# Patient Record
Sex: FEMALE | Race: OTHER | NOT HISPANIC OR LATINO | Employment: OTHER | URBAN - METROPOLITAN AREA
[De-identification: names, ages, dates, MRNs, and addresses within clinical notes are randomized per-mention and may not be internally consistent; named-entity substitution may affect disease eponyms.]

---

## 2022-05-12 ENCOUNTER — APPOINTMENT (OUTPATIENT)
Dept: RADIOLOGY | Facility: MEDICAL CENTER | Age: 82
DRG: 025 | End: 2022-05-12
Attending: EMERGENCY MEDICINE
Payer: MEDICAID

## 2022-05-12 ENCOUNTER — HOSPITAL ENCOUNTER (INPATIENT)
Facility: MEDICAL CENTER | Age: 82
LOS: 19 days | DRG: 025 | End: 2022-05-31
Attending: EMERGENCY MEDICINE | Admitting: STUDENT IN AN ORGANIZED HEALTH CARE EDUCATION/TRAINING PROGRAM
Payer: MEDICAID

## 2022-05-12 DIAGNOSIS — D49.6 BRAIN TUMOR (HCC): ICD-10-CM

## 2022-05-12 DIAGNOSIS — I10 PRIMARY HYPERTENSION: ICD-10-CM

## 2022-05-12 DIAGNOSIS — I10 HYPERTENSION, UNSPECIFIED TYPE: ICD-10-CM

## 2022-05-12 DIAGNOSIS — D32.9 MENINGIOMA (HCC): ICD-10-CM

## 2022-05-12 DIAGNOSIS — K04.7 DENTAL INFECTION: ICD-10-CM

## 2022-05-12 PROBLEM — I16.0 HYPERTENSIVE URGENCY: Status: ACTIVE | Noted: 2022-05-12

## 2022-05-12 PROBLEM — K02.9 DENTAL CARIES: Status: ACTIVE | Noted: 2022-05-12

## 2022-05-12 LAB
ALBUMIN SERPL BCP-MCNC: 3.9 G/DL (ref 3.2–4.9)
ALBUMIN/GLOB SERPL: 1.2 G/DL
ALP SERPL-CCNC: 103 U/L (ref 30–99)
ALT SERPL-CCNC: 20 U/L (ref 2–50)
ANION GAP SERPL CALC-SCNC: 10 MMOL/L (ref 7–16)
APPEARANCE UR: CLEAR
AST SERPL-CCNC: 20 U/L (ref 12–45)
BACTERIA #/AREA URNS HPF: NEGATIVE /HPF
BASOPHILS # BLD AUTO: 0.4 % (ref 0–1.8)
BASOPHILS # BLD: 0.04 K/UL (ref 0–0.12)
BILIRUB SERPL-MCNC: <0.2 MG/DL (ref 0.1–1.5)
BILIRUB UR QL STRIP.AUTO: NEGATIVE
BUN SERPL-MCNC: 17 MG/DL (ref 8–22)
CALCIUM SERPL-MCNC: 9.4 MG/DL (ref 8.5–10.5)
CHLORIDE SERPL-SCNC: 105 MMOL/L (ref 96–112)
CO2 SERPL-SCNC: 25 MMOL/L (ref 20–33)
COLOR UR: YELLOW
CREAT SERPL-MCNC: 0.71 MG/DL (ref 0.5–1.4)
EOSINOPHIL # BLD AUTO: 0.15 K/UL (ref 0–0.51)
EOSINOPHIL NFR BLD: 1.7 % (ref 0–6.9)
EPI CELLS #/AREA URNS HPF: NEGATIVE /HPF
ERYTHROCYTE [DISTWIDTH] IN BLOOD BY AUTOMATED COUNT: 43.6 FL (ref 35.9–50)
GFR SERPLBLD CREATININE-BSD FMLA CKD-EPI: 86 ML/MIN/1.73 M 2
GLOBULIN SER CALC-MCNC: 3.2 G/DL (ref 1.9–3.5)
GLUCOSE SERPL-MCNC: 146 MG/DL (ref 65–99)
GLUCOSE UR STRIP.AUTO-MCNC: NEGATIVE MG/DL
HCT VFR BLD AUTO: 42 % (ref 37–47)
HGB BLD-MCNC: 13.6 G/DL (ref 12–16)
HYALINE CASTS #/AREA URNS LPF: NORMAL /LPF
IMM GRANULOCYTES # BLD AUTO: 0.04 K/UL (ref 0–0.11)
IMM GRANULOCYTES NFR BLD AUTO: 0.4 % (ref 0–0.9)
KETONES UR STRIP.AUTO-MCNC: NEGATIVE MG/DL
LEUKOCYTE ESTERASE UR QL STRIP.AUTO: ABNORMAL
LYMPHOCYTES # BLD AUTO: 1.47 K/UL (ref 1–4.8)
LYMPHOCYTES NFR BLD: 16.2 % (ref 22–41)
MCH RBC QN AUTO: 28.3 PG (ref 27–33)
MCHC RBC AUTO-ENTMCNC: 32.4 G/DL (ref 33.6–35)
MCV RBC AUTO: 87.5 FL (ref 81.4–97.8)
MICRO URNS: ABNORMAL
MONOCYTES # BLD AUTO: 0.54 K/UL (ref 0–0.85)
MONOCYTES NFR BLD AUTO: 5.9 % (ref 0–13.4)
NEUTROPHILS # BLD AUTO: 6.85 K/UL (ref 2–7.15)
NEUTROPHILS NFR BLD: 75.4 % (ref 44–72)
NITRITE UR QL STRIP.AUTO: NEGATIVE
NRBC # BLD AUTO: 0 K/UL
NRBC BLD-RTO: 0 /100 WBC
PH UR STRIP.AUTO: 6 [PH] (ref 5–8)
PLATELET # BLD AUTO: 355 K/UL (ref 164–446)
PMV BLD AUTO: 10.4 FL (ref 9–12.9)
POTASSIUM SERPL-SCNC: 4.4 MMOL/L (ref 3.6–5.5)
PROT SERPL-MCNC: 7.1 G/DL (ref 6–8.2)
PROT UR QL STRIP: NEGATIVE MG/DL
RBC # BLD AUTO: 4.8 M/UL (ref 4.2–5.4)
RBC # URNS HPF: NORMAL /HPF
RBC UR QL AUTO: NEGATIVE
SODIUM SERPL-SCNC: 140 MMOL/L (ref 135–145)
SP GR UR STRIP.AUTO: 1.01
UROBILINOGEN UR STRIP.AUTO-MCNC: 0.2 MG/DL
WBC # BLD AUTO: 9.1 K/UL (ref 4.8–10.8)
WBC #/AREA URNS HPF: NORMAL /HPF

## 2022-05-12 PROCEDURE — 700102 HCHG RX REV CODE 250 W/ 637 OVERRIDE(OP): Performed by: EMERGENCY MEDICINE

## 2022-05-12 PROCEDURE — 70487 CT MAXILLOFACIAL W/DYE: CPT

## 2022-05-12 PROCEDURE — 70553 MRI BRAIN STEM W/O & W/DYE: CPT

## 2022-05-12 PROCEDURE — 70450 CT HEAD/BRAIN W/O DYE: CPT

## 2022-05-12 PROCEDURE — 700105 HCHG RX REV CODE 258: Performed by: EMERGENCY MEDICINE

## 2022-05-12 PROCEDURE — 700111 HCHG RX REV CODE 636 W/ 250 OVERRIDE (IP): Performed by: EMERGENCY MEDICINE

## 2022-05-12 PROCEDURE — 96376 TX/PRO/DX INJ SAME DRUG ADON: CPT

## 2022-05-12 PROCEDURE — 700117 HCHG RX CONTRAST REV CODE 255: Performed by: EMERGENCY MEDICINE

## 2022-05-12 PROCEDURE — 770001 HCHG ROOM/CARE - MED/SURG/GYN PRIV*

## 2022-05-12 PROCEDURE — A9270 NON-COVERED ITEM OR SERVICE: HCPCS | Performed by: EMERGENCY MEDICINE

## 2022-05-12 PROCEDURE — 85025 COMPLETE CBC W/AUTO DIFF WBC: CPT

## 2022-05-12 PROCEDURE — 96365 THER/PROPH/DIAG IV INF INIT: CPT

## 2022-05-12 PROCEDURE — A9576 INJ PROHANCE MULTIPACK: HCPCS | Performed by: EMERGENCY MEDICINE

## 2022-05-12 PROCEDURE — 81001 URINALYSIS AUTO W/SCOPE: CPT

## 2022-05-12 PROCEDURE — 700111 HCHG RX REV CODE 636 W/ 250 OVERRIDE (IP): Performed by: STUDENT IN AN ORGANIZED HEALTH CARE EDUCATION/TRAINING PROGRAM

## 2022-05-12 PROCEDURE — 80053 COMPREHEN METABOLIC PANEL: CPT

## 2022-05-12 PROCEDURE — 99285 EMERGENCY DEPT VISIT HI MDM: CPT

## 2022-05-12 PROCEDURE — 36415 COLL VENOUS BLD VENIPUNCTURE: CPT

## 2022-05-12 PROCEDURE — 96372 THER/PROPH/DIAG INJ SC/IM: CPT

## 2022-05-12 PROCEDURE — 96375 TX/PRO/DX INJ NEW DRUG ADDON: CPT

## 2022-05-12 PROCEDURE — 99223 1ST HOSP IP/OBS HIGH 75: CPT | Performed by: STUDENT IN AN ORGANIZED HEALTH CARE EDUCATION/TRAINING PROGRAM

## 2022-05-12 RX ORDER — HYDRALAZINE HYDROCHLORIDE 20 MG/ML
10 INJECTION INTRAMUSCULAR; INTRAVENOUS ONCE
Status: COMPLETED | OUTPATIENT
Start: 2022-05-12 | End: 2022-05-12

## 2022-05-12 RX ORDER — LABETALOL HYDROCHLORIDE 5 MG/ML
10 INJECTION, SOLUTION INTRAVENOUS EVERY 4 HOURS PRN
Status: DISCONTINUED | OUTPATIENT
Start: 2022-05-12 | End: 2022-05-19

## 2022-05-12 RX ORDER — LEVETIRACETAM 500 MG/5ML
500 INJECTION, SOLUTION, CONCENTRATE INTRAVENOUS EVERY 12 HOURS
Status: DISCONTINUED | OUTPATIENT
Start: 2022-05-12 | End: 2022-05-16

## 2022-05-12 RX ORDER — MORPHINE SULFATE 4 MG/ML
4 INJECTION INTRAVENOUS ONCE
Status: COMPLETED | OUTPATIENT
Start: 2022-05-12 | End: 2022-05-12

## 2022-05-12 RX ORDER — ONDANSETRON 2 MG/ML
4 INJECTION INTRAMUSCULAR; INTRAVENOUS ONCE
Status: COMPLETED | OUTPATIENT
Start: 2022-05-12 | End: 2022-05-12

## 2022-05-12 RX ORDER — LEVETIRACETAM 500 MG/1
500 TABLET ORAL ONCE
Status: COMPLETED | OUTPATIENT
Start: 2022-05-12 | End: 2022-05-12

## 2022-05-12 RX ORDER — HEPARIN SODIUM 5000 [USP'U]/ML
5000 INJECTION, SOLUTION INTRAVENOUS; SUBCUTANEOUS EVERY 8 HOURS
Status: DISPENSED | OUTPATIENT
Start: 2022-05-12 | End: 2022-05-15

## 2022-05-12 RX ORDER — LISINOPRIL 20 MG/1
20 TABLET ORAL
Status: DISCONTINUED | OUTPATIENT
Start: 2022-05-12 | End: 2022-05-16

## 2022-05-12 RX ORDER — DEXAMETHASONE 4 MG/1
4 TABLET ORAL ONCE
Status: COMPLETED | OUTPATIENT
Start: 2022-05-12 | End: 2022-05-12

## 2022-05-12 RX ORDER — ACETAMINOPHEN 325 MG/1
650 TABLET ORAL EVERY 4 HOURS PRN
Status: DISCONTINUED | OUTPATIENT
Start: 2022-05-12 | End: 2022-05-19

## 2022-05-12 RX ORDER — DEXAMETHASONE SODIUM PHOSPHATE 4 MG/ML
4 INJECTION, SOLUTION INTRA-ARTICULAR; INTRALESIONAL; INTRAMUSCULAR; INTRAVENOUS; SOFT TISSUE EVERY 6 HOURS
Status: DISCONTINUED | OUTPATIENT
Start: 2022-05-13 | End: 2022-05-16

## 2022-05-12 RX ADMIN — HYDRALAZINE HYDROCHLORIDE 10 MG: 20 INJECTION, SOLUTION INTRAMUSCULAR; INTRAVENOUS at 21:38

## 2022-05-12 RX ADMIN — IOHEXOL 50 ML: 350 INJECTION, SOLUTION INTRAVENOUS at 19:40

## 2022-05-12 RX ADMIN — ONDANSETRON 4 MG: 2 INJECTION INTRAMUSCULAR; INTRAVENOUS at 18:50

## 2022-05-12 RX ADMIN — HYDRALAZINE HYDROCHLORIDE 10 MG: 20 INJECTION INTRAMUSCULAR; INTRAVENOUS at 20:06

## 2022-05-12 RX ADMIN — MORPHINE SULFATE 4 MG: 4 INJECTION INTRAVENOUS at 21:38

## 2022-05-12 RX ADMIN — SODIUM CHLORIDE 3 G: 900 INJECTION INTRAVENOUS at 18:57

## 2022-05-12 RX ADMIN — HEPARIN SODIUM 5000 UNITS: 5000 INJECTION, SOLUTION INTRAVENOUS; SUBCUTANEOUS at 23:37

## 2022-05-12 RX ADMIN — MORPHINE SULFATE 4 MG: 4 INJECTION INTRAVENOUS at 18:50

## 2022-05-12 RX ADMIN — LEVETIRACETAM 500 MG: 500 TABLET, FILM COATED ORAL at 20:54

## 2022-05-12 RX ADMIN — LEVETIRACETAM 500 MG: 100 INJECTION, SOLUTION INTRAVENOUS at 23:37

## 2022-05-12 RX ADMIN — GADOTERIDOL 20 ML: 279.3 INJECTION, SOLUTION INTRAVENOUS at 23:16

## 2022-05-12 RX ADMIN — DEXAMETHASONE 4 MG: 4 TABLET ORAL at 20:54

## 2022-05-12 ASSESSMENT — ENCOUNTER SYMPTOMS
MUSCULOSKELETAL NEGATIVE: 1
PSYCHIATRIC NEGATIVE: 1
GASTROINTESTINAL NEGATIVE: 1
EYES NEGATIVE: 1
RESPIRATORY NEGATIVE: 1
NEUROLOGICAL NEGATIVE: 1
CARDIOVASCULAR NEGATIVE: 1

## 2022-05-13 PROCEDURE — 700105 HCHG RX REV CODE 258: Performed by: HOSPITALIST

## 2022-05-13 PROCEDURE — 700111 HCHG RX REV CODE 636 W/ 250 OVERRIDE (IP): Performed by: HOSPITALIST

## 2022-05-13 PROCEDURE — 770001 HCHG ROOM/CARE - MED/SURG/GYN PRIV*

## 2022-05-13 PROCEDURE — 96376 TX/PRO/DX INJ SAME DRUG ADON: CPT

## 2022-05-13 PROCEDURE — 99233 SBSQ HOSP IP/OBS HIGH 50: CPT | Performed by: HOSPITALIST

## 2022-05-13 PROCEDURE — 96375 TX/PRO/DX INJ NEW DRUG ADDON: CPT

## 2022-05-13 PROCEDURE — A9270 NON-COVERED ITEM OR SERVICE: HCPCS | Performed by: HOSPITALIST

## 2022-05-13 PROCEDURE — 700102 HCHG RX REV CODE 250 W/ 637 OVERRIDE(OP): Performed by: STUDENT IN AN ORGANIZED HEALTH CARE EDUCATION/TRAINING PROGRAM

## 2022-05-13 PROCEDURE — 700102 HCHG RX REV CODE 250 W/ 637 OVERRIDE(OP): Performed by: HOSPITALIST

## 2022-05-13 PROCEDURE — 96372 THER/PROPH/DIAG INJ SC/IM: CPT

## 2022-05-13 PROCEDURE — 700111 HCHG RX REV CODE 636 W/ 250 OVERRIDE (IP): Performed by: STUDENT IN AN ORGANIZED HEALTH CARE EDUCATION/TRAINING PROGRAM

## 2022-05-13 PROCEDURE — A9270 NON-COVERED ITEM OR SERVICE: HCPCS | Performed by: STUDENT IN AN ORGANIZED HEALTH CARE EDUCATION/TRAINING PROGRAM

## 2022-05-13 RX ORDER — OMEPRAZOLE 20 MG/1
20 CAPSULE, DELAYED RELEASE ORAL DAILY
Status: DISCONTINUED | OUTPATIENT
Start: 2022-05-13 | End: 2022-05-15

## 2022-05-13 RX ADMIN — ACETAMINOPHEN 650 MG: 325 TABLET, FILM COATED ORAL at 11:45

## 2022-05-13 RX ADMIN — DEXAMETHASONE SODIUM PHOSPHATE 4 MG: 4 INJECTION, SOLUTION INTRA-ARTICULAR; INTRALESIONAL; INTRAMUSCULAR; INTRAVENOUS; SOFT TISSUE at 11:45

## 2022-05-13 RX ADMIN — SODIUM CHLORIDE 3 G: 900 INJECTION INTRAVENOUS at 17:05

## 2022-05-13 RX ADMIN — HEPARIN SODIUM 5000 UNITS: 5000 INJECTION, SOLUTION INTRAVENOUS; SUBCUTANEOUS at 22:11

## 2022-05-13 RX ADMIN — OMEPRAZOLE 20 MG: 20 CAPSULE, DELAYED RELEASE ORAL at 17:04

## 2022-05-13 RX ADMIN — ACETAMINOPHEN 650 MG: 325 TABLET, FILM COATED ORAL at 17:04

## 2022-05-13 RX ADMIN — LEVETIRACETAM 500 MG: 100 INJECTION, SOLUTION INTRAVENOUS at 12:07

## 2022-05-13 RX ADMIN — DEXAMETHASONE SODIUM PHOSPHATE 4 MG: 4 INJECTION, SOLUTION INTRA-ARTICULAR; INTRALESIONAL; INTRAMUSCULAR; INTRAVENOUS; SOFT TISSUE at 01:36

## 2022-05-13 RX ADMIN — HEPARIN SODIUM 5000 UNITS: 5000 INJECTION, SOLUTION INTRAVENOUS; SUBCUTANEOUS at 06:45

## 2022-05-13 RX ADMIN — DEXAMETHASONE SODIUM PHOSPHATE 4 MG: 4 INJECTION, SOLUTION INTRA-ARTICULAR; INTRALESIONAL; INTRAMUSCULAR; INTRAVENOUS; SOFT TISSUE at 17:04

## 2022-05-13 RX ADMIN — LEVETIRACETAM 500 MG: 100 INJECTION, SOLUTION INTRAVENOUS at 22:12

## 2022-05-13 RX ADMIN — DEXAMETHASONE SODIUM PHOSPHATE 4 MG: 4 INJECTION, SOLUTION INTRA-ARTICULAR; INTRALESIONAL; INTRAMUSCULAR; INTRAVENOUS; SOFT TISSUE at 06:45

## 2022-05-13 ASSESSMENT — ENCOUNTER SYMPTOMS
PALPITATIONS: 0
SPUTUM PRODUCTION: 0
HEMOPTYSIS: 0
NERVOUS/ANXIOUS: 1
ORTHOPNEA: 0
MYALGIAS: 0
DIZZINESS: 1
VOMITING: 0
HEARTBURN: 0
COUGH: 0
CHILLS: 0
NAUSEA: 0
BLURRED VISION: 0
FOCAL WEAKNESS: 1
FEVER: 0
ABDOMINAL PAIN: 0
HEADACHES: 1
CLAUDICATION: 0
SHORTNESS OF BREATH: 1
DOUBLE VISION: 0

## 2022-05-13 ASSESSMENT — COGNITIVE AND FUNCTIONAL STATUS - GENERAL
SUGGESTED CMS G CODE MODIFIER MOBILITY: CH
SUGGESTED CMS G CODE MODIFIER DAILY ACTIVITY: CH
MOBILITY SCORE: 24
DAILY ACTIVITIY SCORE: 24

## 2022-05-13 ASSESSMENT — PAIN DESCRIPTION - PAIN TYPE
TYPE: ACUTE PAIN

## 2022-05-13 NOTE — ED TRIAGE NOTES
Pt BIB [facility] from home with c/c of HA and Hypertension. Per EMS the patient is reporting no pain relief with tylenol. Pt does not currently take HTN medications. Pt speaks Nepali

## 2022-05-13 NOTE — ASSESSMENT & PLAN NOTE
Unasyn given in ED  No abscess seen on CT. unasyn stopped 5/16.  Leukocytosis increased but now improving likely due to steroids - no dental pain  following intermittently

## 2022-05-13 NOTE — H&P
Hospital Medicine History & Physical Note    Date of Service  5/12/2022    Primary Care Physician  No primary care provider on file.    Consultants  Neurosurgery    Code Status  Full Code    Chief Complaint  Chief Complaint   Patient presents with   • Headache   • Hypertension   • Hernia   • Dental Pain     Left sided        History of Presenting Illness  Enzo Núñez is a 81 y.o. female who presented 5/12/2022 with head pressure with left-sided dental and facial pain for the last 2 days.  Patient is Czech speaking and is from [place].  She began to have worsening pain, prompting her to come into the ED for evaluation.    She states that she feels that her legs have been weak bilaterally, however this is been going on for years.  She states that she has visual issues however chronic as well.  She denies dizziness upon ambulation and denies imbalances upon ambulation.  Denies drinking smoking illicit drug use.    In the ED, patient found to have elevated blood pressure.  CT maxillofacial negative for abscess.  CT head shows large right frontal meningioma measuring 5.2 x 4.4 x 4.2 cm with associated mass-effect on the right frontal lobe with adjacent white matter edema and mild compression of the frontal horn of the right lateral ventricle.  Neurosurgery consulted, recommended Keppra MRI and steroids.    Translation provided by Dr. Karl HARRIS discussed the plan of care with patient.    Review of Systems  Review of Systems   Constitutional: Positive for malaise/fatigue.   HENT: Negative.    Eyes: Negative.    Respiratory: Negative.    Cardiovascular: Negative.    Gastrointestinal: Negative.    Genitourinary: Negative.    Musculoskeletal: Negative.    Skin: Negative.    Neurological: Negative.    Endo/Heme/Allergies: Negative.    Psychiatric/Behavioral: Negative.        Past Medical History  No pertinent medical history    Surgical History  No pertinent surgical history    Family History   Family history reviewed  with patient. There is no family history that is pertinent to the chief complaint.     Social History   as per hpi     Allergies  No Known Allergies    Medications  None       Physical Exam  Temp:  [36.9 °C (98.5 °F)] 36.9 °C (98.5 °F)  Pulse:  [82-97] 97  Resp:  [16] 16  BP: (162-212)/() 168/76  SpO2:  [90 %-95 %] 92 %  Blood Pressure : (!) 168/76   Temperature: 36.9 °C (98.5 °F)   Pulse: 97   Respiration: 16   Pulse Oximetry: 92 %       Physical Exam  Constitutional:       Appearance: Normal appearance. She is normal weight.   HENT:      Head: Normocephalic.      Comments: Several Dental cavities on L upper and lower teeth      Nose: Nose normal.      Mouth/Throat:      Mouth: Mucous membranes are moist.   Eyes:      Pupils: Pupils are equal, round, and reactive to light.   Cardiovascular:      Rate and Rhythm: Normal rate and regular rhythm.      Pulses: Normal pulses.   Pulmonary:      Effort: Pulmonary effort is normal.      Breath sounds: Normal breath sounds.   Abdominal:      General: Abdomen is flat. Bowel sounds are normal.      Palpations: Abdomen is soft.   Musculoskeletal:         General: Normal range of motion.      Cervical back: Normal range of motion.   Skin:     General: Skin is warm.   Neurological:      General: No focal deficit present.      Mental Status: She is alert and oriented to person, place, and time. Mental status is at baseline.   Psychiatric:         Mood and Affect: Mood normal.         Behavior: Behavior normal.         Thought Content: Thought content normal.         Judgment: Judgment normal.         Laboratory:  Recent Labs     05/12/22  1700   WBC 9.1   RBC 4.80   HEMOGLOBIN 13.6   HEMATOCRIT 42.0   MCV 87.5   MCH 28.3   MCHC 32.4*   RDW 43.6   PLATELETCT 355   MPV 10.4     Recent Labs     05/12/22  1700   SODIUM 140   POTASSIUM 4.4   CHLORIDE 105   CO2 25   GLUCOSE 146*   BUN 17   CREATININE 0.71   CALCIUM 9.4     Recent Labs     05/12/22  1700   ALTSGPT 20   ASTSGOT 20    ALKPHOSPHAT 103*   TBILIRUBIN <0.2   GLUCOSE 146*         No results for input(s): NTPROBNP in the last 72 hours.      No results for input(s): TROPONINT in the last 72 hours.    Imaging:  CT-HEAD W/O   Final Result      1.  Negative for hemorrhage      2.  Large right frontal meningioma measuring 5.2 x 4.4 x 4.2 cm      3.  Associated mass effect on the right frontal lobe with adjacent white matter edema and mild compression of the frontal horn right lateral ventricle.         CT-MAXILLOFACIAL WITH PLUS RECONS   Final Result      1.  Negative for facial or orbital abscess      2.  CT face within normal limits      3.  Partially calcified right frontal extra-axial mass consistent with a meningioma described in detail on head CT report      MR-[place] BRAIN WITH & W/O    (Results Pending)       no X-Ray or EKG requiring interpretation    Assessment/Plan:  Justification for Admission Status  I anticipate this patient will require at least two midnights for appropriate medical management, necessitating inpatient admission because Inpatient    * Meningioma (HCC)  Assessment & Plan  CT head shows Large right frontal meningioma measuring 5.2 x 4.4 x 4.2 cm and associated mass effect on the right frontal lobe with adjacent white matter edema and mild compression of the frontal horn right lateral ventricle  Neurosurgery would like MRI w/ and w/o contrast   Decadron 4 mg q6h  Keppra 500 mg q12h      Hypertensive urgency  Assessment & Plan  Start patient on lisinopril   Labetalol IV prn     Dental caries  Assessment & Plan  Unasyn given in ED  No abscess seen on CT. Can hold abx for now, possibly infected however no leukocytosis. Patient does not brush her teeth regularly and I feel that she can follow up with her dentist outpt         VTE prophylaxis: heparin ppx

## 2022-05-13 NOTE — PROGRESS NOTES
1106 Report received from ARACELIS Israel RN.     1135 Patient arrived on the unit.     1200 Dr. Dhaliwal at bedside to speak with patient and daughter. Plan for IR embolization on 5/14 and tumor resection on 5/15 or 5/16.    1230 Dr. Smith at bedside to assess patient. Patient updated on plan of care, all needs met at this time.

## 2022-05-13 NOTE — ED PROVIDER NOTES
ED Provider Note    Scribed for Chloe Norwood M.D. by Dave Edward. 5/12/2022  5:43 PM    Primary care provider: No primary care provider reported.  Means of arrival: EMS  History obtained from: Patient  History limited by: None  CHIEF COMPLAINT  Chief Complaint   Patient presents with   • Headache   • Hypertension   • Hernia   • Dental Pain     Left sided        HPI  Enzo Núñez is a 81 y.o. female who presents to the Emergency Department for complaint of head pressure and left-sided dental pain with left-sided jaw swelling over the last 2 days.  Patient is visiting from [place].  Translation is by patient's daughters.  Daughter say patient does not usually brush her teeth.  2 days ago she had pain at her left lower tooth.  Today she developed swelling in her left lower jaw.  She had a subjective fever today.  They did not take her temperature.  She is had some nausea but no vomiting.  She has history of hypertension but is not any medications for that.  Her daughter says that they thought she was taken off of her medications in [place] a few years ago.  No chest pains.  Denies she feels short of breath because of the pain.  She has been taking Aleve, Tylenol, and a pain medication from [place] without any improvement or relief in her symptoms.  She also complains of some abdominal discomfort which she has had many times in the past.  She says it is due to a large incisional hernia that she has.  The main reason why she comes to ER today is for the dental pain/jaw pain and head pain.  She says she really is not that concerned about the abdominal pain or hernia.    REVIEW OF SYSTEMS  Pertinent positives include left-sided dental pain, left-sided jaw swelling, subjective fever, nausea, intermittent abdominal pain due to known hernia, head pressure.   Pertinent negatives include no chest pain, vomiting, diarrhea, numbness/tingling/weakness of extremities.   See HPI for further details. All other systems are  negative.    PAST MEDICAL HISTORY  No past medical history on file.    FAMILY HISTORY  No family history on file.    SOCIAL HISTORY      Social History     Substance and Sexual Activity   Drug Use Not on file       SURGICAL HISTORY  No past surgical history on file.    CURRENT MEDICATIONS  Home Medications    **Home medications have not yet been reviewed for this encounter**         ALLERGIES  No Known Allergies    PHYSICAL EXAM  VITAL SIGNS: BP (!) 212/99   Pulse 91   Temp 36.9 °C (98.5 °F) (Temporal)   Resp 16   Wt 90.7 kg (200 lb)   SpO2 92%      Constitutional: Well developed, well nourished; moderate acute distress due to dental pain; Non-toxic appearance.   HENT: Normocephalic, atraumatic; Bilateral external ears normal; very poor dentition throughout.  The left lower premolar is broken, rotted and decayed.  It is tender to percussion.  There is associated tenderness with palpation of the gingiva adjacent to that particular tooth.  There is also some mild tenderness to one of the left upper premolars which is also broken and decayed.  Patient has some tenderness to the left anterior mandible just the front of the associated/affected tooth with some mild swelling in that area.  No trismus.  No sublingual or submental swelling.  Eyes: PERRL, EOMI, Conjunctiva normal. No discharge.   Neck:  Supple, nontender midline; No stridor; No nuchal rigidity.   Lymphatic: No cervical lymphadenopathy noted.   Cardiovascular: Regular rate and rhythm without murmurs, rubs, or gallop.   Thorax & Lungs: No respiratory distress, breath sounds clear to auscultation bilaterally without wheezing, rales or rhonchi. Nontender chest wall. No crepitus or subcutaneous air  Abdomen: Soft, nontender, healed abdominal scar with what appears to be a leftward incisional hernia which is soft and reducible.  Bowel sounds normal. No obvious masses; No pulsatile masses; no rebound, guarding, or peritoneal signs.   Skin: Good color; warm and  dry without rash or petechia.  Back: Nontender, No CVA tenderness.   Extremities:  No edema; Nontender calves or saphenous, No cyanosis, No clubbing.   Musculoskeletal: Good range of motion in all major joints. No tenderness to palpation or major deformities noted.   Neurologic: Alert & oriented x 4, clear speech.         LABS/RADIOLOGY/PROCEDURES  Results for orders placed or performed during the hospital encounter of 05/12/22   CBC WITH DIFFERENTIAL   Result Value Ref Range    WBC 9.1 4.8 - 10.8 K/uL    RBC 4.80 4.20 - 5.40 M/uL    Hemoglobin 13.6 12.0 - 16.0 g/dL    Hematocrit 42.0 37.0 - 47.0 %    MCV 87.5 81.4 - 97.8 fL    MCH 28.3 27.0 - 33.0 pg    MCHC 32.4 (L) 33.6 - 35.0 g/dL    RDW 43.6 35.9 - 50.0 fL    Platelet Count 355 164 - 446 K/uL    MPV 10.4 9.0 - 12.9 fL    Neutrophils-Polys 75.40 (H) 44.00 - 72.00 %    Lymphocytes 16.20 (L) 22.00 - 41.00 %    Monocytes 5.90 0.00 - 13.40 %    Eosinophils 1.70 0.00 - 6.90 %    Basophils 0.40 0.00 - 1.80 %    Immature Granulocytes 0.40 0.00 - 0.90 %    Nucleated RBC 0.00 /100 WBC    Neutrophils (Absolute) 6.85 2.00 - 7.15 K/uL    Lymphs (Absolute) 1.47 1.00 - 4.80 K/uL    Monos (Absolute) 0.54 0.00 - 0.85 K/uL    Eos (Absolute) 0.15 0.00 - 0.51 K/uL    Baso (Absolute) 0.04 0.00 - 0.12 K/uL    Immature Granulocytes (abs) 0.04 0.00 - 0.11 K/uL    NRBC (Absolute) 0.00 K/uL   Comp Metabolic Panel   Result Value Ref Range    Sodium 140 135 - 145 mmol/L    Potassium 4.4 3.6 - 5.5 mmol/L    Chloride 105 96 - 112 mmol/L    Co2 25 20 - 33 mmol/L    Anion Gap 10.0 7.0 - 16.0    Glucose 146 (H) 65 - 99 mg/dL    Bun 17 8 - 22 mg/dL    Creatinine 0.71 0.50 - 1.40 mg/dL    Calcium 9.4 8.5 - 10.5 mg/dL    AST(SGOT) 20 12 - 45 U/L    ALT(SGPT) 20 2 - 50 U/L    Alkaline Phosphatase 103 (H) 30 - 99 U/L    Total Bilirubin <0.2 0.1 - 1.5 mg/dL    Albumin 3.9 3.2 - 4.9 g/dL    Total Protein 7.1 6.0 - 8.2 g/dL    Globulin 3.2 1.9 - 3.5 g/dL    A-G Ratio 1.2 g/dL   ESTIMATED GFR   Result  "Value Ref Range    GFR (CKD-EPI) 86 >60 mL/min/1.73 m 2   URINALYSIS    Specimen: Urine   Result Value Ref Range    Color Yellow     Character Clear     Specific Gravity 1.010 <1.035    Ph 6.0 5.0 - 8.0    Glucose Negative Negative mg/dL    Ketones Negative Negative mg/dL    Protein Negative Negative mg/dL    Bilirubin Negative Negative    Urobilinogen, Urine 0.2 Negative    Nitrite Negative Negative    Leukocyte Esterase Trace (A) Negative    Occult Blood Negative Negative    Micro Urine Req Microscopic    URINE MICROSCOPIC (W/UA)   Result Value Ref Range    WBC 0-2 /hpf    RBC 0-2 /hpf    Bacteria Negative None /hpf    Epithelial Cells Negative /hpf    Hyaline Cast 0-2 /lpf         CT-HEAD W/O   Final Result      1.  Negative for hemorrhage      2.  Large right frontal meningioma measuring 5.2 x 4.4 x 4.2 cm      3.  Associated mass effect on the right frontal lobe with adjacent white matter edema and mild compression of the frontal horn right lateral ventricle.         CT-MAXILLOFACIAL WITH PLUS RECONS   Final Result      1.  Negative for facial or orbital abscess      2.  CT face within normal limits      3.  Partially calcified right frontal extra-axial mass consistent with a meningioma described in detail on head CT report      MR-STEALTH BRAIN WITH & W/O    (Results Pending)       COURSE & MEDICAL DECISION MAKING  Pertinent Labs & Imaging studies reviewed. (See chart for details)        5:43 PM - Patient seen and examined at bedside. Discussed plan of care.      2000: Daughter reports that patient has been having trouble walking for a little while.  Sometimes she is \"wobbly\" they say.  She is planning on being in the [place] area for the next 2 months.    2015: I spoke with Dr. Dhaliwal, neurosurgeon.  He recommends dexamethasone 4 mg every 6 hours, Keppra 500 mg twice daily, and he would like an MRI scan of the brain Stealth protocol with and without contrast.  He would like the patient admitted to the hospital " "service.    2130: I spoke with Dr. Salazar, hospitalist on-call.  He will kindly evaluate the patient hospitalization.    Patient presents to the ER with complaint of left dental pain and left jaw swelling for the last 2 days.  Dental pain started yesterday.  Jaw swelling started this morning.  She has a exquisitely tender left lower premolar which is broken, decayed and rotten.  It is tender to percussion as well as palpation.  She has tenderness with palpation of the adjacent gingiva as well.  There is some swelling to the left anterior mandible.  There is no trismus.  No sublingual or submental swelling.  No concern for Ryan's at this time.  She had a subjective fever this morning, but no measurable fever at home and no measurable fever here in the ER today.  She complained of \"head pressure\" and had an elevated blood pressure upon arrival.  She reports a history of hypertension in [place] but has not been on medications for quite some time.  Initially I thought perhaps her elevated blood pressure was due to some pain as she was quite uncomfortable secondary to her tooth.  Gave her some morphine.  It helped her pain but her blood pressure still high.  At this point I gave her some hydralazine and that did decrease her blood pressure a bit.  Given the elevated blood pressure and the \"head pressure\", since I was going to be doing a CT scan of her maxillofacial region anyways to look for abscess, I did a CT scan of the brain to be sure there was no head bleed.  The patient has a large meningioma which is causing mass-effect.  I spoke with Dr. Dhaliwal, neurosurgeon on-call.  He recommended admission to get admitted to the hospital for MRI Stealth protocol with and without contrast as well as for every 6 hour Decadron and twice daily Keppra.  Patient was given IV Unasyn for her dental pain.  There is no drainable abscess at this time.  She will need continued antibiotics to control the dental infection.  At this time " patient's meningioma with mass-effect will be managed by neurosurgery in conjunction with the hospitalist.  I spoke with Dr. Salazar, hospitalist on-call, and he will come evaluate the patient hospitalization.      FINAL IMPRESSION  1. Meningioma (HCC) Acute   2. Dental infection Acute   3. Hypertension, unspecified type Acute         Dave HARRIS (Herlindaibe), am scribing for, and in the presence of, Chloe Norwood M.D..    Electronically signed by: Dave Edward (Herlindaibmarlon), 5/12/2022    Chloe HARRIS M.D. personally performed the services described in this documentation, as scribed by Dave Edward in my presence, and it is both accurate and complete.    This dictation has been created using voice recognition software. The accuracy of the dictation is limited by the abilities of the software. I expect there may be some errors of grammar and possibly content. I made every attempt to manually correct the errors within my dictation. However, errors related to voice recognition software may still exist and should be interpreted within the appropriate context.    The note accurately reflects work and decisions made by me.  Chloe Norwood M.D.  5/12/2022  8:11 PM

## 2022-05-13 NOTE — CARE PLAN
The patient is Watcher - Medium risk of patient condition declining or worsening    Shift Goals  Clinical Goals: Pain management, monitor neuro status, NPO at midnight for procedure tomorrow  Patient Goals: Pain management, rest    Progress made toward(s) clinical / shift goals:    Problem: Knowledge Deficit - Standard  Goal: Patient and family/care givers will demonstrate understanding of plan of care, disease process/condition, diagnostic tests and medications  Outcome: Progressing  Note: Discussed POC with patient and daughter including plan for surgery, NPO at midnight, medications, and orientation to room and call light. They indicated understanding.      Problem: Pain - Standard  Goal: Alleviation of pain or a reduction in pain to the patient’s comfort goal  Outcome: Progressing  Note: Discussed pain management regimen with patient and daughter. See administrations per MAR, heat also in use to manage pain.      Problem: Fall Risk  Goal: Patient will remain free from falls  Outcome: Progressing  Note: Fall precautions in place, including bed alarm. Patient educated to call for assistance.

## 2022-05-13 NOTE — ASSESSMENT & PLAN NOTE
CT head shows Large right frontal meningioma measuring 5.2 x 4.4 x 4.2 cm and associated mass effect on the right frontal lobe with adjacent white matter edema and mild compression of the frontal horn right lateral ventricl  MRI was reviewed, neurosurgery Dr. Goodson has evaluated, recommended IR guided embolization on 5/14  IR unable to perform embolization of feeder artery due to anatomy    Continue decadron taper,  omeprazole 20 mg p.o. daily  Keppra 500 mg twice daily    S/p CRANIOTOMY, USING FRAMELESS STEREOTAXY - FRONTAL FOR BRAIN TUMOR RESECTION   Weaning steroids  Continue supportive care    Neurosurgery cleared her for discharge however given weakness and no insurance so no post-acute will keep inpatient to work with PT/OT until safe for discharge - she is improving, anticipate home very soon

## 2022-05-13 NOTE — CONSULTS
ID:  Enzo Núñez; 81 y.o. female      Admission Date: 5/12/2022   Date of Consultation: 5/13/2022  Requesting Provider: Braydon Smith M.D.  PCP: No primary care provider on file.        Chief Complaint:: headache    Reason for consultation:: brain tumor      HPI:  Enzo Núñez is a 81 y.o. female who presented to [facility] with headache and increasing confusion over last few days. Pt is visiting from [place]. When she arrived in February, she was in her usual state of health. However, she has had worsening headache, ataxia and confusion over last week. Imaging demonstrates large Right frontal tumor for which I have been consulted.        Past Medical History:  No past medical history on file.    Past Surgical History:  No past surgical history on file.    Social History:  Social History     Occupational History   • Not on file   Tobacco Use   • Smoking status: Not on file   • Smokeless tobacco: Not on file   Substance and Sexual Activity   • Alcohol use: Not on file   • Drug use: Not on file   • Sexual activity: Not on file     Social History     Social History Narrative   • Not on file       Family History:  No family history on file.    Medications:  Prior to Admission medications    Not on File       Allergies:  No Known Allergies    Review of Systems:    negative for constitutional, HEENT, cardiac, pulmonary, GI, , musculoskeletal, psych, dermatologic, endo, hematologic, immunologic except: headache, abdominal pain, RLE pain      PHYSICAL  EXAMINATION:     Performed with help of daughter for translation  General:  Awake and alert, oriented X4  Normal affect, attention and concentration  Fluent, appropriate speech.  No acute distress, well appearing  Briskly follows commands    Cranial nerves:  PERRL, EOMI, VFF  Face symmetric, TML    Muscle testing:  RUE 5/5  LUE 5/5  RLE 5/5  LLE 5/5    No pronator drift  Sensation intact to light touch          LABS:  Recent Labs     05/12/22  1700    SODIUM 140   POTASSIUM 4.4   CHLORIDE 105   CO2 25   GLUCOSE 146*   BUN 17   CREATININE 0.71   CALCIUM 9.4     Recent Labs     05/12/22  1700   WBC 9.1   RBC 4.80   HEMOGLOBIN 13.6   HEMATOCRIT 42.0   MCV 87.5   MCH 28.3   MCHC 32.4*   RDW 43.6   PLATELETCT 355   MPV 10.4     No results found for: PROTHROMBTM, INR   Recent Labs     05/12/22  1700   ASTSGOT 20   ALTSGPT 20   TBILIRUBIN <0.2   ALKPHOSPHAT 103*   GLOBULIN 3.2       Radiology Studies:     There is a large 45 x 57 x 45 mm mass in the right inferior frontal extra-axial space with significant mass effect upon the right frontal lobe as well as the inferomedial left frontal lobe.    Impression and Plan:     Ms. Enzo Núñez is a 81 y.o. year old female presenting with new diagnosis of right frontal brain tumor     - dex 4q6  - keppra 500 BID  - IR embolization 5/14  - resection on 5/15 or 5/15    Thank you for the consultation. Please do not hesitate contacting me with questions.    Hakan Dhaliwal III, MD, PhD  Board eligible neurosurgeon  [facility]

## 2022-05-13 NOTE — HOSPITAL COURSE
"\"This is 81-year-old female with a past medical history significant for obesity, new diagnosis of hypertension presented to the ER with headache, mild confusion hypertension and balance issues.  She was found to have right frontal meningioma with associated mass-effect on right frontal lobe with adjacent white matter edema.  Neurosurgery was consulted.  She was started on Keppra and Decadron.  She was initially admitted to the ICU for close monitoring.  IR was unable to perform embolization of feeder vessel due to vessel anatomy. Now planned for craniotomy and tumor resection 5/19/22.\" Assessment from prior Hospitalist and critical care APRN.    Patient did well after surgery and was monitored overnight in RICU. As per neurosurgery, patient motor skills intact and downgraded to medical bed.  Patient kept on decadron and Keppra.  MRI brain ordered.  "

## 2022-05-13 NOTE — ED NOTES
Medication Reconciliation Complete per virtual  with pt.     Patient stated she took two tabets of an otc pain medication yesterday, but does not know the name of the medication. Pt stated she does not take any prescriptions.   NKDA per pt.   No ABX in last 30 days.     Home Pharmacy: [facility] [phone]

## 2022-05-13 NOTE — PROGRESS NOTES
4 Eyes Skin Assessment Completed by Tracy RN and Tuan RN.    Head WDL  Ears WDL  Nose WDL  Mouth Redness, dental caries  Neck WDL  Breast/Chest WDL  Shoulder Blades WDL  Spine WDL  (R) Arm/Elbow/Hand Bruising  (L) Arm/Elbow/Hand Bruising  Abdomen Bruising and Incision, previous hernia repair  Groin WDL  Scrotum/Coccyx/Buttocks WDL  (R) Leg Swelling  (L) Leg Swelling  (R) Heel/Foot/Toe WDL  (L) Heel/Foot/Toe WDL          Devices In Places Blood Pressure Cuff, Pulse Ox and Nasal Cannula      Interventions In Place Waffle Overlay, Pillows, Heels Loaded W/Pillows and Pressure Redistribution Mattress    Possible Skin Injury No    Pictures Uploaded Into Epic N/A  Wound Consult Placed N/A  RN Wound Prevention Protocol Ordered No

## 2022-05-13 NOTE — H&P
"Radiology Consult  Author: ALMA Ray Date & Time created: 5/13/2022  4:34 PM   Date of admission  5/12/2022  Note to reader: this note follows the APSO format rather than the historical SOAP format. Assessment and plan located at the top of the note for ease of use.    Chief Complaint  81 y.o. female admitted 5/12/2022 with   Chief Complaint   Patient presents with   • Headache   • Hypertension   • Hernia   • Dental Pain     Left sided      HPI  \" 81-year-old female with a past medical significant for obesity presented to the ER on 5/12/2022 with a complaint of headache that has been going on for the last 2 days.  Patient is Azeri speaking and came from [place].  She reports that her leg has been getting weak for some time but now it has progressively gotten worse and associated with visual disturbances and dizziness, and problems with balance. CT head showed large right frontal meningioma measuring 5.2 x 4.4 x 4.2 cm with associated mass-effect on the right frontal lobe with adjacent white matter edema and mild compression of the frontal horn of the right lateral ventricle. Maxillofacial did not show any abscess, but did noted to have infected tooth. Neurosurgery was consulted, neurosurgery evaluated the patient, recommended steroid and Keppra.  MRI was obtained, large 45 x 57 x 45 mm mass in the right inferior frontal extra-axial space with significant mass effect upon the right frontal lobe as well as the inferomedial left frontal lobe.\"         Assessment/Plan  Interval History   Principal Problem:    Meningioma (HCC)  Active Problems:    Dental caries    Hypertensive urgency      Plan IR  - Intracranial tumor embolization with GA tomorrow 5/14  - NPO at midnight  - Hold blood thinners/ASA  - Went over the risks, of aforementioned procedures were discussed with patient in detail. Patient was given opportunities to ask questions and discuss other options. Risks including but not limited to " perforation, infection, bleeding,  possible need for surgery(ies) and/or interventional radiology,  aspiration, hypoxia, stroke, medication (s) and/or anesthesia reaction(s),  discomfort/pain, unsuccessful and/or incomplete procedure, ineffective therapy, persistent symptoms, damage to adjacent organs/structure and/or vascular, and other adverse event(s) possibly life-threatening.  Interactive discussion was undertaken with Layman's terms.  I answered questions in full and to satisfaction.  Patient stated understanding and acceptance of these risks, and wished to proceed.         [id number]  IR: Interpretor services via ipad               Review of Systems  Physical Exam   Review of Systems   Unable to perform ROS: Language   Constitutional: Negative for chills, fever and malaise/fatigue.   Neurological: Positive for dizziness and headaches.   Psychiatric/Behavioral: The patient is nervous/anxious.       Vitals:    05/13/22 1624   BP: 133/65   Pulse: 90   Resp: 18   Temp: 36.6 °C (97.8 °F)   SpO2: 95%      Physical Exam  Constitutional:       Appearance: She is obese.   HENT:      Head: Normocephalic.      Nose: Nose normal.      Mouth/Throat:      Mouth: Mucous membranes are moist.   Eyes:      Pupils: Pupils are equal, round, and reactive to light.   Cardiovascular:      Rate and Rhythm: Normal rate.   Pulmonary:      Effort: Pulmonary effort is normal. No respiratory distress.   Abdominal:      General: Abdomen is flat.      Tenderness: There is no abdominal tenderness.   Musculoskeletal:         General: No tenderness or deformity.      Cervical back: Normal range of motion.      Right lower leg: Edema present.      Left lower leg: Edema present.   Skin:     General: Skin is warm and dry.      Capillary Refill: Capillary refill takes less than 2 seconds.      Coloration: Skin is not jaundiced or pale.   Neurological:      General: No focal deficit present.      Mental Status: She is alert.      Motor: No weakness.    Psychiatric:         Mood and Affect: Mood normal.         Behavior: Behavior normal.             Labs    Recent Labs     05/12/22  1700   WBC 9.1   RBC 4.80   HEMOGLOBIN 13.6   HEMATOCRIT 42.0   MCV 87.5   MCH 28.3   MCHC 32.4*   RDW 43.6   PLATELETCT 355   MPV 10.4     Recent Labs     05/12/22  1700   SODIUM 140   POTASSIUM 4.4   CHLORIDE 105   CO2 25   GLUCOSE 146*   BUN 17   CREATININE 0.71   CALCIUM 9.4     MR-STEALTH BRAIN WITH & W/O   Final Result         Large right inferior frontal region dural based extra-axial mass that most likely represents a meningioma. There is mild mass effect upon the bilateral inferior frontal lobes with midline shift to the left measuring approximately 10 to 11 mm. There is    also mass effect upon the lateral ventricles.      Minimal edema noted in the right superomedial frontal white matter adjacent to the mass.      Age-related volume loss and chronic microvascular ischemic changes.      CT-HEAD W/O   Final Result      1.  Negative for hemorrhage      2.  Large right frontal meningioma measuring 5.2 x 4.4 x 4.2 cm      3.  Associated mass effect on the right frontal lobe with adjacent white matter edema and mild compression of the frontal horn right lateral ventricle.         CT-MAXILLOFACIAL WITH PLUS RECONS   Final Result      1.  Negative for facial or orbital abscess      2.  CT face within normal limits      3.  Partially calcified right frontal extra-axial mass consistent with a meningioma described in detail on head CT report      IR-EMBOLIZE-NEURO-INTRACRANIAL    (Results Pending)     Recent Labs     05/12/22  1700   SODIUM 140   POTASSIUM 4.4   CHLORIDE 105   CO2 25   GLUCOSE 146*   BUN 17     No results found for: INR  No results found for: POCINR   No intake or output data in the 24 hours ending 05/13/22 1634   Labs not explicitly included in this progress note were reviewed by the author. Radiology/imaging not explicitly included in this progress note was reviewed  by the author.     No past medical history on file.     Home Medications    Not on File       I have performed a physical exam and reviewed and updated ROS and Plan today (5/13/2022).     20  minutes in directly providing and coordinating care and extensive data review.  No time overlap and excludes procedures.      As the Staff Neurointerventional radiologist, I have reviewed the notes, discussed the findings and plan of management with the Nurse Practitioner and agree with the contents of the note.

## 2022-05-13 NOTE — PROGRESS NOTES
[facility] Medicine Daily Progress Note    Date of Service  5/13/2022    Chief Complaint  Enzo Núñez is a 81 y.o. female admitted 5/12/2022 with .[facility]      Hospital Course  This is 81-year-old female with a past medical significant for obesity presented to the ER on 5/12/2022 with a complaint of headache that has been going on for the last 2 days.  Patient is Latvian speaking and came from [place].  She reports that her leg has been getting weak for some time but now it has progressively gotten worse and associated with visual disturbances and dizziness, and problems with balance    CT head showed large right frontal meningioma measuring 5.2 x 4.4 x 4.2 cm with associated mass-effect on the right frontal lobe with adjacent white matter edema and mild compression of the frontal horn of the right lateral ventricle.  Maxillofacial did not show any abscess, but did noted to have infected tooth.    Neurosurgery was consulted, neurosurgery evaluated the patient, recommended steroid and Keppra.  MRI was obtained, large 45 x 57 x 45 mm mass in the right inferior frontal extra-axial space with significant mass effect upon the right frontal lobe as well as the inferomedial left frontal lobe.    Dr. Goodson evaluated and recommended Decadron 4 mg every 6 hours Keppra 500 mg p.o. twice daily, IR embolization on 5/4  And  resection on 5/15.  Neurochecks every 4 hours.    Interval events  -- No acute events overnight, medicine has been stable throughout and oriented, answering questions appropriately.  Blood pressure noted to be better controlled.  Monitor.  Heart rate has been 75-92.  -- Neurochecks every 4 hour, patient at bedtime is doing well on 2 L of oxygen.  -- Continue Keppra 5 mg p.o. twice daily, Decadron 4 mg every 6 hours.  -- Undergoing IR embolization 5/14 and resection of the tumor on 5/15  Follow NS recs     Daughter  Has  Been noted to be at bedside, and translated during the conversation.    Plan of care has been  discussed the patient, nursing staff, , family member.    I have personally seen and examined the patient at bedside. I discussed the plan of care with patient, family, bedside RN, charge RN and .    Consultants/Specialty  neurosurgery    Code Status  Full Code    Disposition  Patient is not medically cleared for discharge.   Anticipate discharge to to home with close outpatient follow-up.  I have placed the appropriate orders for post-discharge needs.    Review of Systems  Review of Systems   Constitutional: Negative for fever and malaise/fatigue.   HENT: Negative for congestion.    Eyes: Negative for blurred vision and double vision.   Respiratory: Positive for shortness of breath. Negative for cough, hemoptysis and sputum production.    Cardiovascular: Negative for chest pain, palpitations, orthopnea and claudication.   Gastrointestinal: Negative for abdominal pain, heartburn, nausea and vomiting.   Genitourinary: Negative for dysuria and urgency.   Musculoskeletal: Negative for myalgias.   Neurological: Positive for focal weakness and headaches.   Psychiatric/Behavioral: The patient is nervous/anxious.    All other systems reviewed and are negative.       Physical Exam  Temp:  [36.8 °C (98.2 °F)-36.9 °C (98.5 °F)] 36.8 °C (98.2 °F)  Pulse:  [] 81  Resp:  [16-18] 16  BP: (106-212)/() 131/70  SpO2:  [90 %-96 %] 96 %    Physical Exam  Vitals and nursing note reviewed.   Constitutional:       Appearance: She is obese.   HENT:      Head: Normocephalic.   Eyes:      Extraocular Movements: Extraocular movements intact.   Cardiovascular:      Rate and Rhythm: Normal rate.      Pulses: Normal pulses.   Pulmonary:      Effort: No respiratory distress.      Breath sounds: Normal breath sounds.   Abdominal:      General: Bowel sounds are normal. There is no distension.   Musculoskeletal:      Right lower leg: Edema present.      Left lower leg: Edema present.   Skin:     General: Skin is  warm.   Neurological:      Mental Status: She is alert and oriented to person, place, and time.      Cranial Nerves: No cranial nerve deficit.   Psychiatric:         Mood and Affect: Mood normal.         Fluids  No intake or output data in the 24 hours ending 05/13/22 1600    Laboratory  Recent Labs     05/12/22  1700   WBC 9.1   RBC 4.80   HEMOGLOBIN 13.6   HEMATOCRIT 42.0   MCV 87.5   MCH 28.3   MCHC 32.4*   RDW 43.6   PLATELETCT 355   MPV 10.4     Recent Labs     05/12/22  1700   SODIUM 140   POTASSIUM 4.4   CHLORIDE 105   CO2 25   GLUCOSE 146*   BUN 17   CREATININE 0.71   CALCIUM 9.4                   Imaging  MR-STEALTH BRAIN WITH & W/O   Final Result         Large right inferior frontal region dural based extra-axial mass that most likely represents a meningioma. There is mild mass effect upon the bilateral inferior frontal lobes with midline shift to the left measuring approximately 10 to 11 mm. There is    also mass effect upon the lateral ventricles.      Minimal edema noted in the right superomedial frontal white matter adjacent to the mass.      Age-related volume loss and chronic microvascular ischemic changes.      CT-HEAD W/O   Final Result      1.  Negative for hemorrhage      2.  Large right frontal meningioma measuring 5.2 x 4.4 x 4.2 cm      3.  Associated mass effect on the right frontal lobe with adjacent white matter edema and mild compression of the frontal horn right lateral ventricle.         CT-MAXILLOFACIAL WITH PLUS RECONS   Final Result      1.  Negative for facial or orbital abscess      2.  CT face within normal limits      3.  Partially calcified right frontal extra-axial mass consistent with a meningioma described in detail on head CT report      IR-EMBOLIZE-NEURO-INTRACRANIAL    (Results Pending)        Assessment/Plan  * Meningioma (HCC)  Assessment & Plan  CT head shows Large right frontal meningioma measuring 5.2 x 4.4 x 4.2 cm and associated mass effect on the right frontal lobe  with adjacent white matter edema and mild compression of the frontal horn right lateral ventricle   -- MRI has been reviewed, neurosurgery Dr. Goodson has evaluated, recommended IR guided embolization on 5/14   -- Surgery on 5/15  Continue decadron every 6 hours,  omeprazole 20 mg p.o. daily  Keppra 500 mg twice daily    Hypertensive urgency  Assessment & Plan  Start patient on lisinopril   Labetalol IV prn     Dental caries  Assessment & Plan  Unasyn given in ED  No abscess seen on CT.  Will continue unasyn       VTE prophylaxis: SCDs/TEDs    I

## 2022-05-13 NOTE — ED NOTES
Medication Reconciliation unable to obtain at this time.     Trying to get in touch with daughter. Pt has no medication history or pharmacy on file with us and only speaks Pashto.

## 2022-05-13 NOTE — ASSESSMENT & PLAN NOTE
Start patient on lisinopril (holding for neurosurgery)  Amlodipine, lasix 5/18  Hydralazine and Labetalol IV prn   BP improved but remains elevated

## 2022-05-14 ENCOUNTER — APPOINTMENT (OUTPATIENT)
Dept: RADIOLOGY | Facility: MEDICAL CENTER | Age: 82
DRG: 025 | End: 2022-05-14
Attending: NEUROLOGICAL SURGERY
Payer: MEDICAID

## 2022-05-14 ENCOUNTER — ANESTHESIA EVENT (OUTPATIENT)
Dept: RADIOLOGY | Facility: MEDICAL CENTER | Age: 82
DRG: 025 | End: 2022-05-14
Payer: MEDICAID

## 2022-05-14 ENCOUNTER — ANESTHESIA (OUTPATIENT)
Dept: RADIOLOGY | Facility: MEDICAL CENTER | Age: 82
DRG: 025 | End: 2022-05-14
Payer: MEDICAID

## 2022-05-14 PROBLEM — R73.9 HYPERGLYCEMIA: Status: ACTIVE | Noted: 2022-05-14

## 2022-05-14 LAB
ALBUMIN SERPL BCP-MCNC: 3.4 G/DL (ref 3.2–4.9)
BUN SERPL-MCNC: 20 MG/DL (ref 8–22)
CALCIUM SERPL-MCNC: 8.7 MG/DL (ref 8.5–10.5)
CHLORIDE SERPL-SCNC: 106 MMOL/L (ref 96–112)
CO2 SERPL-SCNC: 21 MMOL/L (ref 20–33)
CREAT SERPL-MCNC: 0.56 MG/DL (ref 0.5–1.4)
ERYTHROCYTE [DISTWIDTH] IN BLOOD BY AUTOMATED COUNT: 44.4 FL (ref 35.9–50)
GFR SERPLBLD CREATININE-BSD FMLA CKD-EPI: 92 ML/MIN/1.73 M 2
GLUCOSE SERPL-MCNC: 176 MG/DL (ref 65–99)
HCT VFR BLD AUTO: 38.3 % (ref 37–47)
HGB BLD-MCNC: 12 G/DL (ref 12–16)
INR PPP: 1.05 (ref 0.87–1.13)
MCH RBC QN AUTO: 27.3 PG (ref 27–33)
MCHC RBC AUTO-ENTMCNC: 31.3 G/DL (ref 33.6–35)
MCV RBC AUTO: 87 FL (ref 81.4–97.8)
PHOSPHATE SERPL-MCNC: 2.7 MG/DL (ref 2.5–4.5)
PLATELET # BLD AUTO: 339 K/UL (ref 164–446)
PMV BLD AUTO: 10.7 FL (ref 9–12.9)
POTASSIUM SERPL-SCNC: 4.4 MMOL/L (ref 3.6–5.5)
PROTHROMBIN TIME: 13.4 SEC (ref 12–14.6)
RBC # BLD AUTO: 4.4 M/UL (ref 4.2–5.4)
SODIUM SERPL-SCNC: 139 MMOL/L (ref 135–145)
WBC # BLD AUTO: 7.2 K/UL (ref 4.8–10.8)

## 2022-05-14 PROCEDURE — 700111 HCHG RX REV CODE 636 W/ 250 OVERRIDE (IP): Performed by: ANESTHESIOLOGY

## 2022-05-14 PROCEDURE — C1894 INTRO/SHEATH, NON-LASER: HCPCS

## 2022-05-14 PROCEDURE — 700102 HCHG RX REV CODE 250 W/ 637 OVERRIDE(OP): Performed by: STUDENT IN AN ORGANIZED HEALTH CARE EDUCATION/TRAINING PROGRAM

## 2022-05-14 PROCEDURE — 85027 COMPLETE CBC AUTOMATED: CPT

## 2022-05-14 PROCEDURE — B3181ZZ FLUOROSCOPY OF BILATERAL INTERNAL CAROTID ARTERIES USING LOW OSMOLAR CONTRAST: ICD-10-PCS | Performed by: RADIOLOGY

## 2022-05-14 PROCEDURE — B31C1ZZ FLUOROSCOPY OF BILATERAL EXTERNAL CAROTID ARTERIES USING LOW OSMOLAR CONTRAST: ICD-10-PCS | Performed by: RADIOLOGY

## 2022-05-14 PROCEDURE — 85610 PROTHROMBIN TIME: CPT

## 2022-05-14 PROCEDURE — 00216 ANES ICR PX VASCULAR PX: CPT | Performed by: ANESTHESIOLOGY

## 2022-05-14 PROCEDURE — 700111 HCHG RX REV CODE 636 W/ 250 OVERRIDE (IP): Performed by: STUDENT IN AN ORGANIZED HEALTH CARE EDUCATION/TRAINING PROGRAM

## 2022-05-14 PROCEDURE — 99100 ANES PT EXTEME AGE<1 YR&>70: CPT | Performed by: ANESTHESIOLOGY

## 2022-05-14 PROCEDURE — A9270 NON-COVERED ITEM OR SERVICE: HCPCS | Performed by: ANESTHESIOLOGY

## 2022-05-14 PROCEDURE — 85347 COAGULATION TIME ACTIVATED: CPT

## 2022-05-14 PROCEDURE — 700105 HCHG RX REV CODE 258: Performed by: HOSPITALIST

## 2022-05-14 PROCEDURE — 99291 CRITICAL CARE FIRST HOUR: CPT | Performed by: INTERNAL MEDICINE

## 2022-05-14 PROCEDURE — 160002 HCHG RECOVERY MINUTES (STAT)

## 2022-05-14 PROCEDURE — 700105 HCHG RX REV CODE 258: Performed by: ANESTHESIOLOGY

## 2022-05-14 PROCEDURE — B3151ZZ FLUOROSCOPY OF BILATERAL COMMON CAROTID ARTERIES USING LOW OSMOLAR CONTRAST: ICD-10-PCS | Performed by: RADIOLOGY

## 2022-05-14 PROCEDURE — 770022 HCHG ROOM/CARE - ICU (200)

## 2022-05-14 PROCEDURE — 80069 RENAL FUNCTION PANEL: CPT

## 2022-05-14 PROCEDURE — 700117 HCHG RX CONTRAST REV CODE 255: Performed by: RADIOLOGY

## 2022-05-14 PROCEDURE — 700101 HCHG RX REV CODE 250: Performed by: ANESTHESIOLOGY

## 2022-05-14 PROCEDURE — 160036 HCHG PACU - EA ADDL 30 MINS PHASE I

## 2022-05-14 PROCEDURE — A9270 NON-COVERED ITEM OR SERVICE: HCPCS | Performed by: HOSPITALIST

## 2022-05-14 PROCEDURE — 700102 HCHG RX REV CODE 250 W/ 637 OVERRIDE(OP): Performed by: ANESTHESIOLOGY

## 2022-05-14 PROCEDURE — 700102 HCHG RX REV CODE 250 W/ 637 OVERRIDE(OP): Performed by: HOSPITALIST

## 2022-05-14 PROCEDURE — 160035 HCHG PACU - 1ST 60 MINS PHASE I

## 2022-05-14 PROCEDURE — 700111 HCHG RX REV CODE 636 W/ 250 OVERRIDE (IP): Performed by: HOSPITALIST

## 2022-05-14 PROCEDURE — 36227 PLACE CATH XTRNL CAROTID: CPT

## 2022-05-14 PROCEDURE — A9270 NON-COVERED ITEM OR SERVICE: HCPCS | Performed by: STUDENT IN AN ORGANIZED HEALTH CARE EDUCATION/TRAINING PROGRAM

## 2022-05-14 PROCEDURE — B31R1ZZ FLUOROSCOPY OF INTRACRANIAL ARTERIES USING LOW OSMOLAR CONTRAST: ICD-10-PCS | Performed by: RADIOLOGY

## 2022-05-14 RX ORDER — HYDROMORPHONE HYDROCHLORIDE 1 MG/ML
0.4 INJECTION, SOLUTION INTRAMUSCULAR; INTRAVENOUS; SUBCUTANEOUS
Status: DISCONTINUED | OUTPATIENT
Start: 2022-05-14 | End: 2022-05-14 | Stop reason: HOSPADM

## 2022-05-14 RX ORDER — ONDANSETRON 2 MG/ML
INJECTION INTRAMUSCULAR; INTRAVENOUS PRN
Status: DISCONTINUED | OUTPATIENT
Start: 2022-05-14 | End: 2022-05-14 | Stop reason: SURG

## 2022-05-14 RX ORDER — AMLODIPINE BESYLATE 5 MG/1
2.5 TABLET ORAL
Status: DISCONTINUED | OUTPATIENT
Start: 2022-05-14 | End: 2022-05-14

## 2022-05-14 RX ORDER — DIPHENHYDRAMINE HYDROCHLORIDE 50 MG/ML
12.5 INJECTION INTRAMUSCULAR; INTRAVENOUS
Status: DISCONTINUED | OUTPATIENT
Start: 2022-05-14 | End: 2022-05-14 | Stop reason: HOSPADM

## 2022-05-14 RX ORDER — ALBUTEROL SULFATE 2.5 MG/3ML
2.5 SOLUTION RESPIRATORY (INHALATION)
Status: DISCONTINUED | OUTPATIENT
Start: 2022-05-14 | End: 2022-05-14 | Stop reason: HOSPADM

## 2022-05-14 RX ORDER — LABETALOL HYDROCHLORIDE 5 MG/ML
5 INJECTION, SOLUTION INTRAVENOUS
Status: DISCONTINUED | OUTPATIENT
Start: 2022-05-14 | End: 2022-05-14 | Stop reason: HOSPADM

## 2022-05-14 RX ORDER — MEPERIDINE HYDROCHLORIDE 25 MG/ML
12.5 INJECTION INTRAMUSCULAR; INTRAVENOUS; SUBCUTANEOUS
Status: DISCONTINUED | OUTPATIENT
Start: 2022-05-14 | End: 2022-05-14 | Stop reason: HOSPADM

## 2022-05-14 RX ORDER — MIDAZOLAM HYDROCHLORIDE 1 MG/ML
1 INJECTION INTRAMUSCULAR; INTRAVENOUS
Status: DISCONTINUED | OUTPATIENT
Start: 2022-05-14 | End: 2022-05-14 | Stop reason: HOSPADM

## 2022-05-14 RX ORDER — SODIUM CHLORIDE, SODIUM LACTATE, POTASSIUM CHLORIDE, CALCIUM CHLORIDE 600; 310; 30; 20 MG/100ML; MG/100ML; MG/100ML; MG/100ML
INJECTION, SOLUTION INTRAVENOUS CONTINUOUS
Status: DISCONTINUED | OUTPATIENT
Start: 2022-05-14 | End: 2022-05-14 | Stop reason: HOSPADM

## 2022-05-14 RX ORDER — HYDROMORPHONE HYDROCHLORIDE 1 MG/ML
0.1 INJECTION, SOLUTION INTRAMUSCULAR; INTRAVENOUS; SUBCUTANEOUS
Status: DISCONTINUED | OUTPATIENT
Start: 2022-05-14 | End: 2022-05-14 | Stop reason: HOSPADM

## 2022-05-14 RX ORDER — HYDROMORPHONE HYDROCHLORIDE 1 MG/ML
0.2 INJECTION, SOLUTION INTRAMUSCULAR; INTRAVENOUS; SUBCUTANEOUS
Status: DISCONTINUED | OUTPATIENT
Start: 2022-05-14 | End: 2022-05-14 | Stop reason: HOSPADM

## 2022-05-14 RX ORDER — DEXAMETHASONE SODIUM PHOSPHATE 4 MG/ML
INJECTION, SOLUTION INTRA-ARTICULAR; INTRALESIONAL; INTRAMUSCULAR; INTRAVENOUS; SOFT TISSUE PRN
Status: DISCONTINUED | OUTPATIENT
Start: 2022-05-14 | End: 2022-05-14 | Stop reason: SURG

## 2022-05-14 RX ORDER — ONDANSETRON 2 MG/ML
4 INJECTION INTRAMUSCULAR; INTRAVENOUS
Status: DISCONTINUED | OUTPATIENT
Start: 2022-05-14 | End: 2022-05-14 | Stop reason: HOSPADM

## 2022-05-14 RX ORDER — LABETALOL HYDROCHLORIDE 5 MG/ML
INJECTION, SOLUTION INTRAVENOUS PRN
Status: DISCONTINUED | OUTPATIENT
Start: 2022-05-14 | End: 2022-05-14 | Stop reason: SURG

## 2022-05-14 RX ORDER — SODIUM CHLORIDE 9 MG/ML
INJECTION, SOLUTION INTRAVENOUS CONTINUOUS
Status: DISCONTINUED | OUTPATIENT
Start: 2022-05-14 | End: 2022-05-15

## 2022-05-14 RX ORDER — HALOPERIDOL 5 MG/ML
1 INJECTION INTRAMUSCULAR
Status: DISCONTINUED | OUTPATIENT
Start: 2022-05-14 | End: 2022-05-14 | Stop reason: HOSPADM

## 2022-05-14 RX ORDER — HYDRALAZINE HYDROCHLORIDE 20 MG/ML
5 INJECTION INTRAMUSCULAR; INTRAVENOUS
Status: DISCONTINUED | OUTPATIENT
Start: 2022-05-14 | End: 2022-05-14 | Stop reason: HOSPADM

## 2022-05-14 RX ORDER — HEPARIN SODIUM,PORCINE 1000/ML
VIAL (ML) INJECTION PRN
Status: DISCONTINUED | OUTPATIENT
Start: 2022-05-14 | End: 2022-05-14 | Stop reason: SURG

## 2022-05-14 RX ORDER — ACETAMINOPHEN 500 MG
1000 TABLET ORAL EVERY 4 HOURS PRN
Status: DISCONTINUED | OUTPATIENT
Start: 2022-05-14 | End: 2022-05-14 | Stop reason: HOSPADM

## 2022-05-14 RX ORDER — HEPARIN SODIUM 1000 [USP'U]/ML
INJECTION, SOLUTION INTRAVENOUS; SUBCUTANEOUS
Status: COMPLETED
Start: 2022-05-14 | End: 2022-05-14

## 2022-05-14 RX ORDER — NEOSTIGMINE METHYLSULFATE 1 MG/ML
INJECTION, SOLUTION INTRAVENOUS PRN
Status: DISCONTINUED | OUTPATIENT
Start: 2022-05-14 | End: 2022-05-14 | Stop reason: SURG

## 2022-05-14 RX ORDER — SODIUM CHLORIDE, SODIUM LACTATE, POTASSIUM CHLORIDE, CALCIUM CHLORIDE 600; 310; 30; 20 MG/100ML; MG/100ML; MG/100ML; MG/100ML
INJECTION, SOLUTION INTRAVENOUS
Status: DISCONTINUED | OUTPATIENT
Start: 2022-05-14 | End: 2022-05-14 | Stop reason: SURG

## 2022-05-14 RX ADMIN — LABETALOL HYDROCHLORIDE 10 MG: 5 INJECTION, SOLUTION INTRAVENOUS at 12:07

## 2022-05-14 RX ADMIN — HEPARIN SODIUM 6000 UNITS: 1000 INJECTION, SOLUTION INTRAVENOUS; SUBCUTANEOUS at 11:23

## 2022-05-14 RX ADMIN — IOHEXOL 50 ML: 300 INJECTION, SOLUTION INTRAVENOUS at 12:45

## 2022-05-14 RX ADMIN — HEPARIN SODIUM 5000 UNITS: 5000 INJECTION, SOLUTION INTRAVENOUS; SUBCUTANEOUS at 22:14

## 2022-05-14 RX ADMIN — DEXAMETHASONE SODIUM PHOSPHATE 4 MG: 4 INJECTION, SOLUTION INTRA-ARTICULAR; INTRALESIONAL; INTRAMUSCULAR; INTRAVENOUS; SOFT TISSUE at 15:03

## 2022-05-14 RX ADMIN — FENTANYL CITRATE 25 MCG: 50 INJECTION, SOLUTION INTRAMUSCULAR; INTRAVENOUS at 12:53

## 2022-05-14 RX ADMIN — ROCURONIUM BROMIDE 40 MG: 10 INJECTION, SOLUTION INTRAVENOUS at 10:35

## 2022-05-14 RX ADMIN — ONDANSETRON 4 MG: 2 INJECTION INTRAMUSCULAR; INTRAVENOUS at 11:26

## 2022-05-14 RX ADMIN — SODIUM CHLORIDE 3 G: 900 INJECTION INTRAVENOUS at 21:00

## 2022-05-14 RX ADMIN — DEXAMETHASONE SODIUM PHOSPHATE 4 MG: 4 INJECTION, SOLUTION INTRA-ARTICULAR; INTRALESIONAL; INTRAMUSCULAR; INTRAVENOUS; SOFT TISSUE at 21:00

## 2022-05-14 RX ADMIN — LISINOPRIL 20 MG: 20 TABLET ORAL at 09:42

## 2022-05-14 RX ADMIN — SODIUM CHLORIDE: 9 INJECTION, SOLUTION INTRAVENOUS at 09:45

## 2022-05-14 RX ADMIN — NEOSTIGMINE METHYLSULFATE 5 MG: 1 INJECTION INTRAVENOUS at 12:11

## 2022-05-14 RX ADMIN — SODIUM CHLORIDE 3 G: 900 INJECTION INTRAVENOUS at 01:11

## 2022-05-14 RX ADMIN — DEXAMETHASONE SODIUM PHOSPHATE 4 MG: 4 INJECTION, SOLUTION INTRA-ARTICULAR; INTRALESIONAL; INTRAMUSCULAR; INTRAVENOUS; SOFT TISSUE at 11:27

## 2022-05-14 RX ADMIN — LEVETIRACETAM 500 MG: 100 INJECTION, SOLUTION INTRAVENOUS at 22:14

## 2022-05-14 RX ADMIN — ROCURONIUM BROMIDE 10 MG: 10 INJECTION, SOLUTION INTRAVENOUS at 11:31

## 2022-05-14 RX ADMIN — FENTANYL CITRATE 250 MCG: 50 INJECTION, SOLUTION INTRAMUSCULAR; INTRAVENOUS at 10:35

## 2022-05-14 RX ADMIN — OMEPRAZOLE 20 MG: 20 CAPSULE, DELAYED RELEASE ORAL at 05:29

## 2022-05-14 RX ADMIN — SODIUM CHLORIDE 3 G: 900 INJECTION INTRAVENOUS at 05:29

## 2022-05-14 RX ADMIN — PROPOFOL 130 MG: 10 INJECTION, EMULSION INTRAVENOUS at 10:35

## 2022-05-14 RX ADMIN — ALBUTEROL SULFATE 2.5 MG: 2.5 SOLUTION RESPIRATORY (INHALATION) at 13:10

## 2022-05-14 RX ADMIN — DEXAMETHASONE SODIUM PHOSPHATE 4 MG: 4 INJECTION, SOLUTION INTRA-ARTICULAR; INTRALESIONAL; INTRAMUSCULAR; INTRAVENOUS; SOFT TISSUE at 01:12

## 2022-05-14 RX ADMIN — DEXAMETHASONE SODIUM PHOSPHATE 4 MG: 4 INJECTION, SOLUTION INTRA-ARTICULAR; INTRALESIONAL; INTRAMUSCULAR; INTRAVENOUS; SOFT TISSUE at 05:29

## 2022-05-14 RX ADMIN — SODIUM CHLORIDE, POTASSIUM CHLORIDE, SODIUM LACTATE AND CALCIUM CHLORIDE: 600; 310; 30; 20 INJECTION, SOLUTION INTRAVENOUS at 11:04

## 2022-05-14 RX ADMIN — LABETALOL HYDROCHLORIDE 10 MG: 5 INJECTION, SOLUTION INTRAVENOUS at 12:01

## 2022-05-14 RX ADMIN — SODIUM CHLORIDE 3 G: 900 INJECTION INTRAVENOUS at 15:02

## 2022-05-14 ASSESSMENT — ENCOUNTER SYMPTOMS
BRUISES/BLEEDS EASILY: 0
BACK PAIN: 0
NERVOUS/ANXIOUS: 0
SORE THROAT: 0
VOMITING: 0
ABDOMINAL PAIN: 0
DOUBLE VISION: 0
BLURRED VISION: 0
NAUSEA: 0
FOCAL WEAKNESS: 0
COUGH: 0
DIARRHEA: 0
SPEECH CHANGE: 0
DEPRESSION: 0
FEVER: 0
PHOTOPHOBIA: 0
NECK PAIN: 0
SHORTNESS OF BREATH: 0
SENSORY CHANGE: 0
HEADACHES: 1
CHILLS: 0

## 2022-05-14 ASSESSMENT — PAIN DESCRIPTION - PAIN TYPE
TYPE: SURGICAL PAIN

## 2022-05-14 ASSESSMENT — PAIN SCALES - GENERAL: PAIN_LEVEL: 0

## 2022-05-14 ASSESSMENT — FIBROSIS 4 INDEX
FIB4 SCORE: 1.04
FIB4 SCORE: 1.04

## 2022-05-14 NOTE — CONSULTS
Critical Care Consultation    Date of consult: 5/14/2022    Referring Physician  Ayo Schmidt M.D.    Reason for Consultation  Critical care management after cerebral angiogram    History of Presenting Illness  All history was obtained from the patient's daughter and the old chart as the patient is Vietnamese speaking only.    The patient is a 81 year old lady who presented to the ER on 5/12 with complaints of worsening left sided facial pain and head pressure over the past 2 days.  The patient also endorses that some mild balance issues and family has noted some confusion.  The patient has chronic visual changes that have not worsened recently.  No nausea or vomiting.  The patient underwent a CT head which revealed a large right frontal meningioma measuring 5.2 x 4.4 x 4.2 cm with associated mass effect on the right frontal lobe with adjacent white matter edema and mild compression of the frontal horn of the right lateral ventricle.  Neurosurgery was consulted.  She was started on Keppra and Decadron.  Interventional radiology was consulted.  The patient underwent diagnostic cerebral angiogram today in order to do embolization.  Unfortunately, the patient did not have adequate vessel anatomy or feeder vessels to undergo embolization.  She will be admitted to the neuro ICU for closely neurological monitoring and in preparation for craniotomy tomorrow with neurosurgery.    Code Status  Full Code    Review of Systems  Review of Systems   Constitutional: Negative for chills, fever and malaise/fatigue.   HENT: Negative for congestion and sore throat.    Eyes: Negative for blurred vision, double vision and photophobia.   Respiratory: Negative for cough and shortness of breath.    Cardiovascular: Negative for chest pain and leg swelling.   Gastrointestinal: Negative for abdominal pain, diarrhea, nausea and vomiting.   Genitourinary: Negative for frequency and urgency.   Musculoskeletal: Negative for back pain and neck  pain.   Skin: Negative for rash.   Neurological: Positive for headaches. Negative for sensory change, speech change and focal weakness.        Gait disturbances   Endo/Heme/Allergies: Does not bruise/bleed easily.   Psychiatric/Behavioral: Negative for depression. The patient is not nervous/anxious.         Confusion       Past Medical History  Obesity     Surgical History  No surgeries in the past    Family History  No family history of brain tumors    Social History   no history of cigarette smoking, alcohol consumption, or illegal drugs.  The patient is from [place] and is Turkish speaking    Medications  Home Medications     Reviewed by Isidra Pleitez, Pharmacy Intern (Pharmacy Intern) on 05/13/22 at 1001  Med List Status: Complete   Medication Last Dose Status        Patient Zaki Taking any Medications                     Current Facility-Administered Medications   Medication Dose Route Frequency Provider Last Rate Last Admin   • NS infusion   Intravenous Continuous Braydon Smith M.D. 83 mL/hr at 05/14/22 0945 New Bag at 05/14/22 0945   • lactated ringers infusion   Intravenous Continuous Neil Santiago M.D. 50 mL/hr at 05/14/22 1245 Restarted at 05/14/22 1245   • meperidine (DEMEROL) injection 12.5 mg  12.5 mg Intravenous Q5 MIN PRN Neil Santiago M.D.       • midazolam (Versed) 2 MG/2ML injection 1 mg  1 mg Intravenous Q5 MIN PRN Neil Santiago M.D.       • ondansetron (ZOFRAN) syringe/vial injection 4 mg  4 mg Intravenous Once PRN Neil Santiago M.D.       • haloperidol lactate (HALDOL) injection 1 mg  1 mg Intravenous Q15 MIN PRN Neil Santiago M.D.       • diphenhydrAMINE (BENADRYL) injection 12.5 mg  12.5 mg Intravenous Q15 MIN PRN Neil Santiago M.D.       • fentaNYL (SUBLIMAZE) injection 25 mcg  25 mcg Intravenous Q2 MIN PRN Neil Santiago M.D.   25 mcg at 05/14/22 1253    Or   • fentaNYL (SUBLIMAZE) injection 50 mcg  50 mcg Intravenous Q2 MIN PRN Neil Santiago M.D.       • HYDROcodone-acetaminophen  2.5-108 mg/5mL (HYCET) solution 7.5 mg  15 mL Oral Once PRN Neil Santiago M.D.        Or   • HYDROcodone-acetaminophen 2.5-108 mg/5mL (HYCET) solution 15 mg  30 mL Oral Once PRN Neil Santiago M.D.       • ePHEDrine injection 5 mg  5 mg Intravenous Q5 MIN PRN Neil Santiago M.D.       • labetalol (NORMODYNE/TRANDATE) injection 5 mg  5 mg Intravenous Q5 MIN PRN Neil Santiago M.D.       • hydrALAZINE (APRESOLINE) injection 5 mg  5 mg Intravenous Q5 MIN PRN Neil Santiago M.D.       • albuterol (PROVENTIL) 2.5mg/3ml nebulizer solution 2.5 mg  2.5 mg Inhalation Q10 MIN PRN Neil Santiago M.D.   2.5 mg at 05/14/22 1310   • acetaminophen (TYLENOL) tablet 1,000 mg  1,000 mg Oral Q4HRS PRN Neil Santiago M.D.       • HYDROmorphone (Dilaudid) injection 0.1 mg  0.1 mg Intravenous Q5 MIN PRN Neil Santiago M.D.        Or   • HYDROmorphone (Dilaudid) injection 0.2 mg  0.2 mg Intravenous Q5 MIN PRN Neil Santiago M.D.        Or   • HYDROmorphone (Dilaudid) injection 0.4 mg  0.4 mg Intravenous Q5 MIN PRN Neil Santiago M.D.       • [MAR Hold] omeprazole (PRILOSEC) capsule 20 mg  20 mg Oral DAILY Braydon Smith M.D.   20 mg at 05/14/22 0529   • [MAR Hold] ampicillin/sulbactam (UNASYN) 3 g in  mL IVPB  3 g Intravenous Q6HRS Braydon Smith M.D.   Stopped at 05/14/22 0559   • [MAR Hold] dexamethasone (DECADRON) injection 4 mg  4 mg Intravenous Q6HRS Melvin Salazar M.D.   4 mg at 05/14/22 0529   • [MAR Hold] levETIRAcetam (Keppra) injection 500 mg  500 mg Intravenous Q12HRS Melvin Salazar M.D.   500 mg at 05/13/22 2212   • [MAR Hold] heparin injection 5,000 Units  5,000 Units Subcutaneous Q8HRS Melvin Salazar M.D.   5,000 Units at 05/13/22 2211   • [MAR Hold] acetaminophen (Tylenol) tablet 650 mg  650 mg Oral Q4HRS PRN Melvin Salazar M.D.   650 mg at 05/13/22 1704   • [MAR Hold] lisinopril (PRINIVIL) tablet 20 mg  20 mg Oral Q DAY Melvin Salazar M.D.   20 mg at 05/14/22 0942   • [MAR Hold] labetalol  (NORMODYNE/TRANDATE) injection 10 mg  10 mg Intravenous Q4HRS PRN Melvin Salazar M.D.           Allergies  No Known Allergies    Vital Signs last 24 hours  Temp:  [36.1 °C (96.9 °F)-37.4 °C (99.3 °F)] 36.2 °C (97.1 °F)  Pulse:  [47-90] 55  Resp:  [16-25] 17  BP: (111-155)/(56-71) 115/67  SpO2:  [90 %-97 %] 94 %    Physical Exam  Physical Exam  Vitals and nursing note reviewed.   Constitutional:       General: She is not in acute distress.     Appearance: She is obese. She is ill-appearing. She is not toxic-appearing.   HENT:      Head: Normocephalic and atraumatic.      Right Ear: External ear normal.      Left Ear: External ear normal.      Nose: Nose normal. No rhinorrhea.      Mouth/Throat:      Mouth: Mucous membranes are moist.      Pharynx: Oropharynx is clear. No oropharyngeal exudate.   Eyes:      General: No scleral icterus.     Extraocular Movements: Extraocular movements intact.      Conjunctiva/sclera: Conjunctivae normal.      Pupils: Pupils are equal, round, and reactive to light.   Cardiovascular:      Rate and Rhythm: Normal rate and regular rhythm.      Pulses: Normal pulses.      Heart sounds: Normal heart sounds. No murmur heard.  Pulmonary:      Effort: No respiratory distress.      Breath sounds: Normal breath sounds. No wheezing.   Chest:      Chest wall: No tenderness.   Abdominal:      General: Bowel sounds are normal. There is no distension.      Palpations: Abdomen is soft.      Tenderness: There is no abdominal tenderness. There is no guarding or rebound.   Musculoskeletal:         General: Normal range of motion.      Cervical back: Normal range of motion and neck supple.      Right lower leg: No edema.      Left lower leg: No edema.   Lymphadenopathy:      Cervical: No cervical adenopathy.   Skin:     General: Skin is warm and dry.      Capillary Refill: Capillary refill takes less than 2 seconds.      Findings: No rash.   Neurological:      Mental Status: She is alert and oriented  to person, place, and time.      Cranial Nerves: No cranial nerve deficit.      Sensory: No sensory deficit.      Motor: No weakness.      Comments: Speaks Bengali with daughter translating   Psychiatric:         Mood and Affect: Mood normal.         Behavior: Behavior normal.         Thought Content: Thought content normal.         Fluids    Intake/Output Summary (Last 24 hours) at 5/14/2022 1357  Last data filed at 5/14/2022 1345  Gross per 24 hour   Intake 729.67 ml   Output 220 ml   Net 509.67 ml       Laboratory  Recent Results (from the past 48 hour(s))   CBC WITH DIFFERENTIAL    Collection Time: 05/12/22  5:00 PM   Result Value Ref Range    WBC 9.1 4.8 - 10.8 K/uL    RBC 4.80 4.20 - 5.40 M/uL    Hemoglobin 13.6 12.0 - 16.0 g/dL    Hematocrit 42.0 37.0 - 47.0 %    MCV 87.5 81.4 - 97.8 fL    MCH 28.3 27.0 - 33.0 pg    MCHC 32.4 (L) 33.6 - 35.0 g/dL    RDW 43.6 35.9 - 50.0 fL    Platelet Count 355 164 - 446 K/uL    MPV 10.4 9.0 - 12.9 fL    Neutrophils-Polys 75.40 (H) 44.00 - 72.00 %    Lymphocytes 16.20 (L) 22.00 - 41.00 %    Monocytes 5.90 0.00 - 13.40 %    Eosinophils 1.70 0.00 - 6.90 %    Basophils 0.40 0.00 - 1.80 %    Immature Granulocytes 0.40 0.00 - 0.90 %    Nucleated RBC 0.00 /100 WBC    Neutrophils (Absolute) 6.85 2.00 - 7.15 K/uL    Lymphs (Absolute) 1.47 1.00 - 4.80 K/uL    Monos (Absolute) 0.54 0.00 - 0.85 K/uL    Eos (Absolute) 0.15 0.00 - 0.51 K/uL    Baso (Absolute) 0.04 0.00 - 0.12 K/uL    Immature Granulocytes (abs) 0.04 0.00 - 0.11 K/uL    NRBC (Absolute) 0.00 K/uL   Comp Metabolic Panel    Collection Time: 05/12/22  5:00 PM   Result Value Ref Range    Sodium 140 135 - 145 mmol/L    Potassium 4.4 3.6 - 5.5 mmol/L    Chloride 105 96 - 112 mmol/L    Co2 25 20 - 33 mmol/L    Anion Gap 10.0 7.0 - 16.0    Glucose 146 (H) 65 - 99 mg/dL    Bun 17 8 - 22 mg/dL    Creatinine 0.71 0.50 - 1.40 mg/dL    Calcium 9.4 8.5 - 10.5 mg/dL    AST(SGOT) 20 12 - 45 U/L    ALT(SGPT) 20 2 - 50 U/L    Alkaline  Phosphatase 103 (H) 30 - 99 U/L    Total Bilirubin <0.2 0.1 - 1.5 mg/dL    Albumin 3.9 3.2 - 4.9 g/dL    Total Protein 7.1 6.0 - 8.2 g/dL    Globulin 3.2 1.9 - 3.5 g/dL    A-G Ratio 1.2 g/dL   ESTIMATED GFR    Collection Time: 05/12/22  5:00 PM   Result Value Ref Range    GFR (CKD-EPI) 86 >60 mL/min/1.73 m 2   URINALYSIS    Collection Time: 05/12/22  5:35 PM    Specimen: Urine   Result Value Ref Range    Color Yellow     Character Clear     Specific Gravity 1.010 <1.035    Ph 6.0 5.0 - 8.0    Glucose Negative Negative mg/dL    Ketones Negative Negative mg/dL    Protein Negative Negative mg/dL    Bilirubin Negative Negative    Urobilinogen, Urine 0.2 Negative    Nitrite Negative Negative    Leukocyte Esterase Trace (A) Negative    Occult Blood Negative Negative    Micro Urine Req Microscopic    URINE MICROSCOPIC (W/UA)    Collection Time: 05/12/22  5:35 PM   Result Value Ref Range    WBC 0-2 /hpf    RBC 0-2 /hpf    Bacteria Negative None /hpf    Epithelial Cells Negative /hpf    Hyaline Cast 0-2 /lpf   CBC WITHOUT DIFFERENTIAL    Collection Time: 05/14/22  5:45 AM   Result Value Ref Range    WBC 7.2 4.8 - 10.8 K/uL    RBC 4.40 4.20 - 5.40 M/uL    Hemoglobin 12.0 12.0 - 16.0 g/dL    Hematocrit 38.3 37.0 - 47.0 %    MCV 87.0 81.4 - 97.8 fL    MCH 27.3 27.0 - 33.0 pg    MCHC 31.3 (L) 33.6 - 35.0 g/dL    RDW 44.4 35.9 - 50.0 fL    Platelet Count 339 164 - 446 K/uL    MPV 10.7 9.0 - 12.9 fL   Renal Function Panel    Collection Time: 05/14/22  5:45 AM   Result Value Ref Range    Sodium 139 135 - 145 mmol/L    Potassium 4.4 3.6 - 5.5 mmol/L    Chloride 106 96 - 112 mmol/L    Co2 21 20 - 33 mmol/L    Glucose 176 (H) 65 - 99 mg/dL    Creatinine 0.56 0.50 - 1.40 mg/dL    Bun 20 8 - 22 mg/dL    Calcium 8.7 8.5 - 10.5 mg/dL    Phosphorus 2.7 2.5 - 4.5 mg/dL    Albumin 3.4 3.2 - 4.9 g/dL   ESTIMATED GFR    Collection Time: 05/14/22  5:45 AM   Result Value Ref Range    GFR (CKD-EPI) 92 >60 mL/min/1.73 m 2   Prothrombin Time     Collection Time: 05/14/22  8:42 AM   Result Value Ref Range    PT 13.4 12.0 - 14.6 sec    INR 1.05 0.87 - 1.13       Imaging  MRI brain:  Large right inferior frontal region dural based extra-axial mass that most likely represents a meningioma. There is mild mass effect upon the bilateral inferior frontal lobes with midline shift to the left measuring approximately 10 to 11 mm. There is   also mass effect upon the lateral ventricles.     Minimal edema noted in the right superomedial frontal white matter adjacent to the mass.     Age-related volume loss and chronic microvascular ischemic changes.    Assessment/Plan  * Meningioma (HCC)  Assessment & Plan  Noted associated mass effect  Cont Decadron 4mg every 6 hours  Keppra 50mg every 12 hours  S/p diagnostic cerebral angiogram-->unable to embolize  Cont hourly neuro checks  SBP goals < 160      Hyperglycemia- (present on admission)  Assessment & Plan  BG goals 120-180s  Monitor closely while on steroids  No insulin coverage needed    Hypertensive urgency  Assessment & Plan  Improved control   Lisinopril 20mg PO daily  Labetalol prn for SBP goals    Dental caries  Assessment & Plan  Noted       Discussed patient condition and risk of morbidity and/or mortality with Hospitalist, Family, RN, RT, Pharmacy, Charge nurse / hot rounds, Patient, neurosurgery and IR.    The patient remains critically ill.   I have assessed and reassessed the blood pressure, hemodynamics, cardiovascular status, and neurological status. This patient remains at high risk for worsening cardiopulmonary dysfunction and death without the above critical care interventions.    Critical care time = 45 minutes in directly providing and coordinating critical care and extensive data review.  No time overlap and excludes procedures.

## 2022-05-14 NOTE — ASSESSMENT & PLAN NOTE
HgA1c = 6.4  -BG goals 120-180s  -Monitor closely while on steroids  -No insulin coverage needed to date  -Trend chemistry

## 2022-05-14 NOTE — ANESTHESIA POSTPROCEDURE EVALUATION
Patient: Enzo Núñez    Procedure Summary     Date: 05/14/22 Room / Location: [facility] IMAGING - INTERVENTIONAL - REGIONAL MEDICAL [facility]    Anesthesia Start: 1032 Anesthesia Stop: 1231    Procedure: IR-EMBOLIZE-NEURO-INTRACRANIAL Diagnosis:       Meningioma (HCC)      Meningioma (HCC)      (tumor embolization)    Scheduled Providers: Ayo Schmidt M.D.; Neil Santiago M.D. Responsible Provider: Neil Santiago M.D.    Anesthesia Type: general ASA Status: 3          Final Anesthesia Type: general  Last vitals  BP   Blood Pressure : 124/60, Arterial BP: 132/58    Temp   36.2 °C (97.1 °F)    Pulse   (!) 52   Resp   20    SpO2   95 %      Anesthesia Post Evaluation    Patient location during evaluation: PACU  Patient participation: complete - patient participated  Level of consciousness: awake and alert  Pain score: 0    Airway patency: patent  Anesthetic complications: no  Cardiovascular status: hemodynamically stable  Respiratory status: acceptable  Hydration status: euvolemic    PONV: none          No complications documented.     Nurse Pain Score: 0 (NPRS)

## 2022-05-14 NOTE — PROGRESS NOTES
Neurosurgery Progress Note    Subjective:  Pt presents with headache, confusion and ataxia.  Found to have large right frontal tumor; likely meningioma     In IR for embolization now    Exam:  See consult     BP  Min: 112/62  Max: 155/71  Pulse  Av.6  Min: 69  Max: 90  Resp  Av.4  Min: 16  Max: 18  Temp  Av.6 °C (97.9 °F)  Min: 36.1 °C (96.9 °F)  Max: 37.4 °C (99.3 °F)  SpO2  Av.8 %  Min: 92 %  Max: 97 %    No data recorded    Recent Labs     22  1700 22  0545   WBC 9.1 7.2   RBC 4.80 4.40   HEMOGLOBIN 13.6 12.0   HEMATOCRIT 42.0 38.3   MCV 87.5 87.0   MCH 28.3 27.3   MCHC 32.4* 31.3*   RDW 43.6 44.4   PLATELETCT 355 339   MPV 10.4 10.7     Recent Labs     22  17022  0545   SODIUM 140 139   POTASSIUM 4.4 4.4   CHLORIDE 105 106   CO2 25 21   GLUCOSE 146* 176*   BUN 17 20   CREATININE 0.71 0.56   CALCIUM 9.4 8.7     Recent Labs     22  0842   INR 1.05           Intake/Output                       22 07 - 22 0659 22 0700 - 05/15/22 0659     [id number] 0572-5899 Total 3905-7784 9472-3241 Total                 Intake    Total Intake -- -- -- -- -- --       Output    Urine  --  -- --  --  -- --    Number of Times Voided -- 1 x 1 x -- -- --    Total Output -- -- -- -- -- --       Net I/O     -- -- -- -- -- --          No intake or output data in the 24 hours ending 22 1113         • NS   Continuous   • heparin      • omeprazole  20 mg DAILY   • ampicillin-sulbactam (UNASYN) IV  3 g Q6HRS   • dexamethasone  4 mg Q6HRS   • levETIRAcetam (Keppra) IV  500 mg Q12HRS   • heparin  5,000 Units Q8HRS   • acetaminophen  650 mg Q4HRS PRN   • lisinopril  20 mg Q DAY   • labetalol  10 mg Q4HRS PRN       Assessment and Plan:  Hospital day #3  IR today  OR likely Monday now for tumor resection     Prophylactic anticoagulation: no         Start date/time:

## 2022-05-14 NOTE — PROGRESS NOTES
Pt is transported up to floor by Olga Dukes and [facility] on 4 lpm on a full tank of O2. The pt is completely awake and in no acute distress prior to leaving the PACU. Pain is at a tolerable level and are not exhibiting any n/v.     Handoff report given to Stephanie DUKES on the receiving unit and are aware of pt arrival.

## 2022-05-14 NOTE — ANESTHESIA PREPROCEDURE EVALUATION
Date/Time: 05/14/22 1000    Scheduled providers: Ayo Schmidt M.D.; Neil Santiago M.D.    Procedure: IR-EMBOLIZE-NEURO-INTRACRANIAL    Diagnosis:       Meningioma (HCC) [D32.9]      Meningioma (HCC) [D32.9]    Indications: tumor embolization    Location: [facility] IMAGING - INTERVENTIONAL - REGIONAL MEDICAL CTR          Relevant Problems   CARDIAC   (positive) Hypertensive urgency       Physical Exam    Airway   Mallampati: II  TM distance: >3 FB  Neck ROM: full       Cardiovascular - normal exam  Rhythm: regular  Rate: normal  (-) murmur     Dental - normal exam           Pulmonary - normal exam  Breath sounds clear to auscultation     Abdominal    Neurological - normal exam                 Anesthesia Plan    ASA 3       Plan - general       Airway plan will be ETT                    Informed Consent:

## 2022-05-14 NOTE — ANESTHESIA TIME REPORT
Anesthesia Start and Stop Event Times     Date Time Event    5/14/2022 0906 Ready for Procedure     1032 Anesthesia Start     1231 Anesthesia Stop        Responsible Staff  05/14/22    Name Role Begin End    Neil Santiago M.D. Anesth 1032 1231        Overtime Reason:  no overtime (within assigned shift)    Comments:

## 2022-05-14 NOTE — ANESTHESIA PROCEDURE NOTES
Airway    Date/Time: 5/14/2022 10:38 AM  Performed by: Neil Santiago M.D.  Authorized by: Neil Santiago M.D.     Location:  OR  Urgency:  Elective  Indications for Airway Management:  Anesthesia      Spontaneous Ventilation: absent    Sedation Level:  Deep  Preoxygenated: Yes    Patient Position:  Sniffing  Mask Difficulty Assessment:  1 - vent by mask  Final Airway Type:  Endotracheal airway  Final Endotracheal Airway:  ETT  Cuffed: Yes    Technique Used for Successful ETT Placement:  Direct laryngoscopy    Insertion Site:  Oral  Blade Type:  Katie  Laryngoscope Blade/Videolaryngoscope Blade Size:  3  ETT Size (mm):  6.5  Measured from:  Teeth  ETT to Teeth (cm):  25  Placement Verified by: auscultation and capnometry    Cormack-Lehane Classification:  Grade IIa - partial view of glottis  Number of Attempts at Approach:  1   Teeth intact

## 2022-05-14 NOTE — OR NURSING
Pt is aaox4, resting comfortably in hospital on bedrest on 4lpm oxymask. The pt's only complaint is pain from her IV at this time. She does not complain of any severe headache, n/v, dizziness, numbness, tingling at this time. Respirations are equal and unlabored, she is in no acute distress. Neuro is intact. Sheath site is clean, dry and soft to palpation. 2+ pedal pulse is felt without difficulty. Pt understands bedrest orders and keeps her R leg straight and flat for duration of bedrest.     Family is at bedside to assist in translation and are updated on plan of care, status and room # with all questions answered.     Dr. Dennison at pt's bedside to update family and assess pt prior to leaving the pacu.

## 2022-05-14 NOTE — PROGRESS NOTES
Patient brought to IR 3 for cerebral angiogram with tumor embolization by Dr Schmidt. After consents were obtained, patient was safely transferred to procedure table and positioned supine; placed under anesthesia care. Patient prepped and draped in sterile fashion and timeout was called.     1120: Procedure start  1121: 6Fr right femoral artery  1124: 6k units heparin given by anesthesia  1146:   1155: Sheath removed; Perclose applied  1205: Hemostasis    Abbott Perclose ProStyle  Ref: [id number]  Lot: [id number]  Exp: 02/29/2024    Patient tolerated procedure well, no complications. Post-procedure patient disconnected from monitoring and safely transferred back to hospital bed. Patient taken to PACU.

## 2022-05-14 NOTE — ANESTHESIA PROCEDURE NOTES
Arterial Line  Performed by: Neil Santiago M.D.  Authorized by: Neil Santiago M.D.     Start Time:  5/14/2022 10:39 AM  End Time:  5/14/2022 10:40 AM  Localization: surface landmarks    Patient Location:  OR  Indication: continuous blood pressure monitoring        Catheter Size:  20 G  Seldinger Technique?: Yes    Laterality:  Left  Site:  Radial artery  Line Secured:  Antimicrobial disc, tape and transparent dressing  Events: patient tolerated procedure well with no complications     ultrasound

## 2022-05-14 NOTE — ASSESSMENT & PLAN NOTE
Status post right frontal craniotomy for tumor resection w/ Dr. Dhaliwal  -previously noted associated mass effect found to be unamenable to embolization on cerebral angiogram  -Patient received 2u PRBC & 2 FFP intraoperatively   -Continue Decadron 4mg as scheduled  -Continue Keppra 50mg as scheduled  -Continue Ancef until 5/20 as ordered by neurosurgery  -SBP goals < 160  -Q1h neurochecks  -Heparin held since 5/18, resume when cleared by neurosurgery  -Control pain  -Postop/post-transfusion labs pending  -Follow up imaging in am

## 2022-05-14 NOTE — CARE PLAN
Problem: Knowledge Deficit - Standard  Goal: Patient and family/care givers will demonstrate understanding of plan of care, disease process/condition, diagnostic tests and medications  Outcome: Progressing     Problem: Pain - Standard  Goal: Alleviation of pain or a reduction in pain to the patient’s comfort goal  Outcome: Progressing     Problem: Fall Risk  Goal: Patient will remain free from falls  Outcome: Progressing   The patient is Stable - Low risk of patient condition declining or worsening    Shift Goals  Clinical Goals: Pain management  Patient Goals: pain management    Progress made toward(s) clinical / shift goals:  Educate patient of available PRN pain medication per MAR. Reinforce fall/safety precautions.     Patient is not progressing towards the following goals:

## 2022-05-14 NOTE — ASSESSMENT & PLAN NOTE
-Noted on admission  -CT maxillofacial without abscesses, received Unasyn from ED, will defer for now as she is asymptomatic

## 2022-05-14 NOTE — OR SURGEON
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 No large feeders from MMA or ECA. D/w Dr. Dhaliwal and patient's family.      Procedure(s): Diagnostic cerebral angiography.      Estimated Blood Loss: Less than 5 ml      Complications: None      5/14/2022     12:00 PM     Ayo Schmidt M.D.

## 2022-05-14 NOTE — CARE PLAN
Problem: Knowledge Deficit - Standard  Goal: Patient and family/care givers will demonstrate understanding of plan of care, disease process/condition, diagnostic tests and medications  Outcome: Progressing     Problem: Fall Risk  Goal: Patient will remain free from falls  Outcome: Progressing       The patient is Stable - Low risk of patient condition declining or worsening    Shift Goals  Clinical Goals: pain control, IR  Patient Goals: comfort  Family Goals: NA    Progress made toward(s) clinical / shift goals:  Reinforce fall/safety precautions. Bed on low and locked. Call light within reach.     Patient is not progressing towards the following goals:

## 2022-05-14 NOTE — PROGRESS NOTES
4 Eyes Skin Assessment Completed by LAURO Brown and LAURO Zayas.    Head WDL  Ears WDL  Nose WDL  Mouth WDL  Neck WDL  Breast/Chest WDL  Shoulder Blades WDL  Spine WDL  (R) Arm/Elbow/Hand WDL  (L) Arm/Elbow/Hand WDL  Abdomen WDL  Groin Incision  Scrotum/Coccyx/Buttocks WDL  (R) Leg WDL  (L) Leg WDL  (R) Heel/Foot/Toe WDL  (L) Heel/Foot/Toe WDL          Devices In Places ECG, Blood Pressure Cuff, Pulse Ox, Rodriguez, Arterial Line, SCD's and Oxy Mask      Interventions In Place TAP System, Pillows and Pressure Redistribution Mattress    Possible Skin Injury No    Pictures Uploaded Into Epic N/A  Wound Consult Placed N/A  RN Wound Prevention Protocol Ordered No

## 2022-05-15 LAB — GLUCOSE BLD STRIP.AUTO-MCNC: 131 MG/DL (ref 65–99)

## 2022-05-15 PROCEDURE — 700102 HCHG RX REV CODE 250 W/ 637 OVERRIDE(OP): Performed by: HOSPITALIST

## 2022-05-15 PROCEDURE — 700111 HCHG RX REV CODE 636 W/ 250 OVERRIDE (IP): Performed by: STUDENT IN AN ORGANIZED HEALTH CARE EDUCATION/TRAINING PROGRAM

## 2022-05-15 PROCEDURE — A9270 NON-COVERED ITEM OR SERVICE: HCPCS | Performed by: STUDENT IN AN ORGANIZED HEALTH CARE EDUCATION/TRAINING PROGRAM

## 2022-05-15 PROCEDURE — 700111 HCHG RX REV CODE 636 W/ 250 OVERRIDE (IP): Performed by: HOSPITALIST

## 2022-05-15 PROCEDURE — 770022 HCHG ROOM/CARE - ICU (200)

## 2022-05-15 PROCEDURE — 99233 SBSQ HOSP IP/OBS HIGH 50: CPT | Mod: GC | Performed by: INTERNAL MEDICINE

## 2022-05-15 PROCEDURE — 82962 GLUCOSE BLOOD TEST: CPT

## 2022-05-15 PROCEDURE — A9270 NON-COVERED ITEM OR SERVICE: HCPCS | Performed by: HOSPITALIST

## 2022-05-15 PROCEDURE — 700102 HCHG RX REV CODE 250 W/ 637 OVERRIDE(OP): Performed by: STUDENT IN AN ORGANIZED HEALTH CARE EDUCATION/TRAINING PROGRAM

## 2022-05-15 PROCEDURE — 700105 HCHG RX REV CODE 258: Performed by: HOSPITALIST

## 2022-05-15 RX ORDER — BISACODYL 10 MG
10 SUPPOSITORY, RECTAL RECTAL
Status: DISCONTINUED | OUTPATIENT
Start: 2022-05-15 | End: 2022-05-19

## 2022-05-15 RX ORDER — POLYETHYLENE GLYCOL 3350 17 G/17G
1 POWDER, FOR SOLUTION ORAL
Status: DISCONTINUED | OUTPATIENT
Start: 2022-05-15 | End: 2022-05-19

## 2022-05-15 RX ORDER — AMOXICILLIN 250 MG
2 CAPSULE ORAL 2 TIMES DAILY
Status: DISCONTINUED | OUTPATIENT
Start: 2022-05-15 | End: 2022-05-19

## 2022-05-15 RX ADMIN — DEXAMETHASONE SODIUM PHOSPHATE 4 MG: 4 INJECTION, SOLUTION INTRA-ARTICULAR; INTRALESIONAL; INTRAMUSCULAR; INTRAVENOUS; SOFT TISSUE at 02:04

## 2022-05-15 RX ADMIN — SENNOSIDES AND DOCUSATE SODIUM 2 TABLET: 50; 8.6 TABLET ORAL at 17:10

## 2022-05-15 RX ADMIN — SODIUM CHLORIDE 3 G: 900 INJECTION INTRAVENOUS at 02:05

## 2022-05-15 RX ADMIN — DEXAMETHASONE SODIUM PHOSPHATE 4 MG: 4 INJECTION, SOLUTION INTRA-ARTICULAR; INTRALESIONAL; INTRAMUSCULAR; INTRAVENOUS; SOFT TISSUE at 08:44

## 2022-05-15 RX ADMIN — OMEPRAZOLE 20 MG: 20 CAPSULE, DELAYED RELEASE ORAL at 05:11

## 2022-05-15 RX ADMIN — DEXAMETHASONE SODIUM PHOSPHATE 4 MG: 4 INJECTION, SOLUTION INTRA-ARTICULAR; INTRALESIONAL; INTRAMUSCULAR; INTRAVENOUS; SOFT TISSUE at 20:13

## 2022-05-15 RX ADMIN — LISINOPRIL 20 MG: 20 TABLET ORAL at 05:11

## 2022-05-15 RX ADMIN — HEPARIN SODIUM 5000 UNITS: 5000 INJECTION, SOLUTION INTRAVENOUS; SUBCUTANEOUS at 15:00

## 2022-05-15 RX ADMIN — LEVETIRACETAM 500 MG: 100 INJECTION, SOLUTION INTRAVENOUS at 11:08

## 2022-05-15 RX ADMIN — DEXAMETHASONE SODIUM PHOSPHATE 4 MG: 4 INJECTION, SOLUTION INTRA-ARTICULAR; INTRALESIONAL; INTRAMUSCULAR; INTRAVENOUS; SOFT TISSUE at 15:22

## 2022-05-15 RX ADMIN — MAGNESIUM HYDROXIDE 30 ML: 400 SUSPENSION ORAL at 15:32

## 2022-05-15 RX ADMIN — LEVETIRACETAM 500 MG: 100 INJECTION, SOLUTION INTRAVENOUS at 22:02

## 2022-05-15 RX ADMIN — MAGNESIUM HYDROXIDE 30 ML: 400 SUSPENSION ORAL at 21:19

## 2022-05-15 ASSESSMENT — PAIN DESCRIPTION - PAIN TYPE: TYPE: ACUTE PAIN

## 2022-05-15 NOTE — PROGRESS NOTES
"[facility] ICU Progress Note      Admit Date: 5/12/2022    Resident(s): Gretchen Burroughs M.D.   Attending:  Dr. Green    Patient ID:    Name:  Enzo Núñez     YOB: 1940  Age:  81 y.o.  female   MRN:  [id number]    Hospital Course (carried forward and updated):  Per Dr. Dennison's Consult note- \" The patient is a 81 year old lady who presented to the ER on 5/12 with complaints of worsening left sided facial pain and head pressure over the past 2 days.  The patient also endorses that some mild balance issues and family has noted some confusion.  The patient has chronic visual changes that have not worsened recently.  No nausea or vomiting.  The patient underwent a CT head which revealed a large right frontal meningioma measuring 5.2 x 4.4 x 4.2 cm with associated mass effect on the right frontal lobe with adjacent white matter edema and mild compression of the frontal horn of the right lateral ventricle.  Neurosurgery was consulted.  She was started on Keppra and Decadron.  Interventional radiology was consulted.  The patient underwent diagnostic cerebral angiogram today in order to do embolization.  Unfortunately, the patient did not have adequate vessel anatomy or feeder vessels to undergo embolization.  She will be admitted to the neuro ICU for closely neurological monitoring and in preparation for craniotomy tomorrow with neurosurgery.\"       Consultants:  Critical Care  Neurosurgery    Interval Events:  Patient admitted to ICU yesterday afternoon. No acute event overnight.   Patient awake, able to answer with her name, unable to complete orientation questions due to language barrier. Patient speaks Egyptian Turkish only, no  available  Moves all extremities spontaneously, mimics visual direction, cranial nerve exam grossly intact    Vitals Range last 24h:  Temp:  [36.4 °C (97.5 °F)] 36.4 °C (97.5 °F)  Pulse:  [50-76] 70  Resp:  [14-34] 32  BP: (100-151)/(51-72) 151/65  SpO2:  [93 %-97 %] 93 " %      Intake/Output Summary (Last 24 hours) at 5/15/2022 1250  Last data filed at 5/15/2022 1200  Gross per 24 hour   Intake 3315.52 ml   Output 1780 ml   Net 1535.52 ml        ROS- Unable to obtain due to language barrier     PHYSICAL EXAM:  Vitals:    05/15/22 0900 05/15/22 1000 05/15/22 1100 05/15/22 1200   BP: (!) 148/72 127/60 138/65 (!) 151/65   Pulse: (!) 57 73 62 70   Resp: 14 17 18 (!) 32   Temp:       TempSrc:  Bladder Bladder Bladder   SpO2: 95% 93% 94% 93%   Weight:        There is no height or weight on file to calculate BMI.    O2 therapy: Pulse Oximetry: 93 %, O2 (LPM): 1, O2 Delivery Device: Nasal Cannula    Date 05/15/22 0700 - 05/16/22 0659   Shift 2081-3775 3820-6000 1067-6250 24 Hour Total   INTAKE   P.O. 750   750     P.O. 750   750   I.V. 355.5   355.5     Volume (mL) (NS infusion) 355.5   355.5   Shift Total 1105.5   1105.5   OUTPUT   Urine 430   430     Output (mL) (Urethral Catheter Temperature probe 16 Fr.) 430   430   Shift Total 430   430   .5   675.5        Physical Exam  Constitutional:       General: She is not in acute distress.     Appearance: She is obese. She is not ill-appearing.   HENT:      Head: Normocephalic and atraumatic.      Mouth/Throat:      Mouth: Mucous membranes are moist.      Pharynx: Oropharynx is clear.   Eyes:      Extraocular Movements: Extraocular movements intact.      Pupils: Pupils are equal, round, and reactive to light.   Cardiovascular:      Rate and Rhythm: Normal rate and regular rhythm.      Heart sounds: Normal heart sounds. No murmur heard.  Pulmonary:      Effort: No respiratory distress.      Breath sounds: Normal breath sounds. No wheezing.   Abdominal:      Palpations: Abdomen is soft.      Tenderness: There is no abdominal tenderness.   Musculoskeletal:         General: No swelling or tenderness.      Cervical back: Normal range of motion.   Skin:     General: Skin is warm and dry.   Neurological:      General: No focal deficit present.       Mental Status: She is alert.      Cranial Nerves: No cranial nerve deficit.      Motor: No weakness.             Recent Labs     05/12/22 1700 05/14/22  0545   SODIUM 140 139   POTASSIUM 4.4 4.4   CHLORIDE 105 106   CO2 25 21   BUN 17 20   CREATININE 0.71 0.56   PHOSPHORUS  --  2.7   CALCIUM 9.4 8.7     Recent Labs     05/12/22 1700 05/14/22  0545   ALTSGPT 20  --    ASTSGOT 20  --    ALKPHOSPHAT 103*  --    TBILIRUBIN <0.2  --    GLUCOSE 146* 176*     Recent Labs     05/12/22 1700 05/14/22  0545 05/14/22  0842   RBC 4.80 4.40  --    HEMOGLOBIN 13.6 12.0  --    HEMATOCRIT 42.0 38.3  --    PLATELETCT 355 339  --    PROTHROMBTM  --   --  13.4   INR  --   --  1.05     Recent Labs     05/12/22 1700 05/14/22  0545   WBC 9.1 7.2   NEUTSPOLYS 75.40*  --    LYMPHOCYTES 16.20*  --    MONOCYTES 5.90  --    EOSINOPHILS 1.70  --    BASOPHILS 0.40  --    ASTSGOT 20  --    ALTSGPT 20  --    ALKPHOSPHAT 103*  --    TBILIRUBIN <0.2  --        Meds:  • dexamethasone  4 mg     • levETIRAcetam (Keppra) IV  500 mg     • heparin  5,000 Units     • acetaminophen  650 mg     • lisinopril  20 mg     • labetalol  10 mg          Procedures:      Imaging:  MR-STEALTH BRAIN WITH & W/O   Final Result         Large right inferior frontal region dural based extra-axial mass that most likely represents a meningioma. There is mild mass effect upon the bilateral inferior frontal lobes with midline shift to the left measuring approximately 10 to 11 mm. There is    also mass effect upon the lateral ventricles.      Minimal edema noted in the right superomedial frontal white matter adjacent to the mass.      Age-related volume loss and chronic microvascular ischemic changes.      CT-HEAD W/O   Final Result      1.  Negative for hemorrhage      2.  Large right frontal meningioma measuring 5.2 x 4.4 x 4.2 cm      3.  Associated mass effect on the right frontal lobe with adjacent white matter edema and mild compression of the frontal horn right  lateral ventricle.         CT-MAXILLOFACIAL WITH PLUS RECONS   Final Result      1.  Negative for facial or orbital abscess      2.  CT face within normal limits      3.  Partially calcified right frontal extra-axial mass consistent with a meningioma described in detail on head CT report      IR-EMBOLIZE-NEURO-INTRACRANIAL    (Results Pending)       ASSESSEMENT and PLAN:    * Meningioma (HCC)  Assessment & Plan  Noted associated mass effect  Cont Decadron 4mg every 6 hours  Keppra 50mg every 12 hours  S/p diagnostic cerebral angiogram-->unable to embolize  Cont hourly neuro checks  SBP goals < 160    NPO at midnight, hold evening heparin dose for planned resection tomorrow 5/16       Hyperglycemia- (present on admission)  Assessment & Plan  BG goals 120-180s  Monitor closely while on steroids  No insulin coverage needed    Hypertensive urgency  Assessment & Plan  Improved control   Lisinopril 20mg PO daily  Labetalol prn for SBP goals    Dental caries  Assessment & Plan  Noted   CT maxillofacial without abscesses, received Unasyn from ED, will defer for now as she is asymptomatic       DISPO: Continue ICU for close neurostatus monitoring    CODE STATUS: Full     Quality Measures:  Feeding: oral   Analgesia: none  Sedation: none  Thromboprophylaxis: Heparin, continue until 2100 tonight, hold for surgery in AM   Head of bed: >30 degrees  Ulcer prophylaxis: n/a  Glycemic control: n/a  Bowel care: bowel regimen  Indwelling lines: PIV, leach  Deescalation of antibiotics: d/c Unasyn      Gretchen Burroughs M.D.

## 2022-05-15 NOTE — PROGRESS NOTES
Called  about holding 2200 5/14 heparin dose and 0600 5/15 dose.  told me to give the 5/14 dose and to hold the 5/15 dose.

## 2022-05-15 NOTE — PROGRESS NOTES
Neurosurgery Progress Note    Subjective:  Pt presents with headache, confusion and ataxia.  Found to have large right frontal tumor; likely meningioma   IR unable to embolize tumor feeding artery due to location and brach of artery    Daughter at bedside and able to translate  Patient denies headache or new symptoms   Brain MRI stealth completed     Exam:  Patient awake, talking and appropriate   Moves ext x 4, grossly strong  Speech fluent        BP  Min: 100/57  Max: 148/72  Pulse  Av.6  Min: 47  Max: 76  Resp  Av.7  Min: 14  Max: 34  Temp  Av.3 °C (97.3 °F)  Min: 36.2 °C (97.1 °F)  Max: 36.4 °C (97.5 °F)  Monitored Temp 2  Av.4 °C (97.5 °F)  Min: 36 °C (96.8 °F)  Max: 37.2 °C (99 °F)  SpO2  Av.3 %  Min: 90 %  Max: 97 %    No data recorded    Recent Labs     22  17022  0545   WBC 9.1 7.2   RBC 4.80 4.40   HEMOGLOBIN 13.6 12.0   HEMATOCRIT 42.0 38.3   MCV 87.5 87.0   MCH 28.3 27.3   MCHC 32.4* 31.3*   RDW 43.6 44.4   PLATELETCT 355 339   MPV 10.4 10.7     Recent Labs     22  1700 22  0545   SODIUM 140 139   POTASSIUM 4.4 4.4   CHLORIDE 105 106   CO2 25 21   GLUCOSE 146* 176*   BUN 17 20   CREATININE 0.71 0.56   CALCIUM 9.4 8.7     Recent Labs     22  0842   INR 1.05           Intake/Output                       22 0700 - 05/15/22 0659 05/15/22 0700 - 22 0659     5712-5254 6655-7301 Total 5283-9228 5719-7470 Total                 Intake    P.O.  740  -- 740  250  -- 250    P.O. 740 -- 740 250 -- 250    I.V.  1103.7  996 2099.7  355.5  -- 355.5    Propofol Volume 13 -- 13 -- -- --    Volume (mL) (NS infusion)  355.5 -- 355.5    Volume (mL) (Lactated Ringers) 716.7 -- 716.7 -- -- --    IV Piggyback  100  -- 100  --  -- --    Volume (mL) (ampicillin/sulbactam (UNASYN) 3 g in  mL IVPB) 100 -- 100 -- -- --    Total Intake 1943.7 996 2939.7 605.5 -- 605.5       Output    Urine  470  900 1370  270  -- 270    Urine 20 -- 20 -- -- --     Output (mL) (Urethral Catheter Temperature probe 16 Fr.)  270 -- 270    Blood  50  -- 50  --  -- --    Est. Blood Loss 50 -- 50 -- -- --    Total Output  270 -- 270       Net I/O     1423.7 96 1519.7 335.5 -- 335.5            Intake/Output Summary (Last 24 hours) at 5/15/2022 1106  Last data filed at 5/15/2022 1000  Gross per 24 hour   Intake 3545.19 ml   Output 1690 ml   Net 1855.19 ml            • dexamethasone  4 mg Q6HRS   • levETIRAcetam (Keppra) IV  500 mg Q12HRS   • heparin  5,000 Units Q8HRS   • acetaminophen  650 mg Q4HRS PRN   • lisinopril  20 mg Q DAY   • labetalol  10 mg Q4HRS PRN       Assessment and Plan:  Hospital day #4  1 more dose of heparin and then d/c  NPO at midnight for OR Monday for tumor resection      Prophylactic anticoagulation: no         Start date/time:

## 2022-05-15 NOTE — PROGRESS NOTES
Patient is Czech Italian speaking only. Daughters left bedside at 1715. Daughters were previously interpreting.     1730 RN completing a Neurological Bedside assessment. [employer]ation Services called.  stated that he did not speak this dialect of Italian and he could not understand patient. Instructed RN to call [employer] and ask for Czech .     1733 RN called [employer] and requested a Czech Italian . Per  she attempted to contact a Czech  but no interpreters are available at this time.     1735: Charge nurse notified. Suggested RN contact daughters for interpretation. Daughter on file called. No answer. RN will continue neuro assessment except for Orientation questions due to language barrier. RN will continue to attempt to reach a Czech Italian  or daughter.     1810: Daughter returned to bedside. Neuro assessment obtained.     1815: Per charge RN special visitation allowed for daughter: daughter may visit at any time to provide interpretation services.

## 2022-05-15 NOTE — CARE PLAN
The patient is Stable - Low risk of patient condition declining or worsening    Shift Goals  Clinical Goals:  (Monitor Neuro Status)  Patient Goals: Pain management  Family Goals: Prayer    Progress made toward(s) clinical / shift goals:    Problem: Knowledge Deficit - Standard  Goal: Patient and family/care givers will demonstrate understanding of plan of care, disease process/condition, diagnostic tests and medications  Outcome: Progressing     Problem: Pain - Standard  Goal: Alleviation of pain or a reduction in pain to the patient’s comfort goal  Outcome: Progressing     Problem: Fall Risk  Goal: Patient will remain free from falls  Outcome: Progressing       Patient is not progressing towards the following goals:

## 2022-05-16 LAB
ACT BLD: 196 SEC (ref 74–137)
ANION GAP SERPL CALC-SCNC: 11 MMOL/L (ref 7–16)
BUN SERPL-MCNC: 19 MG/DL (ref 8–22)
CALCIUM SERPL-MCNC: 8.5 MG/DL (ref 8.5–10.5)
CHLORIDE SERPL-SCNC: 108 MMOL/L (ref 96–112)
CO2 SERPL-SCNC: 24 MMOL/L (ref 20–33)
CREAT SERPL-MCNC: 0.6 MG/DL (ref 0.5–1.4)
ERYTHROCYTE [DISTWIDTH] IN BLOOD BY AUTOMATED COUNT: 45.4 FL (ref 35.9–50)
EST. AVERAGE GLUCOSE BLD GHB EST-MCNC: 137 MG/DL
GFR SERPLBLD CREATININE-BSD FMLA CKD-EPI: 91 ML/MIN/1.73 M 2
GLUCOSE SERPL-MCNC: 156 MG/DL (ref 65–99)
HBA1C MFR BLD: 6.4 % (ref 4–5.6)
HCT VFR BLD AUTO: 36.8 % (ref 37–47)
HGB BLD-MCNC: 11.7 G/DL (ref 12–16)
INR PPP: 1.02 (ref 0.87–1.13)
MCH RBC QN AUTO: 27.9 PG (ref 27–33)
MCHC RBC AUTO-ENTMCNC: 31.8 G/DL (ref 33.6–35)
MCV RBC AUTO: 87.8 FL (ref 81.4–97.8)
PLATELET # BLD AUTO: 310 K/UL (ref 164–446)
PMV BLD AUTO: 11.1 FL (ref 9–12.9)
POTASSIUM SERPL-SCNC: 4.2 MMOL/L (ref 3.6–5.5)
PROTHROMBIN TIME: 13.1 SEC (ref 12–14.6)
RBC # BLD AUTO: 4.19 M/UL (ref 4.2–5.4)
SODIUM SERPL-SCNC: 143 MMOL/L (ref 135–145)
WBC # BLD AUTO: 8.9 K/UL (ref 4.8–10.8)

## 2022-05-16 PROCEDURE — 700102 HCHG RX REV CODE 250 W/ 637 OVERRIDE(OP): Performed by: STUDENT IN AN ORGANIZED HEALTH CARE EDUCATION/TRAINING PROGRAM

## 2022-05-16 PROCEDURE — 700102 HCHG RX REV CODE 250 W/ 637 OVERRIDE(OP): Performed by: PHYSICIAN ASSISTANT

## 2022-05-16 PROCEDURE — 36415 COLL VENOUS BLD VENIPUNCTURE: CPT

## 2022-05-16 PROCEDURE — 700111 HCHG RX REV CODE 636 W/ 250 OVERRIDE (IP): Performed by: PHYSICIAN ASSISTANT

## 2022-05-16 PROCEDURE — 99231 SBSQ HOSP IP/OBS SF/LOW 25: CPT | Performed by: STUDENT IN AN ORGANIZED HEALTH CARE EDUCATION/TRAINING PROGRAM

## 2022-05-16 PROCEDURE — A9270 NON-COVERED ITEM OR SERVICE: HCPCS | Performed by: PHYSICIAN ASSISTANT

## 2022-05-16 PROCEDURE — A9270 NON-COVERED ITEM OR SERVICE: HCPCS | Performed by: INTERNAL MEDICINE

## 2022-05-16 PROCEDURE — A9270 NON-COVERED ITEM OR SERVICE: HCPCS | Performed by: STUDENT IN AN ORGANIZED HEALTH CARE EDUCATION/TRAINING PROGRAM

## 2022-05-16 PROCEDURE — 85610 PROTHROMBIN TIME: CPT

## 2022-05-16 PROCEDURE — 80048 BASIC METABOLIC PNL TOTAL CA: CPT

## 2022-05-16 PROCEDURE — 700111 HCHG RX REV CODE 636 W/ 250 OVERRIDE (IP): Performed by: STUDENT IN AN ORGANIZED HEALTH CARE EDUCATION/TRAINING PROGRAM

## 2022-05-16 PROCEDURE — 770001 HCHG ROOM/CARE - MED/SURG/GYN PRIV*

## 2022-05-16 PROCEDURE — 700102 HCHG RX REV CODE 250 W/ 637 OVERRIDE(OP): Performed by: INTERNAL MEDICINE

## 2022-05-16 PROCEDURE — 85027 COMPLETE CBC AUTOMATED: CPT

## 2022-05-16 PROCEDURE — 700101 HCHG RX REV CODE 250: Performed by: STUDENT IN AN ORGANIZED HEALTH CARE EDUCATION/TRAINING PROGRAM

## 2022-05-16 PROCEDURE — 83036 HEMOGLOBIN GLYCOSYLATED A1C: CPT

## 2022-05-16 RX ORDER — DEXAMETHASONE 4 MG/1
4 TABLET ORAL EVERY 6 HOURS
Status: DISCONTINUED | OUTPATIENT
Start: 2022-05-16 | End: 2022-05-22

## 2022-05-16 RX ORDER — LISINOPRIL 20 MG/1
20 TABLET ORAL ONCE
Status: COMPLETED | OUTPATIENT
Start: 2022-05-16 | End: 2022-05-16

## 2022-05-16 RX ORDER — LEVETIRACETAM 500 MG/1
500 TABLET ORAL 2 TIMES DAILY
Status: DISCONTINUED | OUTPATIENT
Start: 2022-05-16 | End: 2022-05-31 | Stop reason: HOSPADM

## 2022-05-16 RX ORDER — LISINOPRIL 20 MG/1
40 TABLET ORAL
Status: DISCONTINUED | OUTPATIENT
Start: 2022-05-17 | End: 2022-05-19

## 2022-05-16 RX ORDER — LEVETIRACETAM 500 MG/1
500 TABLET ORAL ONCE
Status: COMPLETED | OUTPATIENT
Start: 2022-05-16 | End: 2022-05-16

## 2022-05-16 RX ORDER — HEPARIN SODIUM 5000 [USP'U]/ML
5000 INJECTION, SOLUTION INTRAVENOUS; SUBCUTANEOUS EVERY 8 HOURS
Status: DISPENSED | OUTPATIENT
Start: 2022-05-16 | End: 2022-05-19

## 2022-05-16 RX ORDER — LEVETIRACETAM 500 MG/1
500 TABLET ORAL 2 TIMES DAILY
Status: DISCONTINUED | OUTPATIENT
Start: 2022-05-16 | End: 2022-05-16

## 2022-05-16 RX ORDER — HYDRALAZINE HYDROCHLORIDE 50 MG/1
25 TABLET, FILM COATED ORAL EVERY 6 HOURS PRN
Status: DISCONTINUED | OUTPATIENT
Start: 2022-05-16 | End: 2022-05-19

## 2022-05-16 RX ORDER — LISINOPRIL 20 MG/1
40 TABLET ORAL
Status: DISCONTINUED | OUTPATIENT
Start: 2022-05-16 | End: 2022-05-16

## 2022-05-16 RX ADMIN — LEVETIRACETAM 500 MG: 500 TABLET, FILM COATED ORAL at 14:07

## 2022-05-16 RX ADMIN — HEPARIN SODIUM 5000 UNITS: 5000 INJECTION, SOLUTION INTRAVENOUS; SUBCUTANEOUS at 09:45

## 2022-05-16 RX ADMIN — HYDRALAZINE HYDROCHLORIDE 25 MG: 50 TABLET, FILM COATED ORAL at 10:07

## 2022-05-16 RX ADMIN — DEXAMETHASONE 4 MG: 4 TABLET ORAL at 22:14

## 2022-05-16 RX ADMIN — DEXAMETHASONE SODIUM PHOSPHATE 4 MG: 4 INJECTION, SOLUTION INTRA-ARTICULAR; INTRALESIONAL; INTRAMUSCULAR; INTRAVENOUS; SOFT TISSUE at 09:45

## 2022-05-16 RX ADMIN — HEPARIN SODIUM 5000 UNITS: 5000 INJECTION, SOLUTION INTRAVENOUS; SUBCUTANEOUS at 14:08

## 2022-05-16 RX ADMIN — SENNOSIDES AND DOCUSATE SODIUM 2 TABLET: 50; 8.6 TABLET ORAL at 17:01

## 2022-05-16 RX ADMIN — DEXAMETHASONE SODIUM PHOSPHATE 4 MG: 4 INJECTION, SOLUTION INTRA-ARTICULAR; INTRALESIONAL; INTRAMUSCULAR; INTRAVENOUS; SOFT TISSUE at 04:09

## 2022-05-16 RX ADMIN — HEPARIN SODIUM 5000 UNITS: 5000 INJECTION, SOLUTION INTRAVENOUS; SUBCUTANEOUS at 22:14

## 2022-05-16 RX ADMIN — SENNOSIDES AND DOCUSATE SODIUM 2 TABLET: 50; 8.6 TABLET ORAL at 05:08

## 2022-05-16 RX ADMIN — LEVETIRACETAM 500 MG: 500 TABLET, FILM COATED ORAL at 22:13

## 2022-05-16 RX ADMIN — LABETALOL HYDROCHLORIDE 10 MG: 5 INJECTION INTRAVENOUS at 00:04

## 2022-05-16 RX ADMIN — DEXAMETHASONE 4 MG: 4 TABLET ORAL at 18:03

## 2022-05-16 RX ADMIN — LISINOPRIL 20 MG: 20 TABLET ORAL at 14:07

## 2022-05-16 RX ADMIN — LISINOPRIL 20 MG: 20 TABLET ORAL at 05:08

## 2022-05-16 RX ADMIN — HYDRALAZINE HYDROCHLORIDE 25 MG: 50 TABLET, FILM COATED ORAL at 17:01

## 2022-05-16 ASSESSMENT — PAIN DESCRIPTION - PAIN TYPE: TYPE: ACUTE PAIN

## 2022-05-16 NOTE — CARE PLAN
The patient is Stable - Low risk of patient condition declining or worsening    Shift Goals  Clinical Goals: Pain Managment  Patient Goals: Bowel Movement  Family Goals: Prayer    Progress made toward(s) clinical / shift goals:    Problem: Knowledge Deficit - Standard  Goal: Patient and family/care givers will demonstrate understanding of plan of care, disease process/condition, diagnostic tests and medications  5/15/2022 2238 by Reji Jensen R.N.  Outcome: Progressing  5/15/2022 2238 by Reji Jensen R.N.  Outcome: Progressing     Problem: Pain - Standard  Goal: Alleviation of pain or a reduction in pain to the patient’s comfort goal  Outcome: Progressing     Problem: Fall Risk  Goal: Patient will remain free from falls  Outcome: Progressing       Patient is not progressing towards the following goals:

## 2022-05-16 NOTE — PROGRESS NOTES
Neurosurgery Progress Note    Subjective:  Pt presents with headache, confusion and ataxia.  Found to have large right frontal tumor; likely meningioma   IR unable to embolize tumor feeding artery due to location and brach of artery    Surgery unable to be completed today d/t hospital OR staffing shortage.  Patient denies headache or new symptoms   Brain MRI stealth completed     Exam:  Patient awake, talking and appropriate   Moves ext x 4, grossly strong  Performing ADL's  Speech fluent        BP  Min: 137/71  Max: 176/75  Pulse  Av.8  Min: 49  Max: 86  Resp  Av.8  Min: 15  Max: 50  Monitored Temp 2  Av °C (98.6 °F)  Min: 35.1 °C (95.18 °F)  Max: 37.4 °C (99.32 °F)  SpO2  Av %  Min: 91 %  Max: 95 %    No data recorded    Recent Labs     22  0545 22  0305   WBC 7.2 8.9   RBC 4.40 4.19*   HEMOGLOBIN 12.0 11.7*   HEMATOCRIT 38.3 36.8*   MCV 87.0 87.8   MCH 27.3 27.9   MCHC 31.3* 31.8*   RDW 44.4 45.4   PLATELETCT 339 310   MPV 10.7 11.1     Recent Labs     22  0545 22  0305   SODIUM 139 143   POTASSIUM 4.4 4.2   CHLORIDE 106 108   CO2 21 24   GLUCOSE 176* 156*   BUN 20 19   CREATININE 0.56 0.60   CALCIUM 8.7 8.5     Recent Labs     22  0842 22  0305   INR 1.05 1.02           Intake/Output                       05/15/22 0700 - 22 0659 22 0700 - 22 0659     0526-7162 8200-4061 Total 8189-6650 8958-3174 Total                 Intake    P.O.  1390  0 1390  --  -- --    P.O. 1390 0 1390 -- -- --    I.V.  355.5  -- 355.5  --  -- --    Volume (mL) (NS infusion) 355.5 -- 355.5 -- -- --    Total Intake 1745.5 0 1745.5 -- -- --       Output    Urine  845  395 1240  --  -- --    Output (mL) (Urethral Catheter Temperature probe 16 Fr.)  -- -- --    Stool  --  -- --  --  -- --    Number of Times Stooled 1 x -- 1 x -- -- --    Total Output  -- -- --       Net I/O     900.5 -395 505.5 -- -- --            Intake/Output Summary (Last 24  hours) at 5/16/2022 1049  Last data filed at 5/16/2022 0400  Gross per 24 hour   Intake 1140 ml   Output 970 ml   Net 170 ml            • hydrALAZINE  25 mg Q6HRS PRN   • heparin  5,000 Units Q8HRS   • senna-docusate  2 Tablet BID    And   • polyethylene glycol/lytes  1 Packet QDAY PRN    And   • magnesium hydroxide  30 mL QDAY PRN    And   • bisacodyl  10 mg QDAY PRN   • dexamethasone  4 mg Q6HRS   • levETIRAcetam (Keppra) IV  500 mg Q12HRS   • acetaminophen  650 mg Q4HRS PRN   • lisinopril  20 mg Q DAY   • labetalol  10 mg Q4HRS PRN       Assessment and Plan:  Hospital day #5  Ok to transfer to floor today.  Planning for surgery Thursday.  Needs NPO at midnight Wednesday.  Ok to restart prior heparin dose, but this well need to be held on Wednesday.  Appreciate intensivist/Hospitalist care.  Following.       Prophylactic anticoagulation: yes         Start date/time: today, needs to be held starting on 5/18.

## 2022-05-16 NOTE — DISCHARGE PLANNING
Anticipated Discharge Disposition: patient to transfer to Neuro floor.pending follow up surgery for Meningioma.    Action: Plan to have surgery Thursday on Meningioma. Pt. Speaks only Honduran Bengali. [facility] does not have  that speaks that specific dialect.Daughter may be able to assist with translation.    Barriers to Discharge: Surgery scheduling.    Plan: [facility] will continue to assist with discharge planning and barriers.

## 2022-05-16 NOTE — CONSULTS
Hospital Medicine Consultation    Date of Service  5/16/2022    Referring Physician  Judy Green MD    Consulting Physician  Yury Mcrae M.D.    Reason for Consultation  headache    History of Presenting Illness  81 y.o. female who presented 5/12/2022 with headache, with mild confusion and balance issues. Patient found to have right frontal meningioma with associated mass effect on right frontal lobe with adjacent white matter edema. Neurosurgery consulted, patient started on keppra and decadron. Patient admitted to ICU for close monitoring. IR unable to perform embolization of feeder vesell due to vessel anatomy. Neurosurgery plan for craniotomy and tumor resection Thursday. Patient neurologically stable, transfer out of the ICU to neurology floor.    Patient seen at bedside with family. Patient speak dialect of Hebrew that is unavailable on  service. Daughter translating at bedside.  Patient reports mild head pressure otherwise denies focal complaints.  Continue keppra, decadron.  Plan for OR Thursday per neurosurgery.  Okay for transfer to neuro floor.    Review of Systems  ROS   Unable to perform due to language barrier    Past Medical History   has no past medical history on file.    Surgical History   has no past surgical history on file.    Family History  family history is not on file.    Social History       Medications  None       Allergies  No Known Allergies    Physical Exam  Pulse:  [49-86] 58  Resp:  [15-50] 26  BP: (137-176)/(56-93) 158/74  SpO2:  [91 %-95 %] 93 %    Physical Exam  Vitals reviewed.   Constitutional:       General: She is not in acute distress.     Appearance: She is not diaphoretic.   HENT:      Head: Normocephalic and atraumatic.      Right Ear: External ear normal.      Left Ear: External ear normal.      Nose: Nose normal. No congestion.      Mouth/Throat:      Pharynx: No oropharyngeal exudate or posterior oropharyngeal erythema.   Eyes:      Extraocular Movements:  Extraocular movements intact.      Pupils: Pupils are equal, round, and reactive to light.   Cardiovascular:      Rate and Rhythm: Normal rate and regular rhythm.   Pulmonary:      Effort: Pulmonary effort is normal.      Breath sounds: Normal breath sounds.   Abdominal:      General: Bowel sounds are normal.      Palpations: Abdomen is soft.   Musculoskeletal:         General: No swelling. Normal range of motion.      Cervical back: Normal range of motion and neck supple.   Skin:     General: Skin is warm and dry.   Neurological:      General: No focal deficit present.      Mental Status: She is alert and oriented to person, place, and time.      Sensory: No sensory deficit.      Motor: No weakness.   Psychiatric:         Mood and Affect: Mood normal.         Behavior: Behavior normal.         Fluids      Laboratory  Recent Labs     05/14/22  0545 05/16/22  0305   WBC 7.2 8.9   RBC 4.40 4.19*   HEMOGLOBIN 12.0 11.7*   HEMATOCRIT 38.3 36.8*   MCV 87.0 87.8   MCH 27.3 27.9   MCHC 31.3* 31.8*   RDW 44.4 45.4   PLATELETCT 339 310   MPV 10.7 11.1     Recent Labs     05/14/22  0545 05/16/22  0305   SODIUM 139 143   POTASSIUM 4.4 4.2   CHLORIDE 106 108   CO2 21 24   GLUCOSE 176* 156*   BUN 20 19   CREATININE 0.56 0.60   CALCIUM 8.7 8.5     Recent Labs     05/14/22  0842 05/16/22  0305   INR 1.05 1.02                 Imaging  IR-EMBOLIZE-NEURO-INTRACRANIAL   Final Result   Impression:      Cerebral angiogram for assessment for vascular supply to the frontal meningioma demonstrates with the predominant supply to the meningioma from the artery of the falx cerebri arising from the left ophthalmic artery with minimal supply also arising from    the right-sided artery of the falx. No definite supply to the lesion from the middle meningeal arteries. Very minimal supply to the right side of the meningioma from distal frontal penetrating branches of the superficial temporal artery also noted.      Since the majority of the supply  to the meningioma was from the ophthalmic artery, the lesion could not be safely embolized.      I, Ayo Schmidt was physically present and participated during the entire procedure of the IR-EMBOLIZE-NEURO-INTRACRANIAL.         MR-STEALTH BRAIN WITH & W/O   Final Result         Large right inferior frontal region dural based extra-axial mass that most likely represents a meningioma. There is mild mass effect upon the bilateral inferior frontal lobes with midline shift to the left measuring approximately 10 to 11 mm. There is    also mass effect upon the lateral ventricles.      Minimal edema noted in the right superomedial frontal white matter adjacent to the mass.      Age-related volume loss and chronic microvascular ischemic changes.      CT-HEAD W/O   Final Result      1.  Negative for hemorrhage      2.  Large right frontal meningioma measuring 5.2 x 4.4 x 4.2 cm      3.  Associated mass effect on the right frontal lobe with adjacent white matter edema and mild compression of the frontal horn right lateral ventricle.         CT-MAXILLOFACIAL WITH PLUS RECONS   Final Result      1.  Negative for facial or orbital abscess      2.  CT face within normal limits      3.  Partially calcified right frontal extra-axial mass consistent with a meningioma described in detail on head CT report          Assessment/Plan  * Meningioma (HCC)  Assessment & Plan  CT head shows Large right frontal meningioma measuring 5.2 x 4.4 x 4.2 cm and associated mass effect on the right frontal lobe with adjacent white matter edema and mild compression of the frontal horn right lateral ventricle   -- MRI has been reviewed, neurosurgery Dr. Goodson has evaluated, recommended IR guided embolization on 5/14   IR unable to perform embolization of feeder artery due to anatomy   -- plan for craniotomy and tumor resection  5/19 Thursday  Continue decadron every 6 hours,  omeprazole 20 mg p.o. daily  Keppra 500 mg twice daily    Hypertensive  urgency  Assessment & Plan  Start patient on lisinopril   Labetalol IV prn   BP improved but remains elevated  Increase lisinopril to 40 mg daily    Dental caries  Assessment & Plan  Unasyn given in ED  No abscess seen on CT.  unasyn stopped

## 2022-05-17 ENCOUNTER — APPOINTMENT (OUTPATIENT)
Dept: RADIOLOGY | Facility: MEDICAL CENTER | Age: 82
DRG: 025 | End: 2022-05-17
Payer: MEDICAID

## 2022-05-17 LAB — GLUCOSE BLD STRIP.AUTO-MCNC: 131 MG/DL (ref 65–99)

## 2022-05-17 PROCEDURE — 51798 US URINE CAPACITY MEASURE: CPT

## 2022-05-17 PROCEDURE — A9270 NON-COVERED ITEM OR SERVICE: HCPCS | Performed by: STUDENT IN AN ORGANIZED HEALTH CARE EDUCATION/TRAINING PROGRAM

## 2022-05-17 PROCEDURE — 74018 RADEX ABDOMEN 1 VIEW: CPT

## 2022-05-17 PROCEDURE — A9270 NON-COVERED ITEM OR SERVICE: HCPCS | Performed by: INTERNAL MEDICINE

## 2022-05-17 PROCEDURE — 770001 HCHG ROOM/CARE - MED/SURG/GYN PRIV*

## 2022-05-17 PROCEDURE — 700102 HCHG RX REV CODE 250 W/ 637 OVERRIDE(OP): Performed by: INTERNAL MEDICINE

## 2022-05-17 PROCEDURE — 700102 HCHG RX REV CODE 250 W/ 637 OVERRIDE(OP): Performed by: STUDENT IN AN ORGANIZED HEALTH CARE EDUCATION/TRAINING PROGRAM

## 2022-05-17 PROCEDURE — 700111 HCHG RX REV CODE 636 W/ 250 OVERRIDE (IP): Performed by: PHYSICIAN ASSISTANT

## 2022-05-17 PROCEDURE — 82962 GLUCOSE BLOOD TEST: CPT

## 2022-05-17 PROCEDURE — A9270 NON-COVERED ITEM OR SERVICE: HCPCS | Performed by: PHYSICIAN ASSISTANT

## 2022-05-17 PROCEDURE — 99232 SBSQ HOSP IP/OBS MODERATE 35: CPT | Performed by: INTERNAL MEDICINE

## 2022-05-17 PROCEDURE — 700102 HCHG RX REV CODE 250 W/ 637 OVERRIDE(OP): Performed by: PHYSICIAN ASSISTANT

## 2022-05-17 PROCEDURE — 700111 HCHG RX REV CODE 636 W/ 250 OVERRIDE (IP)

## 2022-05-17 RX ORDER — MORPHINE SULFATE 4 MG/ML
2 INJECTION INTRAVENOUS
Status: COMPLETED | OUTPATIENT
Start: 2022-05-17 | End: 2022-05-17

## 2022-05-17 RX ORDER — AMLODIPINE BESYLATE 5 MG/1
5 TABLET ORAL
Status: DISCONTINUED | OUTPATIENT
Start: 2022-05-17 | End: 2022-05-24

## 2022-05-17 RX ADMIN — DEXAMETHASONE 4 MG: 4 TABLET ORAL at 17:13

## 2022-05-17 RX ADMIN — SENNOSIDES AND DOCUSATE SODIUM 2 TABLET: 50; 8.6 TABLET ORAL at 17:13

## 2022-05-17 RX ADMIN — HYDRALAZINE HYDROCHLORIDE 25 MG: 50 TABLET, FILM COATED ORAL at 09:13

## 2022-05-17 RX ADMIN — HEPARIN SODIUM 5000 UNITS: 5000 INJECTION, SOLUTION INTRAVENOUS; SUBCUTANEOUS at 05:34

## 2022-05-17 RX ADMIN — DEXAMETHASONE 4 MG: 4 TABLET ORAL at 13:17

## 2022-05-17 RX ADMIN — HEPARIN SODIUM 5000 UNITS: 5000 INJECTION, SOLUTION INTRAVENOUS; SUBCUTANEOUS at 17:12

## 2022-05-17 RX ADMIN — AMLODIPINE BESYLATE 5 MG: 5 TABLET ORAL at 17:13

## 2022-05-17 RX ADMIN — SENNOSIDES AND DOCUSATE SODIUM 2 TABLET: 50; 8.6 TABLET ORAL at 05:34

## 2022-05-17 RX ADMIN — LEVETIRACETAM 500 MG: 500 TABLET, FILM COATED ORAL at 17:13

## 2022-05-17 RX ADMIN — HEPARIN SODIUM 5000 UNITS: 5000 INJECTION, SOLUTION INTRAVENOUS; SUBCUTANEOUS at 22:18

## 2022-05-17 RX ADMIN — DEXAMETHASONE 4 MG: 4 TABLET ORAL at 22:18

## 2022-05-17 RX ADMIN — LEVETIRACETAM 500 MG: 500 TABLET, FILM COATED ORAL at 05:34

## 2022-05-17 RX ADMIN — DEXAMETHASONE 4 MG: 4 TABLET ORAL at 05:34

## 2022-05-17 RX ADMIN — LISINOPRIL 40 MG: 20 TABLET ORAL at 05:34

## 2022-05-17 RX ADMIN — MORPHINE SULFATE 2 MG: 4 INJECTION INTRAVENOUS at 22:19

## 2022-05-17 ASSESSMENT — PAIN DESCRIPTION - PAIN TYPE
TYPE: ACUTE PAIN

## 2022-05-17 ASSESSMENT — ENCOUNTER SYMPTOMS
DIZZINESS: 0
FEVER: 0
HEADACHES: 0
WEAKNESS: 1
SHORTNESS OF BREATH: 0
FALLS: 0

## 2022-05-17 NOTE — CARE PLAN
The patient is Stable - Low risk of patient condition declining or worsening    Shift Goals  Clinical Goals: Monitor neuro status  Patient Goals: Rest  Family Goals: Prayer    Progress made toward(s) clinical / shift goals:    Problem: Knowledge Deficit - Standard  Goal: Patient and family/care givers will demonstrate understanding of plan of care, disease process/condition, diagnostic tests and medications  Outcome: Progressing  Note: Pt demonstrates understanding of plan of care with translation assistance from her daughter.      Problem: Pain - Standard  Goal: Alleviation of pain or a reduction in pain to the patient’s comfort goal  Outcome: Progressing  Note: Pt educated on 0-10 pain scale, no complaints of pain.

## 2022-05-17 NOTE — PROGRESS NOTES
Neurosurgery Progress Note    Subjective:  Pt presents with headache, confusion and ataxia.  Found to have large right frontal tumor; likely meningioma   IR unable to embolize tumor feeding artery due to location and brach of artery    Surgery unable to be completed today d/t hospital OR staffing shortage.  Patient denies headache or new symptoms   Brain MRI stealth completed     Exam:  No  available. She is awake and alert  Face symmetric  Moves all 4 ext.       BP  Min: 143/69  Max: 214/94  Pulse  Av.8  Min: 60  Max: 81  Resp  Av.1  Min: 18  Max: 52  Temp  Av.6 °C (97.8 °F)  Min: 36.4 °C (97.5 °F)  Max: 36.7 °C (98.1 °F)  Monitored Temp 2  Av.9 °C (98.4 °F)  Min: 36.4 °C (97.5 °F)  Max: 37.1 °C (98.8 °F)  SpO2  Av.4 %  Min: 93 %  Max: 96 %    No data recorded    Recent Labs     22  0305   WBC 8.9   RBC 4.19*   HEMOGLOBIN 11.7*   HEMATOCRIT 36.8*   MCV 87.8   MCH 27.9   MCHC 31.8*   RDW 45.4   PLATELETCT 310   MPV 11.1     Recent Labs     22  0305   SODIUM 143   POTASSIUM 4.2   CHLORIDE 108   CO2 24   GLUCOSE 156*   BUN 19   CREATININE 0.60   CALCIUM 8.5     Recent Labs     22  0305   INR 1.02           Intake/Output                       22 0700 - 22 0659 22 0700 - 22 0659     [id number] 7840-5979 Total [id number] 5975-2720 Total                 Intake    P.O.  560  -- 560  --  -- --    P.O. 560 -- 560 -- -- --    Total Intake 560 -- 560 -- -- --       Output    Urine  1000  -- 1000  --  -- --    Number of Times Voided -- 3 x 3 x -- -- --    Output (mL) ([REMOVED] Urethral Catheter Temperature probe 16 Fr.) 1000 -- 1000 -- -- --    Total Output 1000 -- 1000 -- -- --       Net I/O     -440 -- -440 -- -- --            Intake/Output Summary (Last 24 hours) at 2022 1010  Last data filed at 2022 1545  Gross per 24 hour   Intake 240 ml   Output 600 ml   Net -360 ml            • hydrALAZINE  25 mg Q6HRS PRN   • heparin  5,000 Units  Q8HRS   • dexamethasone  4 mg Q6HRS   • levETIRAcetam  500 mg BID   • lisinopril  40 mg Q DAY   • senna-docusate  2 Tablet BID    And   • polyethylene glycol/lytes  1 Packet QDAY PRN    And   • magnesium hydroxide  30 mL QDAY PRN    And   • bisacodyl  10 mg QDAY PRN   • acetaminophen  650 mg Q4HRS PRN   • labetalol  10 mg Q4HRS PRN       Assessment and Plan:  Hospital day #6  Planning for surgery Thursday.  Needs NPO at midnight Wednesday.  Ok to restart prior heparin dose, but this well need to be held on Wednesday.  Appreciate intensivist/Hospitalist care.  Following.       Prophylactic anticoagulation: yes         Start date/time: today, needs to be held starting on 5/18.

## 2022-05-17 NOTE — PROGRESS NOTES
4 Eyes Skin Assessment Completed by LAURO Blanco and LAURO Magaña.    Head WDL  Ears WDL  Nose WDL  Mouth WDL  Neck WDL  Breast/Chest WDL  Shoulder Blades WDL  Spine WDL  (R) Arm/Elbow/Hand WDL  (L) Arm/Elbow/Hand WDL  Abdomen Scar  Groin Incision L cath site - clean, dry, soft  Scrotum/Coccyx/Buttocks WDL  (R) Leg Swelling  (L) Leg Swelling  (R) Heel/Foot/Toe WDL  (L) Heel/Foot/Toe WDL          Devices In Places Blood Pressure Cuff and Pulse Ox      Interventions In Place TAP System, Pillows and Pressure Redistribution Mattress    Possible Skin Injury No    Pictures Uploaded Into Epic N/A  Wound Consult Placed N/A  RN Wound Prevention Protocol Ordered No

## 2022-05-17 NOTE — PROGRESS NOTES
Hospital Medicine Daily Progress Note    Date of Service  5/17/2022    Chief Complaint  Enzo Núñez is a 81 y.o. female admitted 5/12/2022 with headache    Hospital Course  This is 81-year-old female with a past medical history significant for obesity, new diagnosis of hypertension presented to the ER with headache, mild confusion hypertension and balance issues.  She was found to have right frontal meningioma with associated mass-effect on right frontal lobe with adjacent white matter edema.  Neurosurgery was consulted.  She was started on Keppra and Decadron.  She was initially admitted to the ICU for close monitoring.  IR was unable to perform embolization of feeder vessel due to vessel anatomy. Now planned for craniotomy and tumor resection 5/19/22.      Interval Problem Update  - Daughter at bedside but had to rush home to take kids to school, only complaint of patient is feeling generalized weakness and difficulty walking since IR procedure  - BP high, starting amlodipine    I have personally seen and examined the patient at bedside. I discussed the plan of care with patient, family, bedside RN, charge RN and .    Consultants/Specialty  neurosurgery    Code Status  Full Code    Disposition  Patient is not medically cleared for discharge.   Anticipate discharge to to skilled nursing facility.  I have placed the appropriate orders for post-discharge needs.    Review of Systems  Review of Systems   Constitutional: Negative for fever.   Respiratory: Negative for shortness of breath.    Cardiovascular: Negative for chest pain.   Musculoskeletal: Negative for falls.   Neurological: Positive for weakness. Negative for dizziness and headaches.        Physical Exam  Temp:  [36.4 °C (97.5 °F)-36.9 °C (98.4 °F)] 36.9 °C (98.4 °F)  Pulse:  [61-73] 73  Resp:  [18] 18  BP: (141-172)/(65-91) 141/65  SpO2:  [93 %-96 %] 96 %    Physical Exam  Constitutional:       General: She is not in acute distress.      Appearance: She is obese. She is not ill-appearing, toxic-appearing or diaphoretic.   HENT:      Head: Normocephalic and atraumatic.      Nose: Nose normal.      Mouth/Throat:      Mouth: Mucous membranes are moist.   Eyes:      General: No scleral icterus.     Conjunctiva/sclera: Conjunctivae normal.   Cardiovascular:      Rate and Rhythm: Normal rate and regular rhythm.      Heart sounds: No murmur heard.    No friction rub. No gallop.   Pulmonary:      Effort: Pulmonary effort is normal.      Breath sounds: Normal breath sounds.   Abdominal:      General: Bowel sounds are normal. There is distension.      Palpations: Abdomen is soft.      Tenderness: There is no abdominal tenderness.   Musculoskeletal:      Cervical back: Normal range of motion.      Right lower leg: No edema.      Left lower leg: No edema.   Skin:     Coloration: Skin is not jaundiced.      Findings: No rash.   Neurological:      Mental Status: She is alert and oriented to person, place, and time.   Psychiatric:         Mood and Affect: Mood normal.         Behavior: Behavior normal.         Fluids  No intake or output data in the 24 hours ending 05/17/22 1619    Laboratory  Recent Labs     05/16/22  0305   WBC 8.9   RBC 4.19*   HEMOGLOBIN 11.7*   HEMATOCRIT 36.8*   MCV 87.8   MCH 27.9   MCHC 31.8*   RDW 45.4   PLATELETCT 310   MPV 11.1     Recent Labs     05/16/22  0305   SODIUM 143   POTASSIUM 4.2   CHLORIDE 108   CO2 24   GLUCOSE 156*   BUN 19   CREATININE 0.60   CALCIUM 8.5     Recent Labs     05/16/22  0305   INR 1.02               Imaging  IR-EMBOLIZE-NEURO-INTRACRANIAL   Final Result   Impression:      Cerebral angiogram for assessment for vascular supply to the frontal meningioma demonstrates with the predominant supply to the meningioma from the artery of the falx cerebri arising from the left ophthalmic artery with minimal supply also arising from    the right-sided artery of the falx. No definite supply to the lesion from the middle  meningeal arteries. Very minimal supply to the right side of the meningioma from distal frontal penetrating branches of the superficial temporal artery also noted.      Since the majority of the supply to the meningioma was from the ophthalmic artery, the lesion could not be safely embolized.      I Yarielrashaunsunny Bhandariantoniorohini was physically present and participated during the entire procedure of the IR-EMBOLIZE-NEURO-INTRACRANIAL.         MR-STEALTH BRAIN WITH & W/O   Final Result         Large right inferior frontal region dural based extra-axial mass that most likely represents a meningioma. There is mild mass effect upon the bilateral inferior frontal lobes with midline shift to the left measuring approximately 10 to 11 mm. There is    also mass effect upon the lateral ventricles.      Minimal edema noted in the right superomedial frontal white matter adjacent to the mass.      Age-related volume loss and chronic microvascular ischemic changes.      CT-HEAD W/O   Final Result      1.  Negative for hemorrhage      2.  Large right frontal meningioma measuring 5.2 x 4.4 x 4.2 cm      3.  Associated mass effect on the right frontal lobe with adjacent white matter edema and mild compression of the frontal horn right lateral ventricle.         CT-MAXILLOFACIAL WITH PLUS RECONS   Final Result      1.  Negative for facial or orbital abscess      2.  CT face within normal limits      3.  Partially calcified right frontal extra-axial mass consistent with a meningioma described in detail on head CT report           Assessment/Plan  * Meningioma (HCC)  Assessment & Plan  CT head shows Large right frontal meningioma measuring 5.2 x 4.4 x 4.2 cm and associated mass effect on the right frontal lobe with adjacent white matter edema and mild compression of the frontal horn right lateral ventricle   -- MRI has been reviewed, neurosurgery Dr. Goodson has evaluated, recommended IR guided embolization on 5/14   IR unable to perform  embolization of feeder artery due to anatomy   -- plan for craniotomy and tumor resection  5/19 Thursday  Continue decadron every 6 hours,  omeprazole 20 mg p.o. daily  Keppra 500 mg twice daily    Hypertensive urgency  Assessment & Plan  Start patient on lisinopril   Labetalol IV prn   BP improved but remains elevated  Increase lisinopril to 40 mg daily    Dental caries  Assessment & Plan  Unasyn given in ED  No abscess seen on CT.  unasyn stopped         VTE prophylaxis: SCDs/TEDs and heparin ppx    I have performed a physical exam and reviewed and updated ROS and Plan today (5/17/2022). In review of yesterday's note (5/16/2022), there are no changes except as documented above.

## 2022-05-17 NOTE — CARE PLAN
The patient is Stable - Low risk of patient condition declining or worsening    Shift Goals  Clinical Goals: monitor neuro assessment  Patient Goals: rest  Family Goals: Prayer    Progress made toward(s) clinical / shift goals:    Problem: Fall Risk  Goal: Patient will remain free from falls  Outcome: Progressing  Note: Patient able to ambulate with x1 assistance with a walker.      Problem: Pain - Standard  Goal: Alleviation of pain or a reduction in pain to the patient’s comfort goal  Outcome: Progressing  Non Verbal Scale:   Calm   Sleeping  Note: Patient able to communicate having mild headache however patient refused medication for her headache.

## 2022-05-18 PROBLEM — R60.0 BILATERAL LEG EDEMA: Status: ACTIVE | Noted: 2022-05-18

## 2022-05-18 PROBLEM — R10.84 GENERALIZED ABDOMINAL PAIN: Status: ACTIVE | Noted: 2022-05-18

## 2022-05-18 LAB
EKG IMPRESSION: NORMAL
GLUCOSE BLD STRIP.AUTO-MCNC: 120 MG/DL (ref 65–99)
GLUCOSE BLD STRIP.AUTO-MCNC: 169 MG/DL (ref 65–99)

## 2022-05-18 PROCEDURE — A9270 NON-COVERED ITEM OR SERVICE: HCPCS | Performed by: STUDENT IN AN ORGANIZED HEALTH CARE EDUCATION/TRAINING PROGRAM

## 2022-05-18 PROCEDURE — 700102 HCHG RX REV CODE 250 W/ 637 OVERRIDE(OP): Performed by: INTERNAL MEDICINE

## 2022-05-18 PROCEDURE — A9270 NON-COVERED ITEM OR SERVICE: HCPCS | Performed by: INTERNAL MEDICINE

## 2022-05-18 PROCEDURE — A9270 NON-COVERED ITEM OR SERVICE: HCPCS | Performed by: PHYSICIAN ASSISTANT

## 2022-05-18 PROCEDURE — 700102 HCHG RX REV CODE 250 W/ 637 OVERRIDE(OP): Performed by: STUDENT IN AN ORGANIZED HEALTH CARE EDUCATION/TRAINING PROGRAM

## 2022-05-18 PROCEDURE — 700111 HCHG RX REV CODE 636 W/ 250 OVERRIDE (IP): Performed by: INTERNAL MEDICINE

## 2022-05-18 PROCEDURE — 93010 ELECTROCARDIOGRAM REPORT: CPT | Performed by: INTERNAL MEDICINE

## 2022-05-18 PROCEDURE — 82962 GLUCOSE BLOOD TEST: CPT

## 2022-05-18 PROCEDURE — 93005 ELECTROCARDIOGRAM TRACING: CPT | Performed by: NEUROLOGICAL SURGERY

## 2022-05-18 PROCEDURE — 99233 SBSQ HOSP IP/OBS HIGH 50: CPT | Performed by: INTERNAL MEDICINE

## 2022-05-18 PROCEDURE — 700101 HCHG RX REV CODE 250: Performed by: INTERNAL MEDICINE

## 2022-05-18 PROCEDURE — 770001 HCHG ROOM/CARE - MED/SURG/GYN PRIV*

## 2022-05-18 PROCEDURE — 700102 HCHG RX REV CODE 250 W/ 637 OVERRIDE(OP): Performed by: PHYSICIAN ASSISTANT

## 2022-05-18 RX ORDER — ENEMA 19; 7 G/133ML; G/133ML
1 ENEMA RECTAL ONCE
Status: COMPLETED | OUTPATIENT
Start: 2022-05-18 | End: 2022-05-18

## 2022-05-18 RX ORDER — OMEPRAZOLE 20 MG/1
20 CAPSULE, DELAYED RELEASE ORAL DAILY
Status: DISCONTINUED | OUTPATIENT
Start: 2022-05-18 | End: 2022-05-31 | Stop reason: HOSPADM

## 2022-05-18 RX ORDER — FUROSEMIDE 10 MG/ML
20 INJECTION INTRAMUSCULAR; INTRAVENOUS ONCE
Status: COMPLETED | OUTPATIENT
Start: 2022-05-18 | End: 2022-05-18

## 2022-05-18 RX ORDER — CHOLECALCIFEROL (VITAMIN D3) 125 MCG
5 CAPSULE ORAL NIGHTLY
Status: DISCONTINUED | OUTPATIENT
Start: 2022-05-18 | End: 2022-05-18

## 2022-05-18 RX ADMIN — FUROSEMIDE 20 MG: 10 INJECTION, SOLUTION INTRAMUSCULAR; INTRAVENOUS at 11:30

## 2022-05-18 RX ADMIN — LISINOPRIL 40 MG: 20 TABLET ORAL at 04:57

## 2022-05-18 RX ADMIN — POLYETHYLENE GLYCOL 3350 1 PACKET: 17 POWDER, FOR SOLUTION ORAL at 04:57

## 2022-05-18 RX ADMIN — DEXAMETHASONE 4 MG: 4 TABLET ORAL at 16:50

## 2022-05-18 RX ADMIN — ACETAMINOPHEN 650 MG: 325 TABLET, FILM COATED ORAL at 05:03

## 2022-05-18 RX ADMIN — AMLODIPINE BESYLATE 5 MG: 5 TABLET ORAL at 04:57

## 2022-05-18 RX ADMIN — SENNOSIDES AND DOCUSATE SODIUM 2 TABLET: 50; 8.6 TABLET ORAL at 04:58

## 2022-05-18 RX ADMIN — LEVETIRACETAM 500 MG: 500 TABLET, FILM COATED ORAL at 04:58

## 2022-05-18 RX ADMIN — LEVETIRACETAM 500 MG: 500 TABLET, FILM COATED ORAL at 16:50

## 2022-05-18 RX ADMIN — DEXAMETHASONE 4 MG: 4 TABLET ORAL at 04:57

## 2022-05-18 RX ADMIN — DEXAMETHASONE 4 MG: 4 TABLET ORAL at 11:30

## 2022-05-18 RX ADMIN — OMEPRAZOLE 20 MG: 20 CAPSULE, DELAYED RELEASE ORAL at 16:50

## 2022-05-18 RX ADMIN — SODIUM PHOSPHATE 133 ML: 7; 19 ENEMA RECTAL at 11:30

## 2022-05-18 RX ADMIN — HYDRALAZINE HYDROCHLORIDE 25 MG: 50 TABLET, FILM COATED ORAL at 09:59

## 2022-05-18 RX ADMIN — SENNOSIDES AND DOCUSATE SODIUM 2 TABLET: 50; 8.6 TABLET ORAL at 16:49

## 2022-05-18 ASSESSMENT — PAIN DESCRIPTION - PAIN TYPE: TYPE: ACUTE PAIN

## 2022-05-18 ASSESSMENT — ENCOUNTER SYMPTOMS
DIZZINESS: 0
FEVER: 0
NAUSEA: 0
HEADACHES: 0
SHORTNESS OF BREATH: 0
VOMITING: 0
WEAKNESS: 1
ABDOMINAL PAIN: 1
FALLS: 0

## 2022-05-18 NOTE — PROGRESS NOTES
Neurosurgery Progress Note    Subjective:  Pt presents with headache, confusion and ataxia.  Found to have large right frontal tumor; likely meningioma   IR unable to embolize tumor feeding artery due to location and brach of artery    Surgery unable to be completed today d/t hospital OR staffing shortage.  Patient denies headache or new symptoms   Brain MRI stealth completed   Plan for OR tomorrow   Pt complaining of BLE swelling    Exam:  She is awake and alert  Face symmetric  Moves all 4 ext.  Negative homans sign  Positive non pitting edema BLE  No pronator drift       BP  Min: 139/59  Max: 163/84  Pulse  Av  Min: 64  Max: 73  Resp  Av  Min: 18  Max: 18  Temp  Av.8 °C (98.2 °F)  Min: 36.3 °C (97.3 °F)  Max: 37.3 °C (99.1 °F)  Monitored Temp 2  Av.8 °C (98.3 °F)  Min: 36.3 °C (97.3 °F)  Max: 37.3 °C (99.1 °F)  SpO2  Av.7 %  Min: 92 %  Max: 97 %    No data recorded    Recent Labs     22  0305   WBC 8.9   RBC 4.19*   HEMOGLOBIN 11.7*   HEMATOCRIT 36.8*   MCV 87.8   MCH 27.9   MCHC 31.8*   RDW 45.4   PLATELETCT 310   MPV 11.1     Recent Labs     22  0305   SODIUM 143   POTASSIUM 4.2   CHLORIDE 108   CO2 24   GLUCOSE 156*   BUN 19   CREATININE 0.60   CALCIUM 8.5     Recent Labs     22  0305   INR 1.02           Intake/Output                       22 0700 - 22 0659 22 07 - 22 0659      Total  Total                 Intake    Total Intake -- -- -- -- -- --       Output    Urine  --  -- --  --  -- --    Number of Times Voided 2 x 1 x 3 x 1 x -- 1 x    Stool  --  -- --  --  -- --    Number of Times Stooled -- 1 x 1 x -- -- --    Total Output -- -- -- -- -- --       Net I/O     -- -- -- -- -- --          No intake or output data in the 24 hours ending 22 09    $ Bladder Scan Results (mL): 96    • amLODIPine  5 mg Q DAY   • hydrALAZINE  25 mg Q6HRS PRN   • [Held by provider] heparin  5,000 Units Q8HRS   •  dexamethasone  4 mg Q6HRS   • levETIRAcetam  500 mg BID   • [Held by provider] lisinopril  40 mg Q DAY   • senna-docusate  2 Tablet BID    And   • polyethylene glycol/lytes  1 Packet QDAY PRN    And   • magnesium hydroxide  30 mL QDAY PRN    And   • bisacodyl  10 mg QDAY PRN   • acetaminophen  650 mg Q4HRS PRN   • labetalol  10 mg Q4HRS PRN       Assessment and Plan:  Hospital day #7  Planning for surgery Thursday.  Needs NPO at midnight Wednesday.  Ok to restart prior heparin dose, but this well need to be held on Wednesday.  Held Lisinopril in pre operative period, utilize PRN BP medications as needed  Appreciate intensivist/Hospitalist care.  Following.       Prophylactic anticoagulation: yes         Start date/time: today, needs to be held starting on 5/18.

## 2022-05-18 NOTE — CARE PLAN
Problem: Knowledge Deficit - Standard  Goal: Patient and family/care givers will demonstrate understanding of plan of care, disease process/condition, diagnostic tests and medications  Outcome: Progressing     Problem: Pain - Standard  Goal: Alleviation of pain or a reduction in pain to the patient’s comfort goal  Outcome: Progressing     Problem: Fall Risk  Goal: Patient will remain free from falls  Outcome: Progressing   The patient is Stable - Low risk of patient condition declining or worsening    Shift Goals  Clinical Goals: monitor neuro status  Patient Goals: rest  Family Goals: antonia    Progress made toward(s) clinical / shift goals:  Understands poc. Denies pain.    Patient is not progressing towards the following goals:

## 2022-05-18 NOTE — ASSESSMENT & PLAN NOTE
Likely in setting of steroids, not moving as much, unlikely DVT however CTM  Lasix x 1 5/18  If no improvement consider LE US

## 2022-05-18 NOTE — PROGRESS NOTES
Hospital Medicine Daily Progress Note    Date of Service  5/18/2022    Chief Complaint  Enzo Núñez is a 81 y.o. female admitted 5/12/2022 with headache    Hospital Course  This is 81-year-old female with a past medical history significant for obesity, new diagnosis of hypertension presented to the ER with headache, mild confusion hypertension and balance issues.  She was found to have right frontal meningioma with associated mass-effect on right frontal lobe with adjacent white matter edema.  Neurosurgery was consulted.  She was started on Keppra and Decadron.  She was initially admitted to the ICU for close monitoring.  IR was unable to perform embolization of feeder vessel due to vessel anatomy. Now planned for craniotomy and tumor resection 5/19/22.      Interval Problem Update  - daughter at bedside, assisting with translating  - patient c/o LE edema, abdominal pain and small amounts of stool, abd xray showed moderate stool, will given enema and lasix  - BP still high 160s    I have personally seen and examined the patient at bedside. I discussed the plan of care with patient, family, bedside RN, charge RN,  and neurosurgery.    Consultants/Specialty  neurosurgery    Code Status  Full Code    Disposition  Patient is not medically cleared for discharge.   Anticipate discharge to to skilled nursing facility.  I have placed the appropriate orders for post-discharge needs.    Review of Systems  Review of Systems   Constitutional: Negative for fever.   Respiratory: Negative for shortness of breath.    Cardiovascular: Positive for leg swelling. Negative for chest pain.   Gastrointestinal: Positive for abdominal pain. Negative for nausea and vomiting.   Musculoskeletal: Negative for falls.   Neurological: Positive for weakness. Negative for dizziness and headaches.        Physical Exam  Temp:  [36.3 °C (97.3 °F)-37.3 °C (99.1 °F)] 36.3 °C (97.3 °F)  Pulse:  [64-73] 64  Resp:  [18] 18  BP:  (141-163)/(65-84) 163/84  SpO2:  [92 %-97 %] 93 %    Physical Exam  Constitutional:       General: She is not in acute distress.     Appearance: She is obese. She is not ill-appearing, toxic-appearing or diaphoretic.   HENT:      Head: Normocephalic and atraumatic.      Nose: Nose normal.      Mouth/Throat:      Mouth: Mucous membranes are moist.   Eyes:      General: No scleral icterus.     Conjunctiva/sclera: Conjunctivae normal.   Cardiovascular:      Rate and Rhythm: Normal rate and regular rhythm.      Heart sounds: No murmur heard.    No friction rub. No gallop.   Pulmonary:      Effort: Pulmonary effort is normal.      Breath sounds: Normal breath sounds.   Abdominal:      General: Bowel sounds are normal. There is distension.      Palpations: Abdomen is soft.      Tenderness: There is no abdominal tenderness.      Hernia: A hernia (ventral, reducible) is present.   Musculoskeletal:      Cervical back: Normal range of motion.      Right lower leg: Edema present.      Left lower leg: Edema present.   Skin:     Coloration: Skin is not jaundiced.      Findings: No rash.   Neurological:      Mental Status: She is alert and oriented to person, place, and time.   Psychiatric:         Mood and Affect: Mood normal.         Behavior: Behavior normal.         Fluids  No intake or output data in the 24 hours ending 05/18/22 1049    Laboratory  Recent Labs     05/16/22  0305   WBC 8.9   RBC 4.19*   HEMOGLOBIN 11.7*   HEMATOCRIT 36.8*   MCV 87.8   MCH 27.9   MCHC 31.8*   RDW 45.4   PLATELETCT 310   MPV 11.1     Recent Labs     05/16/22  0305   SODIUM 143   POTASSIUM 4.2   CHLORIDE 108   CO2 24   GLUCOSE 156*   BUN 19   CREATININE 0.60   CALCIUM 8.5     Recent Labs     05/16/22  0305   INR 1.02               Imaging  [id number] VIEW   Final Result         1.  Moderate stool in the colon suggests changes of constipation, otherwise nonspecific bowel gas pattern   2.  Hazy bilateral lung base opacities suggests subtle  infiltrates or edema.      IR-EMBOLIZE-NEURO-INTRACRANIAL   Final Result   Impression:      Cerebral angiogram for assessment for vascular supply to the frontal meningioma demonstrates with the predominant supply to the meningioma from the artery of the falx cerebri arising from the left ophthalmic artery with minimal supply also arising from    the right-sided artery of the falx. No definite supply to the lesion from the middle meningeal arteries. Very minimal supply to the right side of the meningioma from distal frontal penetrating branches of the superficial temporal artery also noted.      Since the majority of the supply to the meningioma was from the ophthalmic artery, the lesion could not be safely embolized.      I, Yarielrashaunsunny Bhandariantoniorohini was physically present and participated during the entire procedure of the IR-EMBOLIZE-NEURO-INTRACRANIAL.         MR-STEALTH BRAIN WITH & W/O   Final Result         Large right inferior frontal region dural based extra-axial mass that most likely represents a meningioma. There is mild mass effect upon the bilateral inferior frontal lobes with midline shift to the left measuring approximately 10 to 11 mm. There is    also mass effect upon the lateral ventricles.      Minimal edema noted in the right superomedial frontal white matter adjacent to the mass.      Age-related volume loss and chronic microvascular ischemic changes.      CT-HEAD W/O   Final Result      1.  Negative for hemorrhage      2.  Large right frontal meningioma measuring 5.2 x 4.4 x 4.2 cm      3.  Associated mass effect on the right frontal lobe with adjacent white matter edema and mild compression of the frontal horn right lateral ventricle.         CT-MAXILLOFACIAL WITH PLUS RECONS   Final Result      1.  Negative for facial or orbital abscess      2.  CT face within normal limits      3.  Partially calcified right frontal extra-axial mass consistent with a meningioma described in detail on head CT report            Assessment/Plan  * Meningioma (HCC)  Assessment & Plan  CT head shows Large right frontal meningioma measuring 5.2 x 4.4 x 4.2 cm and associated mass effect on the right frontal lobe with adjacent white matter edema and mild compression of the frontal horn right lateral ventricle   -- MRI has been reviewed, neurosurgery Dr. Goodson has evaluated, recommended IR guided embolization on 5/14   IR unable to perform embolization of feeder artery due to anatomy   -- plan for craniotomy and tumor resection  5/19 Thursday  Continue decadron every 6 hours,  omeprazole 20 mg p.o. daily  Keppra 500 mg twice daily    Generalized abdominal pain  Assessment & Plan  Abdominal pain  Xray showed moderate stool  Increase bowel regimen  Add PPI since on steroids  CTM    Bilateral leg edema  Assessment & Plan  Likely in setting of steroids, not moving as much, unlikely DVT however CTM  Lasix x 1 today    Hypertensive urgency  Assessment & Plan  Start patient on lisinopril (holding for neurosurgery)  Amlodipine, lasix today  Hydralazine and Labetalol IV prn   BP improved but remains elevated      Dental caries  Assessment & Plan  Unasyn given in ED  No abscess seen on CT.  unasyn stopped       VTE prophylaxis: SCDs/TEDs and heparin ppx    I have performed a physical exam and reviewed and updated ROS and Plan today (5/18/2022). In review of yesterday's note (5/17/2022), there are no changes except as documented above.

## 2022-05-18 NOTE — CARE PLAN
The patient is Stable - Low risk of patient condition declining or worsening    Shift Goals  Clinical Goals: monitor neuro status  Patient Goals: Rest  Family Goals: Prayer    Progress made toward(s) clinical / shift goals:      Patient is not progressing towards the following goals:  Problem: Knowledge Deficit - Standard  Goal: Patient and family/care givers will demonstrate understanding of plan of care, disease process/condition, diagnostic tests and medications  Outcome: Not Progressing   Unable to get  on hospital monitor. Daughter assisting with translations.   Problem: Pain - Standard  Goal: Alleviation of pain or a reduction in pain to the patient’s comfort goal  Outcome: Not Progressing   Patient has abdominal pain.

## 2022-05-19 ENCOUNTER — APPOINTMENT (OUTPATIENT)
Dept: RADIOLOGY | Facility: MEDICAL CENTER | Age: 82
DRG: 025 | End: 2022-05-19
Attending: ANESTHESIOLOGY
Payer: MEDICAID

## 2022-05-19 ENCOUNTER — APPOINTMENT (OUTPATIENT)
Dept: RADIOLOGY | Facility: MEDICAL CENTER | Age: 82
DRG: 025 | End: 2022-05-19
Attending: PHYSICIAN ASSISTANT
Payer: MEDICAID

## 2022-05-19 ENCOUNTER — ANESTHESIA (OUTPATIENT)
Dept: SURGERY | Facility: MEDICAL CENTER | Age: 82
DRG: 025 | End: 2022-05-19
Payer: MEDICAID

## 2022-05-19 ENCOUNTER — ANESTHESIA EVENT (OUTPATIENT)
Dept: SURGERY | Facility: MEDICAL CENTER | Age: 82
DRG: 025 | End: 2022-05-19
Payer: MEDICAID

## 2022-05-19 PROBLEM — I10 HTN (HYPERTENSION): Status: ACTIVE | Noted: 2022-05-19

## 2022-05-19 PROBLEM — I16.0 HYPERTENSIVE URGENCY: Status: RESOLVED | Noted: 2022-05-12 | Resolved: 2022-05-19

## 2022-05-19 PROBLEM — R60.0 BILATERAL LEG EDEMA: Status: RESOLVED | Noted: 2022-05-18 | Resolved: 2022-05-19

## 2022-05-19 PROBLEM — D72.829 LEUKOCYTOSIS: Status: ACTIVE | Noted: 2022-05-19

## 2022-05-19 PROBLEM — D49.6 BRAIN TUMOR (HCC): Status: RESOLVED | Noted: 2022-05-19 | Resolved: 2022-05-19

## 2022-05-19 PROBLEM — D49.6 BRAIN TUMOR (HCC): Status: ACTIVE | Noted: 2022-05-19

## 2022-05-19 PROBLEM — R10.84 GENERALIZED ABDOMINAL PAIN: Status: RESOLVED | Noted: 2022-05-18 | Resolved: 2022-05-19

## 2022-05-19 LAB
ABO + RH BLD: NORMAL
ABO GROUP BLD: NORMAL
ANION GAP SERPL CALC-SCNC: 15 MMOL/L (ref 7–16)
ANION GAP SERPL CALC-SCNC: 15 MMOL/L (ref 7–16)
BARCODED ABORH UBTYP: 5100
BARCODED ABORH UBTYP: 7300
BARCODED PRD CODE UBPRD: NORMAL
BARCODED UNIT NUM UBUNT: NORMAL
BASOPHILS # BLD AUTO: 0.3 % (ref 0–1.8)
BASOPHILS # BLD: 0.09 K/UL (ref 0–0.12)
BLD GP AB SCN SERPL QL: NORMAL
BUN SERPL-MCNC: 18 MG/DL (ref 8–22)
BUN SERPL-MCNC: 25 MG/DL (ref 8–22)
CA-I SERPL-SCNC: 1 MMOL/L (ref 1.1–1.3)
CALCIUM SERPL-MCNC: 7.5 MG/DL (ref 8.5–10.5)
CALCIUM SERPL-MCNC: 8.9 MG/DL (ref 8.5–10.5)
CHLORIDE SERPL-SCNC: 101 MMOL/L (ref 96–112)
CHLORIDE SERPL-SCNC: 104 MMOL/L (ref 96–112)
CO2 SERPL-SCNC: 20 MMOL/L (ref 20–33)
CO2 SERPL-SCNC: 22 MMOL/L (ref 20–33)
COMPONENT F 8504F: NORMAL
COMPONENT F 8504F: NORMAL
COMPONENT R 8504R: NORMAL
COMPONENT R 8504R: NORMAL
CREAT SERPL-MCNC: 0.56 MG/DL (ref 0.5–1.4)
CREAT SERPL-MCNC: 0.58 MG/DL (ref 0.5–1.4)
EOSINOPHIL # BLD AUTO: 0 K/UL (ref 0–0.51)
EOSINOPHIL NFR BLD: 0 % (ref 0–6.9)
ERYTHROCYTE [DISTWIDTH] IN BLOOD BY AUTOMATED COUNT: 43.5 FL (ref 35.9–50)
ERYTHROCYTE [DISTWIDTH] IN BLOOD BY AUTOMATED COUNT: 45.9 FL (ref 35.9–50)
GFR SERPLBLD CREATININE-BSD FMLA CKD-EPI: 91 ML/MIN/1.73 M 2
GFR SERPLBLD CREATININE-BSD FMLA CKD-EPI: 92 ML/MIN/1.73 M 2
GLUCOSE SERPL-MCNC: 152 MG/DL (ref 65–99)
GLUCOSE SERPL-MCNC: 171 MG/DL (ref 65–99)
HCT VFR BLD AUTO: 37.2 % (ref 37–47)
HCT VFR BLD AUTO: 40.9 % (ref 37–47)
HGB BLD-MCNC: 11.7 G/DL (ref 12–16)
HGB BLD-MCNC: 13.1 G/DL (ref 12–16)
IMM GRANULOCYTES # BLD AUTO: 1.01 K/UL (ref 0–0.11)
IMM GRANULOCYTES NFR BLD AUTO: 3.2 % (ref 0–0.9)
INR PPP: 0.98 (ref 0.87–1.13)
INR PPP: 1.06 (ref 0.87–1.13)
LYMPHOCYTES # BLD AUTO: 0.92 K/UL (ref 1–4.8)
LYMPHOCYTES NFR BLD: 2.9 % (ref 22–41)
MAGNESIUM SERPL-MCNC: 2.2 MG/DL (ref 1.5–2.5)
MAGNESIUM SERPL-MCNC: 2.3 MG/DL (ref 1.5–2.5)
MCH RBC QN AUTO: 27.6 PG (ref 27–33)
MCH RBC QN AUTO: 27.7 PG (ref 27–33)
MCHC RBC AUTO-ENTMCNC: 31.5 G/DL (ref 33.6–35)
MCHC RBC AUTO-ENTMCNC: 32 G/DL (ref 33.6–35)
MCV RBC AUTO: 86.1 FL (ref 81.4–97.8)
MCV RBC AUTO: 87.9 FL (ref 81.4–97.8)
MONOCYTES # BLD AUTO: 2.48 K/UL (ref 0–0.85)
MONOCYTES NFR BLD AUTO: 7.7 % (ref 0–13.4)
NEUTROPHILS # BLD AUTO: 27.52 K/UL (ref 2–7.15)
NEUTROPHILS NFR BLD: 85.9 % (ref 44–72)
NRBC # BLD AUTO: 0 K/UL
NRBC BLD-RTO: 0 /100 WBC
PLATELET # BLD AUTO: 322 K/UL (ref 164–446)
PLATELET # BLD AUTO: 329 K/UL (ref 164–446)
PMV BLD AUTO: 10.4 FL (ref 9–12.9)
PMV BLD AUTO: 11 FL (ref 9–12.9)
POTASSIUM SERPL-SCNC: 4.2 MMOL/L (ref 3.6–5.5)
POTASSIUM SERPL-SCNC: 4.4 MMOL/L (ref 3.6–5.5)
PRODUCT TYPE UPROD: NORMAL
PROTHROMBIN TIME: 12.7 SEC (ref 12–14.6)
PROTHROMBIN TIME: 13.5 SEC (ref 12–14.6)
RBC # BLD AUTO: 4.23 M/UL (ref 4.2–5.4)
RBC # BLD AUTO: 4.75 M/UL (ref 4.2–5.4)
RH BLD: NORMAL
SODIUM SERPL-SCNC: 136 MMOL/L (ref 135–145)
SODIUM SERPL-SCNC: 141 MMOL/L (ref 135–145)
UNIT STATUS USTAT: NORMAL
WBC # BLD AUTO: 11.7 K/UL (ref 4.8–10.8)
WBC # BLD AUTO: 32 K/UL (ref 4.8–10.8)

## 2022-05-19 PROCEDURE — 86900 BLOOD TYPING SEROLOGIC ABO: CPT

## 2022-05-19 PROCEDURE — 86901 BLOOD TYPING SEROLOGIC RH(D): CPT

## 2022-05-19 PROCEDURE — 110371 HCHG SHELL REV 272: Performed by: NEUROLOGICAL SURGERY

## 2022-05-19 PROCEDURE — 86923 COMPATIBILITY TEST ELECTRIC: CPT

## 2022-05-19 PROCEDURE — 700102 HCHG RX REV CODE 250 W/ 637 OVERRIDE(OP): Performed by: STUDENT IN AN ORGANIZED HEALTH CARE EDUCATION/TRAINING PROGRAM

## 2022-05-19 PROCEDURE — 160002 HCHG RECOVERY MINUTES (STAT): Performed by: NEUROLOGICAL SURGERY

## 2022-05-19 PROCEDURE — 502000 HCHG MISC OR IMPLANTS RC 0278: Performed by: NEUROLOGICAL SURGERY

## 2022-05-19 PROCEDURE — 700102 HCHG RX REV CODE 250 W/ 637 OVERRIDE(OP): Performed by: INTERNAL MEDICINE

## 2022-05-19 PROCEDURE — 700101 HCHG RX REV CODE 250: Performed by: ANESTHESIOLOGY

## 2022-05-19 PROCEDURE — 00210 ANES INTRACRANIAL PX NOS: CPT | Performed by: ANESTHESIOLOGY

## 2022-05-19 PROCEDURE — A9270 NON-COVERED ITEM OR SERVICE: HCPCS | Performed by: STUDENT IN AN ORGANIZED HEALTH CARE EDUCATION/TRAINING PROGRAM

## 2022-05-19 PROCEDURE — 500331 HCHG COTTONOID, SURG PATTIE: Performed by: NEUROLOGICAL SURGERY

## 2022-05-19 PROCEDURE — C1713 ANCHOR/SCREW BN/BN,TIS/BN: HCPCS | Performed by: NEUROLOGICAL SURGERY

## 2022-05-19 PROCEDURE — 86850 RBC ANTIBODY SCREEN: CPT

## 2022-05-19 PROCEDURE — 160031 HCHG SURGERY MINUTES - 1ST 30 MINS LEVEL 5: Performed by: NEUROLOGICAL SURGERY

## 2022-05-19 PROCEDURE — A9270 NON-COVERED ITEM OR SERVICE: HCPCS | Performed by: PHYSICIAN ASSISTANT

## 2022-05-19 PROCEDURE — 30233K1 TRANSFUSION OF NONAUTOLOGOUS FROZEN PLASMA INTO PERIPHERAL VEIN, PERCUTANEOUS APPROACH: ICD-10-PCS | Performed by: NEUROLOGICAL SURGERY

## 2022-05-19 PROCEDURE — 700105 HCHG RX REV CODE 258: Performed by: ANESTHESIOLOGY

## 2022-05-19 PROCEDURE — 36556 INSERT NON-TUNNEL CV CATH: CPT | Performed by: ANESTHESIOLOGY

## 2022-05-19 PROCEDURE — 88341 IMHCHEM/IMCYTCHM EA ADD ANTB: CPT

## 2022-05-19 PROCEDURE — 71045 X-RAY EXAM CHEST 1 VIEW: CPT

## 2022-05-19 PROCEDURE — 83735 ASSAY OF MAGNESIUM: CPT

## 2022-05-19 PROCEDURE — 501838 HCHG SUTURE GENERAL: Performed by: NEUROLOGICAL SURGERY

## 2022-05-19 PROCEDURE — 700105 HCHG RX REV CODE 258: Performed by: STUDENT IN AN ORGANIZED HEALTH CARE EDUCATION/TRAINING PROGRAM

## 2022-05-19 PROCEDURE — 80048 BASIC METABOLIC PNL TOTAL CA: CPT

## 2022-05-19 PROCEDURE — 88307 TISSUE EXAM BY PATHOLOGIST: CPT | Mod: 59

## 2022-05-19 PROCEDURE — 700111 HCHG RX REV CODE 636 W/ 250 OVERRIDE (IP): Performed by: STUDENT IN AN ORGANIZED HEALTH CARE EDUCATION/TRAINING PROGRAM

## 2022-05-19 PROCEDURE — 88342 IMHCHEM/IMCYTCHM 1ST ANTB: CPT

## 2022-05-19 PROCEDURE — 85025 COMPLETE CBC W/AUTO DIFF WBC: CPT

## 2022-05-19 PROCEDURE — 88331 PATH CONSLTJ SURG 1 BLK 1SPC: CPT

## 2022-05-19 PROCEDURE — 160048 HCHG OR STATISTICAL LEVEL 1-5: Performed by: NEUROLOGICAL SURGERY

## 2022-05-19 PROCEDURE — 160035 HCHG PACU - 1ST 60 MINS PHASE I: Performed by: NEUROLOGICAL SURGERY

## 2022-05-19 PROCEDURE — 700111 HCHG RX REV CODE 636 W/ 250 OVERRIDE (IP): Performed by: ANESTHESIOLOGY

## 2022-05-19 PROCEDURE — 99232 SBSQ HOSP IP/OBS MODERATE 35: CPT | Performed by: INTERNAL MEDICINE

## 2022-05-19 PROCEDURE — 500075 HCHG BLADE, CLIPPER NEURO: Performed by: NEUROLOGICAL SURGERY

## 2022-05-19 PROCEDURE — 00U20JZ SUPPLEMENT DURA MATER WITH SYNTHETIC SUBSTITUTE, OPEN APPROACH: ICD-10-PCS | Performed by: NEUROLOGICAL SURGERY

## 2022-05-19 PROCEDURE — 99100 ANES PT EXTEME AGE<1 YR&>70: CPT | Performed by: ANESTHESIOLOGY

## 2022-05-19 PROCEDURE — 160036 HCHG PACU - EA ADDL 30 MINS PHASE I: Performed by: NEUROLOGICAL SURGERY

## 2022-05-19 PROCEDURE — 770022 HCHG ROOM/CARE - ICU (200)

## 2022-05-19 PROCEDURE — 700102 HCHG RX REV CODE 250 W/ 637 OVERRIDE(OP): Performed by: PHYSICIAN ASSISTANT

## 2022-05-19 PROCEDURE — 160009 HCHG ANES TIME/MIN: Performed by: NEUROLOGICAL SURGERY

## 2022-05-19 PROCEDURE — 500112 HCHG BONE WAX: Performed by: NEUROLOGICAL SURGERY

## 2022-05-19 PROCEDURE — 160042 HCHG SURGERY MINUTES - EA ADDL 1 MIN LEVEL 5: Performed by: NEUROLOGICAL SURGERY

## 2022-05-19 PROCEDURE — 85610 PROTHROMBIN TIME: CPT

## 2022-05-19 PROCEDURE — 36620 INSERTION CATHETER ARTERY: CPT | Performed by: ANESTHESIOLOGY

## 2022-05-19 PROCEDURE — 30233N1 TRANSFUSION OF NONAUTOLOGOUS RED BLOOD CELLS INTO PERIPHERAL VEIN, PERCUTANEOUS APPROACH: ICD-10-PCS | Performed by: NEUROLOGICAL SURGERY

## 2022-05-19 PROCEDURE — 00B10ZZ EXCISION OF CEREBRAL MENINGES, OPEN APPROACH: ICD-10-PCS | Performed by: NEUROLOGICAL SURGERY

## 2022-05-19 PROCEDURE — 85027 COMPLETE CBC AUTOMATED: CPT

## 2022-05-19 PROCEDURE — 110454 HCHG SHELL REV 250: Performed by: NEUROLOGICAL SURGERY

## 2022-05-19 PROCEDURE — 82330 ASSAY OF CALCIUM: CPT

## 2022-05-19 PROCEDURE — A9270 NON-COVERED ITEM OR SERVICE: HCPCS | Performed by: INTERNAL MEDICINE

## 2022-05-19 PROCEDURE — 500375 HCHG DRAIN, J-P ROUND 10FR: Performed by: NEUROLOGICAL SURGERY

## 2022-05-19 DEVICE — GRAFT DURAMATRIX ONLAY PLUS 3IN X 3IN OR 7.5CM X 7.5CM: Type: IMPLANTABLE DEVICE | Status: FUNCTIONAL

## 2022-05-19 DEVICE — SEALANT DURAL AUTOSPRAY ADHERUS (5EA/PK): Type: IMPLANTABLE DEVICE | Status: FUNCTIONAL

## 2022-05-19 DEVICE — SPONGE COLLAGEN BICOL CODMAN (10EA/CT): Type: IMPLANTABLE DEVICE | Status: FUNCTIONAL

## 2022-05-19 DEVICE — SCREW STRYK NC 1.5X4MM (6NCX40=240) CONSIGNED QTY 240 PRE-LOAD 80/PK: Type: IMPLANTABLE DEVICE | Status: FUNCTIONAL

## 2022-05-19 DEVICE — PLATE NC BURR HOLE COVER 10MM (6NCX6=36): Type: IMPLANTABLE DEVICE | Status: FUNCTIONAL

## 2022-05-19 RX ORDER — OXYCODONE HYDROCHLORIDE 5 MG/1
2.5 TABLET ORAL
Status: DISCONTINUED | OUTPATIENT
Start: 2022-05-19 | End: 2022-05-31 | Stop reason: HOSPADM

## 2022-05-19 RX ORDER — HYDRALAZINE HYDROCHLORIDE 20 MG/ML
10 INJECTION INTRAMUSCULAR; INTRAVENOUS
Status: DISCONTINUED | OUTPATIENT
Start: 2022-05-19 | End: 2022-05-31 | Stop reason: HOSPADM

## 2022-05-19 RX ORDER — POLYETHYLENE GLYCOL 3350 17 G/17G
1 POWDER, FOR SOLUTION ORAL 2 TIMES DAILY PRN
Status: DISCONTINUED | OUTPATIENT
Start: 2022-05-19 | End: 2022-05-22

## 2022-05-19 RX ORDER — ONDANSETRON 2 MG/ML
4 INJECTION INTRAMUSCULAR; INTRAVENOUS
Status: DISCONTINUED | OUTPATIENT
Start: 2022-05-19 | End: 2022-05-19 | Stop reason: HOSPADM

## 2022-05-19 RX ORDER — DEXAMETHASONE SODIUM PHOSPHATE 4 MG/ML
INJECTION, SOLUTION INTRA-ARTICULAR; INTRALESIONAL; INTRAMUSCULAR; INTRAVENOUS; SOFT TISSUE PRN
Status: DISCONTINUED | OUTPATIENT
Start: 2022-05-19 | End: 2022-05-19 | Stop reason: SURG

## 2022-05-19 RX ORDER — OXYCODONE HYDROCHLORIDE 5 MG/1
5 TABLET ORAL
Status: DISCONTINUED | OUTPATIENT
Start: 2022-05-19 | End: 2022-05-31 | Stop reason: HOSPADM

## 2022-05-19 RX ORDER — CEFAZOLIN SODIUM 2 G/100ML
2 INJECTION, SOLUTION INTRAVENOUS EVERY 8 HOURS
Status: COMPLETED | OUTPATIENT
Start: 2022-05-20 | End: 2022-05-20

## 2022-05-19 RX ORDER — HYDROMORPHONE HYDROCHLORIDE 1 MG/ML
0.2 INJECTION, SOLUTION INTRAMUSCULAR; INTRAVENOUS; SUBCUTANEOUS
Status: DISCONTINUED | OUTPATIENT
Start: 2022-05-19 | End: 2022-05-19 | Stop reason: HOSPADM

## 2022-05-19 RX ORDER — LEVETIRACETAM 500 MG/5ML
INJECTION, SOLUTION, CONCENTRATE INTRAVENOUS PRN
Status: DISCONTINUED | OUTPATIENT
Start: 2022-05-19 | End: 2022-05-19 | Stop reason: SURG

## 2022-05-19 RX ORDER — DOCUSATE SODIUM 100 MG/1
100 CAPSULE, LIQUID FILLED ORAL 2 TIMES DAILY
Status: DISCONTINUED | OUTPATIENT
Start: 2022-05-19 | End: 2022-05-31 | Stop reason: HOSPADM

## 2022-05-19 RX ORDER — MIDAZOLAM HYDROCHLORIDE 1 MG/ML
INJECTION INTRAMUSCULAR; INTRAVENOUS PRN
Status: DISCONTINUED | OUTPATIENT
Start: 2022-05-19 | End: 2022-05-19 | Stop reason: SURG

## 2022-05-19 RX ORDER — ENEMA 19; 7 G/133ML; G/133ML
1 ENEMA RECTAL
Status: DISCONTINUED | OUTPATIENT
Start: 2022-05-19 | End: 2022-05-31 | Stop reason: HOSPADM

## 2022-05-19 RX ORDER — METOPROLOL TARTRATE 1 MG/ML
1 INJECTION, SOLUTION INTRAVENOUS
Status: DISCONTINUED | OUTPATIENT
Start: 2022-05-19 | End: 2022-05-19 | Stop reason: HOSPADM

## 2022-05-19 RX ORDER — ALBUTEROL SULFATE 2.5 MG/3ML
2.5 SOLUTION RESPIRATORY (INHALATION)
Status: DISCONTINUED | OUTPATIENT
Start: 2022-05-19 | End: 2022-05-19 | Stop reason: HOSPADM

## 2022-05-19 RX ORDER — AMOXICILLIN 250 MG
1 CAPSULE ORAL NIGHTLY
Status: DISCONTINUED | OUTPATIENT
Start: 2022-05-19 | End: 2022-05-31 | Stop reason: HOSPADM

## 2022-05-19 RX ORDER — MANNITOL 20 G/100ML
INJECTION, SOLUTION INTRAVENOUS PRN
Status: DISCONTINUED | OUTPATIENT
Start: 2022-05-19 | End: 2022-05-19 | Stop reason: SURG

## 2022-05-19 RX ORDER — HYDROMORPHONE HYDROCHLORIDE 1 MG/ML
0.1 INJECTION, SOLUTION INTRAMUSCULAR; INTRAVENOUS; SUBCUTANEOUS
Status: DISCONTINUED | OUTPATIENT
Start: 2022-05-19 | End: 2022-05-19 | Stop reason: HOSPADM

## 2022-05-19 RX ORDER — SCOLOPAMINE TRANSDERMAL SYSTEM 1 MG/1
1 PATCH, EXTENDED RELEASE TRANSDERMAL
Status: DISCONTINUED | OUTPATIENT
Start: 2022-05-19 | End: 2022-05-31 | Stop reason: HOSPADM

## 2022-05-19 RX ORDER — BISACODYL 10 MG
10 SUPPOSITORY, RECTAL RECTAL
Status: DISCONTINUED | OUTPATIENT
Start: 2022-05-19 | End: 2022-05-31 | Stop reason: HOSPADM

## 2022-05-19 RX ORDER — SODIUM CHLORIDE, SODIUM LACTATE, POTASSIUM CHLORIDE, CALCIUM CHLORIDE 600; 310; 30; 20 MG/100ML; MG/100ML; MG/100ML; MG/100ML
INJECTION, SOLUTION INTRAVENOUS CONTINUOUS
Status: DISCONTINUED | OUTPATIENT
Start: 2022-05-19 | End: 2022-05-19 | Stop reason: HOSPADM

## 2022-05-19 RX ORDER — HEPARIN SODIUM 5000 [USP'U]/ML
5000 INJECTION, SOLUTION INTRAVENOUS; SUBCUTANEOUS EVERY 12 HOURS
Status: DISCONTINUED | OUTPATIENT
Start: 2022-05-21 | End: 2022-05-22

## 2022-05-19 RX ORDER — CLONIDINE HYDROCHLORIDE 0.1 MG/1
0.1 TABLET ORAL EVERY 4 HOURS PRN
Status: DISCONTINUED | OUTPATIENT
Start: 2022-05-19 | End: 2022-05-31 | Stop reason: HOSPADM

## 2022-05-19 RX ORDER — HYDROMORPHONE HYDROCHLORIDE 1 MG/ML
0.5 INJECTION, SOLUTION INTRAMUSCULAR; INTRAVENOUS; SUBCUTANEOUS
Status: DISCONTINUED | OUTPATIENT
Start: 2022-05-19 | End: 2022-05-31 | Stop reason: HOSPADM

## 2022-05-19 RX ORDER — SODIUM CHLORIDE, SODIUM GLUCONATE, SODIUM ACETATE, POTASSIUM CHLORIDE AND MAGNESIUM CHLORIDE 526; 502; 368; 37; 30 MG/100ML; MG/100ML; MG/100ML; MG/100ML; MG/100ML
INJECTION, SOLUTION INTRAVENOUS
Status: DISCONTINUED | OUTPATIENT
Start: 2022-05-19 | End: 2022-05-19 | Stop reason: SURG

## 2022-05-19 RX ORDER — OXYCODONE HCL 5 MG/5 ML
5 SOLUTION, ORAL ORAL
Status: DISCONTINUED | OUTPATIENT
Start: 2022-05-19 | End: 2022-05-19 | Stop reason: HOSPADM

## 2022-05-19 RX ORDER — HYDROCODONE BITARTRATE AND ACETAMINOPHEN 5; 325 MG/1; MG/1
1 TABLET ORAL EVERY 4 HOURS PRN
Status: DISCONTINUED | OUTPATIENT
Start: 2022-05-19 | End: 2022-05-31 | Stop reason: HOSPADM

## 2022-05-19 RX ORDER — SODIUM CHLORIDE, SODIUM LACTATE, POTASSIUM CHLORIDE, CALCIUM CHLORIDE 600; 310; 30; 20 MG/100ML; MG/100ML; MG/100ML; MG/100ML
INJECTION, SOLUTION INTRAVENOUS CONTINUOUS
Status: DISCONTINUED | OUTPATIENT
Start: 2022-05-19 | End: 2022-05-20

## 2022-05-19 RX ORDER — HYDRALAZINE HYDROCHLORIDE 20 MG/ML
5 INJECTION INTRAMUSCULAR; INTRAVENOUS
Status: DISCONTINUED | OUTPATIENT
Start: 2022-05-19 | End: 2022-05-19 | Stop reason: HOSPADM

## 2022-05-19 RX ORDER — ONDANSETRON 2 MG/ML
4 INJECTION INTRAMUSCULAR; INTRAVENOUS EVERY 4 HOURS PRN
Status: DISCONTINUED | OUTPATIENT
Start: 2022-05-19 | End: 2022-05-31 | Stop reason: HOSPADM

## 2022-05-19 RX ORDER — OXYCODONE HYDROCHLORIDE 5 MG/1
5 TABLET ORAL EVERY 4 HOURS PRN
Status: DISCONTINUED | OUTPATIENT
Start: 2022-05-19 | End: 2022-05-19

## 2022-05-19 RX ORDER — SODIUM CHLORIDE, SODIUM LACTATE, POTASSIUM CHLORIDE, CALCIUM CHLORIDE 600; 310; 30; 20 MG/100ML; MG/100ML; MG/100ML; MG/100ML
INJECTION, SOLUTION INTRAVENOUS
Status: DISCONTINUED | OUTPATIENT
Start: 2022-05-19 | End: 2022-05-19 | Stop reason: SURG

## 2022-05-19 RX ORDER — DIPHENHYDRAMINE HYDROCHLORIDE 50 MG/ML
25 INJECTION INTRAMUSCULAR; INTRAVENOUS EVERY 6 HOURS PRN
Status: DISCONTINUED | OUTPATIENT
Start: 2022-05-19 | End: 2022-05-31 | Stop reason: HOSPADM

## 2022-05-19 RX ORDER — LIDOCAINE HYDROCHLORIDE 20 MG/ML
INJECTION, SOLUTION EPIDURAL; INFILTRATION; INTRACAUDAL; PERINEURAL PRN
Status: DISCONTINUED | OUTPATIENT
Start: 2022-05-19 | End: 2022-05-19 | Stop reason: SURG

## 2022-05-19 RX ORDER — HALOPERIDOL 5 MG/ML
1 INJECTION INTRAMUSCULAR
Status: DISCONTINUED | OUTPATIENT
Start: 2022-05-19 | End: 2022-05-19 | Stop reason: HOSPADM

## 2022-05-19 RX ORDER — SODIUM CHLORIDE 9 MG/ML
INJECTION, SOLUTION INTRAVENOUS
Status: DISCONTINUED | OUTPATIENT
Start: 2022-05-19 | End: 2022-05-19 | Stop reason: SURG

## 2022-05-19 RX ORDER — HYDROCODONE BITARTRATE AND ACETAMINOPHEN 10; 325 MG/1; MG/1
1 TABLET ORAL EVERY 4 HOURS PRN
Status: DISCONTINUED | OUTPATIENT
Start: 2022-05-19 | End: 2022-05-19

## 2022-05-19 RX ORDER — OXYCODONE HCL 5 MG/5 ML
10 SOLUTION, ORAL ORAL
Status: DISCONTINUED | OUTPATIENT
Start: 2022-05-19 | End: 2022-05-19 | Stop reason: HOSPADM

## 2022-05-19 RX ORDER — CEFAZOLIN SODIUM 1 G/3ML
INJECTION, POWDER, FOR SOLUTION INTRAMUSCULAR; INTRAVENOUS PRN
Status: DISCONTINUED | OUTPATIENT
Start: 2022-05-19 | End: 2022-05-19 | Stop reason: SURG

## 2022-05-19 RX ORDER — HYDROMORPHONE HYDROCHLORIDE 1 MG/ML
0.4 INJECTION, SOLUTION INTRAMUSCULAR; INTRAVENOUS; SUBCUTANEOUS
Status: DISCONTINUED | OUTPATIENT
Start: 2022-05-19 | End: 2022-05-19 | Stop reason: HOSPADM

## 2022-05-19 RX ORDER — AMOXICILLIN 250 MG
1 CAPSULE ORAL
Status: DISCONTINUED | OUTPATIENT
Start: 2022-05-19 | End: 2022-05-31 | Stop reason: HOSPADM

## 2022-05-19 RX ORDER — ACETAMINOPHEN 500 MG
500 TABLET ORAL EVERY 6 HOURS PRN
Status: DISCONTINUED | OUTPATIENT
Start: 2022-05-19 | End: 2022-05-21

## 2022-05-19 RX ORDER — ROCURONIUM BROMIDE 10 MG/ML
INJECTION, SOLUTION INTRAVENOUS PRN
Status: DISCONTINUED | OUTPATIENT
Start: 2022-05-19 | End: 2022-05-19 | Stop reason: SURG

## 2022-05-19 RX ORDER — ONDANSETRON 2 MG/ML
INJECTION INTRAMUSCULAR; INTRAVENOUS PRN
Status: DISCONTINUED | OUTPATIENT
Start: 2022-05-19 | End: 2022-05-19 | Stop reason: SURG

## 2022-05-19 RX ORDER — LISINOPRIL 20 MG/1
40 TABLET ORAL
Status: DISCONTINUED | OUTPATIENT
Start: 2022-05-20 | End: 2022-05-31 | Stop reason: HOSPADM

## 2022-05-19 RX ADMIN — EPHEDRINE SULFATE 10 MG: 50 INJECTION, SOLUTION INTRAVENOUS at 15:36

## 2022-05-19 RX ADMIN — FENTANYL CITRATE 25 MCG: 50 INJECTION, SOLUTION INTRAMUSCULAR; INTRAVENOUS at 20:43

## 2022-05-19 RX ADMIN — FENTANYL CITRATE 25 MCG: 50 INJECTION, SOLUTION INTRAMUSCULAR; INTRAVENOUS at 20:52

## 2022-05-19 RX ADMIN — SODIUM CHLORIDE: 9 INJECTION, SOLUTION INTRAVENOUS at 15:16

## 2022-05-19 RX ADMIN — FENTANYL CITRATE 100 MCG: 50 INJECTION, SOLUTION INTRAMUSCULAR; INTRAVENOUS at 14:42

## 2022-05-19 RX ADMIN — ONDANSETRON 4 MG: 2 INJECTION INTRAMUSCULAR; INTRAVENOUS at 18:43

## 2022-05-19 RX ADMIN — DEXAMETHASONE 4 MG: 4 TABLET ORAL at 04:28

## 2022-05-19 RX ADMIN — ONDANSETRON 4 MG: 2 INJECTION INTRAMUSCULAR; INTRAVENOUS at 13:38

## 2022-05-19 RX ADMIN — SENNOSIDES AND DOCUSATE SODIUM 1 TABLET: 50; 8.6 TABLET ORAL at 22:22

## 2022-05-19 RX ADMIN — PROPOFOL 150 MG: 10 INJECTION, EMULSION INTRAVENOUS at 13:38

## 2022-05-19 RX ADMIN — CEFAZOLIN 1 G: 330 INJECTION, POWDER, FOR SOLUTION INTRAMUSCULAR; INTRAVENOUS at 17:45

## 2022-05-19 RX ADMIN — MANNITOL 100 G: 20 INJECTION, SOLUTION INTRAVENOUS at 14:28

## 2022-05-19 RX ADMIN — CEFAZOLIN 2 G: 330 INJECTION, POWDER, FOR SOLUTION INTRAMUSCULAR; INTRAVENOUS at 13:45

## 2022-05-19 RX ADMIN — FENTANYL CITRATE 25 MCG: 50 INJECTION, SOLUTION INTRAMUSCULAR; INTRAVENOUS at 21:17

## 2022-05-19 RX ADMIN — SODIUM CHLORIDE, POTASSIUM CHLORIDE, SODIUM LACTATE AND CALCIUM CHLORIDE: 600; 310; 30; 20 INJECTION, SOLUTION INTRAVENOUS at 13:29

## 2022-05-19 RX ADMIN — HYDROCODONE BITARTRATE AND ACETAMINOPHEN 1 TABLET: 10; 325 TABLET ORAL at 22:21

## 2022-05-19 RX ADMIN — FENTANYL CITRATE 100 MCG: 50 INJECTION, SOLUTION INTRAMUSCULAR; INTRAVENOUS at 13:38

## 2022-05-19 RX ADMIN — EPHEDRINE SULFATE 10 MG: 50 INJECTION, SOLUTION INTRAVENOUS at 16:00

## 2022-05-19 RX ADMIN — EPHEDRINE SULFATE 5 MG: 50 INJECTION, SOLUTION INTRAVENOUS at 15:18

## 2022-05-19 RX ADMIN — DEXAMETHASONE SODIUM PHOSPHATE 10 MG: 4 INJECTION, SOLUTION INTRA-ARTICULAR; INTRALESIONAL; INTRAMUSCULAR; INTRAVENOUS; SOFT TISSUE at 13:38

## 2022-05-19 RX ADMIN — LEVETIRACETAM 500 MG: 500 TABLET, FILM COATED ORAL at 04:28

## 2022-05-19 RX ADMIN — REMIFENTANIL HYDROCHLORIDE 0.15 MCG/KG/MIN: 1 INJECTION, POWDER, LYOPHILIZED, FOR SOLUTION INTRAVENOUS at 13:38

## 2022-05-19 RX ADMIN — LEVETIRACETAM 1000 MG: 100 INJECTION, SOLUTION INTRAVENOUS at 14:24

## 2022-05-19 RX ADMIN — HYDRALAZINE HYDROCHLORIDE 10 MG: 20 INJECTION INTRAMUSCULAR; INTRAVENOUS at 22:42

## 2022-05-19 RX ADMIN — LIDOCAINE HYDROCHLORIDE 90 MG: 20 INJECTION, SOLUTION EPIDURAL; INFILTRATION; INTRACAUDAL at 13:38

## 2022-05-19 RX ADMIN — SODIUM CHLORIDE, POTASSIUM CHLORIDE, SODIUM LACTATE AND CALCIUM CHLORIDE: 600; 310; 30; 20 INJECTION, SOLUTION INTRAVENOUS at 22:42

## 2022-05-19 RX ADMIN — EPHEDRINE SULFATE 10 MG: 50 INJECTION, SOLUTION INTRAVENOUS at 18:13

## 2022-05-19 RX ADMIN — DOCUSATE SODIUM 100 MG: 100 CAPSULE, LIQUID FILLED ORAL at 22:21

## 2022-05-19 RX ADMIN — MIDAZOLAM HYDROCHLORIDE 2 MG: 1 INJECTION, SOLUTION INTRAMUSCULAR; INTRAVENOUS at 13:34

## 2022-05-19 RX ADMIN — EPHEDRINE SULFATE 10 MG: 50 INJECTION, SOLUTION INTRAVENOUS at 14:14

## 2022-05-19 RX ADMIN — SODIUM CHLORIDE, SODIUM GLUCONATE, SODIUM ACETATE, POTASSIUM CHLORIDE AND MAGNESIUM CHLORIDE: 526; 502; 368; 37; 30 INJECTION, SOLUTION INTRAVENOUS at 13:54

## 2022-05-19 RX ADMIN — DEXAMETHASONE 4 MG: 4 TABLET ORAL at 00:29

## 2022-05-19 RX ADMIN — FENTANYL CITRATE 25 MCG: 50 INJECTION, SOLUTION INTRAMUSCULAR; INTRAVENOUS at 20:36

## 2022-05-19 RX ADMIN — AMLODIPINE BESYLATE 5 MG: 5 TABLET ORAL at 04:28

## 2022-05-19 RX ADMIN — ROCURONIUM BROMIDE 100 MG: 10 INJECTION, SOLUTION INTRAVENOUS at 13:38

## 2022-05-19 RX ADMIN — OMEPRAZOLE 20 MG: 20 CAPSULE, DELAYED RELEASE ORAL at 04:28

## 2022-05-19 ASSESSMENT — ENCOUNTER SYMPTOMS
EYES NEGATIVE: 1
FOCAL WEAKNESS: 0
SPEECH CHANGE: 1
FALLS: 0
CONSTITUTIONAL NEGATIVE: 1
PSYCHIATRIC NEGATIVE: 1
SHORTNESS OF BREATH: 0
CARDIOVASCULAR NEGATIVE: 1
HEADACHES: 0
RESPIRATORY NEGATIVE: 1
VOMITING: 0
NAUSEA: 0
CONSTIPATION: 0
ABDOMINAL PAIN: 0
SEIZURES: 0
HEADACHES: 1
FEVER: 0
WEAKNESS: 1
DIZZINESS: 0
GASTROINTESTINAL NEGATIVE: 1
MUSCULOSKELETAL NEGATIVE: 1

## 2022-05-19 ASSESSMENT — FIBROSIS 4 INDEX: FIB4 SCORE: 1.07

## 2022-05-19 ASSESSMENT — PAIN DESCRIPTION - PAIN TYPE
TYPE: SURGICAL PAIN

## 2022-05-19 NOTE — ANESTHESIA PROCEDURE NOTES
Central Venous Line  Performed by: Jerson Kaiser D.O.  Authorized by: Jerson Kaiser D.O.     Start Time:  5/19/2022 1:44 PM  End Time:  5/19/2022 1:54 PM  Patient Location:  OR  Indication: central venous access        provider hand hygiene performed prior to central venous catheter insertion, all 5 sterile barriers used (gloves, gown, cap, mask, large sterile drape) during central venous catheter insertion and skin prep agent completely dried prior to procedure    Patient Position:  Trendelenburg  Laterality:  Right  Site:  Internal jugular  Prep:  Chlorhexidine  Catheter Size:  7.5 Fr  Catheter Length (cm):  20  Number of Lumens:  Triple lumen  target vein identified, needle advanced into vein and blood aspirated and guidewire advanced into vein    Seldinger Technique?: Yes    Ultrasound-Guided: ultrasound-guided  Image captured, interpreted and electronically stored.  Sterile Gel and Probe Cover Used for Ultrasound?: Yes    Intravenous Verification: verified by ultrasound, venous blood return and chest x-ray pending    all ports aspirated, all ports flushed easily, guidewire was removed intact, biopatch was applied, line was sutured in place and dressing was applied    Events: patient tolerated procedure well with no complications

## 2022-05-19 NOTE — ANESTHESIA PREPROCEDURE EVALUATION
Case: [id number] Date/Time: 05/19/22 1215    Procedure: CRANIOTOMY, USING FRAMELESS STEREOTAXY - FRONTAL FOR BRAIN TUMOR RESECTION (Right )    Location: [facility] OR 05 / SURGERY [facility]    Surgeons: Hakan Dhaliwal M.D.          Relevant Problems   CARDIAC   (positive) Hypertensive urgency      Other   (positive) Meningioma (HCC)       Physical Exam    Airway   Mallampati: II  TM distance: >3 FB  Neck ROM: full       Cardiovascular - normal exam  Rhythm: regular  Rate: normal  (-) murmur     Dental - normal exam           Pulmonary - normal exam  Breath sounds clear to auscultation     Abdominal    Neurological - normal exam                 Anesthesia Plan    ASA 2       Plan - general       Airway plan will be ETT          Induction: intravenous    Postoperative Plan: Postoperative administration of opioids is intended.    Pertinent diagnostic labs and testing reviewed    Informed Consent:    Anesthetic plan and risks discussed with patient.    Use of blood products discussed with: patient whom consented to blood products.

## 2022-05-19 NOTE — CARE PLAN
The patient is Stable - Low risk of patient condition declining or worsening    Shift Goals  Clinical Goals: monitor neuro status  Patient Goals: Rest  Family Goals: antonia    Progress made toward(s) clinical / shift goals:    Problem: Fall Risk  Goal: Patient will remain free from falls  Outcome: Progressing     Problem: Knowledge Deficit - Standard  Goal: Patient and family/care givers will demonstrate understanding of plan of care, disease process/condition, diagnostic tests and medications  Outcome: Progressing       Patient is not progressing towards the following goals:  Patient reports having pain on left foot. Assessed groin site. Clean, dry, intact dressing. Soft to touch no pain at site. Swelling in bilateral lower extremities. No redness or warmth to left foot. Right pedal pulse 2+, left pedal pulse 1+. Patient has slight limp when walking to bathroom due to left leg pain.

## 2022-05-19 NOTE — PROGRESS NOTES
Neurosurgery Progress Note    Subjective:  Pt presents with headache, confusion and ataxia.  Found to have large right frontal tumor; likely meningioma   IR unable to embolize tumor feeding artery due to location and brach of artery    Patient denies headache or new symptoms, does state she has occasional blurry vision  Endorses pain in right ankle   Brain MRI stealth completed   Plan for OR today  Communicated with help of patient's daughter, Nieves    Exam:  She is awake and alert  Face symmetric  CN 2-12 grossly intact  Moves all 4 ext.  Negative homans sign  Positive non pitting edema BLE  No pronator drift  Pain with palpation of R ankle       BP  Min: 138/70  Max: 159/80  Pulse  Av  Min: 58  Max: 71  Resp  Av.5  Min: 16  Max: 18  Temp  Av.8 °C (98.2 °F)  Min: 36.4 °C (97.6 °F)  Max: 37.2 °C (98.9 °F)  Monitored Temp 2  Av.8 °C (98.2 °F)  Min: 36.4 °C (97.6 °F)  Max: 37.2 °C (98.9 °F)  SpO2  Av.8 %  Min: 92 %  Max: 96 %    No data recorded    Recent Labs     22  0046   WBC 11.7*   RBC 4.75   HEMOGLOBIN 13.1   HEMATOCRIT 40.9   MCV 86.1   MCH 27.6   MCHC 32.0*   RDW 43.5   PLATELETCT 322   MPV 11.0     Recent Labs     22  0046   SODIUM 136   POTASSIUM 4.2   CHLORIDE 101   CO2 20   GLUCOSE 171*   BUN 25*   CREATININE 0.58   CALCIUM 8.9               Intake/Output                       22 0700 - 22 0659 22 07 - 22 0659      Total  [id number] Total                 Intake    P.O.  240  -- 240  --  -- --    P.O. 240 -- 240 -- -- --    Total Intake 240 -- 240 -- -- --       Output    Urine  --  -- --  --  -- --    Number of Times Voided 5 x 2 x 7 x -- -- --    Stool  --  -- --  --  -- --    Number of Times Stooled 2 x 1 x 3 x -- -- --    Total Output -- -- -- -- -- --       Net I/O     240 -- 240 -- -- --            Intake/Output Summary (Last 24 hours) at 2022 0920  Last data filed at 2022 1245  Gross per 24 hour    Intake 120 ml   Output --   Net 120 ml            • omeprazole  20 mg DAILY   • amLODIPine  5 mg Q DAY   • hydrALAZINE  25 mg Q6HRS PRN   • dexamethasone  4 mg Q6HRS   • levETIRAcetam  500 mg BID   • [Held by provider] lisinopril  40 mg Q DAY   • senna-docusate  2 Tablet BID    And   • polyethylene glycol/lytes  1 Packet QDAY PRN    And   • magnesium hydroxide  30 mL QDAY PRN    And   • bisacodyl  10 mg QDAY PRN   • acetaminophen  650 mg Q4HRS PRN   • labetalol  10 mg Q4HRS PRN       Assessment and Plan:  Hospital day #8  Planning for Right frontal craniotomy for brain tumor resection with stealth guidance this afternoon   NPO  EKG, CMP, CBC, Coags reviewed and ok. Pending CXR  Ok to restart prior heparin dose, but held today for surgery  Held Lisinopril in pre operative period, utilize PRN BP medications as needed  ICU post operatively  Appreciate hospitalist care.  Following.       Prophylactic anticoagulation: yes         Start date/time: today, needs to be held starting on 5/18.

## 2022-05-19 NOTE — OR NURSING
Patients daughter states she will be and has been the  for the patient. Refusal form completed and is in the chart.    Daughter states the patient first and last name is spelled wrong  She states correct  name is Jan Gilliamlila the daughter will bring pt passport so admitting can change the name.

## 2022-05-19 NOTE — DISCHARGE PLANNING
Case Management Discharge Planning    Admission Date: 5/12/2022  GMLOS: 3  ALOS: 6    6-Clicks ADL Score: 24  6-Clicks Mobility Score: 24      Anticipated Discharge Dispo: Discharge Disposition: D/T to SNF with Medicare cert in anticipation of skilled care (03)  Discharge Contact Phone Number: [phone]    DME Needed: unknown    Action(s) Taken: Spoke to patient's daughter, Chantal via telephone.  Pt visiting from [place].  Chantal stated that pt has a huge brain tumor.  They had no idea.  Pt has been staying with Chantal and her two children, ages 13 and 10 in a 2 story townhome.  Chantal does not work.  Chantal's  spends time at their townhome in [place] and at his home in [place].  He does not work either.      Escalations Completed: none    Medically Clear: no    Next Steps: Provide transitional care coordination.    Barriers to Discharge: Medical clearance, uninsured, not a resident of ., visiting from [place].    Care Transition Team Assessment    Information Source  Orientation Level: Unable to assess  Information Given By: Relative  Informant's Name: Chantal  Who is responsible for making decisions for patient? : Patient    Readmission Evaluation  Is this a readmission?: No    Elopement Risk  Legal Hold: No  Ambulatory or Self Mobile in Wheelchair: Yes  Disoriented: No  Psychiatric Symptoms: None  History of Wandering: No  Elopement this Admit: No  Vocalizing Wanting to Leave: No  Displays Behaviors, Body Language Wanting to Leave: No-Not at Risk for Elopement  Elopement Risk: Not at Risk for Elopement         Discharge Preparedness  What is your plan after discharge?: Uncertain - pending medical team collaboration  What are your discharge supports?: Child  Prior Functional Level: Ambulatory, Independent with Activities of Daily Living, Independent with Medication Management    Functional Assesment  Prior Functional Level: Ambulatory, Independent with Activities of Daily Living, Independent with Medication  Management    Finances  Financial Barriers to Discharge: Yes  Prescription Coverage: No    Vision / Hearing Impairment  Right Eye Vision: Impaired, Other (Comments) (intermittent)  Left Eye Vision: Impaired, Other (Comments) (intermittent)         Advance Directive  Advance Directive?: None    Domestic Abuse  Have you ever been the victim of abuse or violence?: No  Physical Abuse or Sexual Abuse: No  Verbal Abuse or Emotional Abuse: No  Possible Abuse/Neglect Reported to:: Not Applicable         Discharge Risks or Barriers  Discharge risks or barriers?: Uninsured / underinsured, No PCP, Complex medical needs  Patient risk factors: Cognitive / sensory / physical deficit, Complex medical needs, No PCP, Uninsured or underinsured    Anticipated Discharge Information  Discharge Disposition: D/T to SNF with Medicare cert in anticipation of skilled care (03)  Discharge Contact Phone Number: [phone]

## 2022-05-19 NOTE — PROGRESS NOTES
Hospital Medicine Daily Progress Note    Date of Service  5/19/2022    Chief Complaint  Enzo Núñez is a 81 y.o. female admitted 5/12/2022 with headache    Hospital Course  This is 81-year-old female with a past medical history significant for obesity, new diagnosis of hypertension presented to the ER with headache, mild confusion hypertension and balance issues.  She was found to have right frontal meningioma with associated mass-effect on right frontal lobe with adjacent white matter edema.  Neurosurgery was consulted.  She was started on Keppra and Decadron.  She was initially admitted to the ICU for close monitoring.  IR was unable to perform embolization of feeder vessel due to vessel anatomy. Now planned for craniotomy and tumor resection 5/19/22.      Interval Problem Update  - nervous and tearful today, sad her family is not here (besides daughter)  - had BM yesterday, abd pain improved today  - NPO for surgery today  - BP improved this am    I have personally seen and examined the patient at bedside. I discussed the plan of care with patient, family, bedside RN, charge RN,  and neurosurgery.    Consultants/Specialty  neurosurgery    Code Status  Full Code    Disposition  Patient is not medically cleared for discharge.   Anticipate discharge to to skilled nursing facility.  I have placed the appropriate orders for post-discharge needs.    Review of Systems  Review of Systems   Constitutional: Negative for fever.   Respiratory: Negative for shortness of breath.    Cardiovascular: Positive for leg swelling. Negative for chest pain.   Gastrointestinal: Negative for abdominal pain, constipation, nausea and vomiting.   Musculoskeletal: Negative for falls.   Neurological: Positive for weakness. Negative for dizziness and headaches.        Physical Exam  Temp:  [36.4 °C (97.6 °F)-37.2 °C (98.9 °F)] 36.7 °C (98 °F)  Pulse:  [58-71] 58  Resp:  [16-18] 18  BP: (138-159)/(70-80) 138/70  SpO2:  [92 %-96  %] 94 %    Physical Exam  Constitutional:       General: She is not in acute distress.     Appearance: She is obese. She is not ill-appearing, toxic-appearing or diaphoretic.   HENT:      Head: Normocephalic and atraumatic.      Nose: Nose normal.      Mouth/Throat:      Mouth: Mucous membranes are moist.   Eyes:      General: No scleral icterus.     Conjunctiva/sclera: Conjunctivae normal.   Cardiovascular:      Rate and Rhythm: Normal rate and regular rhythm.      Heart sounds: No murmur heard.    No friction rub. No gallop.   Pulmonary:      Effort: Pulmonary effort is normal.      Breath sounds: Normal breath sounds.   Abdominal:      General: Bowel sounds are normal. There is distension.      Palpations: Abdomen is soft.      Tenderness: There is no abdominal tenderness.      Hernia: A hernia (ventral, reducible) is present.   Musculoskeletal:      Cervical back: Normal range of motion.      Right lower leg: Edema present.      Left lower leg: Edema present.   Skin:     Coloration: Skin is not jaundiced.      Findings: No rash.   Neurological:      Mental Status: She is alert and oriented to person, place, and time.   Psychiatric:         Mood and Affect: Affect is tearful.         Behavior: Behavior normal.         Fluids    Intake/Output Summary (Last 24 hours) at 5/19/2022 1029  Last data filed at 5/18/2022 1245  Gross per 24 hour   Intake 120 ml   Output --   Net 120 ml       Laboratory  Recent Labs     05/19/22  0046   WBC 11.7*   RBC 4.75   HEMOGLOBIN 13.1   HEMATOCRIT 40.9   MCV 86.1   MCH 27.6   MCHC 32.0*   RDW 43.5   PLATELETCT 322   MPV 11.0     Recent Labs     05/19/22  0046   SODIUM 136   POTASSIUM 4.2   CHLORIDE 101   CO2 20   GLUCOSE 171*   BUN 25*   CREATININE 0.58   CALCIUM 8.9     Recent Labs     05/19/22  0949   INR 0.98               Imaging  DX-CHEST-PORTABLE (1 VIEW)   Final Result      No radiographic evidence of acute cardiopulmonary disease.      [id number] VIEW   Final Result          1.  Moderate stool in the colon suggests changes of constipation, otherwise nonspecific bowel gas pattern   2.  Hazy bilateral lung base opacities suggests subtle infiltrates or edema.      IR-EMBOLIZE-NEURO-INTRACRANIAL   Final Result   Impression:      Cerebral angiogram for assessment for vascular supply to the frontal meningioma demonstrates with the predominant supply to the meningioma from the artery of the falx cerebri arising from the left ophthalmic artery with minimal supply also arising from    the right-sided artery of the falx. No definite supply to the lesion from the middle meningeal arteries. Very minimal supply to the right side of the meningioma from distal frontal penetrating branches of the superficial temporal artery also noted.      Since the majority of the supply to the meningioma was from the ophthalmic artery, the lesion could not be safely embolized.      I, Ayo Schmidt was physically present and participated during the entire procedure of the IR-EMBOLIZE-NEURO-INTRACRANIAL.         MR-STEALTH BRAIN WITH & W/O   Final Result         Large right inferior frontal region dural based extra-axial mass that most likely represents a meningioma. There is mild mass effect upon the bilateral inferior frontal lobes with midline shift to the left measuring approximately 10 to 11 mm. There is    also mass effect upon the lateral ventricles.      Minimal edema noted in the right superomedial frontal white matter adjacent to the mass.      Age-related volume loss and chronic microvascular ischemic changes.      CT-HEAD W/O   Final Result      1.  Negative for hemorrhage      2.  Large right frontal meningioma measuring 5.2 x 4.4 x 4.2 cm      3.  Associated mass effect on the right frontal lobe with adjacent white matter edema and mild compression of the frontal horn right lateral ventricle.         CT-MAXILLOFACIAL WITH PLUS RECONS   Final Result      1.  Negative for facial or orbital abscess       2.  CT face within normal limits      3.  Partially calcified right frontal extra-axial mass consistent with a meningioma described in detail on head CT report           Assessment/Plan  * Meningioma (HCC)  Assessment & Plan  CT head shows Large right frontal meningioma measuring 5.2 x 4.4 x 4.2 cm and associated mass effect on the right frontal lobe with adjacent white matter edema and mild compression of the frontal horn right lateral ventricle   -- MRI has been reviewed, neurosurgery Dr. Goodson has evaluated, recommended IR guided embolization on 5/14   IR unable to perform embolization of feeder artery due to anatomy   -- plan for craniotomy and tumor resection  5/19 Thursday  Continue decadron every 6 hours,  omeprazole 20 mg p.o. daily  Keppra 500 mg twice daily    Generalized abdominal pain  Assessment & Plan  Improved after BM  Abdominal pain  Xray showed moderate stool  Increase bowel regimen  Add PPI since on steroids  CTM    Bilateral leg edema  Assessment & Plan  Likely in setting of steroids, not moving as much, unlikely DVT however CTM  Lasix x 1 5/18  If no improvement consider LE US    Hypertensive urgency  Assessment & Plan  Start patient on lisinopril (holding for neurosurgery)  Amlodipine, lasix 5/18  Hydralazine and Labetalol IV prn   BP improved but remains elevated      Dental caries  Assessment & Plan  Unasyn given in ED  No abscess seen on CT.  unasyn stopped       VTE prophylaxis: SCDs/TEDs and heparin ppx    I have performed a physical exam and reviewed and updated ROS and Plan today (5/19/2022). In review of yesterday's note (5/18/2022), there are no changes except as documented above.

## 2022-05-19 NOTE — ANESTHESIA PROCEDURE NOTES
Airway    Date/Time: 5/19/2022 1:38 PM  Performed by: Jerson Kaiser D.O.  Authorized by: Jerson Kaiser D.O.     Location:  OR  Urgency:  Elective  Indications for Airway Management:  Anesthesia      Spontaneous Ventilation: absent    Sedation Level:  Deep  Preoxygenated: Yes    Patient Position:  Sniffing  Mask Difficulty Assessment:  0 - not attempted  Final Airway Type:  Endotracheal airway  Final Endotracheal Airway:  ETT  Cuffed: Yes    Technique Used for Successful ETT Placement:  Direct laryngoscopy    Insertion Site:  Oral  Blade Type:  Glide  Laryngoscope Blade/Videolaryngoscope Blade Size:  3  ETT Size (mm):  7.0  Measured from:  Teeth  ETT to Teeth (cm):  21  Placement Verified by: auscultation and capnometry    Cormack-Lehane Classification:  Grade I - full view of glottis  Number of Attempts at Approach:  1

## 2022-05-19 NOTE — ANESTHESIA PROCEDURE NOTES
Arterial Line  Performed by: Jerson Kaiser D.O.  Authorized by: Jerson Kaiser D.O.     Start Time:  5/19/2022 1:40 PM  End Time:  5/19/2022 1:42 PM  Localization: surface landmarks    Patient Location:  OR  Indication: continuous blood pressure monitoring        Catheter Size:  20 G  Seldinger Technique?: Yes    Laterality:  Left  Site:  Radial artery  Line Secured:  Antimicrobial disc, tape and transparent dressing  Events: patient tolerated procedure well with no complications

## 2022-05-19 NOTE — CARE PLAN
Problem: Knowledge Deficit - Standard  Goal: Patient and family/care givers will demonstrate understanding of plan of care, disease process/condition, diagnostic tests and medications  Outcome: Progressing     Problem: Pain - Standard  Goal: Alleviation of pain or a reduction in pain to the patient’s comfort goal  Outcome: Progressing     Problem: Fall Risk  Goal: Patient will remain free from falls  Outcome: Progressing   The patient is Stable - Low risk of patient condition declining or worsening    Shift Goals  Clinical Goals: surgery, stable neuro status  Patient Goals: rest  Family Goals: antonia    Progress made toward(s) clinical / shift goals:  understands poc. Fall precautions in place.    Patient is not progressing towards the following goals:

## 2022-05-20 ENCOUNTER — APPOINTMENT (OUTPATIENT)
Dept: RADIOLOGY | Facility: MEDICAL CENTER | Age: 82
DRG: 025 | End: 2022-05-20
Attending: NURSE PRACTITIONER
Payer: MEDICAID

## 2022-05-20 PROBLEM — R73.03 PREDIABETES: Status: ACTIVE | Noted: 2022-05-14

## 2022-05-20 LAB
ANION GAP SERPL CALC-SCNC: 12 MMOL/L (ref 7–16)
BUN SERPL-MCNC: 19 MG/DL (ref 8–22)
CALCIUM SERPL-MCNC: 7.7 MG/DL (ref 8.5–10.5)
CHLORIDE SERPL-SCNC: 106 MMOL/L (ref 96–112)
CO2 SERPL-SCNC: 22 MMOL/L (ref 20–33)
CREAT SERPL-MCNC: 0.56 MG/DL (ref 0.5–1.4)
ERYTHROCYTE [DISTWIDTH] IN BLOOD BY AUTOMATED COUNT: 45.9 FL (ref 35.9–50)
GFR SERPLBLD CREATININE-BSD FMLA CKD-EPI: 92 ML/MIN/1.73 M 2
GLUCOSE BLD STRIP.AUTO-MCNC: 163 MG/DL (ref 65–99)
GLUCOSE BLD STRIP.AUTO-MCNC: 165 MG/DL (ref 65–99)
GLUCOSE BLD STRIP.AUTO-MCNC: 169 MG/DL (ref 65–99)
GLUCOSE SERPL-MCNC: 183 MG/DL (ref 65–99)
HCT VFR BLD AUTO: 36.3 % (ref 37–47)
HGB BLD-MCNC: 11.4 G/DL (ref 12–16)
MCH RBC QN AUTO: 27.7 PG (ref 27–33)
MCHC RBC AUTO-ENTMCNC: 31.4 G/DL (ref 33.6–35)
MCV RBC AUTO: 88.1 FL (ref 81.4–97.8)
PATHOLOGY CONSULT NOTE: NORMAL
PLATELET # BLD AUTO: 291 K/UL (ref 164–446)
PMV BLD AUTO: 10.4 FL (ref 9–12.9)
POTASSIUM SERPL-SCNC: 4.3 MMOL/L (ref 3.6–5.5)
RBC # BLD AUTO: 4.12 M/UL (ref 4.2–5.4)
SODIUM SERPL-SCNC: 140 MMOL/L (ref 135–145)
WBC # BLD AUTO: 25.4 K/UL (ref 4.8–10.8)

## 2022-05-20 PROCEDURE — 700102 HCHG RX REV CODE 250 W/ 637 OVERRIDE(OP): Performed by: PHYSICIAN ASSISTANT

## 2022-05-20 PROCEDURE — 70450 CT HEAD/BRAIN W/O DYE: CPT

## 2022-05-20 PROCEDURE — 700111 HCHG RX REV CODE 636 W/ 250 OVERRIDE (IP): Performed by: NURSE PRACTITIONER

## 2022-05-20 PROCEDURE — 99231 SBSQ HOSP IP/OBS SF/LOW 25: CPT | Performed by: NURSE PRACTITIONER

## 2022-05-20 PROCEDURE — 85027 COMPLETE CBC AUTOMATED: CPT

## 2022-05-20 PROCEDURE — 700102 HCHG RX REV CODE 250 W/ 637 OVERRIDE(OP): Performed by: INTERNAL MEDICINE

## 2022-05-20 PROCEDURE — A9270 NON-COVERED ITEM OR SERVICE: HCPCS | Performed by: STUDENT IN AN ORGANIZED HEALTH CARE EDUCATION/TRAINING PROGRAM

## 2022-05-20 PROCEDURE — A9270 NON-COVERED ITEM OR SERVICE: HCPCS | Performed by: PHYSICIAN ASSISTANT

## 2022-05-20 PROCEDURE — 770001 HCHG ROOM/CARE - MED/SURG/GYN PRIV*

## 2022-05-20 PROCEDURE — 700105 HCHG RX REV CODE 258: Performed by: STUDENT IN AN ORGANIZED HEALTH CARE EDUCATION/TRAINING PROGRAM

## 2022-05-20 PROCEDURE — 700102 HCHG RX REV CODE 250 W/ 637 OVERRIDE(OP): Performed by: STUDENT IN AN ORGANIZED HEALTH CARE EDUCATION/TRAINING PROGRAM

## 2022-05-20 PROCEDURE — 99233 SBSQ HOSP IP/OBS HIGH 50: CPT | Performed by: INTERNAL MEDICINE

## 2022-05-20 PROCEDURE — A9270 NON-COVERED ITEM OR SERVICE: HCPCS | Performed by: NURSE PRACTITIONER

## 2022-05-20 PROCEDURE — 700102 HCHG RX REV CODE 250 W/ 637 OVERRIDE(OP): Performed by: NURSE PRACTITIONER

## 2022-05-20 PROCEDURE — A9270 NON-COVERED ITEM OR SERVICE: HCPCS | Performed by: INTERNAL MEDICINE

## 2022-05-20 PROCEDURE — 700111 HCHG RX REV CODE 636 W/ 250 OVERRIDE (IP): Performed by: STUDENT IN AN ORGANIZED HEALTH CARE EDUCATION/TRAINING PROGRAM

## 2022-05-20 PROCEDURE — 82962 GLUCOSE BLOOD TEST: CPT

## 2022-05-20 PROCEDURE — 80048 BASIC METABOLIC PNL TOTAL CA: CPT

## 2022-05-20 RX ORDER — DEXTROSE MONOHYDRATE 25 G/50ML
25 INJECTION, SOLUTION INTRAVENOUS
Status: DISCONTINUED | OUTPATIENT
Start: 2022-05-20 | End: 2022-05-22

## 2022-05-20 RX ORDER — CALCIUM GLUCONATE 20 MG/ML
1 INJECTION, SOLUTION INTRAVENOUS ONCE
Status: COMPLETED | OUTPATIENT
Start: 2022-05-20 | End: 2022-05-20

## 2022-05-20 RX ADMIN — OXYCODONE 5 MG: 5 TABLET ORAL at 20:45

## 2022-05-20 RX ADMIN — LEVETIRACETAM 500 MG: 500 TABLET, FILM COATED ORAL at 06:03

## 2022-05-20 RX ADMIN — DEXAMETHASONE 4 MG: 4 TABLET ORAL at 17:28

## 2022-05-20 RX ADMIN — AMLODIPINE BESYLATE 5 MG: 5 TABLET ORAL at 06:03

## 2022-05-20 RX ADMIN — DEXAMETHASONE 4 MG: 4 TABLET ORAL at 11:31

## 2022-05-20 RX ADMIN — OXYCODONE 5 MG: 5 TABLET ORAL at 06:03

## 2022-05-20 RX ADMIN — SODIUM CHLORIDE, POTASSIUM CHLORIDE, SODIUM LACTATE AND CALCIUM CHLORIDE: 600; 310; 30; 20 INJECTION, SOLUTION INTRAVENOUS at 04:32

## 2022-05-20 RX ADMIN — SENNOSIDES AND DOCUSATE SODIUM 1 TABLET: 50; 8.6 TABLET ORAL at 20:45

## 2022-05-20 RX ADMIN — OXYCODONE 5 MG: 5 TABLET ORAL at 11:29

## 2022-05-20 RX ADMIN — INSULIN HUMAN 1 UNITS: 100 INJECTION, SOLUTION PARENTERAL at 11:36

## 2022-05-20 RX ADMIN — OXYCODONE 5 MG: 5 TABLET ORAL at 01:37

## 2022-05-20 RX ADMIN — DEXAMETHASONE 4 MG: 4 TABLET ORAL at 00:00

## 2022-05-20 RX ADMIN — CALCIUM GLUCONATE 1 G: 20 INJECTION, SOLUTION INTRAVENOUS at 11:31

## 2022-05-20 RX ADMIN — CEFAZOLIN SODIUM 2 G: 2 INJECTION, SOLUTION INTRAVENOUS at 01:34

## 2022-05-20 RX ADMIN — INSULIN HUMAN 1 UNITS: 100 INJECTION, SOLUTION PARENTERAL at 17:28

## 2022-05-20 RX ADMIN — OXYCODONE 5 MG: 5 TABLET ORAL at 15:37

## 2022-05-20 RX ADMIN — INSULIN HUMAN 1 UNITS: 100 INJECTION, SOLUTION PARENTERAL at 21:05

## 2022-05-20 RX ADMIN — ACETAMINOPHEN 500 MG: 500 TABLET ORAL at 12:13

## 2022-05-20 RX ADMIN — DOCUSATE SODIUM 100 MG: 100 CAPSULE, LIQUID FILLED ORAL at 06:03

## 2022-05-20 RX ADMIN — CEFAZOLIN SODIUM 2 G: 2 INJECTION, SOLUTION INTRAVENOUS at 09:50

## 2022-05-20 RX ADMIN — DEXAMETHASONE 4 MG: 4 TABLET ORAL at 06:00

## 2022-05-20 RX ADMIN — HYDROMORPHONE HYDROCHLORIDE 0.5 MG: 1 INJECTION, SOLUTION INTRAMUSCULAR; INTRAVENOUS; SUBCUTANEOUS at 12:51

## 2022-05-20 RX ADMIN — OMEPRAZOLE 20 MG: 20 CAPSULE, DELAYED RELEASE ORAL at 06:04

## 2022-05-20 RX ADMIN — LISINOPRIL 40 MG: 20 TABLET ORAL at 06:04

## 2022-05-20 RX ADMIN — LEVETIRACETAM 500 MG: 500 TABLET, FILM COATED ORAL at 17:27

## 2022-05-20 RX ADMIN — DOCUSATE SODIUM 100 MG: 100 CAPSULE, LIQUID FILLED ORAL at 17:28

## 2022-05-20 ASSESSMENT — PAIN DESCRIPTION - PAIN TYPE
TYPE: SURGICAL PAIN
TYPE: SURGICAL PAIN
TYPE: ACUTE PAIN
TYPE: SURGICAL PAIN
TYPE: ACUTE PAIN
TYPE: SURGICAL PAIN

## 2022-05-20 NOTE — PROGRESS NOTES
Explained to patient plan of care with daughter at bedside as interpretor. Goal for tonight is neuro check every 1 hour, and blood pressure control. This was translated to patient by her daughter. Patient verbalized understanding.   Patient's primary language is not available on interpretor service. Per last visit, daughter stayed overnight with patient. Daughter stated she cannot stay tonight, but staff should call her whenever needed. Explained to daughter (Chantal) that neuro check will need to be complete every hour, daughter stated that it's fine to call every hour to translate to patient. Verify number in chart.     5/20/22 at 0000- attempted to call multiple time and was not able to reach Chantal. This RN performed neuro exam to her best ability. Patient is able to follow command and imitate RN movement, patient open eyes to voice, and move arms and legs up from bed when asked to do so. Pupils are round, equal and reactive to light. Not able to ask patient orientation questions.     0330: RN was able to reach interpretor service. Patient was able to say her name, where she is, and the year. Patient is made aware of CT scan scheduled for 0400.

## 2022-05-20 NOTE — ASSESSMENT & PLAN NOTE
No apparent diabetes, no medication at home listed on EMR.   A1c 6.4 on admission.  Patient has hyperglycemia, while on dexamethasone    - sugars controlled and weaning steroids, dc insulin and glucose checks

## 2022-05-20 NOTE — PROGRESS NOTES
Critical Care Progress Note    Date of admission  5/12/2022    Chief Complaint  Headache, left sided dental pain, and HTN    Hospital Course  All history was obtained from the patient's daughter and the old chart as the patient is Romansh speaking only.     The patient is a 81 year old lady who presented to the ER on 5/12 with complaints of worsening left sided facial pain and head pressure over the past 2 days.  The patient also endorses that some mild balance issues and family has noted some confusion.  The patient has chronic visual changes that have not worsened recently.  No nausea or vomiting.  The patient underwent a CT head which revealed a large right frontal meningioma measuring 5.2 x 4.4 x 4.2 cm with associated mass effect on the right frontal lobe with adjacent white matter edema and mild compression of the frontal horn of the right lateral ventricle.  Neurosurgery was consulted.  She was started on Keppra and Decadron.  Interventional radiology was consulted.  The patient underwent diagnostic cerebral angiogram today in order to do embolization.  Unfortunately, the patient did not have adequate vessel anatomy or feeder vessels to undergo embolization.  She will be admitted to the neuro ICU for closely neurological monitoring and in preparation for craniotomy tomorrow with neurosurgery. (Dr. Dennison 5/14/22)    Interval Problem Update  Reviewed last 24 hour events:  The patient was taken for a right frontal craniotomy for brain tumor resection on 5/19/2022 by Dr. Dhaliwal. She is seen in the PACU postoperatively with daughter at bedside for specialized translation.   She complains of a headache postoperatively that is appropriately relieved with pain medication  Her postoperative vital signs are stable  EBL  = 800ml; she received FFP and PRBC intraoperatively    Review of Systems  Review of Systems   Constitutional: Negative.    HENT:        Tooth pain  Headache   Eyes: Negative.    Respiratory: Negative.     Cardiovascular: Negative.    Gastrointestinal: Negative.    Genitourinary: Negative.    Musculoskeletal: Negative.    Skin: Negative.    Neurological: Positive for speech change and headaches. Negative for focal weakness and seizures.        Gait disturbance   Endo/Heme/Allergies: Negative.    Psychiatric/Behavioral: Negative.         Vital Signs for last 24 hours   Temp:  [36.7 °C (98 °F)-37.5 °C (99.5 °F)] 37.5 °C (99.5 °F)  Pulse:  [] 101  Resp:  [16-22] 17  BP: (135-162)/(70-91) 139/91  SpO2:  [94 %-97 %] 97 %    Hemodynamic parameters for last 24 hours  Temp:  [36.4 °C (97.6 °F)-37.2 °C (98.9 °F)] 36.7 °C (98 °F)  Pulse:  [58-71] 58  Resp:  [16-18] 18  BP: (138-159)/(70-80) 138/70  SpO2:  [92 %-96 %] 94 %    Respiratory Information for the last 24 hours  She was on r/a preoperatively and is currently on 5l simple mask oxygen. Continue to wean as tolerated to keep sPo2 >90%.    Physical Exam   Physical Exam  Constitutional:       General: She is not in acute distress.     Appearance: She is obese. She is not toxic-appearing.      Comments: Lethargic postoperatively   HENT:      Head: Normocephalic.      Comments: Right craniotomy incision w/ dressing in place     Nose: Nose normal.      Mouth/Throat:      Mouth: Mucous membranes are moist.   Eyes:      General: No scleral icterus.     Extraocular Movements: Extraocular movements intact.      Conjunctiva/sclera: Conjunctivae normal.      Pupils: Pupils are equal, round, and reactive to light.   Cardiovascular:      Rate and Rhythm: Normal rate and regular rhythm.      Pulses: Normal pulses.      Heart sounds: Normal heart sounds. No murmur heard.    No friction rub.   Pulmonary:      Effort: Pulmonary effort is normal. No respiratory distress.      Breath sounds: Normal breath sounds.   Abdominal:      General: Bowel sounds are normal.      Palpations: Abdomen is soft.      Tenderness: There is no abdominal tenderness. There is no guarding or rebound.    Genitourinary:     Comments: Deferred w/ family at bedside  Musculoskeletal:         General: Normal range of motion.      Cervical back: Normal range of motion and neck supple.   Skin:     General: Skin is warm and dry.      Capillary Refill: Capillary refill takes less than 2 seconds.      Coloration: Skin is not jaundiced.   Neurological:      General: No focal deficit present.      Motor: No weakness.      Comments: Oriented to person/place  Lethargic  Slightly slurred speech postop   Psychiatric:         Mood and Affect: Mood normal.         Medications  Current Facility-Administered Medications   Medication Dose Route Frequency Provider Last Rate Last Admin   • [START ON 5/20/2022] lisinopril (PRINIVIL) tablet 40 mg  40 mg Oral Q DAY Luann A Sara, P.A.-C.       • MD ALERT...DO NOT ADMINISTER NSAIDS or ASPIRIN unless ORDERED By Neurosurgery 1 Each  1 Each Other PRN Luann A Sara, P.A.-C.       • ondansetron (ZOFRAN) syringe/vial injection 4 mg  4 mg Intravenous Q4HRS PRN Luann A Sara, P.A.-C.       • scopolamine (TRANSDERM-SCOP) patch 1 Patch  1 Patch Transdermal Q72HRS PRN Luann A Sara, P.A.-C.       • hydrALAZINE (APRESOLINE) injection 10 mg  10 mg Intravenous Q HOUR PRN Luann A Sara, P.A.-C.       • cloNIDine (CATAPRES) tablet 0.1 mg  0.1 mg Oral Q4HRS PRN Luann A Sara, P.A.-C.       • docusate sodium (COLACE) capsule 100 mg  100 mg Oral BID Luann A Sara, P.A.-C.   100 mg at 05/19/22 2221   • senna-docusate (PERICOLACE or SENOKOT S) 8.6-50 MG per tablet 1 Tablet  1 Tablet Oral Nightly Luann A Johanneus, P.A.-C.   1 Tablet at 05/19/22 2222   • senna-docusate (PERICOLACE or SENOKOT S) 8.6-50 MG per tablet 1 Tablet  1 Tablet Oral Q24HRS PRN Luann A Sara, P.A.-C.       • polyethylene glycol/lytes (MIRALAX) PACKET 1 Packet  1 Packet Oral BID PRN Luann MADELAINE Ruiz, P.A.-C.       • bisacodyl (DULCOLAX) suppository 10 mg  10 mg Rectal Q24HRS PRN Luann A Sara, P.A.-C.       • sodium phosphate (Fleet)  enema 133 mL  1 Each Rectal Once PRN Luann A Sara, P.A.-C.       • artificial tears (EYE LUBRICANT) ophth ointment 1 Application  1 Application Both Eyes Q8HRS Luann A Sara, P.A.-C.       • lactated ringers infusion   Intravenous Continuous Luann A Sara, P.A.-C.       • acetaminophen (TYLENOL) tablet 500 mg  500 mg Oral Q6HRS PRN Luann A Sara, P.A.-C.       • diphenhydrAMINE (BENADRYL) injection 25 mg  25 mg Intravenous Q6HRS PRN Luann A Sara, P.A.-C.       • [START ON 5/20/2022] ceFAZolin in dextrose (Ancef) IVPB premix 2 g  2 g Intravenous Q8HR Luann Ruiz, P.A.-C.       • HYDROcodone-acetaminophen (NORCO) 5-325 MG per tablet 1 Tablet  1 Tablet Oral Q4HRS PRN Luann Ruiz, P.A.-C.       • [START ON 5/21/2022] heparin injection 5,000 Units  5,000 Units Subcutaneous Q12HRS Luann A Sara, P.A.-C.       • HYDROmorphone (Dilaudid) injection 0.5 mg  0.5 mg Intravenous Q3HRS PRN Luann A Sara, P.A.-C.       • benzocaine-menthol (Cepacol) lozenge 1 Lozenge  1 Lozenge Mouth/Throat Q2HRS PRN Luann Ruiz, P.A.-C.       • magnesium hydroxide (MILK OF MAGNESIA) suspension 30 mL  30 mL Oral QDAY PRN Karen L. Latona       • niCARdipine (CARDENE) 25 mg in  mL Infusion  0-15 mg/hr Intravenous Continuous Karen L. Latona       • oxyCODONE immediate-release (ROXICODONE) tablet 2.5 mg  2.5 mg Oral Q3HRS PRN Karen L. Latona        Or   • oxyCODONE immediate-release (ROXICODONE) tablet 5 mg  5 mg Oral Q3HRS PRN Karen L. Latona        Or   • fentaNYL (SUBLIMAZE) injection 25 mcg  25 mcg Intravenous Q3HRS PRN Karen L. Latona       • Pharmacy Consult Request ...Pain Management Review 1 Each  1 Each Other PHARMACY TO DOSE Karen Driscoll       • omeprazole (PRILOSEC) capsule 20 mg  20 mg Oral DAILY Jailene Cordoba M.D.   20 mg at 05/19/22 0428   • amLODIPine (NORVASC) tablet 5 mg  5 mg Oral Q DAY Jailene Cordoba M.D.   5 mg at 05/19/22 0428   • dexamethasone (DECADRON) tablet 4 mg  4 mg Oral Q6HRS Christiano SOLORZANO  PANDA FloresC.   4 mg at 05/20/22 0000   • levETIRAcetam (KEPPRA) tablet 500 mg  500 mg Oral BID Yury Mcrae M.D.   500 mg at 05/19/22 0428       Fluids    Intake/Output Summary (Last 24 hours) at 5/19/2022 2231  Last data filed at 5/19/2022 2100  Gross per 24 hour   Intake 4200 ml   Output 2950 ml   Net 1250 ml       Laboratory          Recent Labs     05/19/22  0046   SODIUM 136   POTASSIUM 4.2   CHLORIDE 101   CO2 20   BUN 25*   CREATININE 0.58   MAGNESIUM 2.3   CALCIUM 8.9     Recent Labs     05/19/22  0046   GLUCOSE 171*     Recent Labs     05/19/22  0046   WBC 11.7*     Recent Labs     05/19/22  0046 05/19/22  0949   RBC 4.75  --    HEMOGLOBIN 13.1  --    HEMATOCRIT 40.9  --    PLATELETCT 322  --    PROTHROMBTM  --  12.7   INR  --  0.98       Imaging  CT:    Reviewed. Also, reviewed preoperative brain MRI. Repeat postoperative CT head pending in am.    Assessment/Plan  * Meningioma (HCC)  Assessment & Plan  Status post right frontal craniotomy for tumor resection w/ Dr. Dhaliwal  -previously noted associated mass effect found to be unamenable to embolization on cerebral angiogram  -Patient received 2u PRBC & 2 FFP intraoperatively   -Continue Decadron 4mg as scheduled  -Continue Keppra 50mg as scheduled  -Continue Ancef until 5/20 as ordered by neurosurgery  -SBP goals < 160  -Q1h neurochecks  -Heparin held since 5/18, resume when cleared by neurosurgery  -Control pain  -Postop/post-transfusion labs pending  -Follow up imaging in am    HTN (hypertension)  Assessment & Plan  Hypertensive urgency on admission w/ improved control   -Continue Lisinopril 40mg and Norvasc 5mg PO daily  -PRN medications as ordered to keep SBP <160  -Control pain  -Strict I&O  -Cardiac diet when tolerating PO    Leukocytosis  Assessment & Plan  Noted preoperatively, likely secondary to steroid use  -Trend CBC  -Trend vital signs  -Monitor incision site for signs of infection  -Continue steroid therapy   -Perioperative  antibiotics      Hyperglycemia- (present on admission)  Assessment & Plan  HgA1c = 6.4  -BG goals 120-180s  -Monitor closely while on steroids  -No insulin coverage needed to date  -Trend chemistry    Dental caries  Assessment & Plan  -Noted on admission  -CT maxillofacial without abscesses, received Unasyn from ED, will defer for now as she is asymptomatic        VTE:  Contraindicated immediately postoperatively, continue prophylactic heparin when cleared by neurosurgery  Ulcer: Not Indicated, pt has diet ordered.  Lines: Central Line  Ongoing indication addressed, Arterial Line  Ongoing indication addressed and Rodriguez Catheter  Ongoing indication addressed    I have performed a physical exam and reviewed and updated ROS and Plan today (5/19/2022). In review of yesterday's note (5/18/2022), there are no changes except as documented above.     Discussed patient condition and risk of morbidity and/or mortality with Family  The patient remains critically ill.  Critical care time = 50 minutes in directly providing and coordinating critical care and extensive data review.  No time overlap and excludes procedures.

## 2022-05-20 NOTE — ANESTHESIA TIME REPORT
Anesthesia Start and Stop Event Times     Date Time Event    5/19/2022 1236 Ready for Procedure     1329 Anesthesia Start     1934 Anesthesia Stop        Responsible Staff  05/19/22    Name Role Begin End    Jerson Kaiser D.O. Anesth 1329 1934        Overtime Reason:  no overtime (within assigned shift)    Comments:

## 2022-05-20 NOTE — PROGRESS NOTES
Ms. Palencia is a 81-year-old female who was admitted to the hospital 5/12/2022 with headaches.  She was found to have right frontal brain mass and underwent right frontal craniotomy for tumor resection on 5/19.  She was admitted to the ICU postop for serial neurologic exams and hemodynamic monitoring.    No acute events overnight. No focal neuro deficits.  iPad  line used for [place]awk translation.   [employer] #[id number].  Patient noted to have some bloody drainage posterior head on her pillowcase.  Neurosurgery PA at bedside.  Head examined with no active source of bleeding identified, likely some old ooze from surgical pin site.  Will monitor.    No longer requires ICU care.  Transfer to Neurosurgery floor.  Discussed with Hospitalist, Dr. Treviño, who will assume care.  Critical care service is available for any questions or concerns.    OZZY Sheth.

## 2022-05-20 NOTE — PROGRESS NOTES
"Hospital Medicine Daily Progress Note    Date of Service  5/20/2022    Chief Complaint  Jan Palencia is a 81 y.o. female admitted 5/12/2022 with   Chief Complaint   Patient presents with   • Headache   • Hypertension   • Hernia   • Dental Pain     Left sided        Hospital Course  \"This is 81-year-old female with a past medical history significant for obesity, new diagnosis of hypertension presented to the ER with headache, mild confusion hypertension and balance issues.  She was found to have right frontal meningioma with associated mass-effect on right frontal lobe with adjacent white matter edema.  Neurosurgery was consulted.  She was started on Keppra and Decadron.  She was initially admitted to the ICU for close monitoring.  IR was unable to perform embolization of feeder vessel due to vessel anatomy. Now planned for craniotomy and tumor resection 5/19/22.\" Assessment from prior Hospitalist and critical care APRN.    Patient did well after surgery and was monitored overnight in RICU. As per neurosurgery, patient motor skills intact and downgraded to medical bed.  Patient kept on decadron and Keppra.  MRI brain ordered.    Interval Problem Update  Patient downgraded from RICU.  No apparent difficulty moving. Patient difficult to communicate with due to language barrier and lack of proper  for Sudanese Tajik.    I have personally seen and examined the patient at bedside. I discussed the plan of care with patient, family, bedside RN, charge RN,  and pharmacy.    Consultants/Specialty  critical care and neurosurgery    Code Status  Full Code    Disposition  Patient is not medically cleared for discharge.   Anticipate discharge to TBD.  I have placed the appropriate orders for post-discharge needs.    Review of Systems  Review of Systems   Unable to perform ROS: Language    Patient appeared to be complaining of a headache and back head pain.     Physical Exam  Temp:  [37.5 °C (99.5 °F)] " 37.5 °C (99.5 °F)  Pulse:  [] 91  Resp:  [11-22] 22  BP: (127-164)/(58-97) 153/67  SpO2:  [94 %-98 %] 95 %    Physical Exam  Vitals and nursing note reviewed.   Constitutional:       General: She is not in acute distress.     Appearance: She is obese. She is not ill-appearing.      Comments: Frail appearing elderly female   HENT:      Head:      Comments: Guaze over cranium, stapled into place.  Occipital area wet with old blood.  Tender to surgical site.     Right Ear: External ear normal.      Left Ear: External ear normal.      Mouth/Throat:      Mouth: Mucous membranes are dry.      Pharynx: No oropharyngeal exudate.   Eyes:      General: No scleral icterus.     Extraocular Movements: Extraocular movements intact.      Comments: Cataracts bilaterally   Cardiovascular:      Rate and Rhythm: Normal rate and regular rhythm.      Pulses: Normal pulses.      Heart sounds: Normal heart sounds. No murmur heard.  Pulmonary:      Effort: Pulmonary effort is normal. No respiratory distress.      Breath sounds: Normal breath sounds. No wheezing.   Abdominal:      General: Abdomen is flat. Bowel sounds are normal. There is no distension.      Palpations: Abdomen is soft.      Tenderness: There is no abdominal tenderness.   Musculoskeletal:         General: No swelling or tenderness.      Cervical back: Normal range of motion. No rigidity.      Comments: Sarcopenic   Skin:     General: Skin is warm.      Capillary Refill: Capillary refill takes less than 2 seconds.      Coloration: Skin is not jaundiced.      Findings: No erythema.   Neurological:      Mental Status: She is alert and oriented to person, place, and time. Mental status is at baseline.      Motor: Weakness present.      Comments: No apparent motor dysfunction, moving all extremities independently.  Perhaps vision issues, patient has cataracts.   Psychiatric:         Mood and Affect: Mood normal.         Behavior: Behavior normal.      Comments: Unable to  full participate due to language barrier         Fluids    Intake/Output Summary (Last 24 hours) at 5/20/2022 1431  Last data filed at 5/20/2022 1200  Gross per 24 hour   Intake 5944.17 ml   Output 4225 ml   Net 1719.17 ml       Laboratory  Recent Labs     05/19/22  0046 05/19/22 2237 05/20/22  0435   WBC 11.7* 32.0* 25.4*   RBC 4.75 4.23 4.12*   HEMOGLOBIN 13.1 11.7* 11.4*   HEMATOCRIT 40.9 37.2 36.3*   MCV 86.1 87.9 88.1   MCH 27.6 27.7 27.7   MCHC 32.0* 31.5* 31.4*   RDW 43.5 45.9 45.9   PLATELETCT 322 329 291   MPV 11.0 10.4 10.4     Recent Labs     05/19/22  0046 05/19/22 2237 05/20/22  0435   SODIUM 136 141 140   POTASSIUM 4.2 4.4 4.3   CHLORIDE 101 104 106   CO2 20 22 22   GLUCOSE 171* 152* 183*   BUN 25* 18 19   CREATININE 0.58 0.56 0.56   CALCIUM 8.9 7.5* 7.7*     Recent Labs     05/19/22  0949 05/19/22 2237   INR 0.98 1.06               Imaging  CT-HEAD W/O   Final Result         1.  Postop changes of right frontal meningioma resection with new pneumocephalus   2.  Hyperdensity along the anterior aspect of the right frontal lobe superiorly, appearance suggests possible Gelfoam or area of postoperative extra-axial hemorrhage.   3.  Atherosclerosis.         DX-CHEST-PORTABLE (1 VIEW)   Final Result      1.  Supportive tubing as described above.   2.  No pneumothorax.   3.  Minimal LEFT lung base atelectasis again seen.      DX-CHEST-PORTABLE (1 VIEW)   Final Result      No radiographic evidence of acute cardiopulmonary disease.      [id number] VIEW   Final Result         1.  Moderate stool in the colon suggests changes of constipation, otherwise nonspecific bowel gas pattern   2.  Hazy bilateral lung base opacities suggests subtle infiltrates or edema.      IR-EMBOLIZE-NEURO-INTRACRANIAL   Final Result   Impression:      Cerebral angiogram for assessment for vascular supply to the frontal meningioma demonstrates with the predominant supply to the meningioma from the artery of the falx cerebri arising  from the left ophthalmic artery with minimal supply also arising from    the right-sided artery of the falx. No definite supply to the lesion from the middle meningeal arteries. Very minimal supply to the right side of the meningioma from distal frontal penetrating branches of the superficial temporal artery also noted.      Since the majority of the supply to the meningioma was from the ophthalmic artery, the lesion could not be safely embolized.      I Ayo Schmidt was physically present and participated during the entire procedure of the IR-EMBOLIZE-NEURO-INTRACRANIAL.         MR-STEALTH BRAIN WITH & W/O   Final Result         Large right inferior frontal region dural based extra-axial mass that most likely represents a meningioma. There is mild mass effect upon the bilateral inferior frontal lobes with midline shift to the left measuring approximately 10 to 11 mm. There is    also mass effect upon the lateral ventricles.      Minimal edema noted in the right superomedial frontal white matter adjacent to the mass.      Age-related volume loss and chronic microvascular ischemic changes.      CT-HEAD W/O   Final Result      1.  Negative for hemorrhage      2.  Large right frontal meningioma measuring 5.2 x 4.4 x 4.2 cm      3.  Associated mass effect on the right frontal lobe with adjacent white matter edema and mild compression of the frontal horn right lateral ventricle.         CT-MAXILLOFACIAL WITH PLUS RECONS   Final Result      1.  Negative for facial or orbital abscess      2.  CT face within normal limits      3.  Partially calcified right frontal extra-axial mass consistent with a meningioma described in detail on head CT report      MR-BRAIN-WITH & W/O    (Results Pending)        Assessment/Plan  * Meningioma (HCC)- (present on admission)  Assessment & Plan  CT head shows Large right frontal meningioma measuring 5.2 x 4.4 x 4.2 cm and associated mass effect on the right frontal lobe with adjacent  white matter edema and mild compression of the frontal horn right lateral ventricle   -- MRI has been reviewed, neurosurgery Dr. Goodson has evaluated, recommended IR guided embolization on 5/14   IR unable to perform embolization of feeder artery due to anatomy    Continue decadron every 6 hours,  omeprazole 20 mg p.o. daily  Keppra 500 mg twice daily    S/p CRANIOTOMY, USING FRAMELESS STEREOTAXY - FRONTAL FOR BRAIN TUMOR RESECTION   - Neurosurgery following patient's case    Leukocytosis- (present on admission)  Assessment & Plan  Patient WBC 25.4.  She is on dexamethasone.   Likely steroids and post op reaction.  No infectious source at this time.    HTN (hypertension)- (present on admission)  Assessment & Plan  Continue amlodipine and lisinopril.  Goal for BP <140mmHg    Prediabetes- (present on admission)  Assessment & Plan  No apparent diabetes, no medication at home listed on EMR.   A1c 6.4 on admission.  Patient has hyperglycemia, while on dexamethasone  - ISS, accuchecks and hypoglycemia protocol started      Dental caries- (present on admission)  Assessment & Plan  Unasyn given in ED  No abscess seen on CT.  unasyn stopped 5/16.     VTE prophylaxis: heparin ppx    I have performed a physical exam and reviewed and updated ROS and Plan today (5/20/2022). In review of yesterday's note (5/19/2022), there are no changes except as documented above.    Patient was seen as Hospitalist Services as she was downgraded from ICU.  Patient was previously being seen by Hospitalist Services.

## 2022-05-20 NOTE — CARE PLAN
The patient is Stable - Low risk of patient condition declining or worsening    Shift Goals  Clinical Goals: Stable neuro check  Patient Goals: rest  Family Goals: antonia    Progress made toward(s) clinical / shift goals:  Neuro exam has remained the same, no new deficits noted.     Problem: Pain - Standard  Goal: Alleviation of pain or a reduction in pain to the patient’s comfort goal  Outcome: Progressing   Patient pain is managed with PRN medications, repositioning and rest.   Problem: Fall Risk  Goal: Patient will remain free from falls  Outcome: Progressing

## 2022-05-20 NOTE — OR SURGEON
Immediate Post OP Note    PreOp Diagnosis: Right frontal brain lesion.       PostOp Diagnosis: Same.       Procedure(s):  CRANIOTOMY, USING FRAMELESS STEREOTAXY - FRONTAL FOR BRAIN TUMOR RESECTION - Wound Class: Clean    Surgeon(s):  Hakan Dhaliwal M.D.    Anesthesiologist/Type of Anesthesia:  Anesthesiologist: Jerson Kaiser D.O./General    Surgical Staff:  Assistant: Luann Ruiz P.A.-C.  Circulator: Maeve Baldwin R.N.; Hiral Graham R.N.  Relief Scrub: Khari Ny; Monica Connors  Scrub Person: Karrie Walls  [facility] : Ancelmo Coto R.N.    Specimens removed if any:  ID Type Source Tests Collected by Time Destination   A : Right frontal brain mass Tissue Brain PATHOLOGY SPECIMEN Hakan Dhaliwal M.D. 5/19/2022  3:49 PM    B : Right frontal brain mass  Tissue Brain PATHOLOGY SPECIMEN Hakan Dhaliwal M.D. 5/19/2022  4:39 PM        Estimated Blood Loss: 800cc    Findings: Patient did well, see OP report.     Complications: None.         5/19/2022 7:26 PM ANIBAL Chavarria-LAURA.

## 2022-05-20 NOTE — PROGRESS NOTES
4 Eyes Skin Assessment Completed by Chelly RN and Carlos RN.    Head Incision across head, dressing intact  Ears WDL  Nose WDL  Mouth WDL  Neck WDL  Breast/Chest WDL  Shoulder Blades Redness, marking from laying on IV (picture documented   Spine WDL  (R) Arm/Elbow/Hand WDL  (L) Arm/Elbow/Hand WDL  Abdomen WDL  Groin WDL  Scrotum/Coccyx/Buttocks WDL  (R) Leg WDL  (L) Leg WDL  (R) Heel/Foot/Toe WDL  (L) Heel/Foot/Toe WDL          Devices In Places ECG, Pulse Ox, SCD's and Central Line      Interventions In Place Pillows and Low Air Loss Mattress    Possible Skin Injury Yes    Pictures Uploaded Into Epic Yes  Wound Consult Placed N/A  RN Wound Prevention Protocol Ordered No

## 2022-05-20 NOTE — ASSESSMENT & PLAN NOTE
Hypertensive urgency on admission w/ improved control   -Continue Lisinopril 40mg and Norvasc 5mg PO daily  -PRN medications as ordered to keep SBP <160  -Control pain  -Strict I&O  -Cardiac diet when tolerating PO

## 2022-05-20 NOTE — PROGRESS NOTES
Patient c/o blurred vision, worsening headache. Blas CHUNG notified, orders received to administer Tylenol.

## 2022-05-20 NOTE — ASSESSMENT & PLAN NOTE
Noted preoperatively, likely secondary to steroid use  -Trend CBC  -Trend vital signs  -Monitor incision site for signs of infection  -Continue steroid therapy   -Perioperative antibiotics

## 2022-05-20 NOTE — THERAPY
Physical Therapy Contact Note    Patient Name: Jan Palencia  Age:  81 y.o., Sex:  female  Medical Record #: [id number]  Today's Date: 5/20/2022 05/20/22 0749   Interdisciplinary Plan of Care Collaboration   Collaboration Comments PT consult received.  Per chart review pt with strict bed rest orders.  Will HOLD and attempt PT eval as able/appopriate when pt cleared to participate.     Iza Blanco, PT, DPT

## 2022-05-20 NOTE — PROGRESS NOTES
Neurosurgery Progress Note    Subjective:  POD1 R frontal craniotomy for tumor resection   Pt neurologically stable   Pt initially presented with headache, confusion and ataxia.Found to have large right frontal tumor; likely meningioma   IR unable to embolize tumor feeding artery due to location and brach of artery  C/o HA and general malaise today  Communicated today with help of patient's daughter, Chantal    Exam:  She is awake and alert  Face symmetric  CN 2-12 grossly intact  Moves all 4 ext.  No pronator drift  PERRL  Incision CDI with gauze and staples in place, mild bleeding on pillowcase from [place] pinning site        BP  Min: 132/68  Max: 164/70  Pulse  Av.3  Min: 60  Max: 104  Resp  Av.6  Min: 14  Max: 22  Temp  Av.5 °C (99.5 °F)  Min: 37.5 °C (99.5 °F)  Max: 37.5 °C (99.5 °F)  Monitored Temp 2  Av.3 °C (99.2 °F)  Min: 35.9 °C (96.7 °F)  Max: 37.5 °C (99.5 °F)  SpO2  Av.7 %  Min: 95 %  Max: 98 %    No data recorded    Recent Labs     22  0046 22   WBC 11.7* 32.0* 25.4*   RBC 4.75 4.23 4.12*   HEMOGLOBIN 13.1 11.7* 11.4*   HEMATOCRIT 40.9 37.2 36.3*   MCV 86.1 87.9 88.1   MCH 27.6 27.7 27.7   MCHC 32.0* 31.5* 31.4*   RDW 43.5 45.9 45.9   PLATELETCT 322 329 291   MPV 11.0 10.4 10.4     Recent Labs     22  0046 22  043   SODIUM 136 141 140   POTASSIUM 4.2 4.4 4.3   CHLORIDE 101 104 106   CO2    GLUCOSE 171* 152* 183*   BUN 25* 18 19   CREATININE 0.58 0.56 0.56   CALCIUM 8.9 7.5* 7.7*     Recent Labs     22  0949 22   INR 0.98 1.06           Intake/Output                       22 07 - 22 0659 22 07 - 22 0659     [id number] 6659-5614 Total  Total                 Intake    P.O.  --  660 660  --  -- --    P.O. -- 660 660 -- -- --    I.V.  4000  860 4860  --  -- --    Volume (mL) (Lactated Ringers) 1000 -- 1000 -- -- --    Volume (mL) (electrolyte-A  (PLASMALYTE-A) infusion) 2000 -- 2000 -- -- --    Volume (mL) (NS infusion) 1000 -- 1000 -- -- --    Volume (mL) (lactated ringers infusion) -- 200 200 -- -- --    Volume (mL) (lactated ringers infusion) -- 660 660 -- -- --    IV Piggyback  --  100 100  --  -- --    Volume (mL) (ceFAZolin in dextrose (Ancef) IVPB premix 2 g) -- 100 100 -- -- --    Total Intake 4000 1620 5620 -- -- --       Output    Urine  1600  1475 3075  --  -- --    Urine 4275 082 5048 -- -- --    Output (mL) (Urethral Catheter Non-latex;Temperature probe 16 Fr.) -- 925 925 -- -- --    Blood  800  -- 800  --  -- --    Est. Blood Loss 800 -- 800 -- -- --    Total Output 2400 1475 3875 -- -- --       Net I/O     5297 546 5031 -- -- --            Intake/Output Summary (Last 24 hours) at 5/20/2022 0949  Last data filed at 5/20/2022 0600  Gross per 24 hour   Intake 5620 ml   Output 3875 ml   Net 1745 ml            • artificial tears  1 Application Q8HRS PRN   • lisinopril  40 mg Q DAY   • MD ALERT...DO NOT ADMINISTER NSAIDS or ASPIRIN unless ORDERED By Neurosurgery  1 Each PRN   • ondansetron  4 mg Q4HRS PRN   • scopolamine  1 Patch Q72HRS PRN   • hydrALAZINE  10 mg Q HOUR PRN   • cloNIDine  0.1 mg Q4HRS PRN   • docusate sodium  100 mg BID   • senna-docusate  1 Tablet Nightly   • senna-docusate  1 Tablet Q24HRS PRN   • polyethylene glycol/lytes  1 Packet BID PRN   • bisacodyl  10 mg Q24HRS PRN   • sodium phosphate  1 Each Once PRN   • acetaminophen  500 mg Q6HRS PRN   • diphenhydrAMINE  25 mg Q6HRS PRN   • ceFAZolin  2 g Q8HR   • HYDROcodone-acetaminophen  1 Tablet Q4HRS PRN   • [START ON 5/21/2022] heparin  5,000 Units Q12HRS   • HYDROmorphone  0.5 mg Q3HRS PRN   • benzocaine-menthol  1 Lozenge Q2HRS PRN   • magnesium hydroxide  30 mL QDAY PRN   • oxyCODONE immediate-release  2.5 mg Q3HRS PRN    Or   • oxyCODONE immediate-release  5 mg Q3HRS PRN   • Pharmacy Consult Request  1 Each PHARMACY TO DOSE   • omeprazole  20 mg DAILY   • amLODIPine  5 mg Q  DAY   • dexamethasone  4 mg Q6HRS   • levETIRAcetam  500 mg BID       Assessment and Plan:  Hospital day #9  POD#1 R frontal craniotomy   CTH reviewed and appropriate, pending MRI   OK for regular diet  OK for q4h Neuro checks  OK to resume lisinopril, heparin  Will plan to wean decadron after MRI is complete   Continue Keppra 500mg BID until f/u with [facility] in 4 weeks  Appreciate hospitalist care.  Following.       Prophylactic anticoagulation: yes         Start date/time: OK to resume from NS perspective

## 2022-05-20 NOTE — ASSESSMENT & PLAN NOTE
Continue amlodipine and lisinopril.  Bp improved but not consistently at goal, norvasc was increased to 10 on 5/24 - continue to follow  monitoring  Goal sbp less 140

## 2022-05-21 ENCOUNTER — APPOINTMENT (OUTPATIENT)
Dept: RADIOLOGY | Facility: MEDICAL CENTER | Age: 82
DRG: 025 | End: 2022-05-21
Attending: PHYSICIAN ASSISTANT
Payer: MEDICAID

## 2022-05-21 LAB
ALBUMIN SERPL BCP-MCNC: 2.8 G/DL (ref 3.2–4.9)
BUN SERPL-MCNC: 25 MG/DL (ref 8–22)
CALCIUM SERPL-MCNC: 8 MG/DL (ref 8.5–10.5)
CHLORIDE SERPL-SCNC: 106 MMOL/L (ref 96–112)
CO2 SERPL-SCNC: 23 MMOL/L (ref 20–33)
CREAT SERPL-MCNC: 0.51 MG/DL (ref 0.5–1.4)
ERYTHROCYTE [DISTWIDTH] IN BLOOD BY AUTOMATED COUNT: 46.7 FL (ref 35.9–50)
GFR SERPLBLD CREATININE-BSD FMLA CKD-EPI: 94 ML/MIN/1.73 M 2
GLUCOSE BLD STRIP.AUTO-MCNC: 132 MG/DL (ref 65–99)
GLUCOSE BLD STRIP.AUTO-MCNC: 143 MG/DL (ref 65–99)
GLUCOSE BLD STRIP.AUTO-MCNC: 146 MG/DL (ref 65–99)
GLUCOSE BLD STRIP.AUTO-MCNC: 152 MG/DL (ref 65–99)
GLUCOSE SERPL-MCNC: 150 MG/DL (ref 65–99)
HCT VFR BLD AUTO: 32.3 % (ref 37–47)
HGB BLD-MCNC: 10 G/DL (ref 12–16)
MAGNESIUM SERPL-MCNC: 2.7 MG/DL (ref 1.5–2.5)
MCH RBC QN AUTO: 27.8 PG (ref 27–33)
MCHC RBC AUTO-ENTMCNC: 31 G/DL (ref 33.6–35)
MCV RBC AUTO: 89.7 FL (ref 81.4–97.8)
PHOSPHATE SERPL-MCNC: 3.3 MG/DL (ref 2.5–4.5)
PLATELET # BLD AUTO: 203 K/UL (ref 164–446)
PMV BLD AUTO: 10.8 FL (ref 9–12.9)
POTASSIUM SERPL-SCNC: 5.2 MMOL/L (ref 3.6–5.5)
RBC # BLD AUTO: 3.6 M/UL (ref 4.2–5.4)
SODIUM SERPL-SCNC: 139 MMOL/L (ref 135–145)
WBC # BLD AUTO: 18 K/UL (ref 4.8–10.8)

## 2022-05-21 PROCEDURE — 700111 HCHG RX REV CODE 636 W/ 250 OVERRIDE (IP): Performed by: PHYSICIAN ASSISTANT

## 2022-05-21 PROCEDURE — 700102 HCHG RX REV CODE 250 W/ 637 OVERRIDE(OP): Performed by: INTERNAL MEDICINE

## 2022-05-21 PROCEDURE — 83735 ASSAY OF MAGNESIUM: CPT

## 2022-05-21 PROCEDURE — 36415 COLL VENOUS BLD VENIPUNCTURE: CPT

## 2022-05-21 PROCEDURE — 99233 SBSQ HOSP IP/OBS HIGH 50: CPT | Performed by: INTERNAL MEDICINE

## 2022-05-21 PROCEDURE — A9270 NON-COVERED ITEM OR SERVICE: HCPCS | Performed by: PHYSICIAN ASSISTANT

## 2022-05-21 PROCEDURE — 700102 HCHG RX REV CODE 250 W/ 637 OVERRIDE(OP): Performed by: STUDENT IN AN ORGANIZED HEALTH CARE EDUCATION/TRAINING PROGRAM

## 2022-05-21 PROCEDURE — 700117 HCHG RX CONTRAST REV CODE 255: Performed by: PHYSICIAN ASSISTANT

## 2022-05-21 PROCEDURE — A9576 INJ PROHANCE MULTIPACK: HCPCS | Performed by: PHYSICIAN ASSISTANT

## 2022-05-21 PROCEDURE — 700102 HCHG RX REV CODE 250 W/ 637 OVERRIDE(OP): Performed by: PHYSICIAN ASSISTANT

## 2022-05-21 PROCEDURE — 51798 US URINE CAPACITY MEASURE: CPT

## 2022-05-21 PROCEDURE — 80069 RENAL FUNCTION PANEL: CPT

## 2022-05-21 PROCEDURE — 70553 MRI BRAIN STEM W/O & W/DYE: CPT

## 2022-05-21 PROCEDURE — A9270 NON-COVERED ITEM OR SERVICE: HCPCS | Performed by: INTERNAL MEDICINE

## 2022-05-21 PROCEDURE — A9270 NON-COVERED ITEM OR SERVICE: HCPCS | Performed by: STUDENT IN AN ORGANIZED HEALTH CARE EDUCATION/TRAINING PROGRAM

## 2022-05-21 PROCEDURE — 770001 HCHG ROOM/CARE - MED/SURG/GYN PRIV*

## 2022-05-21 PROCEDURE — 85027 COMPLETE CBC AUTOMATED: CPT

## 2022-05-21 PROCEDURE — 82962 GLUCOSE BLOOD TEST: CPT

## 2022-05-21 PROCEDURE — 700111 HCHG RX REV CODE 636 W/ 250 OVERRIDE (IP): Performed by: STUDENT IN AN ORGANIZED HEALTH CARE EDUCATION/TRAINING PROGRAM

## 2022-05-21 RX ORDER — DIAZEPAM 5 MG/ML
5 INJECTION, SOLUTION INTRAMUSCULAR; INTRAVENOUS ONCE
Status: COMPLETED | OUTPATIENT
Start: 2022-05-21 | End: 2022-05-21

## 2022-05-21 RX ORDER — ACETAMINOPHEN 500 MG
1000 TABLET ORAL 3 TIMES DAILY
Status: DISCONTINUED | OUTPATIENT
Start: 2022-05-21 | End: 2022-05-29

## 2022-05-21 RX ADMIN — DEXAMETHASONE 4 MG: 4 TABLET ORAL at 05:13

## 2022-05-21 RX ADMIN — LISINOPRIL 40 MG: 20 TABLET ORAL at 05:13

## 2022-05-21 RX ADMIN — LEVETIRACETAM 500 MG: 500 TABLET, FILM COATED ORAL at 18:34

## 2022-05-21 RX ADMIN — ACETAMINOPHEN 1000 MG: 500 TABLET ORAL at 14:20

## 2022-05-21 RX ADMIN — HEPARIN SODIUM 5000 UNITS: 5000 INJECTION, SOLUTION INTRAVENOUS; SUBCUTANEOUS at 05:14

## 2022-05-21 RX ADMIN — OMEPRAZOLE 20 MG: 20 CAPSULE, DELAYED RELEASE ORAL at 05:13

## 2022-05-21 RX ADMIN — DEXAMETHASONE 4 MG: 4 TABLET ORAL at 23:51

## 2022-05-21 RX ADMIN — GADOTERIDOL 15 ML: 279.3 INJECTION, SOLUTION INTRAVENOUS at 16:08

## 2022-05-21 RX ADMIN — LEVETIRACETAM 500 MG: 500 TABLET, FILM COATED ORAL at 05:13

## 2022-05-21 RX ADMIN — DEXAMETHASONE 4 MG: 4 TABLET ORAL at 14:20

## 2022-05-21 RX ADMIN — INSULIN HUMAN 1 UNITS: 100 INJECTION, SOLUTION PARENTERAL at 05:57

## 2022-05-21 RX ADMIN — DOCUSATE SODIUM 100 MG: 100 CAPSULE, LIQUID FILLED ORAL at 05:13

## 2022-05-21 RX ADMIN — SENNOSIDES AND DOCUSATE SODIUM 1 TABLET: 50; 8.6 TABLET ORAL at 21:03

## 2022-05-21 RX ADMIN — DEXAMETHASONE 4 MG: 4 TABLET ORAL at 18:34

## 2022-05-21 RX ADMIN — DOCUSATE SODIUM 100 MG: 100 CAPSULE, LIQUID FILLED ORAL at 18:34

## 2022-05-21 RX ADMIN — ACETAMINOPHEN 1000 MG: 500 TABLET ORAL at 21:03

## 2022-05-21 RX ADMIN — DIAZEPAM 5 MG: 5 INJECTION, SOLUTION INTRAMUSCULAR; INTRAVENOUS at 15:20

## 2022-05-21 RX ADMIN — DEXAMETHASONE 4 MG: 4 TABLET ORAL at 00:54

## 2022-05-21 RX ADMIN — AMLODIPINE BESYLATE 5 MG: 5 TABLET ORAL at 05:13

## 2022-05-21 NOTE — PROGRESS NOTES
Neurosurgery Progress Note    Subjective:  POD2 R frontal craniotomy for tumor resection   Pt neurologically stable   Pt initially presented with headache, confusion and ataxia.Found to have large right frontal tumor; likely meningioma   IR unable to embolize tumor feeding artery due to location and brach of artery  C/o HA and general malaise today  Communicated today with help of patient's daughter, Chantal    Exam:  She is awake and alert  Face symmetric  CN 2-12 grossly intact  Moves all 4 ext.  No pronator drift  PERRL  Incision CDI with gauze and staples in place, mild bleeding on pillowcase from [place] pinning site        BP  Min: 111/61  Max: 154/67  Pulse  Av.2  Min: 63  Max: 91  Resp  Av.7  Min: 13  Max: 24  Temp  Av.5 °C (97.7 °F)  Min: 36.1 °C (97 °F)  Max: 37 °C (98.6 °F)  Monitored Temp 2  Av.5 °C (99.5 °F)  Min: 37 °C (98.6 °F)  Max: 37.7 °C (99.86 °F)  SpO2  Av.8 %  Min: 94 %  Max: 97 %    No data recorded    Recent Labs     22  0435 22  0350   WBC 32.0* 25.4* 18.0*   RBC 4.23 4.12* 3.60*   HEMOGLOBIN 11.7* 11.4* 10.0*   HEMATOCRIT 37.2 36.3* 32.3*   MCV 87.9 88.1 89.7   MCH 27.7 27.7 27.8   MCHC 31.5* 31.4* 31.0*   RDW 45.9 45.9 46.7   PLATELETCT 329 291 203   MPV 10.4 10.4 10.8     Recent Labs     22  0435 22  0350   SODIUM 141 140 139   POTASSIUM 4.4 4.3 5.2   CHLORIDE 104 106 106   CO2    GLUCOSE 152* 183* 150*   BUN 18 19 25*   CREATININE 0.56 0.56 0.51   CALCIUM 7.5* 7.7* 8.0*     Recent Labs     22  0949 22   INR 0.98 1.06           Intake/Output                       22 07 - 22 0659 22 07 - 22 0659     [id number] Total  Total                 Intake    P.O.  480  -- 480  --  -- --    P.O. 480 -- 480 -- -- --    I.V.  300  -- 300  --  -- --    Volume (mL) (lactated ringers infusion) 300 -- 300 -- -- --    IV Piggyback  324.2  -- 324.2   --  -- --    Volume (mL) (calcium GLUConate 1 g in NaCl IVPB premix) 324.2 -- 324.2 -- -- --    Total Intake 1104.2 -- 1104.2 -- -- --       Output    Urine  475  400 875  --  -- --    Straight Catheter -- 400 400 -- -- --    Output (mL) ([REMOVED] Urethral Catheter Non-latex;Temperature probe 16 Fr.) 475 -- 475 -- -- --    Stool  --  -- --  --  -- --    Number of Times Stooled 0 x -- 0 x -- -- --    Total Output 475 400 875 -- -- --       Net I/O     629.2 -400 229.2 -- -- --            Intake/Output Summary (Last 24 hours) at 5/21/2022 1254  Last data filed at 5/21/2022 0200  Gross per 24 hour   Intake 540 ml   Output 575 ml   Net -35 ml       $ Bladder Scan Results (mL): 402    • acetaminophen  1,000 mg TID   • diazePAM  5 mg Once   • artificial tears  1 Application Q8HRS PRN   • insulin regular  1-6 Units 4X/DAY ACHS    And   • dextrose bolus  25 g Q15 MIN PRN   • lisinopril  40 mg Q DAY   • MD ALERT...DO NOT ADMINISTER NSAIDS or ASPIRIN unless ORDERED By Neurosurgery  1 Each PRN   • ondansetron  4 mg Q4HRS PRN   • scopolamine  1 Patch Q72HRS PRN   • hydrALAZINE  10 mg Q HOUR PRN   • cloNIDine  0.1 mg Q4HRS PRN   • docusate sodium  100 mg BID   • senna-docusate  1 Tablet Nightly   • senna-docusate  1 Tablet Q24HRS PRN   • polyethylene glycol/lytes  1 Packet BID PRN   • bisacodyl  10 mg Q24HRS PRN   • sodium phosphate  1 Each Once PRN   • diphenhydrAMINE  25 mg Q6HRS PRN   • HYDROcodone-acetaminophen  1 Tablet Q4HRS PRN   • heparin  5,000 Units Q12HRS   • HYDROmorphone  0.5 mg Q3HRS PRN   • benzocaine-menthol  1 Lozenge Q2HRS PRN   • magnesium hydroxide  30 mL QDAY PRN   • oxyCODONE immediate-release  2.5 mg Q3HRS PRN    Or   • oxyCODONE immediate-release  5 mg Q3HRS PRN   • Pharmacy Consult Request  1 Each PHARMACY TO DOSE   • omeprazole  20 mg DAILY   • amLODIPine  5 mg Q DAY   • dexamethasone  4 mg Q6HRS   • levETIRAcetam  500 mg BID       Assessment and Plan:  Hospital day #10  POD#2 R frontal craniotomy    CTH reviewed and appropriate, pending MRI   DC tefla dressing prior to MRI, remove staples  Tylenol scheduled  OK to leave incision open to air  OK for regular diet  OK for q4h Neuro checks  OK to resume lisinopril, heparin  Will plan to wean decadron after MRI is complete   Continue Keppra 500mg BID until f/u with [facility] in 4 weeks  Appreciate hospitalist care.  Appreciate CM input regarding post operative medications as patient is not insured  Following.    Likely DC to home tomorrow    POC discussed with patient, daughter, RN    Prophylactic anticoagulation: yes         Start date/time: OK to resume from NS perspective

## 2022-05-21 NOTE — THERAPY
Physical Therapy Contact Note    pt off floor for MRI upon 2nd attempt; will return when able;    Felicia HAWKINS, PT,  [phone]

## 2022-05-21 NOTE — OP REPORT
DATE OF SERVICE:  05/19/2022     PREOPERATIVE DIAGNOSES:   Right frontal extraaxial brain tumor measuring   [id number] mm.     POSTOPERATIVE DIAGNOSES:  Right frontal extraaxial brain tumor measuring   [id number] mm.     PROCEDURES PERFORMED:  1.  Bicoronal incision for right frontal craniotomy for tumor resection.  2.  Use of intraoperative microscope.  3.  Use of Stealth navigation.     SURGEON:  Hakan Dhaliwal MD     ASSISTANT:  ARIANNA Feldman.     ANESTHESIOLOGIST:  Jerson Kaiser DO     ANESTHESIA:  General endotracheal anesthesia.     ESTIMATED BLOOD LOSS:  800 mL.     FINDINGS:  Large tumor arising from the falx with near gross total resection.     INDICATIONS FOR PROCEDURE:  The patient is a 81-year-old female who is   visiting from [place] and developed headache and ataxia.  She presented to   [facility].  An imaging revealed a large extraaxial tumor arising   from the falx in the right frontal region.  We initially attempted   embolization of the tumor; however, this was unable to be embolize because of   feeding from the ophthalmic artery. After discussion with her daughter who   speaks English, we decided to proceed with tumor resection.  I discussed the   risks and benefits, which includes bleeding, infection, CSF leak, seizures,   blindness in the right eye, left left-sided paralysis or sensory changes, and   rare instances of death.  There is also risk of anesthesia that includes, but   not limited to myocardial infarction, stroke, inability to extubate and rare   instances of death.  Despite these risks, the daughter wished to proceed with   surgery.     DESCRIPTION OF PROCEDURE:  Informed consent was obtained from the daughter.    The patient was brought to the OR and induced by anesthesia.  The patient was   pinned in a bicoronal set up and the skull cap was secured with Taft   headholder.  Stealth navigation was registered and the tumor was marked out on   scalp and shaved a  small strip of hair with a planned bicoronal incision from   zygoma through the vertex to the zygoma.  The patient was prepped and draped   in sterile fashion.  A timeout occurred.  Preoperative antibiotics, Keppra,   Mannitol, and dexamethasone were given.  I made a skin incision from the right   zygoma around the vertex to several centimeters above the left zygoma.  We   used Bovie electrocautery to dissect the scalp down to the skull.  I   intentionally left the temporalis muscles intact bilaterally.  I next began   reflecting the scalp forward by bovieing the pericranium and then developing a   plane over the temporalis muscle. As I was able to advance the scalp forward,   I flipped it over and retracted it forward.  I verified that I had enough   exposure anteriorly, I was careful to avoid exposure of the frontal sinuses.    I next bovied down to the temporalis muscle and reflected it forward carefully   protecting the frontalis nerve.  I mapped out my planned craniotomy with   Stealth navigation, drilled several shruthi holes around the periphery.  I was   careful to map out the superior sagittal sinus and drilled shruthi holes just   lateral to the sinus.  Unfortunately, the patient had poor dura and I was   unable to establish a plane between the dura and the skull, though I attempted   my best with a [place] 4 to scrape the dura off.  Not having a good plane   with the dura, I was careful with the craniotome as I performed my craniotomy.    I started on the lateral portion of the craniotomy and connected the shruthi   holes and then with the medial shruthi holes that was close to the sinus I   carefully reflected the dura as best as I could.  I then connected the shruthi   holes with a craniotome.  There was considerable bleeding as I was trying to   remove the bone flap; however, was carefully able to scrape the remaining dura   off the bone flap totally releasing the bone flap.  The bone flap was   secured, kept for  later use.  The wound was irrigated and hemostasis was   obtained with Gelfoam and patties.  There was some bleeding coming from the   skull that was easily controlled with bone wax.  The patient's dura was rather   thin and macerated in several areas. The remaining dura was opened up with   Metzenbaum scissors and reflected medially.  Tumor was readily apparent as it   came up to the surface.  In the anterior portion of the craniotomy, I mapped   out the periphery of the tumor in the borders with Stealth navigation, I began   with bipolar electrocautery to define the margin.  There was readily apparent   margin on the posterior and lateral aspect of the tumor.  I was able to   easily place cotton patties to define the normal brain parenchyma from the   tumor.  I shrunk the tumor with bipolar electrocautery and then used the   Sonopet to aspirate portions of the tumor.  Tumor was quite large, though I   was able to core out the center of the tumor with the Sonopet. As I resected   more tumor, I further define the tumor edges and bipolared the edges of the   tumor; therefore, shrinking it.  At this point, I brought in the   intraoperative microscope and continued with tumor resection gaining control   of the tumor around the periphery and eventually the anterior border of the   tumor.  With the tumor was arising from the falx and this portion of the tumor   was quite vasculature, I decided to save that portion for the end of the   case, continued working the periphery of the tumor. As I took out additional   tumor with the Sonopet, the edges of the tumor began to collapse on itself and   I was able to get circumferential control of the tumor.  There were several   blood vessels feeding the tumor from the ophthalmic artery.  I was able to   define the bottom portion of the tumor, shrink it with bipolar electrocautery   where I was able to clearly identify the vasculature.  The vasculature was   bipolared extensively  and then cut with microscissors.  Again, I was able to   pass patties around the bottom of the tumor totally surrounding it to its   detachment.  I continued to resect tumor with the Sonopet and as I got close   to the dural attachment, additional bleeding picked up.  I frequently obtained   hemostasis with bipolar electrocautery as well as FloSeal with patties.  At   this point, I resected almost the entirety of the tumor except for what was   left of the dural attachment.  Now that I identified the falx, I carefully   dissected the tumor off the falx with a rotund instruments. As I resected   there was extra bleeding, which was bipolared as I went.  I was able to get   the majority of the tumor off the dural attachment; however, up along the   sinus there was additional tumor attached. I felt that resecting this tumor   was going to risk a severe sagittal sinus injury.  So at this point, I decided   to just bipolar the tumor as much as possible at the dural attachment.  I   intentionally left a rim of tumor along the falx close to the sinus.  I   irrigated the wound and obtained hemostasis with FloSeal. I again irrigated   out the FloSeal seeing no areas of active bleeding.  I placed Surgicel along   the rim of the tumor resection with the dura being macerated, I decided to use   synthetic dura in the form of a DuraMatrix Onlay Plus overlay, this was   placed over the brain parenchyma and overlying the edge of the craniotomy.  I   then sealed this in place with DuraSeal.  I then next retrieved the bone flap,   placed shruthi hole covers over the shruthi holes and attached the bone flap to the   patient's skull.  The wound was irrigated.  I next reattached the temporalis   muscle with Vicryl sutures and held it in place by suturing to a bur hole cover.  I then released the scalp, reflected it back to place, closed   the galea with 2-0 Vicryl sutures and then the skin with a running chromic   suture.  I irrigated the  wound, placed a dressing, and turned the patient over   to anesthesia for extubation and admission to the ICU overnight.  Sponge and   needle counts were correct x2.  No complications were noted.        ______________________________  MD GRETCHEN Lion/EVANGELINA    DD:  05/20/2022 16:03  DT:  05/20/2022 19:54    Job#:  [id number]

## 2022-05-21 NOTE — PROGRESS NOTES
Patient noted to have blood on pillow under head, SHELDON Odonnell Ray notified. Orders received to change pillowcase Qshift.

## 2022-05-21 NOTE — PROGRESS NOTES
"Hospital Medicine Daily Progress Note    Date of Service  5/21/2022    Chief Complaint  Jan Palencia is a 81 y.o. female admitted 5/12/2022 with   Chief Complaint   Patient presents with   • Headache   • Hypertension   • Hernia   • Dental Pain     Left sided        Hospital Course  \"This is 81-year-old female with a past medical history significant for obesity, new diagnosis of hypertension presented to the ER with headache, mild confusion hypertension and balance issues.  She was found to have right frontal meningioma with associated mass-effect on right frontal lobe with adjacent white matter edema.  Neurosurgery was consulted.  She was started on Keppra and Decadron.  She was initially admitted to the ICU for close monitoring.  IR was unable to perform embolization of feeder vessel due to vessel anatomy. Now planned for craniotomy and tumor resection 5/19/22.\" Assessment from prior Hospitalist and critical care APRN.    Patient did well after surgery and was monitored overnight in RICU. As per neurosurgery, patient motor skills intact and downgraded to medical bed.  Patient kept on decadron and Keppra.  MRI brain ordered.    Interval Problem Update  - transferred from ICU yesterday  - doing well, pain controlled  - -150s (currently 140s)  - waiting for MRI brain  - daughter not at bedside this am during rounds    I have personally seen and examined the patient at bedside. I discussed the plan of care with patient and bedside RN.    Consultants/Specialty  critical care and neurosurgery    Code Status  Full Code    Disposition  Patient is not medically cleared for discharge.   Anticipate discharge to TBD.  I have placed the appropriate orders for post-discharge needs.    Review of Systems  Review of Systems   Unable to perform ROS: Language    Patient appeared to be complaining of a headache and back head pain.     Physical Exam  Temp:  [36.1 °C (97 °F)-37 °C (98.6 °F)] 36.4 °C (97.6 °F)  Pulse:  [63-91] " 80  Resp:  [11-24] 16  BP: (111-154)/(58-97) 146/61  SpO2:  [94 %-97 %] 94 %    Physical Exam  Vitals and nursing note reviewed.   Constitutional:       General: She is not in acute distress.     Appearance: She is obese. She is not ill-appearing.      Comments: Sleeping   HENT:      Head:      Comments: Guaze over cranium, stapled into place.  Occipital area wet with old blood.  Tender to surgical site.     Nose: Nose normal.      Mouth/Throat:      Mouth: Mucous membranes are moist.      Pharynx: No oropharyngeal exudate.   Eyes:      General: No scleral icterus.     Conjunctiva/sclera: Conjunctivae normal.      Comments: Cataracts bilaterally   Cardiovascular:      Rate and Rhythm: Normal rate and regular rhythm.      Pulses: Normal pulses.      Heart sounds: Normal heart sounds. No murmur heard.  Pulmonary:      Effort: Pulmonary effort is normal. No respiratory distress.      Breath sounds: Normal breath sounds. No wheezing.   Abdominal:      General: Abdomen is flat. Bowel sounds are normal. There is no distension.      Palpations: Abdomen is soft.      Tenderness: There is no abdominal tenderness.      Hernia: A hernia (ventral) is present.   Musculoskeletal:         General: No swelling or tenderness.      Right lower leg: Edema (trace) present.      Left lower leg: Edema (trace) present.      Comments: Sarcopenic   Skin:     General: Skin is warm.      Coloration: Skin is not jaundiced.      Findings: No erythema.   Neurological:      Comments: Sleeping this am, did not get to do full neuro exam         Fluids    Intake/Output Summary (Last 24 hours) at 5/21/2022 0937  Last data filed at 5/21/2022 0200  Gross per 24 hour   Intake 904.17 ml   Output 750 ml   Net 154.17 ml       Laboratory  Recent Labs     05/19/22  2237 05/20/22  0435 05/21/22  0350   WBC 32.0* 25.4* 18.0*   RBC 4.23 4.12* 3.60*   HEMOGLOBIN 11.7* 11.4* 10.0*   HEMATOCRIT 37.2 36.3* 32.3*   MCV 87.9 88.1 89.7   MCH 27.7 27.7 27.8   MCHC  31.5* 31.4* 31.0*   RDW 45.9 45.9 46.7   PLATELETCT 329 291 203   MPV 10.4 10.4 10.8     Recent Labs     05/19/22  2237 05/20/22  0435 05/21/22  0350   SODIUM 141 140 139   POTASSIUM 4.4 4.3 5.2   CHLORIDE 104 106 106   CO2 22 22 23   GLUCOSE 152* 183* 150*   BUN 18 19 25*   CREATININE 0.56 0.56 0.51   CALCIUM 7.5* 7.7* 8.0*     Recent Labs     05/19/22  0949 05/19/22 2237   INR 0.98 1.06               Imaging  CT-HEAD W/O   Final Result         1.  Postop changes of right frontal meningioma resection with new pneumocephalus   2.  Hyperdensity along the anterior aspect of the right frontal lobe superiorly, appearance suggests possible Gelfoam or area of postoperative extra-axial hemorrhage.   3.  Atherosclerosis.         DX-CHEST-PORTABLE (1 VIEW)   Final Result      1.  Supportive tubing as described above.   2.  No pneumothorax.   3.  Minimal LEFT lung base atelectasis again seen.      DX-CHEST-PORTABLE (1 VIEW)   Final Result      No radiographic evidence of acute cardiopulmonary disease.      MO-[id number]-4 VIEW   Final Result         1.  Moderate stool in the colon suggests changes of constipation, otherwise nonspecific bowel gas pattern   2.  Hazy bilateral lung base opacities suggests subtle infiltrates or edema.      IR-EMBOLIZE-NEURO-INTRACRANIAL   Final Result   Impression:      Cerebral angiogram for assessment for vascular supply to the frontal meningioma demonstrates with the predominant supply to the meningioma from the artery of the falx cerebri arising from the left ophthalmic artery with minimal supply also arising from    the right-sided artery of the falx. No definite supply to the lesion from the middle meningeal arteries. Very minimal supply to the right side of the meningioma from distal frontal penetrating branches of the superficial temporal artery also noted.      Since the majority of the supply to the meningioma was from the ophthalmic artery, the lesion could not be safely embolized.       I Ayo Schmidt was physically present and participated during the entire procedure of the IR-EMBOLIZE-NEURO-INTRACRANIAL.         MR-STEALTH BRAIN WITH & W/O   Final Result         Large right inferior frontal region dural based extra-axial mass that most likely represents a meningioma. There is mild mass effect upon the bilateral inferior frontal lobes with midline shift to the left measuring approximately 10 to 11 mm. There is    also mass effect upon the lateral ventricles.      Minimal edema noted in the right superomedial frontal white matter adjacent to the mass.      Age-related volume loss and chronic microvascular ischemic changes.      CT-HEAD W/O   Final Result      1.  Negative for hemorrhage      2.  Large right frontal meningioma measuring 5.2 x 4.4 x 4.2 cm      3.  Associated mass effect on the right frontal lobe with adjacent white matter edema and mild compression of the frontal horn right lateral ventricle.         CT-MAXILLOFACIAL WITH PLUS RECONS   Final Result      1.  Negative for facial or orbital abscess      2.  CT face within normal limits      3.  Partially calcified right frontal extra-axial mass consistent with a meningioma described in detail on head CT report      MR-BRAIN-WITH & W/O    (Results Pending)        Assessment/Plan  * Meningioma (HCC)- (present on admission)  Assessment & Plan  CT head shows Large right frontal meningioma measuring 5.2 x 4.4 x 4.2 cm and associated mass effect on the right frontal lobe with adjacent white matter edema and mild compression of the frontal horn right lateral ventricle   -- MRI has been reviewed, neurosurgery Dr. Goodson has evaluated, recommended IR guided embolization on 5/14   IR unable to perform embolization of feeder artery due to anatomy    Continue decadron every 6 hours,  omeprazole 20 mg p.o. daily  Keppra 500 mg twice daily    S/p CRANIOTOMY, USING FRAMELESS STEREOTAXY - [facility] FOR BRAIN TUMOR RESECTION   - Neurosurgery  following patient's case  - MRI brain today, then can likely wean steroids but will wait for neurosurgery recs  - Pain control    Leukocytosis- (present on admission)  Assessment & Plan  Improving  Likely steroids and post op reaction.  No infectious source at this time.    HTN (hypertension)- (present on admission)  Assessment & Plan  Continue amlodipine and lisinopril.  Goal for BP <140mmHg  If still above goal after steroids wean may need to add another agent    Prediabetes- (present on admission)  Assessment & Plan  No apparent diabetes, no medication at home listed on EMR.   A1c 6.4 on admission.  Patient has hyperglycemia, while on dexamethasone  - ISS, accuchecks and hypoglycemia protocol started      Dental caries- (present on admission)  Assessment & Plan  Unasyn given in ED  No abscess seen on CT.  unasyn stopped 5/16.     VTE prophylaxis: heparin ppx    I have performed a physical exam and reviewed and updated ROS and Plan today (5/21/2022). In review of yesterday's note (5/20/2022), there are no changes except as documented above.

## 2022-05-21 NOTE — PROGRESS NOTES
4 Eyes Skin Assessment Completed by LAURO Villagomez and LAURO Jarvis.    Head Swelling and Incision,dressing in place  Ears WDL  Nose WDL  Mouth WDL  Neck WDL  Breast/Chest WDL  Shoulder Blades Blanching,redness right shoulder blade  Spine WDL  (R) Arm/Elbow/Hand WDL  (L) Arm/Elbow/Hand WDL  Abdomen Scar and Bruising  Groin WDL  Scrotum/Coccyx/Buttocks WDL  (R) Leg Incision  (L) Leg WDL  (R) Heel/Foot/Toe WDL  (L) Heel/Foot/Toe WDL          Devices In Places Pulse Ox      Interventions In Place Pillows and Low Air Loss Mattress    Possible Skin Injury No    Pictures Uploaded Into Epic N/A  Wound Consult Placed N/A  RN Wound Prevention Protocol Ordered No

## 2022-05-22 PROBLEM — R10.9 ABDOMINAL PAIN: Status: ACTIVE | Noted: 2022-05-18

## 2022-05-22 LAB
ERYTHROCYTE [DISTWIDTH] IN BLOOD BY AUTOMATED COUNT: 45.7 FL (ref 35.9–50)
GLUCOSE BLD STRIP.AUTO-MCNC: 131 MG/DL (ref 65–99)
GLUCOSE BLD STRIP.AUTO-MCNC: 148 MG/DL (ref 65–99)
GLUCOSE BLD STRIP.AUTO-MCNC: 153 MG/DL (ref 65–99)
HCT VFR BLD AUTO: 29.6 % (ref 37–47)
HGB BLD-MCNC: 9.4 G/DL (ref 12–16)
MCH RBC QN AUTO: 28.3 PG (ref 27–33)
MCHC RBC AUTO-ENTMCNC: 31.8 G/DL (ref 33.6–35)
MCV RBC AUTO: 89.2 FL (ref 81.4–97.8)
PLATELET # BLD AUTO: 187 K/UL (ref 164–446)
PMV BLD AUTO: 11 FL (ref 9–12.9)
RBC # BLD AUTO: 3.32 M/UL (ref 4.2–5.4)
WBC # BLD AUTO: 11.9 K/UL (ref 4.8–10.8)

## 2022-05-22 PROCEDURE — 700102 HCHG RX REV CODE 250 W/ 637 OVERRIDE(OP): Performed by: NURSE PRACTITIONER

## 2022-05-22 PROCEDURE — A9270 NON-COVERED ITEM OR SERVICE: HCPCS | Performed by: STUDENT IN AN ORGANIZED HEALTH CARE EDUCATION/TRAINING PROGRAM

## 2022-05-22 PROCEDURE — 99233 SBSQ HOSP IP/OBS HIGH 50: CPT | Performed by: INTERNAL MEDICINE

## 2022-05-22 PROCEDURE — A9270 NON-COVERED ITEM OR SERVICE: HCPCS | Performed by: PHYSICIAN ASSISTANT

## 2022-05-22 PROCEDURE — RXMED WILLOW AMBULATORY MEDICATION CHARGE: Performed by: INTERNAL MEDICINE

## 2022-05-22 PROCEDURE — 770001 HCHG ROOM/CARE - MED/SURG/GYN PRIV*

## 2022-05-22 PROCEDURE — 700102 HCHG RX REV CODE 250 W/ 637 OVERRIDE(OP): Performed by: PHYSICIAN ASSISTANT

## 2022-05-22 PROCEDURE — 700102 HCHG RX REV CODE 250 W/ 637 OVERRIDE(OP): Performed by: STUDENT IN AN ORGANIZED HEALTH CARE EDUCATION/TRAINING PROGRAM

## 2022-05-22 PROCEDURE — 97166 OT EVAL MOD COMPLEX 45 MIN: CPT

## 2022-05-22 PROCEDURE — 82962 GLUCOSE BLOOD TEST: CPT

## 2022-05-22 PROCEDURE — 97162 PT EVAL MOD COMPLEX 30 MIN: CPT

## 2022-05-22 PROCEDURE — A9270 NON-COVERED ITEM OR SERVICE: HCPCS | Performed by: NURSE PRACTITIONER

## 2022-05-22 PROCEDURE — A9270 NON-COVERED ITEM OR SERVICE: HCPCS | Performed by: INTERNAL MEDICINE

## 2022-05-22 PROCEDURE — 85027 COMPLETE CBC AUTOMATED: CPT

## 2022-05-22 PROCEDURE — 36415 COLL VENOUS BLD VENIPUNCTURE: CPT

## 2022-05-22 PROCEDURE — 700102 HCHG RX REV CODE 250 W/ 637 OVERRIDE(OP): Performed by: INTERNAL MEDICINE

## 2022-05-22 PROCEDURE — 700111 HCHG RX REV CODE 636 W/ 250 OVERRIDE (IP): Performed by: INTERNAL MEDICINE

## 2022-05-22 RX ORDER — LISINOPRIL 40 MG/1
40 TABLET ORAL DAILY
Qty: 30 TABLET | Refills: 0 | Status: SHIPPED | OUTPATIENT
Start: 2022-05-23 | End: 2022-08-18

## 2022-05-22 RX ORDER — ENOXAPARIN SODIUM 100 MG/ML
40 INJECTION SUBCUTANEOUS DAILY
Status: DISCONTINUED | OUTPATIENT
Start: 2022-05-22 | End: 2022-05-31 | Stop reason: HOSPADM

## 2022-05-22 RX ORDER — LEVETIRACETAM 500 MG/1
500 TABLET ORAL 2 TIMES DAILY
Qty: 60 TABLET | Refills: 0 | Status: SHIPPED | OUTPATIENT
Start: 2022-05-22 | End: 2022-07-17

## 2022-05-22 RX ORDER — OMEPRAZOLE 20 MG/1
20 CAPSULE, DELAYED RELEASE ORAL DAILY
Qty: 30 CAPSULE | Refills: 0 | Status: SHIPPED | OUTPATIENT
Start: 2022-05-23 | End: 2022-08-18

## 2022-05-22 RX ORDER — DEXAMETHASONE 4 MG/1
2 TABLET ORAL EVERY 24 HOURS
Status: COMPLETED | OUTPATIENT
Start: 2022-05-28 | End: 2022-05-31

## 2022-05-22 RX ORDER — POLYETHYLENE GLYCOL 3350 17 G/17G
17 POWDER, FOR SOLUTION ORAL 2 TIMES DAILY PRN
Refills: 3 | COMMUNITY
Start: 2022-05-22 | End: 2022-06-04

## 2022-05-22 RX ORDER — POLYETHYLENE GLYCOL 3350 17 G/17G
1 POWDER, FOR SOLUTION ORAL 2 TIMES DAILY
Status: DISCONTINUED | OUTPATIENT
Start: 2022-05-22 | End: 2022-05-31 | Stop reason: HOSPADM

## 2022-05-22 RX ORDER — OXYCODONE HYDROCHLORIDE 5 MG/1
5 TABLET ORAL EVERY 4 HOURS PRN
Qty: 20 TABLET | Refills: 0 | Status: ON HOLD | OUTPATIENT
Start: 2022-05-22 | End: 2022-06-11

## 2022-05-22 RX ORDER — AMOXICILLIN 250 MG
1 CAPSULE ORAL NIGHTLY
Qty: 30 TABLET | Refills: 0 | Status: SHIPPED | OUTPATIENT
Start: 2022-05-22 | End: 2022-08-18

## 2022-05-22 RX ORDER — DEXAMETHASONE 4 MG/1
2 TABLET ORAL EVERY 6 HOURS
Status: DISPENSED | OUTPATIENT
Start: 2022-05-22 | End: 2022-05-25

## 2022-05-22 RX ORDER — AMLODIPINE BESYLATE 5 MG/1
5 TABLET ORAL DAILY
Qty: 30 TABLET | Refills: 0 | Status: ON HOLD | OUTPATIENT
Start: 2022-05-23 | End: 2022-06-11

## 2022-05-22 RX ORDER — DEXAMETHASONE 4 MG/1
2 TABLET ORAL EVERY 12 HOURS
Status: COMPLETED | OUTPATIENT
Start: 2022-05-25 | End: 2022-05-28

## 2022-05-22 RX ORDER — DEXAMETHASONE 4 MG/1
TABLET ORAL
Qty: 15 TABLET | Refills: 0 | Status: SHIPPED | OUTPATIENT
Start: 2022-05-22 | End: 2022-05-28

## 2022-05-22 RX ADMIN — ACETAMINOPHEN 1000 MG: 500 TABLET ORAL at 19:46

## 2022-05-22 RX ADMIN — POLYETHYLENE GLYCOL 3350 1 PACKET: 17 POWDER, FOR SOLUTION ORAL at 10:50

## 2022-05-22 RX ADMIN — DEXAMETHASONE 2 MG: 4 TABLET ORAL at 17:55

## 2022-05-22 RX ADMIN — LEVETIRACETAM 500 MG: 500 TABLET, FILM COATED ORAL at 05:24

## 2022-05-22 RX ADMIN — DEXAMETHASONE 2 MG: 4 TABLET ORAL at 23:15

## 2022-05-22 RX ADMIN — DOCUSATE SODIUM 100 MG: 100 CAPSULE, LIQUID FILLED ORAL at 17:55

## 2022-05-22 RX ADMIN — ACETAMINOPHEN 1000 MG: 500 TABLET ORAL at 15:07

## 2022-05-22 RX ADMIN — OXYCODONE 5 MG: 5 TABLET ORAL at 13:07

## 2022-05-22 RX ADMIN — POLYETHYLENE GLYCOL 3350 1 PACKET: 17 POWDER, FOR SOLUTION ORAL at 17:56

## 2022-05-22 RX ADMIN — DEXAMETHASONE 4 MG: 4 TABLET ORAL at 12:15

## 2022-05-22 RX ADMIN — SENNOSIDES AND DOCUSATE SODIUM 1 TABLET: 50; 8.6 TABLET ORAL at 19:46

## 2022-05-22 RX ADMIN — ACETAMINOPHEN 1000 MG: 500 TABLET ORAL at 10:50

## 2022-05-22 RX ADMIN — AMLODIPINE BESYLATE 5 MG: 5 TABLET ORAL at 05:24

## 2022-05-22 RX ADMIN — DOCUSATE SODIUM 100 MG: 100 CAPSULE, LIQUID FILLED ORAL at 05:24

## 2022-05-22 RX ADMIN — MAGNESIUM HYDROXIDE 30 ML: 400 SUSPENSION ORAL at 19:46

## 2022-05-22 RX ADMIN — OMEPRAZOLE 20 MG: 20 CAPSULE, DELAYED RELEASE ORAL at 05:24

## 2022-05-22 RX ADMIN — ENOXAPARIN SODIUM 40 MG: 40 INJECTION SUBCUTANEOUS at 17:55

## 2022-05-22 RX ADMIN — LISINOPRIL 40 MG: 20 TABLET ORAL at 05:24

## 2022-05-22 RX ADMIN — INSULIN HUMAN 1 UNITS: 100 INJECTION, SOLUTION PARENTERAL at 06:32

## 2022-05-22 RX ADMIN — LEVETIRACETAM 500 MG: 500 TABLET, FILM COATED ORAL at 17:55

## 2022-05-22 RX ADMIN — DEXAMETHASONE 4 MG: 4 TABLET ORAL at 05:24

## 2022-05-22 ASSESSMENT — GAIT ASSESSMENTS
GAIT LEVEL OF ASSIST: MODERATE ASSIST
DISTANCE (FEET): 12
ASSISTIVE DEVICE: FRONT WHEEL WALKER;HAND HELD ASSIST

## 2022-05-22 ASSESSMENT — COGNITIVE AND FUNCTIONAL STATUS - GENERAL
SUGGESTED CMS G CODE MODIFIER MOBILITY: CM
STANDING UP FROM CHAIR USING ARMS: A LITTLE
DRESSING REGULAR UPPER BODY CLOTHING: A LOT
MOVING TO AND FROM BED TO CHAIR: UNABLE
DRESSING REGULAR LOWER BODY CLOTHING: A LOT
MOVING FROM LYING ON BACK TO SITTING ON SIDE OF FLAT BED: UNABLE
WALKING IN HOSPITAL ROOM: A LOT
TOILETING: A LOT
DAILY ACTIVITIY SCORE: 14
TURNING FROM BACK TO SIDE WHILE IN FLAT BAD: UNABLE
SUGGESTED CMS G CODE MODIFIER DAILY ACTIVITY: CK
EATING MEALS: A LITTLE
PERSONAL GROOMING: A LITTLE
HELP NEEDED FOR BATHING: A LOT
MOBILITY SCORE: 9
CLIMB 3 TO 5 STEPS WITH RAILING: TOTAL

## 2022-05-22 ASSESSMENT — ENCOUNTER SYMPTOMS
HEADACHES: 1
SENSORY CHANGE: 1
FOCAL WEAKNESS: 0
ABDOMINAL PAIN: 1
CONSTIPATION: 1

## 2022-05-22 ASSESSMENT — FIBROSIS 4 INDEX: FIB4 SCORE: 1.89

## 2022-05-22 ASSESSMENT — ACTIVITIES OF DAILY LIVING (ADL): TOILETING: INDEPENDENT

## 2022-05-22 NOTE — PROGRESS NOTES
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 Maria Elena relayed that she would notify MD Li.    MD Li called back to notify writer that it is okay to keep pt off heparin overnight. No other new orders placed at this time.

## 2022-05-22 NOTE — THERAPY
"Physical Therapy   Initial Evaluation     Patient Name: Jan Palencia  Age:  81 y.o., Sex:  female  Medical Record #: [id number]  Today's Date: 5/22/2022     Precautions  Precautions: Fall Risk  Comments: s/p crani large tumor resection    Assessment  Patient is 81 y.o. female admitted for HA and confusion, found to have R frontal tumor on imaging and underwent cerebral angiogram and R frontal craniotomy for tumor resection. Pt is from [place] and speaks [place] Slovak which was not available via language line interpreters, thus Daughter utilized as  for therapy evaluation. Today, pt required Mod - Min A to complete functional mobility as noted below. Most limited by keeping eyes closed shut and poor use/coordination of FWW during mobility. Daughter notably \"paniced\" during ambulation efforts with PT, reassured Daughter pt is safe and this is why PT was consulted and asked Dtr to please focus on cue translation. Pt ambulated better with HHA vs FWW, RN made aware. Neurosurgical PA-C present during PT evaluation and updated on current functions and barriers to DC home at this time. Pt will benefit from acute PT interventions to address impairments as pt is currently mobilizing below functional baseline.     Plan    Recommend Physical Therapy 5 times per week until therapy goals are met for the following treatments:  Bed Mobility, Equipment, Gait Training, Neuro Re-Education / Balance, Self Care/Home Evaluation, Stair Training, Therapeutic Activities, and Therapeutic Exercises    DC Equipment Recommendations: Unable to determine at this time  Discharge Recommendations: Recommend post-acute placement for additional physical therapy services prior to discharge home        05/22/22 1301   Precautions   Precautions Fall Risk   Comments s/p crani, tumor resection   Vitals   O2 Delivery Device None - Room Air   Vitals Comments BP stable post ambulation   Pain 0 - 10 Group   Therapist Pain Assessment During " Activity;Nurse Notified  (abdominal pain, HA and dizziness)   Prior Living Situation   Prior Services None   Housing / Facility 2 Story Apartment / Condo   Steps Into Home 2  (-3)   Steps In Home   (flight to bedrooms)   Rail   (railing in home)   Equipment Owned None   Lives with - Patient's Self Care Capacity Adult Children;Child Less than 18 Years of Age   Comments Pt is staying locally with her Dtr, and 13/10 y/o grandkids. Visiting from [place]   Prior Level of Functional Mobility   Bed Mobility Independent   Transfer Status Independent   Ambulation Independent   Distance Ambulation (Feet)   (community)   Assistive Devices Used None   Stairs Independent   Cognition    Level of Consciousness Responds to voice   Comments Dtr used to interpret as no [place] language line  available.   Active ROM Lower Body    Active ROM Lower Body  WDL   Comments for functional mobility, unable to formally assess due to impaired command following   Strength Lower Body   Lower Body Strength  X   Comments Functionally assessed B LE at 3+/5, unable to formally assess 2/2 impaired command following   Vision   Vision Comments kept eyes closed throughout session, cues to open for safety and to participate more in therapy   Balance Assessment   Sitting Balance (Static) Fair   Sitting Balance (Dynamic) Fair -   Standing Balance (Static) Poor -   Standing Balance (Dynamic) Trace   Weight Shift Sitting Poor   Weight Shift Standing Poor   Comments improved balance w/ HHA vs FWW   Gait Analysis   Gait Level Of Assist Moderate Assist  (with FWW to Min A with HHA)   Assistive Device Front Wheel Walker;Hand Held Assist   Distance (Feet) 12   # of Times Distance was Traveled 2  (1x with FWW, 1x with HHA)   Deviation   (see below)   # of Stairs Climbed 0   Weight Bearing Status no restrictions   Comments with FWW: pt with increased NEVILLE as FWW beyond NEVILLE and pushing it unsafely requiring HEAVY Mod A to maintain balance and  stability. With HHA, pt demonstrated more upright posture and slightly improve NEVILLE and overall gait mechanics.   Bed Mobility    Supine to Sit Moderate Assist   Sit to Supine Moderate Assist   Scooting Moderate Assist   Comments highly anxious   Functional Mobility   Sit to Stand Minimal Assist   Bed, Chair, Wheelchair Transfer Moderate Assist   Toilet Transfers Moderate Assist   Transfer Method Stand Step   Comments more assist due to keeping eyes closed and rushed mobility   How much difficulty does the patient currently have...   Turning over in bed (including adjusting bedclothes, sheets and blankets)? 1   Sitting down on and standing up from a chair with arms (e.g., wheelchair, bedside commode, etc.) 1   Moving from lying on back to sitting on the side of the bed? 1   How much help from another person does the patient currently need...   Moving to and from a bed to a chair (including a wheelchair)? 3   Need to walk in a hospital room? 2   Climbing 3-5 steps with a railing? 1   6 clicks Mobility Score 9   Activity Tolerance   Sitting in Chair 8-10 min on toilet   Sitting Edge of Bed 3 min   Standing 3-5 min total   Patient / Family Goals    Patient / Family Goal #1 to return home   Short Term Goals    Short Term Goal # 1 pt will perform supine <> sit with HOB flat, no railing and SPV in 6 visits   Short Term Goal # 2 pt will perform sit <> stand and transfer between various surfaces with LRAD and SPV in 6 visits   Short Term Goal # 3 pt will ambulate > 150 ft with LRAD and SPV to access home/community needs in 6 visits   Short Term Goal # 4 pt will negotiate 3- 12 steps to access home environment with LRAD and SPV in 6 visits   Education Group   Education Provided Role of Physical Therapist   Role of Physical Therapist Patient Response Patient;Acceptance;Explanation;Verbal Demonstration;Reinforcement Needed;Family   Problem List    Problems Impaired Bed Mobility;Impaired Transfers;Impaired Ambulation;Impaired  Balance;Decreased Activity Tolerance;Safety Awareness Deficits / Cognition   Anticipated Discharge Equipment and Recommendations   DC Equipment Recommendations Unable to determine at this time   Discharge Recommendations Recommend post-acute placement for additional physical therapy services prior to discharge home   Interdisciplinary Plan of Care Collaboration   IDT Collaboration with  Nursing;Occupational Therapist;Family / Caregiver   Patient Position at End of Therapy In Bed;Call Light within Reach;Tray Table within Reach;Family / Friend in Room   Collaboration Comments aware of PT eval   Session Information   Date / Session Number  5/22-1 (1/5, 5/28)

## 2022-05-22 NOTE — CARE PLAN
The patient is Stable - Low risk of patient condition declining or worsening    Shift Goals  Clinical Goals: safety  Patient Goals: sleep  Family Goals: sleep    Progress made toward(s) clinical / shift goals:    Problem: Knowledge Deficit - Standard  Goal: Patient and family/care givers will demonstrate understanding of plan of care, disease process/condition, diagnostic tests and medications  Outcome: Progressing   Pt's family receptive to learning about pt's care plan. In absence of family, pt interacts minimally with staff partly due to language barrier. Frequent rounding done overnight.    Problem: Fall Risk  Goal: Patient will remain free from falls  Outcome: Progressing   Pt ambulated x1 to bathroom overnight with FWW and 1PA. Shuffling/staggering gait with pt noted to be dragging right foot at times. Bed alarm on.    No acute events overnight.

## 2022-05-22 NOTE — PROGRESS NOTES
Neurosurgery Progress Note    Subjective:  POD3 R frontal craniotomy for tumor resection   Pt neurologically stable   Pt initially presented with headache, confusion and ataxia.Found to have large right frontal tumor; likely meningioma   IR unable to embolize tumor feeding artery due to location and brach of artery  MRI with contrast completed and reviewed by myself and Dr Dhaliwal  C/o STODDARD and general malaise today  Communicated today with help of patient's daughter, Chantal    Exam:  She is awake and alert  Face symmetric  CN 2-12 grossly intact  Moves all 4 ext.  No pronator drift  PERRL  Incision CDI        BP  Min: 111/56  Max: 146/66  Pulse  Av.8  Min: 62  Max: 76  Resp  Av  Min: 18  Max: 18  Temp  Av.8 °C (98.3 °F)  Min: 36.7 °C (98 °F)  Max: 37 °C (98.6 °F)  Monitored Temp 2  Av.7 °C (98 °F)  Min: 36.5 °C (97.7 °F)  Max: 36.8 °C (98.2 °F)  SpO2  Av.3 %  Min: 92 %  Max: 99 %    No data recorded    Recent Labs     22  0435 22  0350 22  0245   WBC 25.4* 18.0* 11.9*   RBC 4.12* 3.60* 3.32*   HEMOGLOBIN 11.4* 10.0* 9.4*   HEMATOCRIT 36.3* 32.3* 29.6*   MCV 88.1 89.7 89.2   MCH 27.7 27.8 28.3   MCHC 31.4* 31.0* 31.8*   RDW 45.9 46.7 45.7   PLATELETCT 291 203 187   MPV 10.4 10.8 11.0     Recent Labs     227 22  0435 22  0350   SODIUM 141 140 139   POTASSIUM 4.4 4.3 5.2   CHLORIDE 104 106 106   CO2  23   GLUCOSE 152* 183* 150*   BUN 18 19 25*   CREATININE 0.56 0.56 0.51   CALCIUM 7.5* 7.7* 8.0*     Recent Labs     22   INR 1.06           Intake/Output                       22 0700 - 22 0659 05700 - 22 Total  Total                 Intake    Total Intake -- -- -- -- -- --       Output    Urine  900  -- 900  --  -- --    Number of Times Voided -- 1 x 1 x 1 x -- 1 x    Urine Void (mL) 900 -- 900 -- -- --    Total Output 900 -- 900 -- -- --       Net I/O     - -- -900  -- -- --            Intake/Output Summary (Last 24 hours) at 5/22/2022 1218  Last data filed at 5/21/2022 1731  Gross per 24 hour   Intake --   Output 900 ml   Net -900 ml            • polyethylene glycol/lytes  1 Packet BID   • acetaminophen  1,000 mg TID   • artificial tears  1 Application Q8HRS PRN   • insulin regular  1-6 Units 4X/DAY ACHS    And   • dextrose bolus  25 g Q15 MIN PRN   • lisinopril  40 mg Q DAY   • MD ALERT...DO NOT ADMINISTER NSAIDS or ASPIRIN unless ORDERED By Neurosurgery  1 Each PRN   • ondansetron  4 mg Q4HRS PRN   • scopolamine  1 Patch Q72HRS PRN   • hydrALAZINE  10 mg Q HOUR PRN   • cloNIDine  0.1 mg Q4HRS PRN   • docusate sodium  100 mg BID   • senna-docusate  1 Tablet Nightly   • senna-docusate  1 Tablet Q24HRS PRN   • bisacodyl  10 mg Q24HRS PRN   • sodium phosphate  1 Each Once PRN   • diphenhydrAMINE  25 mg Q6HRS PRN   • HYDROcodone-acetaminophen  1 Tablet Q4HRS PRN   • heparin  5,000 Units Q12HRS   • HYDROmorphone  0.5 mg Q3HRS PRN   • benzocaine-menthol  1 Lozenge Q2HRS PRN   • magnesium hydroxide  30 mL QDAY PRN   • oxyCODONE immediate-release  2.5 mg Q3HRS PRN    Or   • oxyCODONE immediate-release  5 mg Q3HRS PRN   • Pharmacy Consult Request  1 Each PHARMACY TO DOSE   • omeprazole  20 mg DAILY   • amLODIPine  5 mg Q DAY   • dexamethasone  4 mg Q6HRS   • levETIRAcetam  500 mg BID       Assessment and Plan:  Hospital day #11  POD#3 R frontal craniotomy   CT and MRI done post operatively are stable  Tylenol scheduled  OK to leave incision open to air  OK for regular diet  OK for q4h Neuro checks  OK to resume lisinopril, heparin    Dexamethasone weaning schedule:   2mg q6h x 3 days (12 tabs), 2mg q12h x 3 days (6 tabs), 2mg q24h x days (3 tabs). Disp 21 tabs total    Continue Keppra 500mg BID until f/u with [facility] in 4 weeks  Following.    OK to DC to home from our perspective, when cleared by medicine  Appreciate continued CM and hospitalist care  POC discussed with patient,  daughter, RN    Prophylactic anticoagulation: yes         Start date/time: OK to resume from NS perspective

## 2022-05-22 NOTE — THERAPY
Occupational Therapy   Initial Evaluation     Patient Name: Jan Palencia  Age:  81 y.o., Sex:  female  Medical Record #: [id number]  Today's Date: 5/22/2022     Precautions: Fall Risk  Comments: s/p crani large tumor resection    Assessment    Patient is 81 y.o. female admitted with headache, found to have right frontal meningioma with associated mass-effect on right frontal lobe with adjacent white matter edema, now s/p craniotomy and tumor resection. Pt is limited by pain and impaired attention to task, pt required repeated cues to keep her eyes open while ambulating to BR and completing toileting. Pt presents incontinent of urine and ataxic, pts daughter at bedside reports inability to provide mod-maxA for ADLs and modA for walking at her home. Pt will require additional therapy sessions to increase safety prior to DC home with daughter.     Plan    Recommend Occupational Therapy 4 times per week until therapy goals are met for the following treatments:  Adaptive Equipment, Self Care/Activities of Daily Living, Therapeutic Activities, and Therapeutic Exercises.    DC Equipment Recommendations: Bed Side Commode, Tub / Shower Seat  Discharge Recommendations: Recommend post-acute placement for additional occupational therapy services prior to discharge home. If placement is not an option recommend additional therapy sessions in the acute setting prior to DC home to reduce fall risk and risk for readmission.      Objective     05/22/22 1310   Prior Living Situation   Prior Services None   Housing / Facility 2 Story Apartment / Condo   Steps Into Home 3   Steps In Home 12   Bathroom Set up Walk In Shower;Bathtub / Shower Combination   Equipment Owned None   Lives with - Patient's Self Care Capacity Adult Children;Child Less than 18 Years of Age   Comments pt is staying with her daughter, 13 and 10 year old grandchildren.   Prior Level of ADL Function   Self Feeding Independent   Grooming / Hygiene Independent    Bathing Independent   Dressing Independent   Toileting Independent   Prior Level of IADL Function   Medication Management Independent   Laundry Independent   Kitchen Mobility Independent   Finances Independent   Home Management Independent   Shopping Independent   Prior Level Of Mobility Independent Without Device in Community   Comments travelled her from [place]   Precautions   Precautions Fall Risk   Comments s/p crani large tumor resection   Pain 0 - 10 Group   Therapist Pain Assessment Nurse Notified;Post Activity Pain Same as Prior to Activity  (per dtr pt c/o abdominal pain and headache)   Cognition    Level of Consciousness Responds to voice   Comments repeated cues to keep her eyes open while ambulating required   Strength Upper Body   Comments unable to assess d/t hysteria and language barrier, no language line  available   Coordination Upper Body   Coordination WDL   Balance Assessment   Sitting Balance (Static) Fair   Sitting Balance (Dynamic) Fair -   Standing Balance (Static) Poor -   Standing Balance (Dynamic) Trace +   Weight Shift Sitting Poor   Weight Shift Standing Poor   Comments balance improved with HHA vs FWW   Bed Mobility    Supine to Sit Moderate Assist   Sit to Supine Moderate Assist   Scooting Moderate Assist   ADL Assessment   Upper Body Dressing Moderate Assist   Lower Body Dressing Maximal Assist   Toileting Maximal Assist   Comments incontinent of urine   How much help from another person does the patient currently need...   Putting on and taking off regular lower body clothing? 2   Bathing (including washing, rinsing, and drying)? 2   Toileting, which includes using a toilet, bedpan, or urinal? 2   Putting on and taking off regular upper body clothing? 2   Taking care of personal grooming such as brushing teeth? 3   Eating meals? 3   6 Clicks Daily Activity Score 14   Functional Mobility   Sit to Stand Minimal Assist   Bed, Chair, Wheelchair Transfer Moderate Assist    Toilet Transfers Moderate Assist   Transfer Method Stand Step   Mobility improved with HHA vs FWW   Activity Tolerance   Sitting in Chair 10 min (toilet)   Sitting Edge of Bed 3min   Standing 3min x2   Patient / Family Goals   Patient / Family Goal #1 less pain   Short Term Goals   Short Term Goal # 1 pt will demo functional transfer with SPV   Short Term Goal # 2 pt will complete >4min standing grooming ADLs at sink without cues for sequencing   Short Term Goal # 3 pt will demo LB dress at SPV level   Education Group   Education Provided Activities of Daily Living;Role of Occupational Therapist   Role of Occupational Therapist Patient Response Patient;Family;Acceptance;Explanation;Verbal Demonstration   ADL Patient Response Patient;Family;Acceptance;Explanation;Verbal Demonstration;Action Demonstration   Problem List   Problem List Decreased Homemaking Skills;Decreased Active Daily Living Skills;Decreased Functional Mobility;Decreased Activity Tolerance;Safety Awareness Deficits / Cognition;Impaired Cognitive Function;Impaired Postural Control / Balance;Impaired Vision   Anticipated Discharge Equipment and Recommendations   DC Equipment Recommendations Bed Side Commode;Tub / Shower Seat   Discharge Recommendations Recommend post-acute placement for additional occupational therapy services prior to discharge home

## 2022-05-22 NOTE — CARE PLAN
The patient is Stable - Low risk of patient condition declining or worsening    Shift Goals  Clinical Goals: Discharge  Patient Goals: NA  Family Goals: NA    Progress made toward(s) clinical / shift goals:  Patient resting in bed without complaints. PT/OT to see patient for possible discharge home with daughter today.

## 2022-05-22 NOTE — PROGRESS NOTES
"Hospital Medicine Daily Progress Note    Date of Service  5/22/2022    Chief Complaint  Jan Palencia is a 81 y.o. female admitted 5/12/2022 with   Chief Complaint   Patient presents with   • Headache   • Hypertension   • Hernia   • Dental Pain     Left sided        Hospital Course  \"This is 81-year-old female with a past medical history significant for obesity, new diagnosis of hypertension presented to the ER with headache, mild confusion hypertension and balance issues.  She was found to have right frontal meningioma with associated mass-effect on right frontal lobe with adjacent white matter edema.  Neurosurgery was consulted.  She was started on Keppra and Decadron.  She was initially admitted to the ICU for close monitoring.  IR was unable to perform embolization of feeder vessel due to vessel anatomy. Now planned for craniotomy and tumor resection 5/19/22.\" Assessment from prior Hospitalist and critical care APRN.    Patient did well after surgery and was monitored overnight in RICU. As per neurosurgery, patient motor skills intact and downgraded to medical bed.  Patient kept on decadron and Keppra.  MRI brain ordered.    Interval Problem Update  - per daughter, she was very confused yesterday and had trouble speaking, better today, reports severe head pain, abdominal pain, no BM, also feeling lightheaded, not eating/drinking much  - neurosurgery cleared for discharge, weaning steroids  - hasn't worked with PT/OT yet     I have personally seen and examined the patient at bedside. I discussed the plan of care with patient, family and bedside RN.    Consultants/Specialty  critical care and neurosurgery    Code Status  Full Code    Disposition  Patient is not medically cleared for discharge.   Anticipate discharge to TBD.  I have placed the appropriate orders for post-discharge needs.    Review of Systems  Review of Systems   Unable to perform ROS: Language   Gastrointestinal: Positive for abdominal pain and " constipation.   Neurological: Positive for sensory change and headaches. Negative for focal weakness.    Patient appeared to be complaining of a headache and back head pain.     Physical Exam  Temp:  [36.7 °C (98 °F)-37 °C (98.6 °F)] 36.7 °C (98 °F)  Pulse:  [62-76] 64  Resp:  [18] 18  BP: (111-146)/(53-66) 143/66  SpO2:  [92 %-99 %] 92 %    Physical Exam  Vitals and nursing note reviewed.   Constitutional:       General: She is not in acute distress.     Appearance: She is obese. She is not ill-appearing.   HENT:      Head:      Comments: Guaze over cranium with craniotomy incision       Nose: Nose normal.      Mouth/Throat:      Mouth: Mucous membranes are moist.      Pharynx: No oropharyngeal exudate.   Eyes:      General: No scleral icterus.     Conjunctiva/sclera: Conjunctivae normal.   Cardiovascular:      Rate and Rhythm: Normal rate and regular rhythm.      Pulses: Normal pulses.      Heart sounds: Normal heart sounds. No murmur heard.  Pulmonary:      Effort: Pulmonary effort is normal. No respiratory distress.      Breath sounds: Normal breath sounds. No wheezing.   Abdominal:      General: Bowel sounds are normal. There is distension.      Palpations: Abdomen is soft.      Tenderness: There is no abdominal tenderness.      Hernia: A hernia (ventral) is present.   Musculoskeletal:         General: No swelling or tenderness.      Right lower leg: Edema (trace) present.      Left lower leg: Edema (trace) present.   Skin:     General: Skin is warm.      Coloration: Skin is not jaundiced.      Findings: No erythema.   Neurological:      Mental Status: She is alert.      Sensory: Sensory deficit (left leg numbness) present.      Motor: No weakness.         Fluids    Intake/Output Summary (Last 24 hours) at 5/22/2022 0982  Last data filed at 5/21/2022 1731  Gross per 24 hour   Intake --   Output 900 ml   Net -900 ml       Laboratory  Recent Labs     05/20/22  0435 05/21/22  0350 05/22/22  0245   WBC 25.4* 18.0*  11.9*   RBC 4.12* 3.60* 3.32*   HEMOGLOBIN 11.4* 10.0* 9.4*   HEMATOCRIT 36.3* 32.3* 29.6*   MCV 88.1 89.7 89.2   MCH 27.7 27.8 28.3   MCHC 31.4* 31.0* 31.8*   RDW 45.9 46.7 45.7   PLATELETCT 291 203 187   MPV 10.4 10.8 11.0     Recent Labs     05/19/22  2237 05/20/22  0435 05/21/22  0350   SODIUM 141 140 139   POTASSIUM 4.4 4.3 5.2   CHLORIDE 104 106 106   CO2 22 22 23   GLUCOSE 152* 183* 150*   BUN 18 19 25*   CREATININE 0.56 0.56 0.51   CALCIUM 7.5* 7.7* 8.0*     Recent Labs     05/19/22  0949 05/19/22 2237   INR 0.98 1.06               Imaging  CT-HEAD W/O   Final Result         1.  Postop changes of right frontal meningioma resection with new pneumocephalus   2.  Hyperdensity along the anterior aspect of the right frontal lobe superiorly, appearance suggests possible Gelfoam or area of postoperative extra-axial hemorrhage.   3.  Atherosclerosis.         DX-CHEST-PORTABLE (1 VIEW)   Final Result      1.  Supportive tubing as described above.   2.  No pneumothorax.   3.  Minimal LEFT lung base atelectasis again seen.      DX-CHEST-PORTABLE (1 VIEW)   Final Result      No radiographic evidence of acute cardiopulmonary disease.      AW-RHLAXHY-2 VIEW   Final Result         1.  Moderate stool in the colon suggests changes of constipation, otherwise nonspecific bowel gas pattern   2.  Hazy bilateral lung base opacities suggests subtle infiltrates or edema.      IR-EMBOLIZE-NEURO-INTRACRANIAL   Final Result   Impression:      Cerebral angiogram for assessment for vascular supply to the frontal meningioma demonstrates with the predominant supply to the meningioma from the artery of the falx cerebri arising from the left ophthalmic artery with minimal supply also arising from    the right-sided artery of the falx. No definite supply to the lesion from the middle meningeal arteries. Very minimal supply to the right side of the meningioma from distal frontal penetrating branches of the superficial temporal artery also  noted.      Since the majority of the supply to the meningioma was from the ophthalmic artery, the lesion could not be safely embolized.      I Ayo Schmidt was physically present and participated during the entire procedure of the IR-EMBOLIZE-NEURO-INTRACRANIAL.         MR-STEALTH BRAIN WITH & W/O   Final Result         Large right inferior frontal region dural based extra-axial mass that most likely represents a meningioma. There is mild mass effect upon the bilateral inferior frontal lobes with midline shift to the left measuring approximately 10 to 11 mm. There is    also mass effect upon the lateral ventricles.      Minimal edema noted in the right superomedial frontal white matter adjacent to the mass.      Age-related volume loss and chronic microvascular ischemic changes.      CT-HEAD W/O   Final Result      1.  Negative for hemorrhage      2.  Large right frontal meningioma measuring 5.2 x 4.4 x 4.2 cm      3.  Associated mass effect on the right frontal lobe with adjacent white matter edema and mild compression of the frontal horn right lateral ventricle.         CT-MAXILLOFACIAL WITH PLUS RECONS   Final Result      1.  Negative for facial or orbital abscess      2.  CT face within normal limits      3.  Partially calcified right frontal extra-axial mass consistent with a meningioma described in detail on head CT report      MR-BRAIN-WITH & W/O    (Results Pending)        Assessment/Plan  * Meningioma (HCC)- (present on admission)  Assessment & Plan  CT head shows Large right frontal meningioma measuring 5.2 x 4.4 x 4.2 cm and associated mass effect on the right frontal lobe with adjacent white matter edema and mild compression of the frontal horn right lateral ventricle   -- MRI has been reviewed, neurosurgery Dr. Goodson has evaluated, recommended IR guided embolization on 5/14   IR unable to perform embolization of feeder artery due to anatomy    Continue decadron every 6 hours,  omeprazole 20 mg  p.o. daily  Keppra 500 mg twice daily    S/p CRANIOTOMY, USING FRAMELESS STEREOTAXY - FRONTAL FOR BRAIN TUMOR RESECTION   - Neurosurgery following patient's case  - MRI brain 5/21  - weaning steroids 5/22  - Pain control    'Neurosurgery cleared for discharge however given uncontrolled pain and lightheadedness will keep another day, plus needs to work with PT/OT    Leukocytosis- (present on admission)  Assessment & Plan  Improving  Likely steroids and post op reaction.  No infectious source at this time.    HTN (hypertension)- (present on admission)  Assessment & Plan  Continue amlodipine and lisinopril.  Goal for BP <140mmHg  If still above goal after steroids wean may need to add another agent    Abdominal pain  Assessment & Plan  Abdominal pain again   Increase bowel regimen  continue PPI since on steroids  CTM    Prediabetes- (present on admission)  Assessment & Plan  No apparent diabetes, no medication at home listed on EMR.   A1c 6.4 on admission.  Patient has hyperglycemia, while on dexamethasone    - sugars controlled and weaning steroids, dc insulin and glucose checks      Dental caries- (present on admission)  Assessment & Plan  Unasyn given in ED  No abscess seen on CT.  unasyn stopped 5/16.     VTE prophylaxis: heparin ppx    I have performed a physical exam and reviewed and updated ROS and Plan today (5/22/2022). In review of yesterday's note (5/21/2022), there are no changes except as documented above.

## 2022-05-22 NOTE — DISCHARGE INSTRUCTIONS
Discharge Instructions per Jailene Cordoba M.D.    Ms. Eddy Palencia,    You were admitted to [facility] and found to have a meningioma.  You underwent removal by neurosurgery.  You were started on steroids (dexamethasone) which you will taper off and keppra (anti-seizure medication).  You had high blood pressure so you were started on amlodipine and losartan.  Please monitor your blood pressure at home, if your blood pressure is consistently above 140 (top number) you will need to call your doctor.  We recommend you take tylenol 650mg every 4 hours as first line for pain medication (no NSAIDS, ibuprofen, motrin, aspirin).  If you have breakthrough pain you can take oxycodone.  Do NOT take this pain medication with alcohol or other sedating drugs.  Do NOT drive or operate heavy machinery while taking this medication.  Narcotics can make you constipated so if taking please take with stool softner.    You will have follow up with neurosurgery in 4 weeks.    Return to ER if you develop severe headache, new neurologic symptoms, nausea/vomiting, fevers, redness or drainage from surgery site, chest pain, shortness of breath, lightheadedness/dizziness/syncope or any other concerning symptoms.       Discharge Instructions    Discharged to home by car with relative. Discharged via wheelchair, hospital escort: Yes.  Special equipment needed: wheelchair and bedside commode    Be sure to schedule a follow-up appointment with your primary care doctor or any specialists as instructed.     Discharge Plan:   Diet Plan: Discussed  Activity Level: Discussed  Confirmed Follow up Appointment: No (Comments)  Confirmed Symptoms Management: Discussed  Medication Reconciliation Updated: Yes    I understand that a diet low in cholesterol, fat, and sodium is recommended for good health. Unless I have been given specific instructions below for another diet, I accept this instruction as my diet prescription.   Other diet: regular    Special  Instructions: None    Is patient discharged on Warfarin / Coumadin?   No     Depression / Suicide Risk    As you are discharged from this [facility] facility, it is important to learn how to keep safe from harming yourself.    Recognize the warning signs:  Abrupt changes in personality, positive or negative- including increase in energy   Giving away possessions  Change in eating patterns- significant weight changes-  positive or negative  Change in sleeping patterns- unable to sleep or sleeping all the time   Unwillingness or inability to communicate  Depression  Unusual sadness, discouragement and loneliness  Talk of wanting to die  Neglect of personal appearance   Rebelliousness- reckless behavior  Withdrawal from people/activities they love  Confusion- inability to concentrate     If you or a loved one observes any of these behaviors or has concerns about self-harm, here's what you can do:  Talk about it- your feelings and reasons for harming yourself  Remove any means that you might use to hurt yourself (examples: pills, rope, extension cords, firearm)  Get professional help from the community (Mental Health, Substance Abuse, psychological counseling)  Do not be alone:Call your Safe Contact- someone whom you trust who will be there for you.  Call your local CRISIS HOTLINE 683-3130 or 727-654-4797  Call your local Children's Mobile Crisis Response Team [place] (110) 532-3456 or www.c-crowd  Call the toll free National Suicide Prevention Hotlines   National Suicide Prevention Lifeline 821-393-PING (3408)  National Hope Line Network 800-SUICIDE (853-6384)

## 2022-05-23 LAB
ERYTHROCYTE [DISTWIDTH] IN BLOOD BY AUTOMATED COUNT: 44.8 FL (ref 35.9–50)
HCT VFR BLD AUTO: 28.8 % (ref 37–47)
HGB BLD-MCNC: 9.3 G/DL (ref 12–16)
MCH RBC QN AUTO: 28.4 PG (ref 27–33)
MCHC RBC AUTO-ENTMCNC: 32.3 G/DL (ref 33.6–35)
MCV RBC AUTO: 87.8 FL (ref 81.4–97.8)
PLATELET # BLD AUTO: 205 K/UL (ref 164–446)
PMV BLD AUTO: 11.2 FL (ref 9–12.9)
RBC # BLD AUTO: 3.28 M/UL (ref 4.2–5.4)
WBC # BLD AUTO: 11.3 K/UL (ref 4.8–10.8)

## 2022-05-23 PROCEDURE — 700102 HCHG RX REV CODE 250 W/ 637 OVERRIDE(OP): Performed by: PHYSICIAN ASSISTANT

## 2022-05-23 PROCEDURE — 700102 HCHG RX REV CODE 250 W/ 637 OVERRIDE(OP): Performed by: INTERNAL MEDICINE

## 2022-05-23 PROCEDURE — 770001 HCHG ROOM/CARE - MED/SURG/GYN PRIV*

## 2022-05-23 PROCEDURE — 700102 HCHG RX REV CODE 250 W/ 637 OVERRIDE(OP): Performed by: STUDENT IN AN ORGANIZED HEALTH CARE EDUCATION/TRAINING PROGRAM

## 2022-05-23 PROCEDURE — 99232 SBSQ HOSP IP/OBS MODERATE 35: CPT | Performed by: INTERNAL MEDICINE

## 2022-05-23 PROCEDURE — A9270 NON-COVERED ITEM OR SERVICE: HCPCS | Performed by: STUDENT IN AN ORGANIZED HEALTH CARE EDUCATION/TRAINING PROGRAM

## 2022-05-23 PROCEDURE — 85027 COMPLETE CBC AUTOMATED: CPT

## 2022-05-23 PROCEDURE — A9270 NON-COVERED ITEM OR SERVICE: HCPCS | Performed by: PHYSICIAN ASSISTANT

## 2022-05-23 PROCEDURE — 36415 COLL VENOUS BLD VENIPUNCTURE: CPT

## 2022-05-23 PROCEDURE — 700111 HCHG RX REV CODE 636 W/ 250 OVERRIDE (IP): Performed by: INTERNAL MEDICINE

## 2022-05-23 PROCEDURE — A9270 NON-COVERED ITEM OR SERVICE: HCPCS | Performed by: INTERNAL MEDICINE

## 2022-05-23 RX ADMIN — LEVETIRACETAM 500 MG: 500 TABLET, FILM COATED ORAL at 17:16

## 2022-05-23 RX ADMIN — DEXAMETHASONE 2 MG: 4 TABLET ORAL at 17:16

## 2022-05-23 RX ADMIN — DOCUSATE SODIUM 100 MG: 100 CAPSULE, LIQUID FILLED ORAL at 06:12

## 2022-05-23 RX ADMIN — BISACODYL 10 MG: 10 SUPPOSITORY RECTAL at 06:12

## 2022-05-23 RX ADMIN — DEXAMETHASONE 2 MG: 4 TABLET ORAL at 12:51

## 2022-05-23 RX ADMIN — SENNOSIDES AND DOCUSATE SODIUM 1 TABLET: 50; 8.6 TABLET ORAL at 21:14

## 2022-05-23 RX ADMIN — ACETAMINOPHEN 1000 MG: 500 TABLET ORAL at 21:14

## 2022-05-23 RX ADMIN — OMEPRAZOLE 20 MG: 20 CAPSULE, DELAYED RELEASE ORAL at 06:12

## 2022-05-23 RX ADMIN — LISINOPRIL 40 MG: 20 TABLET ORAL at 06:12

## 2022-05-23 RX ADMIN — ACETAMINOPHEN 1000 MG: 500 TABLET ORAL at 09:28

## 2022-05-23 RX ADMIN — DEXAMETHASONE 2 MG: 4 TABLET ORAL at 06:12

## 2022-05-23 RX ADMIN — ACETAMINOPHEN 1000 MG: 500 TABLET ORAL at 16:02

## 2022-05-23 RX ADMIN — LEVETIRACETAM 500 MG: 500 TABLET, FILM COATED ORAL at 06:12

## 2022-05-23 RX ADMIN — AMLODIPINE BESYLATE 5 MG: 5 TABLET ORAL at 06:12

## 2022-05-23 RX ADMIN — POLYETHYLENE GLYCOL 3350 1 PACKET: 17 POWDER, FOR SOLUTION ORAL at 06:11

## 2022-05-23 ASSESSMENT — PAIN SCALES - GENERAL: PAIN_LEVEL: 5

## 2022-05-23 ASSESSMENT — ENCOUNTER SYMPTOMS
SENSORY CHANGE: 1
FOCAL WEAKNESS: 0
HEADACHES: 1
ABDOMINAL PAIN: 1
CONSTIPATION: 1

## 2022-05-23 ASSESSMENT — PAIN DESCRIPTION - PAIN TYPE: TYPE: ACUTE PAIN

## 2022-05-23 NOTE — ANESTHESIA POSTPROCEDURE EVALUATION
Patient: Jan Palencia    Procedure Summary     Date: 05/19/22 Room / Location: [facility] 05 / SURGERY [facility]    Anesthesia Start: 1329 Anesthesia Stop: 1934    Procedure: CRANIOTOMY, USING FRAMELESS STEREOTAXY - FRONTAL FOR BRAIN TUMOR RESECTION (Right Head) Diagnosis: (right frontal meningioma )    Surgeons: Hakan Dhaliwal M.D. Responsible Provider: Jerson Kaiser D.O.    Anesthesia Type: general ASA Status: 2          Final Anesthesia Type: general  Last vitals  BP   Blood Pressure : 135/62    Temp   36.8 °C (98.2 °F)    Pulse   (!) 59 (Rn notified )   Resp   18    SpO2   93 %      Anesthesia Post Evaluation    Patient location during evaluation: PACU  Patient participation: complete - patient participated  Level of consciousness: awake and alert  Pain score: 5    Airway patency: patent  Anesthetic complications: no  Cardiovascular status: hemodynamically stable  Respiratory status: acceptable  Hydration status: euvolemic    PONV: none          No complications documented.     Nurse Pain Score: 9 (NPRS)

## 2022-05-23 NOTE — CARE PLAN
The patient is Stable - Low risk of patient condition declining or worsening    Shift Goals  Clinical Goals: BM, Rest, Monitor Neuro Status  Patient Goals: BM, Rest  Family Goals: BM, Rest    Progress made toward(s) clinical / shift goals:  POC discussed with family and pt. Pt and family verbalized understanding.     Problem: Knowledge Deficit - Standard  Goal: Patient and family/care givers will demonstrate understanding of plan of care, disease process/condition, diagnostic tests and medications  Outcome: Progressing  Note: Pt and family are very involved in POC. They verbalize understanding of POC. Educated pt and family about constipation and the need for bowel protocol medications.      Problem: Pain - Standard  Goal: Alleviation of pain or a reduction in pain to the patient’s comfort goal  Outcome: Progressing  Note: Pt's pain assessed throughout shift. Patient offered pharmacological and non-pharmacological interventions, see MAR. Pt complaining to abdominal pain r/t constipation and headache.      Problem: Fall Risk  Goal: Patient will remain free from falls  Outcome: Progressing  Note: Fall precautions in place. Bed locked and in lowest position, bed alarm in place, treaded socks used when ambulated, call light within reach. Pt educated to call for assistance. Pt rounded on frequently throughout shift.         Patient is not progressing towards the following goals:      Problem: Bowel Elimination  Goal: Establish and maintain regular bowel function  Outcome: Not Progressing  Note: Pt has not been able to establish a normal bowel function since 5/18. Pt is complaining of abdominal pain, but reports passing gas. Bowel sounds heard about auscultation. Pt on bowel protocol and has been given medications to help assist in the establishment of a regular bowel function.

## 2022-05-23 NOTE — DISCHARGE PLANNING
Case Management Discharge Planning    Admission Date: 5/12/2022  GMLOS: 3  ALOS: 11    6-Clicks ADL Score: 14  6-Clicks Mobility Score: 9  PT and/or OT Eval ordered: Yes  Post-acute Referrals Ordered: No, patient is Self-Pay   Post-acute Choice Obtained: No  Has referral(s) been sent to post-acute provider:  No      Anticipated Discharge Dispo: Discharge Disposition: Discharge to home/self care (01), patient is Self-Pay  Discharge Contact Phone Number: [phone]    DME Needed: OT recommends BSC, Tub/Shower Seat    Action(s) Taken: Updated Provider/Nurse on Discharge Plan and OTHER, discuss patient's treatment and discharge plan with medical and nursing teams during IDT rounds.     Escalations Completed: Provider and Bedside RN    Medically Clear: No    Next Steps: f/u with medical and nursing teams regarding treatment plan, discharge plan, needs, and barriers. F/u with and patient and family regarding discharge planning needs and barriers.    Barriers to Discharge: Medical clearance and DME, discharge disposition, patient is self-pay.    Is the patient up for discharge tomorrow: No

## 2022-05-24 PROBLEM — K59.00 CONSTIPATION: Status: ACTIVE | Noted: 2022-05-24

## 2022-05-24 PROCEDURE — 97535 SELF CARE MNGMENT TRAINING: CPT | Mod: CO

## 2022-05-24 PROCEDURE — A9270 NON-COVERED ITEM OR SERVICE: HCPCS | Performed by: INTERNAL MEDICINE

## 2022-05-24 PROCEDURE — 700102 HCHG RX REV CODE 250 W/ 637 OVERRIDE(OP): Performed by: PHYSICIAN ASSISTANT

## 2022-05-24 PROCEDURE — 700102 HCHG RX REV CODE 250 W/ 637 OVERRIDE(OP): Performed by: STUDENT IN AN ORGANIZED HEALTH CARE EDUCATION/TRAINING PROGRAM

## 2022-05-24 PROCEDURE — 770001 HCHG ROOM/CARE - MED/SURG/GYN PRIV*

## 2022-05-24 PROCEDURE — 700102 HCHG RX REV CODE 250 W/ 637 OVERRIDE(OP): Performed by: INTERNAL MEDICINE

## 2022-05-24 PROCEDURE — 99232 SBSQ HOSP IP/OBS MODERATE 35: CPT | Performed by: HOSPITALIST

## 2022-05-24 PROCEDURE — A9270 NON-COVERED ITEM OR SERVICE: HCPCS | Performed by: STUDENT IN AN ORGANIZED HEALTH CARE EDUCATION/TRAINING PROGRAM

## 2022-05-24 PROCEDURE — A9270 NON-COVERED ITEM OR SERVICE: HCPCS | Performed by: PHYSICIAN ASSISTANT

## 2022-05-24 RX ORDER — AMLODIPINE BESYLATE 5 MG/1
10 TABLET ORAL
Status: DISCONTINUED | OUTPATIENT
Start: 2022-05-25 | End: 2022-05-26

## 2022-05-24 RX ADMIN — DEXAMETHASONE 2 MG: 4 TABLET ORAL at 16:51

## 2022-05-24 RX ADMIN — LEVETIRACETAM 500 MG: 500 TABLET, FILM COATED ORAL at 04:28

## 2022-05-24 RX ADMIN — DEXAMETHASONE 2 MG: 4 TABLET ORAL at 04:28

## 2022-05-24 RX ADMIN — ACETAMINOPHEN 1000 MG: 500 TABLET ORAL at 10:34

## 2022-05-24 RX ADMIN — AMLODIPINE BESYLATE 5 MG: 5 TABLET ORAL at 04:28

## 2022-05-24 RX ADMIN — DEXAMETHASONE 2 MG: 4 TABLET ORAL at 23:22

## 2022-05-24 RX ADMIN — LISINOPRIL 40 MG: 20 TABLET ORAL at 04:28

## 2022-05-24 RX ADMIN — OMEPRAZOLE 20 MG: 20 CAPSULE, DELAYED RELEASE ORAL at 04:28

## 2022-05-24 RX ADMIN — LEVETIRACETAM 500 MG: 500 TABLET, FILM COATED ORAL at 16:51

## 2022-05-24 RX ADMIN — DEXAMETHASONE 2 MG: 4 TABLET ORAL at 12:09

## 2022-05-24 RX ADMIN — POLYETHYLENE GLYCOL 3350 1 PACKET: 17 POWDER, FOR SOLUTION ORAL at 16:50

## 2022-05-24 RX ADMIN — DEXAMETHASONE 2 MG: 4 TABLET ORAL at 00:11

## 2022-05-24 ASSESSMENT — ENCOUNTER SYMPTOMS
PALPITATIONS: 0
SHORTNESS OF BREATH: 0
VOMITING: 0
MYALGIAS: 0
NAUSEA: 0
FOCAL WEAKNESS: 0
COUGH: 0
BRUISES/BLEEDS EASILY: 0
WEIGHT LOSS: 0
CARDIOVASCULAR NEGATIVE: 1
DIAPHORESIS: 0
HEADACHES: 1
MUSCULOSKELETAL NEGATIVE: 1
EYES NEGATIVE: 1
ABDOMINAL PAIN: 0
DEPRESSION: 0
FEVER: 0
RESPIRATORY NEGATIVE: 1
CONSTIPATION: 0
GASTROINTESTINAL NEGATIVE: 1
CHILLS: 0
CONSTITUTIONAL NEGATIVE: 1
SORE THROAT: 0
BLURRED VISION: 0
PSYCHIATRIC NEGATIVE: 1
DIZZINESS: 0
WEAKNESS: 0
SENSORY CHANGE: 1

## 2022-05-24 ASSESSMENT — COGNITIVE AND FUNCTIONAL STATUS - GENERAL
DRESSING REGULAR LOWER BODY CLOTHING: A LOT
HELP NEEDED FOR BATHING: A LOT
SUGGESTED CMS G CODE MODIFIER DAILY ACTIVITY: CK
EATING MEALS: A LITTLE
DAILY ACTIVITIY SCORE: 14
PERSONAL GROOMING: A LITTLE
DRESSING REGULAR UPPER BODY CLOTHING: A LOT
TOILETING: A LOT

## 2022-05-24 ASSESSMENT — FIBROSIS 4 INDEX: FIB4 SCORE: 1.72

## 2022-05-24 ASSESSMENT — PAIN DESCRIPTION - PAIN TYPE
TYPE: ACUTE PAIN

## 2022-05-24 ASSESSMENT — LIFESTYLE VARIABLES: SUBSTANCE_ABUSE: 0

## 2022-05-24 NOTE — CARE PLAN
The patient is Stable - Low risk of patient condition declining or worsening    Shift Goals  Clinical Goals: safety  Patient Goals: antonia  Family Goals: antonia    Progress made toward(s) clinical / shift goals:    Problem: Fall Risk  Goal: Patient will remain free from falls  Outcome: Progressing  Up to commode/bathroom several times overnight, unsteady and requires 1-2 person assist with FWW. Utilized call bell appropriately.     No acute events overnight.

## 2022-05-24 NOTE — THERAPY
Occupational Therapy  Daily Treatment     Patient Name: Jan Palencia  Age:  81 y.o., Sex:  female  Medical Record #: [id number]  Today's Date: 5/24/2022       Precautions: Fall Risk  Comments: crani, tumor resection    Assessment    Pt seen for OT tx. Continues to be limited by decreased activity tolerance, balance deficits, inattention and poor safety awareness impacting ability to complete ADLs and ADL transfers independently. Pt's family present during session to translate. Pt c/o dizzy while EOB. Encouraged pt to get up w/ nrsg staff as tolerated. Required extra time to initiate ADLs. Will continue to benefit from OT services while in house.     Plan    Continue current treatment plan.    DC Equipment Recommendations: Unable to determine at this time  Discharge Recommendations: Recommend post-acute placement for additional occupational therapy services prior to discharge home       05/24/22 1201   Balance   Sitting Balance (Static) Fair   Sitting Balance (Dynamic) Fair -   Standing Balance (Static) Poor -   Standing Balance (Dynamic) Trace +   Weight Shift Sitting Poor   Weight Shift Standing Poor   Bed Mobility    Supine to Sit Minimal Assist   Sit to Supine Moderate Assist   Scooting Minimal Assist   Activities of Daily Living   Grooming Minimal Assist;Seated   Upper Body Dressing Moderate Assist   Lower Body Dressing Maximal Assist   Toileting Maximal Assist   Functional Mobility   Sit to Stand Minimal Assist   Bed, Chair, Wheelchair Transfer Moderate Assist   Toilet Transfers Moderate Assist   Short Term Goals   Short Term Goal # 1 pt will demo functional transfer with SPV   Goal Outcome # 1 Progressing as expected   Short Term Goal # 2 pt will complete >4min standing grooming ADLs at sink without cues for sequencing   Goal Outcome # 2 Progressing as expected   Short Term Goal # 3 pt will demo LB dress at SPV level   Goal Outcome # 3 Progressing as expected   Anticipated Discharge Equipment and  Recommendations   DC Equipment Recommendations Unable to determine at this time   Discharge Recommendations Recommend post-acute placement for additional occupational therapy services prior to discharge home

## 2022-05-24 NOTE — PROGRESS NOTES
Hospital Medicine Daily Progress Note    Date of Service  5/24/2022    Chief Complaint  Jan Palencia is a 81 y.o. female admitted 5/12/2022 with   Chief Complaint   Patient presents with   • Headache   • Hypertension   • Hernia   • Dental Pain     Left sided        Hospital Course  Patient is a 81-year-old female with a past medical history significant for obesity, new diagnosis of hypertension presented to the ER with headache, mild confusion hypertension and balance issues.  She was found to have right frontal meningioma with associated mass-effect on right frontal lobe with adjacent white matter edema.  Neurosurgery was consulted.  She was started on Keppra and Decadron.  She was initially admitted to the ICU for close monitoring.  IR was unable to perform embolization of feeder vessel due to vessel anatomy. She underwent a craniotomy and tumor resection on 5/19/22    Patient did well after surgery and was monitored overnight in RICU. As per neurosurgery, patient motor skills intact and downgraded to medical bed.  Patient kept on decadron and Keppra    Interval Problem Update  Daughter is at bedside translating. Patient had a normal bm yesterday, no further abdominal pain following the BM. Today her only complaint is a chronic headache, no visual changes, no n/v. ROS otherwise negative. Discussed plan with daughter, she would like to take pt home once she is cleared by PT, she is concerned about patient being unsteady with ambulation at present.     I have personally seen and examined the patient at bedside. I discussed the plan of care with patient, bedside RN, charge RN,  and neurosurgery.    Consultants/Specialty  critical care and neurosurgery    Code Status  Full Code    Disposition  Patient is not medically cleared for discharge.   Anticipate discharge to to home with close outpatient follow-up.  I have placed the appropriate orders for post-discharge needs.    Review of Systems  Review of  Systems   Unable to perform ROS: Language   Constitutional: Negative.  Negative for chills, diaphoresis, fever, malaise/fatigue and weight loss.   HENT: Negative.  Negative for sore throat.    Eyes: Negative.  Negative for blurred vision.   Respiratory: Negative.  Negative for cough and shortness of breath.    Cardiovascular: Negative.  Negative for chest pain, palpitations and leg swelling.   Gastrointestinal: Negative.  Negative for abdominal pain, constipation, nausea and vomiting.   Genitourinary: Negative.  Negative for dysuria.   Musculoskeletal: Negative.  Negative for myalgias.   Skin: Negative.  Negative for itching and rash.   Neurological: Positive for sensory change and headaches. Negative for dizziness, focal weakness and weakness.   Endo/Heme/Allergies: Negative.  Does not bruise/bleed easily.   Psychiatric/Behavioral: Negative.  Negative for depression, substance abuse and suicidal ideas.   All other systems reviewed and are negative.   Patient appeared to be complaining of a headache and back head pain.     Physical Exam  Temp:  [36.8 °C (98.2 °F)-37.3 °C (99.1 °F)] 36.9 °C (98.5 °F)  Pulse:  [54-62] 58  Resp:  [16-18] 16  BP: (133-158)/(62-92) 141/62  SpO2:  [93 %-96 %] 96 %    Physical Exam  Vitals and nursing note reviewed.   Constitutional:       General: She is not in acute distress.     Appearance: She is obese. She is not ill-appearing.   HENT:      Head:      Comments: craniotomy incision c/d/i       Nose: Nose normal.      Mouth/Throat:      Mouth: Mucous membranes are moist.      Pharynx: No oropharyngeal exudate.   Eyes:      General: No scleral icterus.     Conjunctiva/sclera: Conjunctivae normal.   Cardiovascular:      Rate and Rhythm: Normal rate and regular rhythm.      Pulses: Normal pulses.      Heart sounds: Normal heart sounds. No murmur heard.  Pulmonary:      Effort: Pulmonary effort is normal. No respiratory distress.      Breath sounds: Normal breath sounds. No wheezing.    Abdominal:      General: Bowel sounds are normal. There is no distension.      Palpations: Abdomen is soft.      Tenderness: There is no abdominal tenderness.      Hernia: A hernia (ventral) is present.   Musculoskeletal:         General: No swelling or tenderness.      Right lower leg: Edema (trace) present.      Left lower leg: Edema (trace) present.   Skin:     General: Skin is warm.      Coloration: Skin is not jaundiced.      Findings: No erythema.   Neurological:      Mental Status: She is alert and oriented to person, place, and time.         Fluids  No intake or output data in the 24 hours ending 05/24/22 0759    Laboratory  Recent Labs     05/22/22  0245 05/23/22  0224   WBC 11.9* 11.3*   RBC 3.32* 3.28*   HEMOGLOBIN 9.4* 9.3*   HEMATOCRIT 29.6* 28.8*   MCV 89.2 87.8   MCH 28.3 28.4   MCHC 31.8* 32.3*   RDW 45.7 44.8   PLATELETCT 187 205   MPV 11.0 11.2                       Imaging  MR-BRAIN-WITH & W/O   Final Result      1.  Recent right frontal craniotomy for resection of large right anterior frontal meningioma. Right frontal subdural drain in place      2.  Bifrontal pneumocephalus.      3.  Postoperative cavity in the right anterior frontal region containing CSF and blood products.      4.  Postoperative changes and edema involving the residual right anterior frontal lobe with effacement of the frontal horns and slight right to left subfalcine herniation.      5.  Moderately large gyriform area of acute infarction involving the right posterior medial temporal lobe and adjacent right medial occipital lobe.      6.  Age-related cerebral atrophy.      7.  Moderate periventricular and juxtacortical white matter changes consistent with chronic microvascular ischemic gliosis.      CT-HEAD W/O   Final Result         1.  Postop changes of right frontal meningioma resection with new pneumocephalus   2.  Hyperdensity along the anterior aspect of the right frontal lobe superiorly, appearance suggests possible  Gelfoam or area of postoperative extra-axial hemorrhage.   3.  Atherosclerosis.         DX-CHEST-PORTABLE (1 VIEW)   Final Result      1.  Supportive tubing as described above.   2.  No pneumothorax.   3.  Minimal LEFT lung base atelectasis again seen.      DX-CHEST-PORTABLE (1 VIEW)   Final Result      No radiographic evidence of acute cardiopulmonary disease.      [id number] VIEW   Final Result         1.  Moderate stool in the colon suggests changes of constipation, otherwise nonspecific bowel gas pattern   2.  Hazy bilateral lung base opacities suggests subtle infiltrates or edema.      IR-EMBOLIZE-NEURO-INTRACRANIAL   Final Result   Impression:      Cerebral angiogram for assessment for vascular supply to the frontal meningioma demonstrates with the predominant supply to the meningioma from the artery of the falx cerebri arising from the left ophthalmic artery with minimal supply also arising from    the right-sided artery of the falx. No definite supply to the lesion from the middle meningeal arteries. Very minimal supply to the right side of the meningioma from distal frontal penetrating branches of the superficial temporal artery also noted.      Since the majority of the supply to the meningioma was from the ophthalmic artery, the lesion could not be safely embolized.      [place] was physically present and participated during the entire procedure of the IR-EMBOLIZE-NEURO-INTRACRANIAL.         MR-STEALTH BRAIN WITH & W/O   Final Result         Large right inferior frontal region dural based extra-axial mass that most likely represents a meningioma. There is mild mass effect upon the bilateral inferior frontal lobes with midline shift to the left measuring approximately 10 to 11 mm. There is    also mass effect upon the lateral ventricles.      Minimal edema noted in the right superomedial frontal white matter adjacent to the mass.      Age-related volume loss and chronic microvascular  ischemic changes.      CT-HEAD W/O   Final Result      1.  Negative for hemorrhage      2.  Large right frontal meningioma measuring 5.2 x 4.4 x 4.2 cm      3.  Associated mass effect on the right frontal lobe with adjacent white matter edema and mild compression of the frontal horn right lateral ventricle.         CT-MAXILLOFACIAL WITH PLUS RECONS   Final Result      1.  Negative for facial or orbital abscess      2.  CT face within normal limits      3.  Partially calcified right frontal extra-axial mass consistent with a meningioma described in detail on head CT report           Assessment/Plan  * Meningioma (HCC)- (present on admission)  Assessment & Plan  CT head shows Large right frontal meningioma measuring 5.2 x 4.4 x 4.2 cm and associated mass effect on the right frontal lobe with adjacent white matter edema and mild compression of the frontal horn right lateral ventricle   -- MRI has been reviewed, neurosurgery Dr. Goodson has evaluated, recommended IR guided embolization on 5/14   IR unable to perform embolization of feeder artery due to anatomy    Continue decadron every 6 hours,  omeprazole 20 mg p.o. daily  Keppra 500 mg twice daily    S/p CRANIOTOMY, USING FRAMELESS STEREOTAXY - [facility] FOR BRAIN TUMOR RESECTION   - Neurosurgery following patient's case  - MRI brain 5/21  - weaning steroids 5/22  - Pain control    'Neurosurgery cleared for discharge however given weakness and no insurance so no post-acute will keep inpatient to work with PT/OT until safe for discharge, neurosurgery starting dex wean    Leukocytosis- (present on admission)  Assessment & Plan  Improving  Likely steroids and post op reaction.  No infectious source at this time.    HTN (hypertension)- (present on admission)  Assessment & Plan  Continue amlodipine and lisinopril.  Goal for BP <140mmHg  If still above goal after steroids wean may need to add another agent    Abdominal pain  Assessment & Plan  Abdominal pain again, now  improved after BM today  bowel regimen  continue PPI since on steroids  CTM    Prediabetes- (present on admission)  Assessment & Plan  No apparent diabetes, no medication at home listed on EMR.   A1c 6.4 on admission.  Patient has hyperglycemia, while on dexamethasone    - sugars controlled and weaning steroids, dc insulin and glucose checks      Dental caries- (present on admission)  Assessment & Plan  Unasyn given in ED  No abscess seen on CT.  unasyn stopped 5/16.     VTE prophylaxis: enoxaparin ppx    I have performed a physical exam and reviewed and updated ROS and Plan today (5/24/2022). In review of yesterday's note (5/23/2022), there are no changes except as documented above.

## 2022-05-24 NOTE — CARE PLAN
The patient is Stable - Low risk of patient condition declining or worsening    Shift Goals  Clinical Goals: Monitor neuro status  Patient Goals: Rest  Family Goals: Rest    Progress made toward(s) clinical / shift goals:  Patient educated on fall risk and call light use. Patient AxOx3, disoriented to situation. Verbalizes understanding of calling for assistance before getting out of bed. Bed is locked and in low position, patient has non slip socks, call light and phone are within reach, bed alarm is on. Patient using call light appropriately. Patient mediated for pain per MAR.           Problem: Bowel Elimination  Goal: Establish and maintain regular bowel function  Outcome: Progressing     Problem: Fall Risk  Goal: Patient will remain free from falls  Outcome: Progressing

## 2022-05-25 PROCEDURE — A9270 NON-COVERED ITEM OR SERVICE: HCPCS | Performed by: INTERNAL MEDICINE

## 2022-05-25 PROCEDURE — 700102 HCHG RX REV CODE 250 W/ 637 OVERRIDE(OP): Performed by: STUDENT IN AN ORGANIZED HEALTH CARE EDUCATION/TRAINING PROGRAM

## 2022-05-25 PROCEDURE — 770001 HCHG ROOM/CARE - MED/SURG/GYN PRIV*

## 2022-05-25 PROCEDURE — A9270 NON-COVERED ITEM OR SERVICE: HCPCS | Performed by: STUDENT IN AN ORGANIZED HEALTH CARE EDUCATION/TRAINING PROGRAM

## 2022-05-25 PROCEDURE — 700102 HCHG RX REV CODE 250 W/ 637 OVERRIDE(OP): Performed by: PHYSICIAN ASSISTANT

## 2022-05-25 PROCEDURE — 97535 SELF CARE MNGMENT TRAINING: CPT | Mod: CQ

## 2022-05-25 PROCEDURE — 99232 SBSQ HOSP IP/OBS MODERATE 35: CPT | Performed by: HOSPITALIST

## 2022-05-25 PROCEDURE — A9270 NON-COVERED ITEM OR SERVICE: HCPCS | Performed by: PHYSICIAN ASSISTANT

## 2022-05-25 PROCEDURE — A9270 NON-COVERED ITEM OR SERVICE: HCPCS | Performed by: HOSPITALIST

## 2022-05-25 PROCEDURE — 700102 HCHG RX REV CODE 250 W/ 637 OVERRIDE(OP): Performed by: INTERNAL MEDICINE

## 2022-05-25 PROCEDURE — 700102 HCHG RX REV CODE 250 W/ 637 OVERRIDE(OP): Performed by: HOSPITALIST

## 2022-05-25 PROCEDURE — 700111 HCHG RX REV CODE 636 W/ 250 OVERRIDE (IP): Performed by: INTERNAL MEDICINE

## 2022-05-25 RX ADMIN — ACETAMINOPHEN 1000 MG: 500 TABLET ORAL at 09:57

## 2022-05-25 RX ADMIN — DEXAMETHASONE 2 MG: 4 TABLET ORAL at 06:32

## 2022-05-25 RX ADMIN — AMLODIPINE BESYLATE 10 MG: 5 TABLET ORAL at 06:32

## 2022-05-25 RX ADMIN — OMEPRAZOLE 20 MG: 20 CAPSULE, DELAYED RELEASE ORAL at 06:32

## 2022-05-25 RX ADMIN — LEVETIRACETAM 500 MG: 500 TABLET, FILM COATED ORAL at 17:07

## 2022-05-25 RX ADMIN — DEXAMETHASONE 2 MG: 4 TABLET ORAL at 12:26

## 2022-05-25 RX ADMIN — LISINOPRIL 40 MG: 20 TABLET ORAL at 06:32

## 2022-05-25 RX ADMIN — ACETAMINOPHEN 1000 MG: 500 TABLET ORAL at 17:07

## 2022-05-25 RX ADMIN — ENOXAPARIN SODIUM 40 MG: 40 INJECTION SUBCUTANEOUS at 17:08

## 2022-05-25 RX ADMIN — LEVETIRACETAM 500 MG: 500 TABLET, FILM COATED ORAL at 06:32

## 2022-05-25 RX ADMIN — ACETAMINOPHEN 1000 MG: 500 TABLET ORAL at 20:05

## 2022-05-25 ASSESSMENT — ENCOUNTER SYMPTOMS
ABDOMINAL PAIN: 0
PSYCHIATRIC NEGATIVE: 1
FEVER: 0
MYALGIAS: 0
NAUSEA: 0
SENSORY CHANGE: 0
RESPIRATORY NEGATIVE: 1
CONSTITUTIONAL NEGATIVE: 1
WEIGHT LOSS: 0
HEADACHES: 1
CHILLS: 0
DIAPHORESIS: 0
FOCAL WEAKNESS: 0
SORE THROAT: 0
DEPRESSION: 0
SHORTNESS OF BREATH: 0
BLURRED VISION: 0
CONSTIPATION: 0
WEAKNESS: 0
COUGH: 0
GASTROINTESTINAL NEGATIVE: 1
CARDIOVASCULAR NEGATIVE: 1
PALPITATIONS: 0
EYES NEGATIVE: 1
DIZZINESS: 0
BRUISES/BLEEDS EASILY: 0
MUSCULOSKELETAL NEGATIVE: 1
VOMITING: 0

## 2022-05-25 ASSESSMENT — FIBROSIS 4 INDEX: FIB4 SCORE: 1.72

## 2022-05-25 ASSESSMENT — PAIN DESCRIPTION - PAIN TYPE
TYPE: ACUTE PAIN
TYPE: ACUTE PAIN

## 2022-05-25 ASSESSMENT — LIFESTYLE VARIABLES: SUBSTANCE_ABUSE: 0

## 2022-05-25 NOTE — CARE PLAN
The patient is Stable - Low risk of patient condition declining or worsening    Shift Goals  Clinical Goals: safety  Patient Goals: rest  Family Goals: for pt to get better    Progress made toward(s) clinical / shift goals:  Patient at risk for falls, bed alarm on, walker out of sight, nonskid socks on, call light within reach, personal belongings within reach, toileting offered.       Patient is not progressing towards the following goals:

## 2022-05-25 NOTE — PROGRESS NOTES
[facility] Medicine Daily Progress Note    Date of Service  5/25/2022    Chief Complaint  Jan Palencia is a 81 y.o. female admitted 5/12/2022 with   Chief Complaint   Patient presents with   • Headache   • Hypertension   • Hernia   • Dental Pain     Left sided        Hospital Course  Patient is a 81-year-old female with a past medical history significant for obesity, new diagnosis of hypertension presented to the ER with headache, mild confusion hypertension and balance issues.  She was found to have right frontal meningioma with associated mass-effect on right frontal lobe with adjacent white matter edema.  Neurosurgery was consulted.  She was started on Keppra and Decadron.  She was initially admitted to the ICU for close monitoring.  IR was unable to perform embolization of feeder vessel due to vessel anatomy. She underwent a craniotomy and tumor resection on 5/19/22    Patient did well after surgery and was monitored overnight in RICU. As per neurosurgery, patient motor skills intact and downgraded to medical bed.  Patient kept on decadron and Keppra    Interval Problem Update  Headache improved, she showered with assistance. Continues to report significant dizziness with ambulation and not yet cleared by PT or OT. No further abdominal pain. Daughter provided translation. ROS otherwise negative.   I have personally seen and examined the patient at bedside. I discussed the plan of care with patient, bedside RN, charge RN,  and neurosurgery.    Consultants/Specialty  critical care and neurosurgery    Code Status  Full Code    Disposition  Patient is not medically cleared for discharge.   Anticipate discharge to to home with close outpatient follow-up.  I have placed the appropriate orders for post-discharge needs.    Review of Systems  Review of Systems   Unable to perform ROS: Language   Constitutional: Negative.  Negative for chills, diaphoresis, fever, malaise/fatigue and weight loss.   HENT:  Negative.  Negative for sore throat.    Eyes: Negative.  Negative for blurred vision.   Respiratory: Negative.  Negative for cough and shortness of breath.    Cardiovascular: Negative.  Negative for chest pain, palpitations and leg swelling.   Gastrointestinal: Negative.  Negative for abdominal pain, constipation, nausea and vomiting.   Genitourinary: Negative.  Negative for dysuria.   Musculoskeletal: Negative.  Negative for myalgias.   Skin: Negative.  Negative for itching and rash.   Neurological: Positive for headaches. Negative for dizziness, sensory change, focal weakness and weakness.   Endo/Heme/Allergies: Negative.  Does not bruise/bleed easily.   Psychiatric/Behavioral: Negative.  Negative for depression, substance abuse and suicidal ideas.   All other systems reviewed and are negative.   Patient appeared to be complaining of a headache and back head pain.     Physical Exam  Temp:  [36.2 °C (97.2 °F)-37.2 °C (98.9 °F)] 36.2 °C (97.2 °F)  Pulse:  [57-69] 57  Resp:  [16-18] 17  BP: (135-156)/(55-76) 156/76  SpO2:  [94 %-100 %] 100 %    Physical Exam  Vitals and nursing note reviewed.   Constitutional:       General: She is not in acute distress.     Appearance: She is obese. She is not ill-appearing.   HENT:      Head:      Comments: craniotomy incision c/d/i       Nose: Nose normal.      Mouth/Throat:      Mouth: Mucous membranes are moist.      Pharynx: No oropharyngeal exudate.   Eyes:      General: No scleral icterus.     Conjunctiva/sclera: Conjunctivae normal.   Cardiovascular:      Rate and Rhythm: Normal rate and regular rhythm.      Pulses: Normal pulses.      Heart sounds: Normal heart sounds. No murmur heard.  Pulmonary:      Effort: Pulmonary effort is normal. No respiratory distress.      Breath sounds: Normal breath sounds. No wheezing.   Abdominal:      General: Bowel sounds are normal. There is no distension.      Palpations: Abdomen is soft.      Tenderness: There is no abdominal tenderness.       Hernia: A hernia (ventral) is present.   Musculoskeletal:         General: No swelling or tenderness.      Right lower leg: Edema (trace) present.      Left lower leg: Edema (trace) present.   Skin:     General: Skin is warm.      Coloration: Skin is not jaundiced.      Findings: No erythema.   Neurological:      Mental Status: She is alert and oriented to person, place, and time.         Fluids    Intake/Output Summary (Last 24 hours) at 5/25/2022 1313  Last data filed at 5/24/2022 1700  Gross per 24 hour   Intake 240 ml   Output --   Net 240 ml       Laboratory  Recent Labs     05/23/22  0224   WBC 11.3*   RBC 3.28*   HEMOGLOBIN 9.3*   HEMATOCRIT 28.8*   MCV 87.8   MCH 28.4   MCHC 32.3*   RDW 44.8   PLATELETCT 205   MPV 11.2                       Imaging  MR-BRAIN-WITH & W/O   Final Result      1.  Recent right frontal craniotomy for resection of large right anterior frontal meningioma. Right frontal subdural drain in place      2.  Bifrontal pneumocephalus.      3.  Postoperative cavity in the right anterior frontal region containing CSF and blood products.      4.  Postoperative changes and edema involving the residual right anterior frontal lobe with effacement of the frontal horns and slight right to left subfalcine herniation.      5.  Moderately large gyriform area of acute infarction involving the right posterior medial temporal lobe and adjacent right medial occipital lobe.      6.  Age-related cerebral atrophy.      7.  Moderate periventricular and juxtacortical white matter changes consistent with chronic microvascular ischemic gliosis.      CT-HEAD W/O   Final Result         1.  Postop changes of right frontal meningioma resection with new pneumocephalus   2.  Hyperdensity along the anterior aspect of the right frontal lobe superiorly, appearance suggests possible Gelfoam or area of postoperative extra-axial hemorrhage.   3.  Atherosclerosis.         DX-CHEST-PORTABLE (1 VIEW)   Final Result       1.  Supportive tubing as described above.   2.  No pneumothorax.   3.  Minimal LEFT lung base atelectasis again seen.      DX-CHEST-PORTABLE (1 VIEW)   Final Result      No radiographic evidence of acute cardiopulmonary disease.      IN-TGILIPJ-4 VIEW   Final Result         1.  Moderate stool in the colon suggests changes of constipation, otherwise nonspecific bowel gas pattern   2.  Hazy bilateral lung base opacities suggests subtle infiltrates or edema.      IR-EMBOLIZE-NEURO-INTRACRANIAL   Final Result   Impression:      Cerebral angiogram for assessment for vascular supply to the frontal meningioma demonstrates with the predominant supply to the meningioma from the artery of the falx cerebri arising from the left ophthalmic artery with minimal supply also arising from    the right-sided artery of the falx. No definite supply to the lesion from the middle meningeal arteries. Very minimal supply to the right side of the meningioma from distal frontal penetrating branches of the superficial temporal artery also noted.      Since the majority of the supply to the meningioma was from the ophthalmic artery, the lesion could not be safely embolized.      I, Ayo Schmidt was physically present and participated during the entire procedure of the IR-EMBOLIZE-NEURO-INTRACRANIAL.         MR-STEALTH BRAIN WITH & W/O   Final Result         Large right inferior frontal region dural based extra-axial mass that most likely represents a meningioma. There is mild mass effect upon the bilateral inferior frontal lobes with midline shift to the left measuring approximately 10 to 11 mm. There is    also mass effect upon the lateral ventricles.      Minimal edema noted in the right superomedial frontal white matter adjacent to the mass.      Age-related volume loss and chronic microvascular ischemic changes.      CT-HEAD W/O   Final Result      1.  Negative for hemorrhage      2.  Large right frontal meningioma measuring 5.2 x  4.4 x 4.2 cm      3.  Associated mass effect on the right frontal lobe with adjacent white matter edema and mild compression of the frontal horn right lateral ventricle.         CT-MAXILLOFACIAL WITH PLUS RECONS   Final Result      1.  Negative for facial or orbital abscess      2.  CT face within normal limits      3.  Partially calcified right frontal extra-axial mass consistent with a meningioma described in detail on head CT report           Assessment/Plan  * Meningioma (HCC)- (present on admission)  Assessment & Plan  CT head shows Large right frontal meningioma measuring 5.2 x 4.4 x 4.2 cm and associated mass effect on the right frontal lobe with adjacent white matter edema and mild compression of the frontal horn right lateral ventricl  MRI was reviewed, neurosurgery Dr. Goodson has evaluated, recommended IR guided embolization on 5/14  IR unable to perform embolization of feeder artery due to anatomy    Continue decadron taper,  omeprazole 20 mg p.o. daily  Keppra 500 mg twice daily    S/p CRANIOTOMY, USING FRAMELESS STEREOTAXY - [facility] FOR BRAIN TUMOR RESECTION   Weaning steroids  Continue supportive care    Neurosurgery cleared her for discharge however given weakness and no insurance so no post-acute will keep inpatient to work with PT/OT until safe for discharge      Constipation  Assessment & Plan  Resolved  Continue bowel regimen    Leukocytosis- (present on admission)  Assessment & Plan  Improved  Likely steroids and post op reaction.  No obvious infectious source at this time.    HTN (hypertension)- (present on admission)  Assessment & Plan  Continue amlodipine and lisinopril.  Bp improved but not consistently at goal, norvasc was increased to 10 on 5/24 - continue to follow  monitoring  Goal sbp less 140    Abdominal pain  Assessment & Plan  Resolved  Likely due to constipation  Continue bowel regimen    Prediabetes- (present on admission)  Assessment & Plan  No apparent diabetes, no medication at  home listed on EMR.   A1c 6.4 on admission.  Patient has hyperglycemia, while on dexamethasone    - sugars controlled and weaning steroids, dc insulin and glucose checks      Dental caries- (present on admission)  Assessment & Plan  Unasyn given in ED  No abscess seen on CT.  unasyn stopped 5/16.     VTE prophylaxis: enoxaparin ppx    I have performed a physical exam and reviewed and updated ROS and Plan today (5/25/2022). In review of yesterday's note (5/24/2022), there are no changes except as documented above.

## 2022-05-25 NOTE — THERAPY
Physical Therapy   Daily Treatment     Patient Name: Jan Palencia  Age:  81 y.o., Sex:  female  Medical Record #: [id number]  Today's Date: 5/25/2022     Precautions: Fall Risk  Comments: crani, tumor resection    Assessment     05/25/22 1153   Other Treatments   Other Treatments Provided Upon arrival pts daughter and family friend, report they just ambulated patient to shower and assisted with shower. PT at this time reporting fatigued and feeling nauseous deferring further activity at this time. However daughter had significant questions regarding discharge plan, equipment, community resources and therapy in hospital setting. Answered questions regarding therapy expectations and equipment as well as home set up. Pt daughter and family friend very receptive to all education, this therapist also reached out to case management to provide potential community resources that could be benefical for family. Will continue to follow while in house.     Plan    Continue current treatment plan.    DC Equipment Recommendations: Unable to determine at this time  Discharge Recommendations: Recommend post-acute placement for additional physical therapy services prior to discharge home

## 2022-05-25 NOTE — CARE PLAN
The patient is Stable - Low risk of patient condition declining or worsening    Shift Goals  Clinical Goals: Safety  Patient Goals: Rest  Family Goals: GAEL    Progress made toward(s) clinical / shift goals:  Patient educated on fall risk and call light use. Patient AxOx3, disoriented to year.Patient verbalizes understanding of calling for assistance before getting out of bed. Bed is locked and in low position, patient has non slip socks, call light and phone are within reach, bed alarm is on. Patient using call light appropriately. No further needs indicated at this time.       Problem: Pain - Standard  Goal: Alleviation of pain or a reduction in pain to the patient’s comfort goal  Outcome: Progressing     Problem: Fall Risk  Goal: Patient will remain free from falls  Outcome: Progressing

## 2022-05-25 NOTE — DISCHARGE PLANNING
Case Management Discharge Planning    Admission Date: 5/12/2022  GMLOS: 3  ALOS: 13    6-Clicks ADL Score: 14  6-Clicks Mobility Score: 9  PT and/or OT Eval ordered: Yes  Post-acute Referrals Ordered: Yes  Post-acute Choice Obtained: No  Has referral(s) been sent to post-acute provider:  No      Anticipated Discharge Dispo: Discharge Disposition: Discharged to home/self care (01)  Discharge Contact Phone Number: [phone]    DME Needed: Yes    DME Ordered: No    Action(s) Taken: Updated Provider/Nurse on Discharge Plan and OTHER   CM spoke with PT and pt's dtr is interested in getting resources for caregiving assistance or any community resources that can be available. I  Pt's dtr is not at bedside. CM called the dtr, but no answer. Left a VM with my call back number.   CM to f/u tomorrow with the pt and family if no call back today.     Escalations Completed: None    Medically Clear: No    Next Steps: f/u with med team for pt's medical clearance. F/u with pt's dtr re resources for cg.     Barriers to Discharge: Medical clearance    Is the patient up for discharge tomorrow: No    7195 - PC back from pt's dtr. She is requesting for CM to leave the resources in pt's room. Let her know that there are very limited resources available. She states they do not have any money. Let the dtr know that I will provide information for care chest as well as for [facility] . Dtr was very appreciative of the infoirmation. CM left printed information with multiple resources on a bedside table.

## 2022-05-26 PROBLEM — R55 VASOVAGAL EPISODE: Status: ACTIVE | Noted: 2022-05-26

## 2022-05-26 LAB
ANION GAP SERPL CALC-SCNC: 9 MMOL/L (ref 7–16)
BUN SERPL-MCNC: 19 MG/DL (ref 8–22)
CALCIUM SERPL-MCNC: 8.3 MG/DL (ref 8.5–10.5)
CHLORIDE SERPL-SCNC: 103 MMOL/L (ref 96–112)
CO2 SERPL-SCNC: 22 MMOL/L (ref 20–33)
CREAT SERPL-MCNC: 0.38 MG/DL (ref 0.5–1.4)
ERYTHROCYTE [DISTWIDTH] IN BLOOD BY AUTOMATED COUNT: 44.2 FL (ref 35.9–50)
GFR SERPLBLD CREATININE-BSD FMLA CKD-EPI: 101 ML/MIN/1.73 M 2
GLUCOSE SERPL-MCNC: 119 MG/DL (ref 65–99)
HCT VFR BLD AUTO: 32.5 % (ref 37–47)
HGB BLD-MCNC: 10.7 G/DL (ref 12–16)
MCH RBC QN AUTO: 28.2 PG (ref 27–33)
MCHC RBC AUTO-ENTMCNC: 32.9 G/DL (ref 33.6–35)
MCV RBC AUTO: 85.8 FL (ref 81.4–97.8)
PLATELET # BLD AUTO: 283 K/UL (ref 164–446)
PMV BLD AUTO: 10.8 FL (ref 9–12.9)
POTASSIUM SERPL-SCNC: 4.5 MMOL/L (ref 3.6–5.5)
RBC # BLD AUTO: 3.79 M/UL (ref 4.2–5.4)
SODIUM SERPL-SCNC: 134 MMOL/L (ref 135–145)
WBC # BLD AUTO: 19.1 K/UL (ref 4.8–10.8)

## 2022-05-26 PROCEDURE — 80048 BASIC METABOLIC PNL TOTAL CA: CPT

## 2022-05-26 PROCEDURE — 36415 COLL VENOUS BLD VENIPUNCTURE: CPT

## 2022-05-26 PROCEDURE — 700102 HCHG RX REV CODE 250 W/ 637 OVERRIDE(OP): Performed by: STUDENT IN AN ORGANIZED HEALTH CARE EDUCATION/TRAINING PROGRAM

## 2022-05-26 PROCEDURE — 700102 HCHG RX REV CODE 250 W/ 637 OVERRIDE(OP): Performed by: INTERNAL MEDICINE

## 2022-05-26 PROCEDURE — 700102 HCHG RX REV CODE 250 W/ 637 OVERRIDE(OP): Performed by: HOSPITALIST

## 2022-05-26 PROCEDURE — 700102 HCHG RX REV CODE 250 W/ 637 OVERRIDE(OP): Performed by: PHYSICIAN ASSISTANT

## 2022-05-26 PROCEDURE — A9270 NON-COVERED ITEM OR SERVICE: HCPCS | Performed by: INTERNAL MEDICINE

## 2022-05-26 PROCEDURE — A9270 NON-COVERED ITEM OR SERVICE: HCPCS | Performed by: HOSPITALIST

## 2022-05-26 PROCEDURE — 99233 SBSQ HOSP IP/OBS HIGH 50: CPT | Performed by: HOSPITALIST

## 2022-05-26 PROCEDURE — 97535 SELF CARE MNGMENT TRAINING: CPT | Mod: CO

## 2022-05-26 PROCEDURE — A9270 NON-COVERED ITEM OR SERVICE: HCPCS | Performed by: STUDENT IN AN ORGANIZED HEALTH CARE EDUCATION/TRAINING PROGRAM

## 2022-05-26 PROCEDURE — 700102 HCHG RX REV CODE 250 W/ 637 OVERRIDE(OP): Performed by: NURSE PRACTITIONER

## 2022-05-26 PROCEDURE — A9270 NON-COVERED ITEM OR SERVICE: HCPCS | Performed by: NURSE PRACTITIONER

## 2022-05-26 PROCEDURE — 770001 HCHG ROOM/CARE - MED/SURG/GYN PRIV*

## 2022-05-26 PROCEDURE — 97530 THERAPEUTIC ACTIVITIES: CPT | Mod: CQ

## 2022-05-26 PROCEDURE — A9270 NON-COVERED ITEM OR SERVICE: HCPCS | Performed by: PHYSICIAN ASSISTANT

## 2022-05-26 PROCEDURE — 700111 HCHG RX REV CODE 636 W/ 250 OVERRIDE (IP): Performed by: INTERNAL MEDICINE

## 2022-05-26 PROCEDURE — 85027 COMPLETE CBC AUTOMATED: CPT

## 2022-05-26 RX ADMIN — AMLODIPINE BESYLATE 10 MG: 5 TABLET ORAL at 05:33

## 2022-05-26 RX ADMIN — LISINOPRIL 40 MG: 20 TABLET ORAL at 05:33

## 2022-05-26 RX ADMIN — ACETAMINOPHEN 1000 MG: 500 TABLET ORAL at 16:14

## 2022-05-26 RX ADMIN — ACETAMINOPHEN 1000 MG: 500 TABLET ORAL at 09:48

## 2022-05-26 RX ADMIN — DEXAMETHASONE 2 MG: 4 TABLET ORAL at 05:33

## 2022-05-26 RX ADMIN — ACETAMINOPHEN 1000 MG: 500 TABLET ORAL at 20:12

## 2022-05-26 RX ADMIN — DEXAMETHASONE 2 MG: 4 TABLET ORAL at 18:23

## 2022-05-26 RX ADMIN — OMEPRAZOLE 20 MG: 20 CAPSULE, DELAYED RELEASE ORAL at 05:33

## 2022-05-26 RX ADMIN — OXYCODONE 5 MG: 5 TABLET ORAL at 05:33

## 2022-05-26 RX ADMIN — LEVETIRACETAM 500 MG: 500 TABLET, FILM COATED ORAL at 05:33

## 2022-05-26 RX ADMIN — LEVETIRACETAM 500 MG: 500 TABLET, FILM COATED ORAL at 18:24

## 2022-05-26 RX ADMIN — ENOXAPARIN SODIUM 40 MG: 40 INJECTION SUBCUTANEOUS at 18:24

## 2022-05-26 ASSESSMENT — ENCOUNTER SYMPTOMS
CARDIOVASCULAR NEGATIVE: 1
ABDOMINAL PAIN: 0
MYALGIAS: 0
DIZZINESS: 0
MUSCULOSKELETAL NEGATIVE: 1
COUGH: 0
CONSTITUTIONAL NEGATIVE: 1
FOCAL WEAKNESS: 0
NAUSEA: 0
EYES NEGATIVE: 1
GASTROINTESTINAL NEGATIVE: 1
WEAKNESS: 0
CHILLS: 0
CONSTIPATION: 0
RESPIRATORY NEGATIVE: 1
VOMITING: 0
PSYCHIATRIC NEGATIVE: 1
BLURRED VISION: 0
DEPRESSION: 0
SHORTNESS OF BREATH: 0
FEVER: 0
BRUISES/BLEEDS EASILY: 0
DIAPHORESIS: 0
SORE THROAT: 0
PALPITATIONS: 0
WEIGHT LOSS: 0
HEADACHES: 1
SENSORY CHANGE: 0

## 2022-05-26 ASSESSMENT — LIFESTYLE VARIABLES: SUBSTANCE_ABUSE: 0

## 2022-05-26 ASSESSMENT — COGNITIVE AND FUNCTIONAL STATUS - GENERAL
CLIMB 3 TO 5 STEPS WITH RAILING: A LOT
TURNING FROM BACK TO SIDE WHILE IN FLAT BAD: A LITTLE
MOVING FROM LYING ON BACK TO SITTING ON SIDE OF FLAT BED: A LITTLE
TOILETING: A LOT
PERSONAL GROOMING: A LITTLE
MOBILITY SCORE: 17
WALKING IN HOSPITAL ROOM: A LITTLE
DRESSING REGULAR LOWER BODY CLOTHING: A LOT
SUGGESTED CMS G CODE MODIFIER MOBILITY: CK
DRESSING REGULAR UPPER BODY CLOTHING: A LOT
HELP NEEDED FOR BATHING: A LOT
DAILY ACTIVITIY SCORE: 14
MOVING TO AND FROM BED TO CHAIR: A LITTLE
SUGGESTED CMS G CODE MODIFIER DAILY ACTIVITY: CK
STANDING UP FROM CHAIR USING ARMS: A LITTLE
EATING MEALS: A LITTLE

## 2022-05-26 ASSESSMENT — PAIN DESCRIPTION - PAIN TYPE
TYPE: ACUTE PAIN
TYPE: ACUTE PAIN

## 2022-05-26 ASSESSMENT — GAIT ASSESSMENTS: GAIT LEVEL OF ASSIST: REFUSED

## 2022-05-26 NOTE — CARE PLAN
The patient is Watcher - Medium risk of patient condition declining or worsening    Shift Goals  Clinical Goals: Monitor blood pressure;  Patient Goals: Sleep;  Family Goals: Monitor blood pressure and heart rate;    Progress made toward(s) clinical / shift goals: BP and HR noted to improve throughout shift.     Patient is not progressing towards the following goals:n/a    Problem: Syncope Management  Goal: Patient's vital signs will be with within normal range or improve  Outcome: Progressing  Note: Patient had possible syncopal episode when up to bathroom this evening. Unsure if episode was due to orthostatic hypotension, vasovagal episode or syncopal episode. Patient blood pressure and HR low after patient assisted back to bed. Vital signs checked q4 hours. Improvement in BP and HR noted throughout shift. Will continue to monitor.      Problem: Fall Risk  Goal: Patient will remain free from falls  Outcome: Progressing  Note: Patient up to bathroom with family's assistance. Bed alarm noted to be off. Educated family on importance of calling for assistance prior to ambulating patient due to patient being high fall risk. Family verbalizes understanding. Bed alarm strip placed and connected to box. Patient instructed to call for assistance.

## 2022-05-26 NOTE — PROGRESS NOTES
Hospital Medicine Daily Progress Note    Date of Service  5/26/2022    Chief Complaint  Jan Palencia is a 81 y.o. female admitted 5/12/2022 with   Chief Complaint   Patient presents with   • Headache   • Hypertension   • Hernia   • Dental Pain     Left sided        Hospital Course  Patient is a 81-year-old female with a past medical history significant for obesity, new diagnosis of hypertension presented to the ER with headache, mild confusion hypertension and balance issues.  She was found to have right frontal meningioma with associated mass-effect on right frontal lobe with adjacent white matter edema.  Neurosurgery was consulted.  She was started on Keppra and Decadron.  She was initially admitted to the ICU for close monitoring.  IR was unable to perform embolization of feeder vessel due to vessel anatomy. She underwent a craniotomy and tumor resection on 5/19/22    Patient did well after surgery and was monitored overnight in RICU. As per neurosurgery, patient motor skills intact and downgraded to medical bed.  Patient kept on decadron and Keppra    Interval Problem Update  Daughter at bedside translating, she described a frightening episode last night, she and her friend had helped pt to the bathroom, while on the toilet, she became pale and then nearly passed out, the aid was able to catch her so she did not fall but she reports some rib pain from being squeezed. She was hypotensive at the time. BP improved this am and pt did very well with OT to the commode but is very afraid to walk to the bathroom again.   She is displaying evidence of emotional pain which is understandable given her circumstances and the fact that she cannot communicate with most of the people around her, she reported severe chest wall pain with light touch but none with distraction and pressure. Discussed with daughter. Unclear if episode was vasovagal due to BM but will stop norvasc and continue close monitoring. No h/a or abdominal  pain today. ROS otherwise negative    I have personally seen and examined the patient at bedside. I discussed the plan of care with family, patient, bedside RN, charge RN,        Consultants/Specialty  critical care and neurosurgery    Code Status  Full Code    Disposition  Patient is not medically cleared for discharge.   Anticipate discharge to to home with close outpatient follow-up.  I have placed the appropriate orders for post-discharge needs.    Review of Systems  Review of Systems   Unable to perform ROS: Language   Constitutional: Negative.  Negative for chills, diaphoresis, fever, malaise/fatigue and weight loss.   HENT: Negative.  Negative for sore throat.    Eyes: Negative.  Negative for blurred vision.   Respiratory: Negative.  Negative for cough and shortness of breath.    Cardiovascular: Negative.  Negative for chest pain, palpitations and leg swelling.   Gastrointestinal: Negative.  Negative for abdominal pain, constipation, nausea and vomiting.   Genitourinary: Negative.  Negative for dysuria.   Musculoskeletal: Negative.  Negative for myalgias.   Skin: Negative.  Negative for itching and rash.   Neurological: Positive for headaches. Negative for dizziness, sensory change, focal weakness and weakness.   Endo/Heme/Allergies: Negative.  Does not bruise/bleed easily.   Psychiatric/Behavioral: Negative.  Negative for depression, substance abuse and suicidal ideas.   All other systems reviewed and are negative.   Patient appeared to be complaining of a headache and back head pain.     Physical Exam  Temp:  [36.2 °C (97.2 °F)-37.1 °C (98.8 °F)] 36.4 °C (97.6 °F)  Pulse:  [40-61] 54  Resp:  [18] 18  BP: ()/(38-86) 122/62  SpO2:  [95 %-97 %] 95 %    Physical Exam  Vitals and nursing note reviewed.   Constitutional:       General: She is not in acute distress.     Appearance: She is obese. She is not ill-appearing.   HENT:      Head:      Comments: craniotomy incision c/d/i       Nose: Nose  normal.      Mouth/Throat:      Mouth: Mucous membranes are moist.      Pharynx: No oropharyngeal exudate.   Eyes:      General: No scleral icterus.     Conjunctiva/sclera: Conjunctivae normal.   Cardiovascular:      Rate and Rhythm: Normal rate and regular rhythm.      Pulses: Normal pulses.      Heart sounds: Normal heart sounds. No murmur heard.  Pulmonary:      Effort: Pulmonary effort is normal. No respiratory distress.      Breath sounds: Normal breath sounds. No wheezing.   Abdominal:      General: Bowel sounds are normal. There is no distension.      Palpations: Abdomen is soft.      Tenderness: There is no abdominal tenderness.      Hernia: A hernia (ventral) is present.   Musculoskeletal:         General: No swelling or tenderness.      Right lower leg: Edema (trace) present.      Left lower leg: Edema (trace) present.   Skin:     General: Skin is warm.      Coloration: Skin is not jaundiced.      Findings: No erythema.   Neurological:      Mental Status: She is alert and oriented to person, place, and time.         Fluids  No intake or output data in the 24 hours ending 05/26/22 1441    Laboratory  Recent Labs     05/26/22  0114   WBC 19.1*   RBC 3.79*   HEMOGLOBIN 10.7*   HEMATOCRIT 32.5*   MCV 85.8   MCH 28.2   MCHC 32.9*   RDW 44.2   PLATELETCT 283   MPV 10.8     Recent Labs     05/26/22  0114   SODIUM 134*   POTASSIUM 4.5   CHLORIDE 103   CO2 22   GLUCOSE 119*   BUN 19   CREATININE 0.38*   CALCIUM 8.3*                   Imaging  MR-BRAIN-WITH & W/O   Final Result      1.  Recent right frontal craniotomy for resection of large right anterior frontal meningioma. Right frontal subdural drain in place      2.  Bifrontal pneumocephalus.      3.  Postoperative cavity in the right anterior frontal region containing CSF and blood products.      4.  Postoperative changes and edema involving the residual right anterior frontal lobe with effacement of the frontal horns and slight right to left subfalcine  herniation.      5.  Moderately large gyriform area of acute infarction involving the right posterior medial temporal lobe and adjacent right medial occipital lobe.      6.  Age-related cerebral atrophy.      7.  Moderate periventricular and juxtacortical white matter changes consistent with chronic microvascular ischemic gliosis.      CT-HEAD W/O   Final Result         1.  Postop changes of right frontal meningioma resection with new pneumocephalus   2.  Hyperdensity along the anterior aspect of the right frontal lobe superiorly, appearance suggests possible Gelfoam or area of postoperative extra-axial hemorrhage.   3.  Atherosclerosis.         DX-CHEST-PORTABLE (1 VIEW)   Final Result      1.  Supportive tubing as described above.   2.  No pneumothorax.   3.  Minimal LEFT lung base atelectasis again seen.      DX-CHEST-PORTABLE (1 VIEW)   Final Result      No radiographic evidence of acute cardiopulmonary disease.      [id number] VIEW   Final Result         1.  Moderate stool in the colon suggests changes of constipation, otherwise nonspecific bowel gas pattern   2.  Hazy bilateral lung base opacities suggests subtle infiltrates or edema.      IR-EMBOLIZE-NEURO-INTRACRANIAL   Final Result   Impression:      Cerebral angiogram for assessment for vascular supply to the frontal meningioma demonstrates with the predominant supply to the meningioma from the artery of the falx cerebri arising from the left ophthalmic artery with minimal supply also arising from    the right-sided artery of the falx. No definite supply to the lesion from the middle meningeal arteries. Very minimal supply to the right side of the meningioma from distal frontal penetrating branches of the superficial temporal artery also noted.      Since the majority of the supply to the meningioma was from the ophthalmic artery, the lesion could not be safely embolized.      Ayo HARRIS was physically present and participated during the  entire procedure of the IR-EMBOLIZE-NEURO-INTRACRANIAL.         MR-STEALTH BRAIN WITH & W/O   Final Result         Large right inferior frontal region dural based extra-axial mass that most likely represents a meningioma. There is mild mass effect upon the bilateral inferior frontal lobes with midline shift to the left measuring approximately 10 to 11 mm. There is    also mass effect upon the lateral ventricles.      Minimal edema noted in the right superomedial frontal white matter adjacent to the mass.      Age-related volume loss and chronic microvascular ischemic changes.      CT-HEAD W/O   Final Result      1.  Negative for hemorrhage      2.  Large right frontal meningioma measuring 5.2 x 4.4 x 4.2 cm      3.  Associated mass effect on the right frontal lobe with adjacent white matter edema and mild compression of the frontal horn right lateral ventricle.         CT-MAXILLOFACIAL WITH PLUS RECONS   Final Result      1.  Negative for facial or orbital abscess      2.  CT face within normal limits      3.  Partially calcified right frontal extra-axial mass consistent with a meningioma described in detail on head CT report           Assessment/Plan  * Meningioma (HCC)- (present on admission)  Assessment & Plan  CT head shows Large right frontal meningioma measuring 5.2 x 4.4 x 4.2 cm and associated mass effect on the right frontal lobe with adjacent white matter edema and mild compression of the frontal horn right lateral ventricl  MRI was reviewed, neurosurgery Dr. Goodson has evaluated, recommended IR guided embolization on 5/14  IR unable to perform embolization of feeder artery due to anatomy    Continue decadron taper,  omeprazole 20 mg p.o. daily  Keppra 500 mg twice daily    S/p CRANIOTOMY, USING FRAMELESS STEREOTAXY - FRONTAL FOR BRAIN TUMOR RESECTION   Weaning steroids  Continue supportive care    Neurosurgery cleared her for discharge however given weakness and no insurance so no post-acute will keep  inpatient to work with PT/OT until safe for discharge      Vasovagal episode  Assessment & Plan  See above  Will d/c norvasc  Continue supportive care and close monitoring    Constipation  Assessment & Plan  Resolved  Continue bowel regimen    Leukocytosis- (present on admission)  Assessment & Plan  See above  following    HTN (hypertension)- (present on admission)  Assessment & Plan  Continue amlodipine and lisinopril.  Bp improved but not consistently at goal, norvasc was increased to 10 on 5/24 - continue to follow  monitoring  Goal sbp less 140    Abdominal pain  Assessment & Plan  Resolved  Likely due to constipation  Continue bowel regimen    Prediabetes- (present on admission)  Assessment & Plan  No apparent diabetes, no medication at home listed on EMR.   A1c 6.4 on admission.  Patient has hyperglycemia, while on dexamethasone    - sugars controlled and weaning steroids, dc insulin and glucose checks      Dental caries- (present on admission)  Assessment & Plan  Unasyn given in ED  No abscess seen on CT. unasyn stopped 5/16.  Leukocytosis increasing but this is likely due to steroids - no dental pain  Will continue to follow     VTE prophylaxis: enoxaparin ppx    I have performed a physical exam and reviewed and updated ROS and Plan today (5/26/2022). In review of yesterday's note (5/25/2022), there are no changes except as documented above.

## 2022-05-26 NOTE — THERAPY
"Physical Therapy   Daily Treatment     Patient Name: Jan Palencia  Age:  81 y.o., Sex:  female  Medical Record #: [id number]  Today's Date: 5/26/2022       Precautions: Fall Risk  Comments: crani, tumor resection    Assessment    Pt was on bsc upon arrival with daughter present. Upon arrival pt standing up from BSC without assist or device and proceeded with ena care performing dynamic balance with no lob. When asked to attempt to ambulate pt becmae very anxious fearful, reaching for therapist arm but performed a few steps back over to bed and returned to supine at this time. Educated on resting and than attempting gait however pt declining mobility. Extensive education to daughter regarding mobility, to wait for nursing to mobilize and discussed fear and anxiety impacting her mobility. Again reviewed potentially going home with  and bsc as pt seems to be transferring with less assist. Daughter also presents with anxiety and multiple questions regarding mobility and plan of care. However she stated \"I don't think my mom can walk today\" and than once pt returned supine daughter inquisitive why we weren't walking. Will continue to follow while in house, today pt limited due to pain and nausea.    Plan    Continue current treatment plan.    DC Equipment Recommendations: Wheelchair, Front-Wheel Walker  Discharge Recommendations: Recommend post-acute placement for additional physical therapy services prior to discharge home         05/26/22 0990   Other Treatments   Other Treatments Provided pt standing for pericare with no assist and no fww, no lob observed. Again educate with family and physician regarding potential wc level to discharge home, lot of anxiety and fear is limiting as of now. As pt refusing to walk and than daughter agreeing but than asking therapist why we aren't walking   Balance   Sitting Balance (Static) Fair   Sitting Balance (Dynamic) Fair -   Standing Balance (Static) Fair -   Standing Balance " (Dynamic) Poor +   Weight Shift Sitting Poor   Weight Shift Standing Poor   Skilled Intervention Verbal Cuing   Gait Analysis   Gait Level Of Assist Refused   Comments pt fearful, seemed anxious, reporting she can't walk she feels nauseous. Enouragement to sit and rest and than try gait, pt refusing laying back supine.   Bed Mobility    Supine to Sit   (on [facility] upon arrival)   Sit to Supine Supervised   Scooting Supervised   Comments highly anxious, able to return back to bed without assist   Functional Mobility   Sit to Stand Standby Assist   Bed, Chair, Wheelchair Transfer Contact Guard Assist   Skilled Intervention Verbal Cuing;Sequencing   Comments pt standing from [facility] with no AD and SBA, no lob, performed stand step pivot back to bed with CGA. Pt reaching out for therapist however seemed more out of anxiety and fear.   Short Term Goals    Short Term Goal # 1 pt will perform supine <> sit with HOB flat, no railing and SPV in 6 visits   Goal Outcome # 1 Progressing as expected   Short Term Goal # 2 pt will perform sit <> stand and transfer between various surfaces with LRAD and SPV in 6 visits   Goal Outcome # 2 Progressing as expected   Short Term Goal # 3 pt will ambulate > 150 ft with LRAD and SPV to access home/community needs in 6 visits   Goal Outcome # 3 Goal not met   Short Term Goal # 4 pt will negotiate 3- 12 steps to access home environment with LRAD and SPV in 6 visits   Goal Outcome # 4 Goal not met

## 2022-05-26 NOTE — PROGRESS NOTES
"1835: Patient's family member walked out of room asking for help. Per family patient \"passed out\" when taken to restroom. This RN in to assist. Patient sitting on toilet after having BM. Patient looking very weak and reporting feeling unwell. Patient stating \"help. I feel like I'm dying.\" This RN reassured patient and family. Called CNA for assistance to transfer patient back to bed. Upon transfer back to bed. Patient's vital signs taken.  BP: 90/38  HR: 40  O2: 96% on RA  Informed patient and family that patient possibly had orthostatic hypotension or vasovagal episode. Patient connected to . HR back up to high 50's-low 60's. BP rechecked. BP up to 94/51. Encouraged fluid intake. Patient feeling better after laying in bed for a few minutes.  Educated family of importance to call RN's/CNA's for assistance prior to ambulating patients due to patient's high fall risk. Family verbalized understanding. Patient requesting purewick for the night. Purewick placed. Scheduled tylenol administered for pain. Patient repositioned and now resting comfortably. Will continue to monitor HR and blood pressure throughout the night. No other needs at this time.    2315: BP up to 129/64. Patient feeling well. Will continue to monitor.   "

## 2022-05-26 NOTE — ASSESSMENT & PLAN NOTE
Bp meds likely contributed - bp improved off of norvasc  No further sx  Continue to follow  Continue supportive care and close monitoring

## 2022-05-27 LAB
BASOPHILS # BLD AUTO: 0 % (ref 0–1.8)
BASOPHILS # BLD: 0 K/UL (ref 0–0.12)
EOSINOPHIL # BLD AUTO: 0 K/UL (ref 0–0.51)
EOSINOPHIL NFR BLD: 0 % (ref 0–6.9)
ERYTHROCYTE [DISTWIDTH] IN BLOOD BY AUTOMATED COUNT: 46.3 FL (ref 35.9–50)
HCT VFR BLD AUTO: 35.6 % (ref 37–47)
HGB BLD-MCNC: 11.4 G/DL (ref 12–16)
LYMPHOCYTES # BLD AUTO: 0.57 K/UL (ref 1–4.8)
LYMPHOCYTES NFR BLD: 3.5 % (ref 22–41)
MANUAL DIFF BLD: NORMAL
MCH RBC QN AUTO: 28.1 PG (ref 27–33)
MCHC RBC AUTO-ENTMCNC: 32 G/DL (ref 33.6–35)
MCV RBC AUTO: 87.9 FL (ref 81.4–97.8)
METAMYELOCYTES NFR BLD MANUAL: 2.6 %
MONOCYTES # BLD AUTO: 0.15 K/UL (ref 0–0.85)
MONOCYTES NFR BLD AUTO: 0.9 % (ref 0–13.4)
MORPHOLOGY BLD-IMP: NORMAL
MYELOCYTES NFR BLD MANUAL: 3.5 %
NEUTROPHILS # BLD AUTO: 14.5 K/UL (ref 2–7.15)
NEUTROPHILS NFR BLD: 89.5 % (ref 44–72)
NRBC # BLD AUTO: 0 K/UL
NRBC BLD-RTO: 0 /100 WBC
PLATELET # BLD AUTO: 285 K/UL (ref 164–446)
PLATELET BLD QL SMEAR: NORMAL
PMV BLD AUTO: 10.6 FL (ref 9–12.9)
RBC # BLD AUTO: 4.05 M/UL (ref 4.2–5.4)
RBC BLD AUTO: NORMAL
WBC # BLD AUTO: 16.2 K/UL (ref 4.8–10.8)

## 2022-05-27 PROCEDURE — A9270 NON-COVERED ITEM OR SERVICE: HCPCS | Performed by: INTERNAL MEDICINE

## 2022-05-27 PROCEDURE — 99232 SBSQ HOSP IP/OBS MODERATE 35: CPT | Performed by: HOSPITALIST

## 2022-05-27 PROCEDURE — 770001 HCHG ROOM/CARE - MED/SURG/GYN PRIV*

## 2022-05-27 PROCEDURE — 36415 COLL VENOUS BLD VENIPUNCTURE: CPT

## 2022-05-27 PROCEDURE — A9270 NON-COVERED ITEM OR SERVICE: HCPCS | Performed by: PHYSICIAN ASSISTANT

## 2022-05-27 PROCEDURE — 700102 HCHG RX REV CODE 250 W/ 637 OVERRIDE(OP): Performed by: STUDENT IN AN ORGANIZED HEALTH CARE EDUCATION/TRAINING PROGRAM

## 2022-05-27 PROCEDURE — A9270 NON-COVERED ITEM OR SERVICE: HCPCS | Performed by: STUDENT IN AN ORGANIZED HEALTH CARE EDUCATION/TRAINING PROGRAM

## 2022-05-27 PROCEDURE — 85025 COMPLETE CBC W/AUTO DIFF WBC: CPT

## 2022-05-27 PROCEDURE — 700102 HCHG RX REV CODE 250 W/ 637 OVERRIDE(OP): Performed by: INTERNAL MEDICINE

## 2022-05-27 PROCEDURE — 85007 BL SMEAR W/DIFF WBC COUNT: CPT

## 2022-05-27 PROCEDURE — 97530 THERAPEUTIC ACTIVITIES: CPT

## 2022-05-27 PROCEDURE — 700102 HCHG RX REV CODE 250 W/ 637 OVERRIDE(OP): Performed by: PHYSICIAN ASSISTANT

## 2022-05-27 PROCEDURE — 700111 HCHG RX REV CODE 636 W/ 250 OVERRIDE (IP): Performed by: INTERNAL MEDICINE

## 2022-05-27 RX ADMIN — LISINOPRIL 40 MG: 20 TABLET ORAL at 06:14

## 2022-05-27 RX ADMIN — DEXAMETHASONE 2 MG: 4 TABLET ORAL at 18:12

## 2022-05-27 RX ADMIN — ACETAMINOPHEN 1000 MG: 500 TABLET ORAL at 18:12

## 2022-05-27 RX ADMIN — DEXAMETHASONE 2 MG: 4 TABLET ORAL at 06:10

## 2022-05-27 RX ADMIN — ACETAMINOPHEN 1000 MG: 500 TABLET ORAL at 21:04

## 2022-05-27 RX ADMIN — ENOXAPARIN SODIUM 40 MG: 40 INJECTION SUBCUTANEOUS at 18:15

## 2022-05-27 RX ADMIN — OMEPRAZOLE 20 MG: 20 CAPSULE, DELAYED RELEASE ORAL at 06:10

## 2022-05-27 RX ADMIN — LEVETIRACETAM 500 MG: 500 TABLET, FILM COATED ORAL at 18:12

## 2022-05-27 RX ADMIN — ACETAMINOPHEN 1000 MG: 500 TABLET ORAL at 09:27

## 2022-05-27 RX ADMIN — LEVETIRACETAM 500 MG: 500 TABLET, FILM COATED ORAL at 06:10

## 2022-05-27 ASSESSMENT — ENCOUNTER SYMPTOMS
VOMITING: 0
SORE THROAT: 0
CHILLS: 0
RESPIRATORY NEGATIVE: 1
FEVER: 0
ABDOMINAL PAIN: 0
GASTROINTESTINAL NEGATIVE: 1
SENSORY CHANGE: 0
BLURRED VISION: 0
SHORTNESS OF BREATH: 0
DIZZINESS: 0
MUSCULOSKELETAL NEGATIVE: 1
NAUSEA: 0
PSYCHIATRIC NEGATIVE: 1
DEPRESSION: 0
FOCAL WEAKNESS: 0
EYES NEGATIVE: 1
CARDIOVASCULAR NEGATIVE: 1
WEAKNESS: 0
MYALGIAS: 0
HEADACHES: 1
DIAPHORESIS: 0
BRUISES/BLEEDS EASILY: 0
CONSTIPATION: 0
PALPITATIONS: 0
WEIGHT LOSS: 0
COUGH: 0
CONSTITUTIONAL NEGATIVE: 1

## 2022-05-27 ASSESSMENT — LIFESTYLE VARIABLES: SUBSTANCE_ABUSE: 0

## 2022-05-27 ASSESSMENT — COGNITIVE AND FUNCTIONAL STATUS - GENERAL
WALKING IN HOSPITAL ROOM: A LITTLE
MOVING TO AND FROM BED TO CHAIR: A LITTLE
STANDING UP FROM CHAIR USING ARMS: A LITTLE
SUGGESTED CMS G CODE MODIFIER MOBILITY: CK
MOBILITY SCORE: 17
MOVING FROM LYING ON BACK TO SITTING ON SIDE OF FLAT BED: A LITTLE
CLIMB 3 TO 5 STEPS WITH RAILING: A LOT
TURNING FROM BACK TO SIDE WHILE IN FLAT BAD: A LITTLE

## 2022-05-27 ASSESSMENT — GAIT ASSESSMENTS
DEVIATION: BRADYKINETIC;SHUFFLED GAIT;INCREASED BASE OF SUPPORT
ASSISTIVE DEVICE: FRONT WHEEL WALKER
DISTANCE (FEET): 12
GAIT LEVEL OF ASSIST: CONTACT GUARD ASSIST

## 2022-05-27 ASSESSMENT — PAIN DESCRIPTION - PAIN TYPE
TYPE: ACUTE PAIN
TYPE: ACUTE PAIN

## 2022-05-27 NOTE — FACE TO FACE
Face to Face Note  -  Durable Medical Equipment    Kecia Hernandez M.D. - NPI: [id number]  I certify that this patient is under my care and that they had a durable medical equipment(DME)face to face encounter by myself that meets the physician DME face-to-face encounter requirements with this patient on:    Date of encounter:   Patient:                    MRN:                       YOB: 2020  Jan Palencia  [id number]  1940     The encounter with the patient was in whole, or in part, for the following medical condition, which is the primary reason for durable medical equipment:  CVA    I certify that, based on my findings, the following durable medical equipment is medically necessary:  Wheel Chair.  Bedside commode  HOME O2 Saturation Measurements:(Values must be present for Home Oxygen orders)         ,     ,         My Clinical findings support the need for the above equipment due to:  Abnormal Gait    Supporting Symptoms  Weakness  PT recommendation    If patient feels more short of breath, they can go up to 6 liters per minute and contact healthcare provider.

## 2022-05-27 NOTE — PROGRESS NOTES
Hospital Medicine Daily Progress Note    Date of Service  2022    Chief Complaint  Jan Palencia is a 81 y.o. female admitted 2022 with   Chief Complaint   Patient presents with   • Headache   • Hypertension   • Hernia   • Dental Pain     Left sided        Hospital Course  Patient is a 81-year-old female with a past medical history significant for obesity, new diagnosis of hypertension presented to the ER with headache, mild confusion hypertension and balance issues.  She was found to have a a right frontal meningioma with associated mass-effect on right frontal lobe with adjacent white matter edema.  Neurosurgery was consulted.  She was started on Keppra and Decadron.  She was initially admitted to the ICU for close monitoring.  IR was unable to perform embolization of feeder vessel due to vessel anatomy. She underwent a craniotomy and tumor resection on 22    Patient did well after surgery and was monitored overnight in RICU. As per neurosurgery, patient motor skills intact and downgraded to medical bed.  Patient kept on decadron and Keppra    Interval Problem Update  No further dizziness or low bp, discussed with nursing they will check orthostatics today. Patient reports mild HA today, no further abdominal or chest wall pain. ROS otherwise negative. I confirmed with the pathologist that path is a meningioma - report is not visible in epic currently, he is addressing this. Daughter reports her mother' visa will  soon and she needs a letter from the hospital - I discussed this with case mgt and she will work on it.    I have personally seen and examined the patient at bedside. I discussed the plan of care with family, patient, bedside RN, charge RN,  and pathologist    Wheelchair and FWW ordered    Consultants/Specialty  critical care and neurosurgery    Code Status  Full Code    Disposition  Patient is not medically cleared for discharge.   Anticipate discharge to to home with  close outpatient follow-up.  I have placed the appropriate orders for post-discharge needs.    Review of Systems  Review of Systems   Unable to perform ROS: Language   Constitutional: Negative.  Negative for chills, diaphoresis, fever, malaise/fatigue and weight loss.   HENT: Negative.  Negative for sore throat.    Eyes: Negative.  Negative for blurred vision.   Respiratory: Negative.  Negative for cough and shortness of breath.    Cardiovascular: Negative.  Negative for chest pain, palpitations and leg swelling.   Gastrointestinal: Negative.  Negative for abdominal pain, constipation, nausea and vomiting.   Genitourinary: Negative.  Negative for dysuria.   Musculoskeletal: Negative.  Negative for myalgias.   Skin: Negative.  Negative for itching and rash.   Neurological: Positive for headaches. Negative for dizziness, sensory change, focal weakness and weakness.   Endo/Heme/Allergies: Negative.  Does not bruise/bleed easily.   Psychiatric/Behavioral: Negative.  Negative for depression, substance abuse and suicidal ideas.   All other systems reviewed and are negative.   Patient appeared to be complaining of a headache and back head pain.     Physical Exam  Temp:  [36.5 °C (97.7 °F)-37.1 °C (98.8 °F)] 36.5 °C (97.7 °F)  Pulse:  [59-78] 60  Resp:  [18] 18  BP: (109-144)/(56-81) 109/67  SpO2:  [95 %-98 %] 95 %    Physical Exam  Vitals and nursing note reviewed.   Constitutional:       General: She is not in acute distress.     Appearance: She is obese. She is not ill-appearing.   HENT:      Head:      Comments: craniotomy incision c/d/i       Nose: Nose normal.      Mouth/Throat:      Mouth: Mucous membranes are moist.      Pharynx: No oropharyngeal exudate.   Eyes:      General: No scleral icterus.     Conjunctiva/sclera: Conjunctivae normal.   Cardiovascular:      Rate and Rhythm: Normal rate and regular rhythm.      Pulses: Normal pulses.      Heart sounds: Normal heart sounds. No murmur heard.  Pulmonary:       Effort: Pulmonary effort is normal. No respiratory distress.      Breath sounds: Normal breath sounds. No wheezing.   Abdominal:      General: Bowel sounds are normal. There is no distension.      Palpations: Abdomen is soft.      Tenderness: There is no abdominal tenderness.      Hernia: A hernia (ventral) is present.   Musculoskeletal:         General: No swelling or tenderness.      Right lower leg: Edema (trace) present.      Left lower leg: Edema (trace) present.   Skin:     General: Skin is warm.      Coloration: Skin is not jaundiced.      Findings: No erythema.   Neurological:      Mental Status: She is alert and oriented to person, place, and time.         Fluids  No intake or output data in the 24 hours ending 05/27/22 1143    Laboratory  Recent Labs     05/26/22  0114 05/27/22  0301   WBC 19.1* 16.2*   RBC 3.79* 4.05*   HEMOGLOBIN 10.7* 11.4*   HEMATOCRIT 32.5* 35.6*   MCV 85.8 87.9   MCH 28.2 28.1   MCHC 32.9* 32.0*   RDW 44.2 46.3   PLATELETCT 283 285   MPV 10.8 10.6     Recent Labs     05/26/22  0114   SODIUM 134*   POTASSIUM 4.5   CHLORIDE 103   CO2 22   GLUCOSE 119*   BUN 19   CREATININE 0.38*   CALCIUM 8.3*                   Imaging  MR-BRAIN-WITH & W/O   Final Result      1.  Recent right frontal craniotomy for resection of large right anterior frontal meningioma. Right frontal subdural drain in place      2.  Bifrontal pneumocephalus.      3.  Postoperative cavity in the right anterior frontal region containing CSF and blood products.      4.  Postoperative changes and edema involving the residual right anterior frontal lobe with effacement of the frontal horns and slight right to left subfalcine herniation.      5.  Moderately large gyriform area of acute infarction involving the right posterior medial temporal lobe and adjacent right medial occipital lobe.      6.  Age-related cerebral atrophy.      7.  Moderate periventricular and juxtacortical white matter changes consistent with chronic  microvascular ischemic gliosis.      CT-HEAD W/O   Final Result         1.  Postop changes of right frontal meningioma resection with new pneumocephalus   2.  Hyperdensity along the anterior aspect of the right frontal lobe superiorly, appearance suggests possible Gelfoam or area of postoperative extra-axial hemorrhage.   3.  Atherosclerosis.         DX-CHEST-PORTABLE (1 VIEW)   Final Result      1.  Supportive tubing as described above.   2.  No pneumothorax.   3.  Minimal LEFT lung base atelectasis again seen.      DX-CHEST-PORTABLE (1 VIEW)   Final Result      No radiographic evidence of acute cardiopulmonary disease.      [id number] VIEW   Final Result         1.  Moderate stool in the colon suggests changes of constipation, otherwise nonspecific bowel gas pattern   2.  Hazy bilateral lung base opacities suggests subtle infiltrates or edema.      IR-EMBOLIZE-NEURO-INTRACRANIAL   Final Result   Impression:      Cerebral angiogram for assessment for vascular supply to the frontal meningioma demonstrates with the predominant supply to the meningioma from the artery of the falx cerebri arising from the left ophthalmic artery with minimal supply also arising from    the right-sided artery of the falx. No definite supply to the lesion from the middle meningeal arteries. Very minimal supply to the right side of the meningioma from distal frontal penetrating branches of the superficial temporal artery also noted.      Since the majority of the supply to the meningioma was from the ophthalmic artery, the lesion could not be safely embolized.      [place] was physically present and participated during the entire procedure of the IR-EMBOLIZE-NEURO-INTRACRANIAL.         MR-STEATG Access BRAIN WITH & W/O   Final Result         Large right inferior frontal region dural based extra-axial mass that most likely represents a meningioma. There is mild mass effect upon the bilateral inferior frontal lobes with midline  shift to the left measuring approximately 10 to 11 mm. There is    also mass effect upon the lateral ventricles.      Minimal edema noted in the right superomedial frontal white matter adjacent to the mass.      Age-related volume loss and chronic microvascular ischemic changes.      CT-HEAD W/O   Final Result      1.  Negative for hemorrhage      2.  Large right frontal meningioma measuring 5.2 x 4.4 x 4.2 cm      3.  Associated mass effect on the right frontal lobe with adjacent white matter edema and mild compression of the frontal horn right lateral ventricle.         CT-MAXILLOFACIAL WITH PLUS RECONS   Final Result      1.  Negative for facial or orbital abscess      2.  CT face within normal limits      3.  Partially calcified right frontal extra-axial mass consistent with a meningioma described in detail on head CT report           Assessment/Plan  * Meningioma (HCC)- (present on admission)  Assessment & Plan  CT head shows Large right frontal meningioma measuring 5.2 x 4.4 x 4.2 cm and associated mass effect on the right frontal lobe with adjacent white matter edema and mild compression of the frontal horn right lateral ventricl  MRI was reviewed, neurosurgery Dr. Goodson has evaluated, recommended IR guided embolization on 5/14  IR unable to perform embolization of feeder artery due to anatomy    Continue decadron taper,  omeprazole 20 mg p.o. daily  Keppra 500 mg twice daily    S/p CRANIOTOMY, USING FRAMELESS STEREOTAXY - FRONTAL FOR BRAIN TUMOR RESECTION   Weaning steroids  Continue supportive care    Neurosurgery cleared her for discharge however given weakness and no insurance so no post-acute will keep inpatient to work with PT/OT until safe for discharge      Vasovagal episode  Assessment & Plan  Bp meds likely contributed - bp improved off of norvasc  No further sx  Continue to follow  Continue supportive care and close monitoring    Constipation  Assessment & Plan  Resolved  Continue bowel  regimen    Leukocytosis- (present on admission)  Assessment & Plan  See above  following    HTN (hypertension)- (present on admission)  Assessment & Plan  Continue amlodipine and lisinopril.  Bp improved but not consistently at goal, norvasc was increased to 10 on 5/24 - continue to follow  monitoring  Goal sbp less 140    Abdominal pain  Assessment & Plan  Resolved  Likely due to constipation  Continue bowel regimen    Prediabetes- (present on admission)  Assessment & Plan  No apparent diabetes, no medication at home listed on EMR.   A1c 6.4 on admission.  Patient has hyperglycemia, while on dexamethasone    - sugars controlled and weaning steroids, dc insulin and glucose checks      Dental caries- (present on admission)  Assessment & Plan  Unasyn given in ED  No abscess seen on CT. unasyn stopped 5/16.  Leukocytosis increased but now improving likely due to steroids - no dental pain  Will continue to follow intermittently     VTE prophylaxis: enoxaparin ppx    I have performed a physical exam and reviewed and updated ROS and Plan today (5/27/2022). In review of yesterday's note (5/26/2022), there are no changes except as documented above.

## 2022-05-27 NOTE — CARE PLAN
The patient is Stable - Low risk of patient condition declining or worsening    Shift Goals  Clinical Goals: Mobility  Patient Goals: Rest, no pain  Family Goals: Blood pressure and heart rate, mobility    Progress made toward(s) clinical / shift goals:  Pt mobilized with physical therapy today, c/o pain in rib cage frequently.    Patient is not progressing towards the following goals:

## 2022-05-27 NOTE — CARE PLAN
The patient is Stable - Low risk of patient condition declining or worsening    Shift Goals  Clinical Goals: Monitor blood pressure;  Patient Goals: Sleep;  Family Goals: Monitor blood pressure and heart rate;      Problem: Syncope Management  Goal: Patient's vital signs will be with within normal range or improve  Outcome: Progressing  Note: Patient's vitals signs assessed and appear stable. No signs/symptoms of syncope today. Norvasc discontinued by provider. Will continue to monitor.      Problem: Knowledge Deficit - Standard  Goal: Patient and family/care givers will demonstrate understanding of plan of care, disease process/condition, diagnostic tests and medications  Outcome: Progressing  Note: Plan of care updated. Will continue to monitor vitals signs/neuro checks.

## 2022-05-27 NOTE — THERAPY
Physical Therapy   Daily Treatment     Patient Name: Jan Palencia  Age:  81 y.o., Sex:  female  Medical Record #: [id number]  Today's Date: 5/27/2022     Precautions  Precautions: Fall Risk  Comments: crani w/ tumor resection    Assessment    Pt seen for PT treatment session, Dtr present and translating as unable to reach an  via language line today. Pt continues to need heavy encouragement to initiate mobility on her own and do as much as she is capable of. Trialed ambulation with FWW with pt quickly reaching for UE support from dtr and PT. Provided pt with FWW and reiterated importance of proper placement and use to reduce falls with pt demonstrated improve gait but continues with wide NEVILLE and shuffled feet. Pt quick to have others do for her, particularly her Dtr, and reiterated to both that the more the patient does for herself the better she will recover from surgery. Pt will continue to follow while in house, needs to be able to negotiate 2-3 steps to enter home to reside on ground floor.     Plan    Continue current treatment plan.    DC Equipment Recommendations: Front-Wheel Walker  Discharge Recommendations:  (placement vs Home health. Only needs to perform 2-3 steps to enter home and remain on ground floor)       05/27/22 0930   Precautions   Precautions Fall Risk   Comments crani w/ tumor resection   Vitals   O2 Delivery Device None - Room Air   Pain 0 - 10 Group   Therapist Pain Assessment During Activity;Nurse Notified  (per Dtr, notes dizziness)   Cognition    Level of Consciousness Alert   Comments attempted to use language line  but none available after 5 minutes of waiting. Dtr present and translated for therapy session   Active ROM Lower Body    Active ROM Lower Body  WDL   Comments adequate for functional mobility   Strength Lower Body   Lower Body Strength  WDL   Comments as above, observed with funtional mobility   Balance   Sitting Balance (Static) Fair +   Sitting Balance  (Dynamic) Fair +   Standing Balance (Static) Fair   Standing Balance (Dynamic) Fair -   Weight Shift Sitting Fair   Weight Shift Standing Fair   Skilled Intervention Compensatory Strategies;Verbal Cuing   Comments initally attempted w/o AD but pt frequently reaching out for B UE support, thus used FWW with improved success   Gait Analysis   Gait Level Of Assist Contact Guard Assist   Assistive Device Front Wheel Walker   Distance (Feet) 12   # of Times Distance was Traveled 1   Deviation Bradykinetic;Shuffled Gait;Increased Base Of Support   # of Stairs Climbed 0   Weight Bearing Status no restrictions   Skilled Intervention Verbal Cuing   Comments cues for FWW placement and reiterated pt needs to try on her own in order to get better   Bed Mobility    Supine to Sit Minimal Assist   Sit to Supine   (seatedin chair at end of session)   Scooting Supervised   Comments HOB flat, needs cues and encouragement to do for herself   Functional Mobility   Sit to Stand Standby Assist   Bed, Chair, Wheelchair Transfer Minimal Assist  (heavy cues as pt attempting to sit on arm rest of chair)   Transfer Method Stand Step   Skilled Intervention Verbal Cuing;Compensatory Strategies   How much difficulty does the patient currently have...   Turning over in bed (including adjusting bedclothes, sheets and blankets)? 3   Sitting down on and standing up from a chair with arms (e.g., wheelchair, bedside commode, etc.) 3   Moving from lying on back to sitting on the side of the bed? 3   How much help from another person does the patient currently need...   Moving to and from a bed to a chair (including a wheelchair)? 3   Need to walk in a hospital room? 3   Climbing 3-5 steps with a railing? 2   6 clicks Mobility Score 17   Activity Tolerance   Sitting in Chair up to chair at end of session   Sitting Edge of Bed 10 min   Standing 7 min   Comments Dtr and pt educated on the importance of having pt do as much as she can for herself to aide  in her recovery of function. Appears dtr has been feeding pt her meals when pt is capable of feeding herself. Encouraged OOB to chair throughout the day as pt should not remain in bed upon DC home   Short Term Goals    Short Term Goal # 1 pt will perform supine <> sit with HOB flat, no railing and SPV in 6 visits   Goal Outcome # 1 goal not met   Short Term Goal # 2 pt will perform sit <> stand and transfer between various surfaces with LRAD and SPV in 6 visits   Goal Outcome # 2 Goal not met   Short Term Goal # 3 pt will ambulate > 150 ft with LRAD and SPV to access home/community needs in 6 visits   Goal Outcome # 3 Goal not met   Short Term Goal # 4 pt will negotiate 3- 12 steps to access home environment with LRAD and SPV in 6 visits   Goal Outcome # 4 Goal not met   Anticipated Discharge Equipment and Recommendations   DC Equipment Recommendations Front-Wheel Walker   Discharge Recommendations   (placement vs Home health. Only needs to perform 2-3 steps to enter home and remain on ground floor)   Interdisciplinary Plan of Care Collaboration   IDT Collaboration with  Nursing;Family / Caregiver   Patient Position at End of Therapy Seated;Chair Alarm On;Call Light within Reach;Tray Table within Reach;Family / Friend in Room   Collaboration Comments aware of PT session   Session Information   Date / Session Number  5/27- 4 (4/5, 5/28)

## 2022-05-28 LAB
BASOPHILS # BLD AUTO: 0 % (ref 0–1.8)
BASOPHILS # BLD: 0 K/UL (ref 0–0.12)
EOSINOPHIL # BLD AUTO: 0 K/UL (ref 0–0.51)
EOSINOPHIL NFR BLD: 0 % (ref 0–6.9)
ERYTHROCYTE [DISTWIDTH] IN BLOOD BY AUTOMATED COUNT: 46.3 FL (ref 35.9–50)
HCT VFR BLD AUTO: 34.1 % (ref 37–47)
HGB BLD-MCNC: 11 G/DL (ref 12–16)
LYMPHOCYTES # BLD AUTO: 1.05 K/UL (ref 1–4.8)
LYMPHOCYTES NFR BLD: 6.3 % (ref 22–41)
MANUAL DIFF BLD: NORMAL
MCH RBC QN AUTO: 28.1 PG (ref 27–33)
MCHC RBC AUTO-ENTMCNC: 32.3 G/DL (ref 33.6–35)
MCV RBC AUTO: 87 FL (ref 81.4–97.8)
METAMYELOCYTES NFR BLD MANUAL: 0.9 %
MONOCYTES # BLD AUTO: 0.6 K/UL (ref 0–0.85)
MONOCYTES NFR BLD AUTO: 3.6 % (ref 0–13.4)
MORPHOLOGY BLD-IMP: NORMAL
MYELOCYTES NFR BLD MANUAL: 2.7 %
NEUTROPHILS # BLD AUTO: 14.36 K/UL (ref 2–7.15)
NEUTROPHILS NFR BLD: 86.5 % (ref 44–72)
NRBC # BLD AUTO: 0 K/UL
NRBC BLD-RTO: 0 /100 WBC
PLATELET # BLD AUTO: 295 K/UL (ref 164–446)
PLATELET BLD QL SMEAR: NORMAL
PMV BLD AUTO: 10.4 FL (ref 9–12.9)
RBC # BLD AUTO: 3.92 M/UL (ref 4.2–5.4)
RBC BLD AUTO: NORMAL
WBC # BLD AUTO: 16.6 K/UL (ref 4.8–10.8)

## 2022-05-28 PROCEDURE — A9270 NON-COVERED ITEM OR SERVICE: HCPCS | Performed by: STUDENT IN AN ORGANIZED HEALTH CARE EDUCATION/TRAINING PROGRAM

## 2022-05-28 PROCEDURE — 85007 BL SMEAR W/DIFF WBC COUNT: CPT

## 2022-05-28 PROCEDURE — 700102 HCHG RX REV CODE 250 W/ 637 OVERRIDE(OP): Performed by: INTERNAL MEDICINE

## 2022-05-28 PROCEDURE — 85025 COMPLETE CBC W/AUTO DIFF WBC: CPT

## 2022-05-28 PROCEDURE — 770001 HCHG ROOM/CARE - MED/SURG/GYN PRIV*

## 2022-05-28 PROCEDURE — 700111 HCHG RX REV CODE 636 W/ 250 OVERRIDE (IP): Performed by: INTERNAL MEDICINE

## 2022-05-28 PROCEDURE — A9270 NON-COVERED ITEM OR SERVICE: HCPCS | Performed by: INTERNAL MEDICINE

## 2022-05-28 PROCEDURE — 36415 COLL VENOUS BLD VENIPUNCTURE: CPT

## 2022-05-28 PROCEDURE — 700102 HCHG RX REV CODE 250 W/ 637 OVERRIDE(OP): Performed by: STUDENT IN AN ORGANIZED HEALTH CARE EDUCATION/TRAINING PROGRAM

## 2022-05-28 PROCEDURE — 99232 SBSQ HOSP IP/OBS MODERATE 35: CPT | Performed by: HOSPITALIST

## 2022-05-28 PROCEDURE — 700102 HCHG RX REV CODE 250 W/ 637 OVERRIDE(OP): Performed by: PHYSICIAN ASSISTANT

## 2022-05-28 PROCEDURE — A9270 NON-COVERED ITEM OR SERVICE: HCPCS | Performed by: PHYSICIAN ASSISTANT

## 2022-05-28 RX ADMIN — ENOXAPARIN SODIUM 40 MG: 40 INJECTION SUBCUTANEOUS at 18:37

## 2022-05-28 RX ADMIN — HYDROCODONE BITARTRATE AND ACETAMINOPHEN 1 TABLET: 5; 325 TABLET ORAL at 22:43

## 2022-05-28 RX ADMIN — ACETAMINOPHEN 1000 MG: 500 TABLET ORAL at 09:50

## 2022-05-28 RX ADMIN — POLYETHYLENE GLYCOL 3350 1 PACKET: 17 POWDER, FOR SOLUTION ORAL at 18:02

## 2022-05-28 RX ADMIN — OMEPRAZOLE 20 MG: 20 CAPSULE, DELAYED RELEASE ORAL at 05:46

## 2022-05-28 RX ADMIN — LEVETIRACETAM 500 MG: 500 TABLET, FILM COATED ORAL at 17:59

## 2022-05-28 RX ADMIN — ACETAMINOPHEN 1000 MG: 500 TABLET ORAL at 17:12

## 2022-05-28 RX ADMIN — LEVETIRACETAM 500 MG: 500 TABLET, FILM COATED ORAL at 05:46

## 2022-05-28 RX ADMIN — ACETAMINOPHEN 1000 MG: 500 TABLET ORAL at 20:43

## 2022-05-28 RX ADMIN — DEXAMETHASONE 2 MG: 4 TABLET ORAL at 05:46

## 2022-05-28 RX ADMIN — DEXAMETHASONE 2 MG: 4 TABLET ORAL at 17:59

## 2022-05-28 RX ADMIN — DOCUSATE SODIUM 100 MG: 100 CAPSULE, LIQUID FILLED ORAL at 17:59

## 2022-05-28 ASSESSMENT — PAIN DESCRIPTION - PAIN TYPE
TYPE: ACUTE PAIN

## 2022-05-28 NOTE — PROGRESS NOTES
Hospital Medicine Daily Progress Note    Date of Service  5/29/2022    Chief Complaint  Jan Palencia is a 81 y.o. female admitted 5/12/2022 with   Chief Complaint   Patient presents with   • Headache   • Hypertension   • Hernia   • Dental Pain     Left sided        Hospital Course  Patient is a 81-year-old female with a past medical history significant for obesity, new diagnosis of hypertension presented to the ER with headache, mild confusion hypertension and balance issues.  She was found to have a a right frontal meningioma with associated mass-effect on right frontal lobe with adjacent white matter edema.  Neurosurgery was consulted.  She was started on Keppra and Decadron.  She was initially admitted to the ICU for close monitoring.  IR was unable to perform embolization of feeder vessel due to vessel anatomy. She underwent a craniotomy and tumor resection on 5/19/22    Patient did well after surgery and was monitored overnight in RICU. Patient was kept on decadron and Keppra    Interval Problem Update  She continues to slowly improve, denies pain this am, no dizziness. ROS negative, vitals and exam stable. Hopefully home in 1-2 days, awaiting clearance from PT/O And procurement of equipment for home.     I have personally seen and examined the patient at bedside. I discussed the plan of care with family, patient, bedside RN, charge RN,  and pathologist    Wheelchair and FWW ordered    Consultants/Specialty  critical care and neurosurgery    Code Status  Full Code    Disposition  Patient is not medically cleared for discharge.   Anticipate discharge to to home with close outpatient follow-up.  I have placed the appropriate orders for post-discharge needs.    Review of Systems  Review of Systems   Unable to perform ROS: Language   Constitutional: Negative.  Negative for chills, diaphoresis, fever, malaise/fatigue and weight loss.   HENT: Negative.  Negative for sore throat.    Eyes: Negative.   Negative for blurred vision.   Respiratory: Negative.  Negative for cough and shortness of breath.    Cardiovascular: Negative.  Negative for chest pain, palpitations and leg swelling.   Gastrointestinal: Negative.  Negative for abdominal pain, constipation, nausea and vomiting.   Genitourinary: Negative.  Negative for dysuria.   Musculoskeletal: Negative.  Negative for myalgias.   Skin: Negative.  Negative for itching and rash.   Neurological: Negative for dizziness, sensory change, focal weakness, weakness and headaches.   Endo/Heme/Allergies: Negative.  Does not bruise/bleed easily.   Psychiatric/Behavioral: Negative.  Negative for depression, substance abuse and suicidal ideas.   All other systems reviewed and are negative.        Physical Exam  Temp:  [35.7 °C (96.2 °F)-36.9 °C (98.4 °F)] 35.7 °C (96.2 °F)  Pulse:  [64-66] 65  Resp:  [18] 18  BP: (108-141)/(58-84) 117/58  SpO2:  [96 %] 96 %    Physical Exam  Vitals and nursing note reviewed.   Constitutional:       General: She is not in acute distress.     Appearance: She is obese. She is not ill-appearing.   HENT:      Head:      Comments: craniotomy incision c/d/i       Nose: Nose normal.      Mouth/Throat:      Mouth: Mucous membranes are moist.      Pharynx: No oropharyngeal exudate.   Eyes:      General: No scleral icterus.     Conjunctiva/sclera: Conjunctivae normal.   Cardiovascular:      Rate and Rhythm: Normal rate and regular rhythm.      Pulses: Normal pulses.      Heart sounds: Normal heart sounds. No murmur heard.  Pulmonary:      Effort: Pulmonary effort is normal. No respiratory distress.      Breath sounds: Normal breath sounds. No wheezing.   Abdominal:      General: Bowel sounds are normal. There is no distension.      Palpations: Abdomen is soft.      Tenderness: There is no abdominal tenderness.      Hernia: A hernia (ventral) is present.   Musculoskeletal:         General: No swelling or tenderness.      Right lower leg: No edema.       Left lower leg: No edema.   Skin:     General: Skin is warm.      Coloration: Skin is not jaundiced.      Findings: No erythema.   Neurological:      Mental Status: She is alert and oriented to person, place, and time.         Fluids  No intake or output data in the 24 hours ending 05/29/22 1100    Laboratory  Recent Labs     05/27/22  0301 05/28/22  0145 05/29/22  0443   WBC 16.2* 16.6* 14.6*   RBC 4.05* 3.92* 4.08*   HEMOGLOBIN 11.4* 11.0* 11.3*   HEMATOCRIT 35.6* 34.1* 35.8*   MCV 87.9 87.0 87.7   MCH 28.1 28.1 27.7   MCHC 32.0* 32.3* 31.6*   RDW 46.3 46.3 46.6   PLATELETCT 285 295 295   MPV 10.6 10.4 10.1                       Imaging  MR-BRAIN-WITH & W/O   Final Result      1.  Recent right frontal craniotomy for resection of large right anterior frontal meningioma. Right frontal subdural drain in place      2.  Bifrontal pneumocephalus.      3.  Postoperative cavity in the right anterior frontal region containing CSF and blood products.      4.  Postoperative changes and edema involving the residual right anterior frontal lobe with effacement of the frontal horns and slight right to left subfalcine herniation.      5.  Moderately large gyriform area of acute infarction involving the right posterior medial temporal lobe and adjacent right medial occipital lobe.      6.  Age-related cerebral atrophy.      7.  Moderate periventricular and juxtacortical white matter changes consistent with chronic microvascular ischemic gliosis.      CT-HEAD W/O   Final Result         1.  Postop changes of right frontal meningioma resection with new pneumocephalus   2.  Hyperdensity along the anterior aspect of the right frontal lobe superiorly, appearance suggests possible Gelfoam or area of postoperative extra-axial hemorrhage.   3.  Atherosclerosis.         DX-CHEST-PORTABLE (1 VIEW)   Final Result      1.  Supportive tubing as described above.   2.  No pneumothorax.   3.  Minimal LEFT lung base atelectasis again seen.       DX-CHEST-PORTABLE (1 VIEW)   Final Result      No radiographic evidence of acute cardiopulmonary disease.      [id number] VIEW   Final Result         1.  Moderate stool in the colon suggests changes of constipation, otherwise nonspecific bowel gas pattern   2.  Hazy bilateral lung base opacities suggests subtle infiltrates or edema.      IR-EMBOLIZE-NEURO-INTRACRANIAL   Final Result   Impression:      Cerebral angiogram for assessment for vascular supply to the frontal meningioma demonstrates with the predominant supply to the meningioma from the artery of the falx cerebri arising from the left ophthalmic artery with minimal supply also arising from    the right-sided artery of the falx. No definite supply to the lesion from the middle meningeal arteries. Very minimal supply to the right side of the meningioma from distal frontal penetrating branches of the superficial temporal artery also noted.      Since the majority of the supply to the meningioma was from the ophthalmic artery, the lesion could not be safely embolized.      IAyo was physically present and participated during the entire procedure of the IR-EMBOLIZE-NEURO-INTRACRANIAL.         MR-STEALTH BRAIN WITH & W/O   Final Result         Large right inferior frontal region dural based extra-axial mass that most likely represents a meningioma. There is mild mass effect upon the bilateral inferior frontal lobes with midline shift to the left measuring approximately 10 to 11 mm. There is    also mass effect upon the lateral ventricles.      Minimal edema noted in the right superomedial frontal white matter adjacent to the mass.      Age-related volume loss and chronic microvascular ischemic changes.      CT-HEAD W/O   Final Result      1.  Negative for hemorrhage      2.  Large right frontal meningioma measuring 5.2 x 4.4 x 4.2 cm      3.  Associated mass effect on the right frontal lobe with adjacent white matter edema and mild  compression of the frontal horn right lateral ventricle.         CT-MAXILLOFACIAL WITH PLUS RECONS   Final Result      1.  Negative for facial or orbital abscess      2.  CT face within normal limits      3.  Partially calcified right frontal extra-axial mass consistent with a meningioma described in detail on head CT report           Assessment/Plan  * Meningioma (HCC)- (present on admission)  Assessment & Plan  CT head shows Large right frontal meningioma measuring 5.2 x 4.4 x 4.2 cm and associated mass effect on the right frontal lobe with adjacent white matter edema and mild compression of the frontal horn right lateral ventricl  MRI was reviewed, neurosurgery Dr. Goodson has evaluated, recommended IR guided embolization on 5/14  IR unable to perform embolization of feeder artery due to anatomy    Continue decadron taper,  omeprazole 20 mg p.o. daily  Keppra 500 mg twice daily    S/p CRANIOTOMY, USING FRAMELESS STEREOTAXY - [facility] FOR BRAIN TUMOR RESECTION   Weaning steroids  Continue supportive care    Neurosurgery cleared her for discharge however given weakness and no insurance so no post-acute will keep inpatient to work with PT/OT until safe for discharge - she is improving, anticipate home very soon    Vasovagal episode  Assessment & Plan  Bp meds likely contributed - bp improved off of norvasc  No further sx  Continue to follow  Continue supportive care and close monitoring    Constipation  Assessment & Plan  Resolved  Continue bowel regimen    Leukocytosis- (present on admission)  Assessment & Plan  See above  improving  Following intermittently    HTN (hypertension)- (present on admission)  Assessment & Plan  Continue amlodipine and lisinopril.  Bp improved but not consistently at goal, norvasc was increased to 10 on 5/24 - continue to follow  monitoring  Goal sbp less 140    Abdominal pain  Assessment & Plan  Resolved  Likely due to constipation  Continue bowel regimen    Prediabetes- (present on  admission)  Assessment & Plan  No apparent diabetes, no medication at home listed on EMR.   A1c 6.4 on admission.  Patient has hyperglycemia, while on dexamethasone    - sugars controlled and weaning steroids, dc insulin and glucose checks      Dental caries- (present on admission)  Assessment & Plan  Unasyn given in ED  No abscess seen on CT. unasyn stopped 5/16.  Leukocytosis increased but now improving likely due to steroids - no dental pain  following intermittently     VTE prophylaxis: enoxaparin ppx    I have performed a physical exam and reviewed and updated ROS and Plan today (5/29/2022). In review of yesterday's note (5/28/2022), there are no changes except as documented above.

## 2022-05-28 NOTE — CARE PLAN
The patient is Stable - Low risk of patient condition declining or worsening    Shift Goals  Clinical Goals: Have BM;  Patient Goals: Rest; Germán management;  Family Goals: Patient comfort;    Problem: Pain - Standard  Goal: Alleviation of pain or a reduction in pain to the patient’s comfort goal  Outcome: Progressing  Note: Patient reporting feeling pain in rib cage. Scheduled Tylenol administered and ice packs provided. Pain assessed during hourly rounding.      Problem: Mobility  Goal: Patient's capacity to carry out activities will improve  Outcome: Progressing  Note: Patient able to ambulate with one person assist and use of FWW. PT involved in patient care. Patient encouraged to ambulate throughout shift and educated on importance of ambulating frequently.

## 2022-05-28 NOTE — PROGRESS NOTES
Pt Daughter would like for case management to come to bedside in the morning around 0930 to discuss needed equipment at discharge.

## 2022-05-28 NOTE — CARE PLAN
The patient is Stable - Low risk of patient condition declining or worsening    Shift Goals  Clinical Goals: Have BM;  Patient Goals: Rest; Germán management;  Family Goals: Patient comfort;    Progress made toward(s) clinical / shift goals:    Problem: Pain - Standard  Goal: Alleviation of pain or a reduction in pain to the patient’s comfort goal  Outcome: Progressing     Problem: Fall Risk  Goal: Patient will remain free from falls  Outcome: Progressing       Patient is not progressing towards the following goals:

## 2022-05-29 LAB
BASOPHILS # BLD AUTO: 0 % (ref 0–1.8)
BASOPHILS # BLD: 0 K/UL (ref 0–0.12)
EOSINOPHIL # BLD AUTO: 0 K/UL (ref 0–0.51)
EOSINOPHIL NFR BLD: 0 % (ref 0–6.9)
ERYTHROCYTE [DISTWIDTH] IN BLOOD BY AUTOMATED COUNT: 46.6 FL (ref 35.9–50)
HCT VFR BLD AUTO: 35.8 % (ref 37–47)
HGB BLD-MCNC: 11.3 G/DL (ref 12–16)
LYMPHOCYTES # BLD AUTO: 0.91 K/UL (ref 1–4.8)
LYMPHOCYTES NFR BLD: 6.2 % (ref 22–41)
MANUAL DIFF BLD: NORMAL
MCH RBC QN AUTO: 27.7 PG (ref 27–33)
MCHC RBC AUTO-ENTMCNC: 31.6 G/DL (ref 33.6–35)
MCV RBC AUTO: 87.7 FL (ref 81.4–97.8)
MONOCYTES # BLD AUTO: 0.51 K/UL (ref 0–0.85)
MONOCYTES NFR BLD AUTO: 3.5 % (ref 0–13.4)
MORPHOLOGY BLD-IMP: NORMAL
MYELOCYTES NFR BLD MANUAL: 2.6 %
NEUTROPHILS # BLD AUTO: 12.8 K/UL (ref 2–7.15)
NEUTROPHILS NFR BLD: 87.7 % (ref 44–72)
NRBC # BLD AUTO: 0 K/UL
NRBC BLD-RTO: 0 /100 WBC
PLATELET # BLD AUTO: 295 K/UL (ref 164–446)
PLATELET BLD QL SMEAR: NORMAL
PMV BLD AUTO: 10.1 FL (ref 9–12.9)
RBC # BLD AUTO: 4.08 M/UL (ref 4.2–5.4)
RBC BLD AUTO: NORMAL
WBC # BLD AUTO: 14.6 K/UL (ref 4.8–10.8)

## 2022-05-29 PROCEDURE — 700102 HCHG RX REV CODE 250 W/ 637 OVERRIDE(OP): Performed by: PHYSICIAN ASSISTANT

## 2022-05-29 PROCEDURE — 85007 BL SMEAR W/DIFF WBC COUNT: CPT

## 2022-05-29 PROCEDURE — 700102 HCHG RX REV CODE 250 W/ 637 OVERRIDE(OP): Performed by: STUDENT IN AN ORGANIZED HEALTH CARE EDUCATION/TRAINING PROGRAM

## 2022-05-29 PROCEDURE — 85025 COMPLETE CBC W/AUTO DIFF WBC: CPT

## 2022-05-29 PROCEDURE — 700111 HCHG RX REV CODE 636 W/ 250 OVERRIDE (IP): Performed by: INTERNAL MEDICINE

## 2022-05-29 PROCEDURE — A9270 NON-COVERED ITEM OR SERVICE: HCPCS | Performed by: PHYSICIAN ASSISTANT

## 2022-05-29 PROCEDURE — 770001 HCHG ROOM/CARE - MED/SURG/GYN PRIV*

## 2022-05-29 PROCEDURE — 700102 HCHG RX REV CODE 250 W/ 637 OVERRIDE(OP): Performed by: INTERNAL MEDICINE

## 2022-05-29 PROCEDURE — 36415 COLL VENOUS BLD VENIPUNCTURE: CPT

## 2022-05-29 PROCEDURE — 99232 SBSQ HOSP IP/OBS MODERATE 35: CPT | Performed by: HOSPITALIST

## 2022-05-29 PROCEDURE — A9270 NON-COVERED ITEM OR SERVICE: HCPCS | Performed by: INTERNAL MEDICINE

## 2022-05-29 PROCEDURE — A9270 NON-COVERED ITEM OR SERVICE: HCPCS | Performed by: STUDENT IN AN ORGANIZED HEALTH CARE EDUCATION/TRAINING PROGRAM

## 2022-05-29 RX ORDER — ACETAMINOPHEN 500 MG
1000 TABLET ORAL 2 TIMES DAILY
Status: DISCONTINUED | OUTPATIENT
Start: 2022-05-30 | End: 2022-05-31 | Stop reason: HOSPADM

## 2022-05-29 RX ADMIN — ENOXAPARIN SODIUM 40 MG: 40 INJECTION SUBCUTANEOUS at 16:27

## 2022-05-29 RX ADMIN — LEVETIRACETAM 500 MG: 500 TABLET, FILM COATED ORAL at 04:47

## 2022-05-29 RX ADMIN — OMEPRAZOLE 20 MG: 20 CAPSULE, DELAYED RELEASE ORAL at 04:47

## 2022-05-29 RX ADMIN — LEVETIRACETAM 500 MG: 500 TABLET, FILM COATED ORAL at 16:27

## 2022-05-29 RX ADMIN — LISINOPRIL 40 MG: 20 TABLET ORAL at 04:46

## 2022-05-29 RX ADMIN — ACETAMINOPHEN 1000 MG: 500 TABLET ORAL at 09:36

## 2022-05-29 ASSESSMENT — ENCOUNTER SYMPTOMS
EYES NEGATIVE: 1
RESPIRATORY NEGATIVE: 1
ABDOMINAL PAIN: 0
CONSTIPATION: 0
PALPITATIONS: 0
HEADACHES: 0
FEVER: 0
NAUSEA: 0
MUSCULOSKELETAL NEGATIVE: 1
SENSORY CHANGE: 0
DIAPHORESIS: 0
FOCAL WEAKNESS: 0
BRUISES/BLEEDS EASILY: 0
WEIGHT LOSS: 0
MYALGIAS: 0
DEPRESSION: 0
BLURRED VISION: 0
DIZZINESS: 0
CHILLS: 0
VOMITING: 0
GASTROINTESTINAL NEGATIVE: 1
SORE THROAT: 0
CONSTITUTIONAL NEGATIVE: 1
CARDIOVASCULAR NEGATIVE: 1
PSYCHIATRIC NEGATIVE: 1
WEAKNESS: 0
COUGH: 0
SHORTNESS OF BREATH: 0

## 2022-05-29 ASSESSMENT — LIFESTYLE VARIABLES: SUBSTANCE_ABUSE: 0

## 2022-05-29 ASSESSMENT — PAIN DESCRIPTION - PAIN TYPE: TYPE: ACUTE PAIN

## 2022-05-29 NOTE — PROGRESS NOTES
Hospital Medicine Daily Progress Note    Date of Service  5/29/2022    Chief Complaint  Jan Palencia is a 81 y.o. female admitted 5/12/2022 with   Chief Complaint   Patient presents with   • Headache   • Hypertension   • Hernia   • Dental Pain     Left sided        Hospital Course  Patient is a 81-year-old female with a past medical history significant for obesity, new diagnosis of hypertension presented to the ER with headache, mild confusion hypertension and balance issues.  She was found to have a a right frontal meningioma with associated mass-effect on right frontal lobe with adjacent white matter edema.  Neurosurgery was consulted.  She was started on Keppra and Decadron.  She was initially admitted to the ICU for close monitoring.  IR was unable to perform embolization of feeder vessel due to vessel anatomy. She underwent a craniotomy and tumor resection on 5/19/22    Patient did well after surgery and was monitored overnight in RICU. Patient was kept on decadron and Keppra    Interval Problem Update  She continues to slowly improve, denies pain this am, no dizziness. ROS negative, vitals and exam stable. Hopefully home in 1-2 days, awaiting clearance from PT/O And procurement of equipment for home.     I have personally seen and examined the patient at bedside. I discussed the plan of care with family, patient, bedside RN, charge RN,  and pathologist    Wheelchair and FWW ordered    Consultants/Specialty  critical care and neurosurgery    Code Status  Full Code    Disposition  Patient is not medically cleared for discharge.   Anticipate discharge to to home with close outpatient follow-up.  I have placed the appropriate orders for post-discharge needs.    Review of Systems  Review of Systems   Unable to perform ROS: Language   Constitutional: Negative.  Negative for chills, diaphoresis, fever, malaise/fatigue and weight loss.   HENT: Negative.  Negative for sore throat.    Eyes: Negative.   Negative for blurred vision.   Respiratory: Negative.  Negative for cough and shortness of breath.    Cardiovascular: Negative.  Negative for chest pain, palpitations and leg swelling.   Gastrointestinal: Negative.  Negative for abdominal pain, constipation, nausea and vomiting.   Genitourinary: Negative.  Negative for dysuria.   Musculoskeletal: Negative.  Negative for myalgias.   Skin: Negative.  Negative for itching and rash.   Neurological: Negative for dizziness, sensory change, focal weakness, weakness and headaches.   Endo/Heme/Allergies: Negative.  Does not bruise/bleed easily.   Psychiatric/Behavioral: Negative.  Negative for depression, substance abuse and suicidal ideas.   All other systems reviewed and are negative.        Physical Exam  Temp:  [35.7 °C (96.2 °F)-36.9 °C (98.4 °F)] 35.7 °C (96.2 °F)  Pulse:  [64-66] 65  Resp:  [18] 18  BP: (108-141)/(58-84) 117/58  SpO2:  [96 %] 96 %    Physical Exam  Vitals and nursing note reviewed.   Constitutional:       General: She is not in acute distress.     Appearance: She is obese. She is not ill-appearing.   HENT:      Head:      Comments: craniotomy incision c/d/i       Nose: Nose normal.      Mouth/Throat:      Mouth: Mucous membranes are moist.      Pharynx: No oropharyngeal exudate.   Eyes:      General: No scleral icterus.     Conjunctiva/sclera: Conjunctivae normal.   Cardiovascular:      Rate and Rhythm: Normal rate and regular rhythm.      Pulses: Normal pulses.      Heart sounds: Normal heart sounds. No murmur heard.  Pulmonary:      Effort: Pulmonary effort is normal. No respiratory distress.      Breath sounds: Normal breath sounds. No wheezing.   Abdominal:      General: Bowel sounds are normal. There is no distension.      Palpations: Abdomen is soft.      Tenderness: There is no abdominal tenderness.      Hernia: A hernia (ventral) is present.   Musculoskeletal:         General: No swelling or tenderness.      Right lower leg: No edema.       Left lower leg: No edema.   Skin:     General: Skin is warm.      Coloration: Skin is not jaundiced.      Findings: No erythema.   Neurological:      Mental Status: She is alert and oriented to person, place, and time.         Fluids  No intake or output data in the 24 hours ending 05/29/22 1101    Laboratory  Recent Labs     05/27/22  0301 05/28/22  0145 05/29/22  0443   WBC 16.2* 16.6* 14.6*   RBC 4.05* 3.92* 4.08*   HEMOGLOBIN 11.4* 11.0* 11.3*   HEMATOCRIT 35.6* 34.1* 35.8*   MCV 87.9 87.0 87.7   MCH 28.1 28.1 27.7   MCHC 32.0* 32.3* 31.6*   RDW 46.3 46.3 46.6   PLATELETCT 285 295 295   MPV 10.6 10.4 10.1                       Imaging  MR-BRAIN-WITH & W/O   Final Result      1.  Recent right frontal craniotomy for resection of large right anterior frontal meningioma. Right frontal subdural drain in place      2.  Bifrontal pneumocephalus.      3.  Postoperative cavity in the right anterior frontal region containing CSF and blood products.      4.  Postoperative changes and edema involving the residual right anterior frontal lobe with effacement of the frontal horns and slight right to left subfalcine herniation.      5.  Moderately large gyriform area of acute infarction involving the right posterior medial temporal lobe and adjacent right medial occipital lobe.      6.  Age-related cerebral atrophy.      7.  Moderate periventricular and juxtacortical white matter changes consistent with chronic microvascular ischemic gliosis.      CT-HEAD W/O   Final Result         1.  Postop changes of right frontal meningioma resection with new pneumocephalus   2.  Hyperdensity along the anterior aspect of the right frontal lobe superiorly, appearance suggests possible Gelfoam or area of postoperative extra-axial hemorrhage.   3.  Atherosclerosis.         DX-CHEST-PORTABLE (1 VIEW)   Final Result      1.  Supportive tubing as described above.   2.  No pneumothorax.   3.  Minimal LEFT lung base atelectasis again seen.       DX-CHEST-PORTABLE (1 VIEW)   Final Result      No radiographic evidence of acute cardiopulmonary disease.      [id number] VIEW   Final Result         1.  Moderate stool in the colon suggests changes of constipation, otherwise nonspecific bowel gas pattern   2.  Hazy bilateral lung base opacities suggests subtle infiltrates or edema.      IR-EMBOLIZE-NEURO-INTRACRANIAL   Final Result   Impression:      Cerebral angiogram for assessment for vascular supply to the frontal meningioma demonstrates with the predominant supply to the meningioma from the artery of the falx cerebri arising from the left ophthalmic artery with minimal supply also arising from    the right-sided artery of the falx. No definite supply to the lesion from the middle meningeal arteries. Very minimal supply to the right side of the meningioma from distal frontal penetrating branches of the superficial temporal artery also noted.      Since the majority of the supply to the meningioma was from the ophthalmic artery, the lesion could not be safely embolized.      IAyo was physically present and participated during the entire procedure of the IR-EMBOLIZE-NEURO-INTRACRANIAL.         MR-STEALTH BRAIN WITH & W/O   Final Result         Large right inferior frontal region dural based extra-axial mass that most likely represents a meningioma. There is mild mass effect upon the bilateral inferior frontal lobes with midline shift to the left measuring approximately 10 to 11 mm. There is    also mass effect upon the lateral ventricles.      Minimal edema noted in the right superomedial frontal white matter adjacent to the mass.      Age-related volume loss and chronic microvascular ischemic changes.      CT-HEAD W/O   Final Result      1.  Negative for hemorrhage      2.  Large right frontal meningioma measuring 5.2 x 4.4 x 4.2 cm      3.  Associated mass effect on the right frontal lobe with adjacent white matter edema and mild  compression of the frontal horn right lateral ventricle.         CT-MAXILLOFACIAL WITH PLUS RECONS   Final Result      1.  Negative for facial or orbital abscess      2.  CT face within normal limits      3.  Partially calcified right frontal extra-axial mass consistent with a meningioma described in detail on head CT report           Assessment/Plan  * Meningioma (HCC)- (present on admission)  Assessment & Plan  CT head shows Large right frontal meningioma measuring 5.2 x 4.4 x 4.2 cm and associated mass effect on the right frontal lobe with adjacent white matter edema and mild compression of the frontal horn right lateral ventricl  MRI was reviewed, neurosurgery Dr. Goodson has evaluated, recommended IR guided embolization on 5/14  IR unable to perform embolization of feeder artery due to anatomy    Continue decadron taper,  omeprazole 20 mg p.o. daily  Keppra 500 mg twice daily    S/p CRANIOTOMY, USING FRAMELESS STEREOTAXY - [facility] FOR BRAIN TUMOR RESECTION   Weaning steroids  Continue supportive care    Neurosurgery cleared her for discharge however given weakness and no insurance so no post-acute will keep inpatient to work with PT/OT until safe for discharge - she is improving, anticipate home very soon    Vasovagal episode  Assessment & Plan  Bp meds likely contributed - bp improved off of norvasc  No further sx  Continue to follow  Continue supportive care and close monitoring    Constipation  Assessment & Plan  Resolved  Continue bowel regimen    Leukocytosis- (present on admission)  Assessment & Plan  See above  improving  Following intermittently    HTN (hypertension)- (present on admission)  Assessment & Plan  Continue amlodipine and lisinopril.  Bp improved but not consistently at goal, norvasc was increased to 10 on 5/24 - continue to follow  monitoring  Goal sbp less 140    Abdominal pain  Assessment & Plan  Resolved  Likely due to constipation  Continue bowel regimen    Prediabetes- (present on  admission)  Assessment & Plan  No apparent diabetes, no medication at home listed on EMR.   A1c 6.4 on admission.  Patient has hyperglycemia, while on dexamethasone    - sugars controlled and weaning steroids, dc insulin and glucose checks      Dental caries- (present on admission)  Assessment & Plan  Unasyn given in ED  No abscess seen on CT. unasyn stopped 5/16.  Leukocytosis increased but now improving likely due to steroids - no dental pain  following intermittently     VTE prophylaxis: enoxaparin ppx    I have performed a physical exam and reviewed and updated ROS and Plan today (5/29/2022). In review of yesterday's note (5/28/2022), there are no changes except as documented above.

## 2022-05-29 NOTE — CARE PLAN
The patient is Stable - Low risk of patient condition declining or worsening    Shift Goals  Clinical Goals: safety  Patient Goals: antonia  Family Goals: antonia    Progress made toward(s) clinical / shift goals:    Problem: Pain - Standard  Goal: Alleviation of pain or a reduction in pain to the patient’s comfort goal  Outcome: Progressing   Rest and reposition implemented  Problem: Fall Risk  Goal: Patient will remain free from falls  Outcome: Progressing   Fall precautions in place, assisted pt up to BSC, pt tolerated well  Problem: Bowel Elimination  Goal: Establish and maintain regular bowel function  Outcome: Progressing   BMx2    Patient is not progressing towards the following goals:

## 2022-05-29 NOTE — CARE PLAN
The patient is Stable - Low risk of patient condition declining or worsening    Shift Goals  Clinical Goals: Safety  Patient Goals: Sleep  Family Goals: GAEL    Progress made toward(s) clinical / shift goals:    Problem: Knowledge Deficit - Standard  Goal: Patient and family/care givers will demonstrate understanding of plan of care, disease process/condition, diagnostic tests and medications  Outcome: Progressing   The patient received education reinforcement regarding plan of care, condition, and medications.    Problem: Pain - Standard  Goal: Alleviation of pain or a reduction in pain to the patient’s comfort goal  Outcome: Progressing   Patient communicates pain level. Receives PRN pain medication.   Problem: Fall Risk  Goal: Patient will remain free from falls  Outcome: Progressing   Appropriate fall precautions are in place. Bed alarm is on at all times. Staff present for bathroom needs.     Patient is not progressing towards the following goals:

## 2022-05-30 ENCOUNTER — PHARMACY VISIT (OUTPATIENT)
Dept: PHARMACY | Facility: MEDICAL CENTER | Age: 82
End: 2022-05-30
Payer: COMMERCIAL

## 2022-05-30 PROBLEM — R55 VASOVAGAL EPISODE: Status: RESOLVED | Noted: 2022-05-26 | Resolved: 2022-05-30

## 2022-05-30 PROBLEM — K59.00 CONSTIPATION: Status: RESOLVED | Noted: 2022-05-24 | Resolved: 2022-05-30

## 2022-05-30 PROCEDURE — 770001 HCHG ROOM/CARE - MED/SURG/GYN PRIV*

## 2022-05-30 PROCEDURE — 700102 HCHG RX REV CODE 250 W/ 637 OVERRIDE(OP): Performed by: STUDENT IN AN ORGANIZED HEALTH CARE EDUCATION/TRAINING PROGRAM

## 2022-05-30 PROCEDURE — 700102 HCHG RX REV CODE 250 W/ 637 OVERRIDE(OP): Performed by: HOSPITALIST

## 2022-05-30 PROCEDURE — 700111 HCHG RX REV CODE 636 W/ 250 OVERRIDE (IP): Performed by: INTERNAL MEDICINE

## 2022-05-30 PROCEDURE — A9270 NON-COVERED ITEM OR SERVICE: HCPCS | Performed by: STUDENT IN AN ORGANIZED HEALTH CARE EDUCATION/TRAINING PROGRAM

## 2022-05-30 PROCEDURE — 700102 HCHG RX REV CODE 250 W/ 637 OVERRIDE(OP): Performed by: INTERNAL MEDICINE

## 2022-05-30 PROCEDURE — A9270 NON-COVERED ITEM OR SERVICE: HCPCS | Performed by: PHYSICIAN ASSISTANT

## 2022-05-30 PROCEDURE — A9270 NON-COVERED ITEM OR SERVICE: HCPCS | Performed by: HOSPITALIST

## 2022-05-30 PROCEDURE — A9270 NON-COVERED ITEM OR SERVICE: HCPCS | Performed by: INTERNAL MEDICINE

## 2022-05-30 PROCEDURE — RXMED WILLOW AMBULATORY MEDICATION CHARGE: Performed by: HOSPITALIST

## 2022-05-30 PROCEDURE — 700102 HCHG RX REV CODE 250 W/ 637 OVERRIDE(OP): Performed by: PHYSICIAN ASSISTANT

## 2022-05-30 RX ORDER — DEXAMETHASONE 2 MG/1
2 TABLET ORAL EVERY 24 HOURS
Qty: 1 TABLET | Refills: 0 | Status: ON HOLD | OUTPATIENT
Start: 2022-05-31 | End: 2022-06-11

## 2022-05-30 RX ADMIN — POLYETHYLENE GLYCOL 3350 1 PACKET: 17 POWDER, FOR SOLUTION ORAL at 16:48

## 2022-05-30 RX ADMIN — ACETAMINOPHEN 1000 MG: 500 TABLET ORAL at 16:48

## 2022-05-30 RX ADMIN — DEXAMETHASONE 2 MG: 4 TABLET ORAL at 05:12

## 2022-05-30 RX ADMIN — LISINOPRIL 40 MG: 20 TABLET ORAL at 05:12

## 2022-05-30 RX ADMIN — OMEPRAZOLE 20 MG: 20 CAPSULE, DELAYED RELEASE ORAL at 05:12

## 2022-05-30 RX ADMIN — ACETAMINOPHEN 1000 MG: 500 TABLET ORAL at 05:13

## 2022-05-30 RX ADMIN — LEVETIRACETAM 500 MG: 500 TABLET, FILM COATED ORAL at 05:12

## 2022-05-30 RX ADMIN — DOCUSATE SODIUM 100 MG: 100 CAPSULE, LIQUID FILLED ORAL at 16:48

## 2022-05-30 RX ADMIN — ENOXAPARIN SODIUM 40 MG: 40 INJECTION SUBCUTANEOUS at 16:48

## 2022-05-30 RX ADMIN — LEVETIRACETAM 500 MG: 500 TABLET, FILM COATED ORAL at 16:48

## 2022-05-30 ASSESSMENT — PAIN DESCRIPTION - PAIN TYPE
TYPE: ACUTE PAIN
TYPE: ACUTE PAIN

## 2022-05-30 NOTE — CARE PLAN
Problem: Knowledge Deficit - Standard  Goal: Patient and family/care givers will demonstrate understanding of plan of care, disease process/condition, diagnostic tests and medications  Outcome: Progressing     Problem: Bowel Elimination  Goal: Establish and maintain regular bowel function  Outcome: Progressing   The patient is Stable - Low risk of patient condition declining or worsening    Shift Goals  Clinical Goals: safety  Patient Goals: rest  Family Goals: antonia    Progress made toward(s) clinical / shift goals:  patient demonstrates ability to call for needs and has remained continent throughout shift. Patient is Green[place]ic speaking and requires sister to translate. Patient shows she is able to follow commands, no signs of distress, fall precautions in place.    Patient is not progressing towards the following goals:

## 2022-05-30 NOTE — DISCHARGE PLANNING
Agency/Facility Name: [facility]  Spoke To: Rory  Outcome: [facility] no longer takes cash pay, credit cards, or approved services.  They only process orders through insurance companies.

## 2022-05-30 NOTE — DISCHARGE PLANNING
Case Management Discharge Planning    Admission Date: 5/12/2022  GMLOS: 3  ALOS: 18    6-Clicks ADL Score: 14  6-Clicks Mobility Score: 17  PT and/or OT Eval ordered: yes  Post-acute Referrals Ordered: yes        Anticipated Discharge Dispo: Discharge Disposition: Discharged to home/self care (01)  Discharge Contact Phone Number: [phone]    DME Needed: wheel chair and 3:1 bedside commode    Action(s) Taken: Contacted Jordin with [facility].  Referral for DME faxed with approved services form.  Expected delivery to bedside is one hour.    Spoke with patient's daughter, Chantal.  I informed her that patient is a contact guard assist for walking and minimal assist for transfer and will need assistance for dressing. I explained wc and 3:1 bsc.  She stated she could not afford to pay for the DME as she is currently not working.  Chantal agreeable to DME and discharge today.  I explained to her that she will need to schedule an appointment with [facility] for patient to get continued medical care and medications.  She verbalized understanding.  She will plan to drive patient home between 4 and 5 p.m. today.  She is currently at urgent care with her son who she thinks has strep throat.  She is calling patient's father to assist her with children.  She stated she will plan to be here today to drive patient home between 4 and 5 p.m.      Medically Clear: yes    Next Steps: Confirm delivery of DME to bedside.    Barriers to Discharge: none

## 2022-05-30 NOTE — DISCHARGE SUMMARY
Discharge Summary    CHIEF COMPLAINT ON ADMISSION  Chief Complaint   Patient presents with   • Headache   • Hypertension   • Hernia   • Dental Pain     Left sided        Reason for Admission  ems     Admission Date  5/12/2022    CODE STATUS  Full Code    HPI & HOSPITAL COURSE  Patient is a 81-year-old female with a past medical history significant for obesity, new diagnosis of hypertension presented to the ER with headache, mild confusion hypertension and balance issues.  She was found to have a a right frontal meningioma with associated mass-effect on right frontal lobe with adjacent white matter edema.  Neurosurgery was consulted.  She was started on Keppra and Decadron.  She was initially admitted to the ICU for close monitoring.  IR was unable to perform embolization of feeder vessel due to vessel anatomy. She underwent a craniotomy and tumor resection on 5/19/22. Pathology confirmed that the mass is a meningioma.   Patient did well after surgery and was monitored initially in RICU. Patient was kept on decadron and Keppra  Her recovery was delayed by dizziness debility and she required a prolonged stay to work with physical and occupational therapy. She also required titrations of medications. After titration of antihypertensives, her dizziness resolved. She is now cleared for discharge home with her daughter with supportive equipment including a wheelchair and bedside commode. She is able to get to and use the commode with minimal assistance at this time. She has mild leukocytosis, however it is improving and is related to the steroid taper that she is on, this will be followed up at the [facility].  Unfortunately, she lives in [place], she was just visiting her daughter here so she does not have [place] insurance and cannot obtain home health services. She will follow up with the [facility] clinic and plan is to return [place] once she is stronger.   She and her daughter agree to close follow up and to return to the ER if  needed.     Addendum: Discharge was postponed to today due to the daughter was not able to  the patient yesterday.  Patient has been stable overnight.  Discharge today as it planned.     Therefore, she is discharged in fair and stable condition to home with close outpatient follow-up.    The patient met 2-midnight criteria for an inpatient stay at the time of discharge.    Discharge Date  5/31/22    FOLLOW UP ITEMS POST DISCHARGE  [facility]  [facility]    DISCHARGE DIAGNOSES  Principal Problem:    Meningioma (HCC) POA: Yes  Active Problems:    Dental caries POA: Yes    Prediabetes POA: Yes    Abdominal pain POA: Unknown    HTN (hypertension) POA: Yes    Leukocytosis POA: Yes  Resolved Problems:    Hypertensive urgency POA: Unknown    Bilateral leg edema POA: Unknown    Brain tumor (HCC) POA: Yes    Constipation POA: Unknown    Vasovagal episode POA: Unknown      FOLLOW UP  No future appointments.  [facility] - Primary Care  [address] [place] [id number]  [phone]  Schedule an appointment as soon as possible for a visit      Hakan Dhaliwal M.D.  [address] [facility] [address]  [facility] [address]  [phone]    Follow up in 3 week(s)        MEDICATIONS ON DISCHARGE     Medication List      START taking these medications      Instructions   acetaminophen 325 MG Tabs  Commonly known as: Tylenol   Take 2 Tablets by mouth every 6 hours as needed for Mild Pain or Moderate Pain.  Dose: 650 mg     amLODIPine 5 MG Tabs  Commonly known as: NORVASC   Take 1 Tablet by mouth every day.  Dose: 5 mg     * dexamethasone 2 MG tablet  Start taking on: May 31, 2022  Commonly known as: DECADRON   Take 1 Tablet by mouth every 24 hours.  Dose: 2 mg     levETIRAcetam 500 MG Tabs  Commonly known as: KEPPRA   Take 1 Tablet by mouth 2 times a day.  Dose: 500 mg     lisinopril 40 MG tablet  Commonly known as: PRINIVIL   Take 1 Tablet by mouth every day.  Dose: 40 mg     omeprazole 20 MG  delayed-release capsule  Commonly known as: PRILOSEC   Take 1 Capsule by mouth every day.  Dose: 20 mg     polyethylene glycol/lytes 17 g Pack  Commonly known as: MIRALAX   Take 1 Packet by mouth 2 times a day as needed (if sennosides and/or docusate ineffective or not ordered).  Dose: 17 g     senna-docusate 8.6-50 MG Tabs  Commonly known as: PERICOLACE or SENOKOT S   Take 1 Tablet by mouth every evening.  Dose: 1 Tablet         * This list has 1 medication(s) that are the same as other medications prescribed for you. Read the directions carefully, and ask your doctor or other care provider to review them with you.            ASK your doctor about these medications      Instructions   * dexamethasone 4 MG Tabs  Start taking on: May 22, 2022  Commonly known as: DECADRON  Ask about: Should I take this medication?   Take 1 Tablet by mouth every 8 hours for 3 days, THEN 1 Tablet 2 times a day for 3 days.     oxyCODONE immediate-release 5 MG Tabs  Commonly known as: ROXICODONE  Ask about: Should I take this medication?   Take 1 Tablet by mouth every four hours as needed for Severe Pain (If tylenol not effective) for up to 5 days.  Dose: 5 mg         * This list has 1 medication(s) that are the same as other medications prescribed for you. Read the directions carefully, and ask your doctor or other care provider to review them with you.                Allergies  Allergies   Allergen Reactions   • Pork Derived Products Unspecified     Jain BELIEFS       DIET  Orders Placed This Encounter   Procedures   • Diet Order Diet: Regular (No meat please)     Standing Status:   Standing     Number of Occurrences:   1     Order Specific Question:   Diet:     Answer:   Regular [1]     Comments:   No meat please       ACTIVITY  As tolerated.    CONSULTATIONS  [facility]  Critical care    PROCEDURES  MR-BRAIN-WITH & W/O   Final Result      1.  Recent right frontal craniotomy for resection of large right anterior frontal  meningioma. Right frontal subdural drain in place      2.  Bifrontal pneumocephalus.      3.  Postoperative cavity in the right anterior frontal region containing CSF and blood products.      4.  Postoperative changes and edema involving the residual right anterior frontal lobe with effacement of the frontal horns and slight right to left subfalcine herniation.      5.  Moderately large gyriform area of acute infarction involving the right posterior medial temporal lobe and adjacent right medial occipital lobe.      6.  Age-related cerebral atrophy.      7.  Moderate periventricular and juxtacortical white matter changes consistent with chronic microvascular ischemic gliosis.      CT-HEAD W/O   Final Result         1.  Postop changes of right frontal meningioma resection with new pneumocephalus   2.  Hyperdensity along the anterior aspect of the right frontal lobe superiorly, appearance suggests possible Gelfoam or area of postoperative extra-axial hemorrhage.   3.  Atherosclerosis.         DX-CHEST-PORTABLE (1 VIEW)   Final Result      1.  Supportive tubing as described above.   2.  No pneumothorax.   3.  Minimal LEFT lung base atelectasis again seen.      DX-CHEST-PORTABLE (1 VIEW)   Final Result      No radiographic evidence of acute cardiopulmonary disease.      [id number] VIEW   Final Result         1.  Moderate stool in the colon suggests changes of constipation, otherwise nonspecific bowel gas pattern   2.  Hazy bilateral lung base opacities suggests subtle infiltrates or edema.      IR-EMBOLIZE-NEURO-INTRACRANIAL   Final Result   Impression:      Cerebral angiogram for assessment for vascular supply to the frontal meningioma demonstrates with the predominant supply to the meningioma from the artery of the falx cerebri arising from the left ophthalmic artery with minimal supply also arising from    the right-sided artery of the falx. No definite supply to the lesion from the middle meningeal arteries. Very  minimal supply to the right side of the meningioma from distal frontal penetrating branches of the superficial temporal artery also noted.      Since the majority of the supply to the meningioma was from the ophthalmic artery, the lesion could not be safely embolized.      Ayo HARRIS was physically present and participated during the entire procedure of the IR-EMBOLIZE-NEURO-INTRACRANIAL.         MR-STEALTH BRAIN WITH & W/O   Final Result         Large right inferior frontal region dural based extra-axial mass that most likely represents a meningioma. There is mild mass effect upon the bilateral inferior frontal lobes with midline shift to the left measuring approximately 10 to 11 mm. There is    also mass effect upon the lateral ventricles.      Minimal edema noted in the right superomedial frontal white matter adjacent to the mass.      Age-related volume loss and chronic microvascular ischemic changes.      CT-HEAD W/O   Final Result      1.  Negative for hemorrhage      2.  Large right frontal meningioma measuring 5.2 x 4.4 x 4.2 cm      3.  Associated mass effect on the right frontal lobe with adjacent white matter edema and mild compression of the frontal horn right lateral ventricle.         CT-MAXILLOFACIAL WITH PLUS RECONS   Final Result      1.  Negative for facial or orbital abscess      2.  CT face within normal limits      3.  Partially calcified right frontal extra-axial mass consistent with a meningioma described in detail on head CT report            LABORATORY  Lab Results   Component Value Date    SODIUM 134 (L) 05/26/2022    POTASSIUM 4.5 05/26/2022    CHLORIDE 103 05/26/2022    CO2 22 05/26/2022    GLUCOSE 119 (H) 05/26/2022    BUN 19 05/26/2022    CREATININE 0.38 (L) 05/26/2022        Lab Results   Component Value Date    WBC 14.6 (H) 05/29/2022    HEMOGLOBIN 11.3 (L) 05/29/2022    HEMATOCRIT 35.8 (L) 05/29/2022    PLATELETCT 295 05/29/2022        Total time of the discharge process  exceeds 45 minutes.

## 2022-05-30 NOTE — DISCHARGE PLANNING
Case Management Discharge Planning    Admission Date: 5/12/2022  GMLOS: 3  ALOS: 18    6-Clicks ADL Score: 14  6-Clicks Mobility Score: 17  PT and/or OT Eval ordered: yes  Post-acute Referrals Ordered: yes      Anticipated Discharge Dispo: Discharge Disposition: Discharged to home/self care (01)  Discharge Contact Phone Number: [phone]    DME Needed: delivered to bedside ( and 3:1 bsc) approved services    Action(s) Taken: I telephoned patient's daughter, Chantal to ask if she was on schedule to take her mother home today.  She responded that she has still not taken her son to urgent care and is waiting for the father to do this.  If he doesn't arrive by 5pm, she will take her daughter to urgent care.  She plans to take pt home either this evening or tomorrow morning by 9 a.m.    Voalte msg to Dr. Hernandez and bedside RN, Becky.    Medically Clear: yes    Next Steps: DC    Barriers to Discharge: none

## 2022-05-31 VITALS
OXYGEN SATURATION: 97 % | TEMPERATURE: 98.3 F | WEIGHT: 209.44 LBS | HEART RATE: 64 BPM | SYSTOLIC BLOOD PRESSURE: 119 MMHG | RESPIRATION RATE: 16 BRPM | DIASTOLIC BLOOD PRESSURE: 71 MMHG

## 2022-05-31 PROCEDURE — A9270 NON-COVERED ITEM OR SERVICE: HCPCS | Performed by: INTERNAL MEDICINE

## 2022-05-31 PROCEDURE — A9270 NON-COVERED ITEM OR SERVICE: HCPCS | Performed by: STUDENT IN AN ORGANIZED HEALTH CARE EDUCATION/TRAINING PROGRAM

## 2022-05-31 PROCEDURE — A9270 NON-COVERED ITEM OR SERVICE: HCPCS | Performed by: HOSPITALIST

## 2022-05-31 PROCEDURE — 700102 HCHG RX REV CODE 250 W/ 637 OVERRIDE(OP): Performed by: HOSPITALIST

## 2022-05-31 PROCEDURE — 99239 HOSP IP/OBS DSCHRG MGMT >30: CPT | Performed by: STUDENT IN AN ORGANIZED HEALTH CARE EDUCATION/TRAINING PROGRAM

## 2022-05-31 PROCEDURE — A9270 NON-COVERED ITEM OR SERVICE: HCPCS | Performed by: PHYSICIAN ASSISTANT

## 2022-05-31 PROCEDURE — 700102 HCHG RX REV CODE 250 W/ 637 OVERRIDE(OP): Performed by: INTERNAL MEDICINE

## 2022-05-31 PROCEDURE — 700102 HCHG RX REV CODE 250 W/ 637 OVERRIDE(OP): Performed by: STUDENT IN AN ORGANIZED HEALTH CARE EDUCATION/TRAINING PROGRAM

## 2022-05-31 PROCEDURE — 700102 HCHG RX REV CODE 250 W/ 637 OVERRIDE(OP): Performed by: PHYSICIAN ASSISTANT

## 2022-05-31 RX ADMIN — LISINOPRIL 40 MG: 20 TABLET ORAL at 05:16

## 2022-05-31 RX ADMIN — OMEPRAZOLE 20 MG: 20 CAPSULE, DELAYED RELEASE ORAL at 05:16

## 2022-05-31 RX ADMIN — ACETAMINOPHEN 1000 MG: 500 TABLET ORAL at 05:16

## 2022-05-31 RX ADMIN — DEXAMETHASONE 2 MG: 4 TABLET ORAL at 05:16

## 2022-05-31 RX ADMIN — LEVETIRACETAM 500 MG: 500 TABLET, FILM COATED ORAL at 05:16

## 2022-05-31 NOTE — PROGRESS NOTES
Pt D/C'd.  Reviewed DC instructions with pt daughter. Discharge instructions provided to pt.  Pt states understanding.  Pt states all questions have been answered.  Copy of discharge provided to pt.  Signed copy in chart.  Prescriptions provided to pt via meds to bed. Pt states that all personal belongings are in possession.

## 2022-06-02 NOTE — DOCUMENTATION QUERY
[facility]                                                                       Query Response Note      PATIENT:               GILA ARGUETA  ACCT #:                  [id number]  MRN:                     [id number]  :                      1940  ADMIT DATE:       2022 4:48 PM  DISCH DATE:        2022 9:40 AM  RESPONDING  PROVIDER #:        [id number]           QUERY TEXT:    This patient presented with a new diagnosis of right frontal brain tumor. Additional related findings including significant mass effect upon the right frontal lobe and inferomedial left frontal lobe and the utilization of IV Decadron has also been reported in the Medical Record. Can a diagnosis be provided to support these findings?    NOTE:  If an appropriate response is not listed below, please respond with a new note.    The patient's clinical indicators include:  Per Neurosurgery Consultation   -has had worsening headache, ataxia and confusion over last week  -There is a large 45 x 57 x 45 mm mass in the right inferior frontal extra-axial space with significant mass effect upon the right frontal lobe as well as the inferomedial left frontal lobe  -presenting with new diagnosis of right frontal brain tumor   - dex 4q6   - keppra 500 BID     Results Review:   MRI brain    -Large right inferior frontal region dural based extra-axial mass that most likely represents a meningioma. There is mild mass effect upon the bilateral inferior frontal lobes with midline shift to the left measuring approximately 10 to 11 mm. There is also mass effect upon the lateral ventricles.   -Minimal edema noted in the right superomedial frontal white matter adjacent to the mass    Treatment:   Brain MRI, IV Decadron, Keppra, & surgical intervention s/p right frontal craniotomy for tumor resection    Risk Factors:   Right frontal brain tumor, significant mass effect,  headache, confusion, & ataxia    Thank You,  Jenny Jarrell RN BSN  Clinical   Connect via [email or web address] Messenger  Options provided:   -- Brain compression is present/a relevant clinical diagnosis   -- Other explanation of clinical finding, (Please specify other explanation of clinical findings)   -- Insignificant finding/No effect on this patient's stay and/or treatment   -- Unable to determine      Query created by: Jenny Jarrell on 5/24/2022 10:03 AM    RESPONSE TEXT:    Brain compression is present/a relevant clinical diagnosis          Electronically signed by:  JORGE GUILLORY MD 6/1/2022 5:16 PM

## 2022-06-04 ENCOUNTER — APPOINTMENT (OUTPATIENT)
Dept: RADIOLOGY | Facility: MEDICAL CENTER | Age: 82
DRG: 175 | End: 2022-06-04
Attending: EMERGENCY MEDICINE
Payer: MEDICAID

## 2022-06-04 ENCOUNTER — HOSPITAL ENCOUNTER (INPATIENT)
Facility: MEDICAL CENTER | Age: 82
LOS: 8 days | DRG: 175 | End: 2022-06-12
Attending: EMERGENCY MEDICINE | Admitting: INTERNAL MEDICINE
Payer: MEDICAID

## 2022-06-04 ENCOUNTER — APPOINTMENT (OUTPATIENT)
Dept: RADIOLOGY | Facility: MEDICAL CENTER | Age: 82
DRG: 175 | End: 2022-06-04
Payer: MEDICAID

## 2022-06-04 DIAGNOSIS — R06.00 DYSPNEA, UNSPECIFIED TYPE: ICD-10-CM

## 2022-06-04 DIAGNOSIS — R09.02 HYPOXIA: ICD-10-CM

## 2022-06-04 DIAGNOSIS — I82.4Y1 ACUTE DEEP VEIN THROMBOSIS (DVT) OF PROXIMAL VEIN OF RIGHT LOWER EXTREMITY (HCC): ICD-10-CM

## 2022-06-04 DIAGNOSIS — D32.9 MENINGIOMA (HCC): ICD-10-CM

## 2022-06-04 DIAGNOSIS — N39.0 URINARY TRACT INFECTION WITHOUT HEMATURIA, SITE UNSPECIFIED: ICD-10-CM

## 2022-06-04 DIAGNOSIS — I26.99 ACUTE PULMONARY EMBOLISM WITHOUT ACUTE COR PULMONALE, UNSPECIFIED PULMONARY EMBOLISM TYPE (HCC): ICD-10-CM

## 2022-06-04 PROBLEM — R41.82 AMS (ALTERED MENTAL STATUS): Status: ACTIVE | Noted: 2022-06-04

## 2022-06-04 PROBLEM — R06.02 SOB (SHORTNESS OF BREATH): Status: ACTIVE | Noted: 2022-06-04

## 2022-06-04 PROBLEM — I82.411 DVT FEMORAL (DEEP VENOUS THROMBOSIS) WITH THROMBOPHLEBITIS, RIGHT (HCC): Status: ACTIVE | Noted: 2022-06-04

## 2022-06-04 LAB
ALBUMIN SERPL BCP-MCNC: 3.5 G/DL (ref 3.2–4.9)
ALBUMIN/GLOB SERPL: 1.2 G/DL
ALP SERPL-CCNC: 129 U/L (ref 30–99)
ALT SERPL-CCNC: 22 U/L (ref 2–50)
ANION GAP SERPL CALC-SCNC: 13 MMOL/L (ref 7–16)
APPEARANCE UR: CLEAR
AST SERPL-CCNC: 17 U/L (ref 12–45)
BACTERIA #/AREA URNS HPF: ABNORMAL /HPF
BASOPHILS # BLD AUTO: 0.4 % (ref 0–1.8)
BASOPHILS # BLD: 0.04 K/UL (ref 0–0.12)
BILIRUB SERPL-MCNC: 0.4 MG/DL (ref 0.1–1.5)
BILIRUB UR QL STRIP.AUTO: NEGATIVE
BUN SERPL-MCNC: 16 MG/DL (ref 8–22)
CALCIUM SERPL-MCNC: 8.8 MG/DL (ref 8.5–10.5)
CHLORIDE SERPL-SCNC: 105 MMOL/L (ref 96–112)
CO2 SERPL-SCNC: 20 MMOL/L (ref 20–33)
COLOR UR: YELLOW
CREAT SERPL-MCNC: 0.4 MG/DL (ref 0.5–1.4)
EKG IMPRESSION: NORMAL
EOSINOPHIL # BLD AUTO: 0.19 K/UL (ref 0–0.51)
EOSINOPHIL NFR BLD: 1.9 % (ref 0–6.9)
EPI CELLS #/AREA URNS HPF: NEGATIVE /HPF
ERYTHROCYTE [DISTWIDTH] IN BLOOD BY AUTOMATED COUNT: 50.1 FL (ref 35.9–50)
FLUAV RNA SPEC QL NAA+PROBE: NEGATIVE
FLUBV RNA SPEC QL NAA+PROBE: NEGATIVE
GFR SERPLBLD CREATININE-BSD FMLA CKD-EPI: 100 ML/MIN/1.73 M 2
GLOBULIN SER CALC-MCNC: 2.9 G/DL (ref 1.9–3.5)
GLUCOSE SERPL-MCNC: 111 MG/DL (ref 65–99)
GLUCOSE UR STRIP.AUTO-MCNC: NEGATIVE MG/DL
HCT VFR BLD AUTO: 36.1 % (ref 37–47)
HGB BLD-MCNC: 11.5 G/DL (ref 12–16)
HYALINE CASTS #/AREA URNS LPF: ABNORMAL /LPF
IMM GRANULOCYTES # BLD AUTO: 0.15 K/UL (ref 0–0.11)
IMM GRANULOCYTES NFR BLD AUTO: 1.5 % (ref 0–0.9)
KETONES UR STRIP.AUTO-MCNC: NEGATIVE MG/DL
LACTATE BLD-SCNC: 1.5 MMOL/L (ref 0.5–2)
LEUKOCYTE ESTERASE UR QL STRIP.AUTO: ABNORMAL
LYMPHOCYTES # BLD AUTO: 1.3 K/UL (ref 1–4.8)
LYMPHOCYTES NFR BLD: 12.8 % (ref 22–41)
MCH RBC QN AUTO: 28.1 PG (ref 27–33)
MCHC RBC AUTO-ENTMCNC: 31.9 G/DL (ref 33.6–35)
MCV RBC AUTO: 88.3 FL (ref 81.4–97.8)
MICRO URNS: ABNORMAL
MONOCYTES # BLD AUTO: 0.82 K/UL (ref 0–0.85)
MONOCYTES NFR BLD AUTO: 8.1 % (ref 0–13.4)
NEUTROPHILS # BLD AUTO: 7.64 K/UL (ref 2–7.15)
NEUTROPHILS NFR BLD: 75.3 % (ref 44–72)
NITRITE UR QL STRIP.AUTO: POSITIVE
NRBC # BLD AUTO: 0 K/UL
NRBC BLD-RTO: 0 /100 WBC
PH UR STRIP.AUTO: 5.5 [PH] (ref 5–8)
PLATELET # BLD AUTO: 167 K/UL (ref 164–446)
PMV BLD AUTO: 10.3 FL (ref 9–12.9)
POTASSIUM SERPL-SCNC: 3.7 MMOL/L (ref 3.6–5.5)
PROT SERPL-MCNC: 6.4 G/DL (ref 6–8.2)
PROT UR QL STRIP: NEGATIVE MG/DL
RBC # BLD AUTO: 4.09 M/UL (ref 4.2–5.4)
RBC # URNS HPF: ABNORMAL /HPF
RBC UR QL AUTO: NEGATIVE
RSV RNA SPEC QL NAA+PROBE: NEGATIVE
SARS-COV-2 RNA RESP QL NAA+PROBE: NOTDETECTED
SODIUM SERPL-SCNC: 138 MMOL/L (ref 135–145)
SP GR UR STRIP.AUTO: 1.02
SPECIMEN SOURCE: NORMAL
UROBILINOGEN UR STRIP.AUTO-MCNC: 1 MG/DL
WBC # BLD AUTO: 10.1 K/UL (ref 4.8–10.8)
WBC #/AREA URNS HPF: ABNORMAL /HPF

## 2022-06-04 PROCEDURE — 700102 HCHG RX REV CODE 250 W/ 637 OVERRIDE(OP): Performed by: EMERGENCY MEDICINE

## 2022-06-04 PROCEDURE — 93971 EXTREMITY STUDY: CPT | Mod: RT

## 2022-06-04 PROCEDURE — 700102 HCHG RX REV CODE 250 W/ 637 OVERRIDE(OP): Performed by: INTERNAL MEDICINE

## 2022-06-04 PROCEDURE — C9803 HOPD COVID-19 SPEC COLLECT: HCPCS | Performed by: EMERGENCY MEDICINE

## 2022-06-04 PROCEDURE — 87186 SC STD MICRODIL/AGAR DIL: CPT

## 2022-06-04 PROCEDURE — 87040 BLOOD CULTURE FOR BACTERIA: CPT | Mod: 91

## 2022-06-04 PROCEDURE — 83605 ASSAY OF LACTIC ACID: CPT

## 2022-06-04 PROCEDURE — A9270 NON-COVERED ITEM OR SERVICE: HCPCS | Performed by: INTERNAL MEDICINE

## 2022-06-04 PROCEDURE — 71275 CT ANGIOGRAPHY CHEST: CPT

## 2022-06-04 PROCEDURE — 700117 HCHG RX CONTRAST REV CODE 255: Performed by: EMERGENCY MEDICINE

## 2022-06-04 PROCEDURE — 770020 HCHG ROOM/CARE - TELE (206)

## 2022-06-04 PROCEDURE — 96374 THER/PROPH/DIAG INJ IV PUSH: CPT

## 2022-06-04 PROCEDURE — 700111 HCHG RX REV CODE 636 W/ 250 OVERRIDE (IP): Performed by: EMERGENCY MEDICINE

## 2022-06-04 PROCEDURE — 36415 COLL VENOUS BLD VENIPUNCTURE: CPT

## 2022-06-04 PROCEDURE — 85025 COMPLETE CBC W/AUTO DIFF WBC: CPT

## 2022-06-04 PROCEDURE — 71045 X-RAY EXAM CHEST 1 VIEW: CPT

## 2022-06-04 PROCEDURE — A9270 NON-COVERED ITEM OR SERVICE: HCPCS | Performed by: EMERGENCY MEDICINE

## 2022-06-04 PROCEDURE — 0241U HCHG SARS-COV-2 COVID-19 NFCT DS RESP RNA 4 TRGT MIC: CPT

## 2022-06-04 PROCEDURE — 93005 ELECTROCARDIOGRAM TRACING: CPT

## 2022-06-04 PROCEDURE — 80053 COMPREHEN METABOLIC PANEL: CPT

## 2022-06-04 PROCEDURE — 81001 URINALYSIS AUTO W/SCOPE: CPT

## 2022-06-04 PROCEDURE — 87086 URINE CULTURE/COLONY COUNT: CPT

## 2022-06-04 PROCEDURE — 87077 CULTURE AEROBIC IDENTIFY: CPT

## 2022-06-04 PROCEDURE — 99285 EMERGENCY DEPT VISIT HI MDM: CPT

## 2022-06-04 PROCEDURE — 99223 1ST HOSP IP/OBS HIGH 75: CPT | Performed by: INTERNAL MEDICINE

## 2022-06-04 PROCEDURE — 93971 EXTREMITY STUDY: CPT | Mod: 26,RT | Performed by: INTERNAL MEDICINE

## 2022-06-04 PROCEDURE — 700111 HCHG RX REV CODE 636 W/ 250 OVERRIDE (IP): Performed by: INTERNAL MEDICINE

## 2022-06-04 RX ORDER — DEXAMETHASONE 1 MG
2 TABLET ORAL EVERY 24 HOURS
Status: DISCONTINUED | OUTPATIENT
Start: 2022-06-05 | End: 2022-06-07

## 2022-06-04 RX ORDER — CEFTRIAXONE 2 G/1
2 INJECTION, POWDER, FOR SOLUTION INTRAMUSCULAR; INTRAVENOUS ONCE
Status: COMPLETED | OUTPATIENT
Start: 2022-06-04 | End: 2022-06-04

## 2022-06-04 RX ORDER — OXYCODONE HYDROCHLORIDE 5 MG/1
5 TABLET ORAL
Status: DISCONTINUED | OUTPATIENT
Start: 2022-06-04 | End: 2022-06-12 | Stop reason: HOSPADM

## 2022-06-04 RX ORDER — OXYCODONE HYDROCHLORIDE 5 MG/1
2.5 TABLET ORAL
Status: DISCONTINUED | OUTPATIENT
Start: 2022-06-04 | End: 2022-06-12 | Stop reason: HOSPADM

## 2022-06-04 RX ORDER — ACETAMINOPHEN 325 MG/1
650 TABLET ORAL EVERY 6 HOURS PRN
Status: DISCONTINUED | OUTPATIENT
Start: 2022-06-04 | End: 2022-06-09

## 2022-06-04 RX ORDER — MORPHINE SULFATE 4 MG/ML
2 INJECTION INTRAVENOUS
Status: DISCONTINUED | OUTPATIENT
Start: 2022-06-04 | End: 2022-06-10

## 2022-06-04 RX ORDER — ACETAMINOPHEN 325 MG/1
650 TABLET ORAL ONCE
Status: COMPLETED | OUTPATIENT
Start: 2022-06-04 | End: 2022-06-04

## 2022-06-04 RX ORDER — HYDRALAZINE HYDROCHLORIDE 20 MG/ML
10 INJECTION INTRAMUSCULAR; INTRAVENOUS EVERY 4 HOURS PRN
Status: DISCONTINUED | OUTPATIENT
Start: 2022-06-04 | End: 2022-06-12 | Stop reason: HOSPADM

## 2022-06-04 RX ORDER — BISACODYL 10 MG
10 SUPPOSITORY, RECTAL RECTAL
Status: DISCONTINUED | OUTPATIENT
Start: 2022-06-04 | End: 2022-06-10

## 2022-06-04 RX ORDER — ENOXAPARIN SODIUM 100 MG/ML
1 INJECTION SUBCUTANEOUS EVERY 12 HOURS
Status: DISCONTINUED | OUTPATIENT
Start: 2022-06-04 | End: 2022-06-07

## 2022-06-04 RX ORDER — AMLODIPINE BESYLATE 5 MG/1
5 TABLET ORAL DAILY
Status: DISCONTINUED | OUTPATIENT
Start: 2022-06-05 | End: 2022-06-11

## 2022-06-04 RX ORDER — LISINOPRIL 20 MG/1
40 TABLET ORAL DAILY
Status: DISCONTINUED | OUTPATIENT
Start: 2022-06-05 | End: 2022-06-12 | Stop reason: HOSPADM

## 2022-06-04 RX ORDER — POLYETHYLENE GLYCOL 3350 17 G/17G
1 POWDER, FOR SOLUTION ORAL
Status: DISCONTINUED | OUTPATIENT
Start: 2022-06-04 | End: 2022-06-10

## 2022-06-04 RX ORDER — OMEPRAZOLE 20 MG/1
20 CAPSULE, DELAYED RELEASE ORAL DAILY
Status: DISCONTINUED | OUTPATIENT
Start: 2022-06-05 | End: 2022-06-12 | Stop reason: HOSPADM

## 2022-06-04 RX ORDER — LEVETIRACETAM 500 MG/1
500 TABLET ORAL 2 TIMES DAILY
Status: DISCONTINUED | OUTPATIENT
Start: 2022-06-04 | End: 2022-06-12 | Stop reason: HOSPADM

## 2022-06-04 RX ORDER — AMOXICILLIN 250 MG
2 CAPSULE ORAL 2 TIMES DAILY
Status: DISCONTINUED | OUTPATIENT
Start: 2022-06-04 | End: 2022-06-10

## 2022-06-04 RX ADMIN — ACETAMINOPHEN 650 MG: 325 TABLET ORAL at 16:54

## 2022-06-04 RX ADMIN — LEVETIRACETAM 500 MG: 500 TABLET, FILM COATED ORAL at 20:24

## 2022-06-04 RX ADMIN — CEFTRIAXONE SODIUM 2 G: 2 INJECTION, POWDER, FOR SOLUTION INTRAMUSCULAR; INTRAVENOUS at 16:37

## 2022-06-04 RX ADMIN — ENOXAPARIN SODIUM 80 MG: 80 INJECTION SUBCUTANEOUS at 20:24

## 2022-06-04 RX ADMIN — IOHEXOL 50 ML: 350 INJECTION, SOLUTION INTRAVENOUS at 18:45

## 2022-06-04 RX ADMIN — OXYCODONE 5 MG: 5 TABLET ORAL at 20:24

## 2022-06-04 ASSESSMENT — PATIENT HEALTH QUESTIONNAIRE - PHQ9
2. FEELING DOWN, DEPRESSED, IRRITABLE, OR HOPELESS: NOT AT ALL
1. LITTLE INTEREST OR PLEASURE IN DOING THINGS: NOT AT ALL
SUM OF ALL RESPONSES TO PHQ9 QUESTIONS 1 AND 2: 0

## 2022-06-04 ASSESSMENT — ENCOUNTER SYMPTOMS
SHORTNESS OF BREATH: 1
DOUBLE VISION: 0
HEMOPTYSIS: 0
BLURRED VISION: 0
HEADACHES: 0
FEVER: 0
NAUSEA: 0
BRUISES/BLEEDS EASILY: 0
HEARTBURN: 0
MYALGIAS: 0
CHILLS: 0
DIZZINESS: 0
POLYDIPSIA: 0

## 2022-06-04 ASSESSMENT — LIFESTYLE VARIABLES
TOTAL SCORE: 0
HAVE YOU EVER FELT YOU SHOULD CUT DOWN ON YOUR DRINKING: NO
CONSUMPTION TOTAL: NEGATIVE
EVER FELT BAD OR GUILTY ABOUT YOUR DRINKING: NO
TOTAL SCORE: 0
HOW MANY TIMES IN THE PAST YEAR HAVE YOU HAD 5 OR MORE DRINKS IN A DAY: 0
ALCOHOL_USE: NO
TOTAL SCORE: 0
HAVE PEOPLE ANNOYED YOU BY CRITICIZING YOUR DRINKING: NO
EVER HAD A DRINK FIRST THING IN THE MORNING TO STEADY YOUR NERVES TO GET RID OF A HANGOVER: NO
AVERAGE NUMBER OF DAYS PER WEEK YOU HAVE A DRINK CONTAINING ALCOHOL: 0
ON A TYPICAL DAY WHEN YOU DRINK ALCOHOL HOW MANY DRINKS DO YOU HAVE: 0

## 2022-06-04 ASSESSMENT — COGNITIVE AND FUNCTIONAL STATUS - GENERAL
STANDING UP FROM CHAIR USING ARMS: TOTAL
SUGGESTED CMS G CODE MODIFIER DAILY ACTIVITY: CK
MOBILITY SCORE: 10
TURNING FROM BACK TO SIDE WHILE IN FLAT BAD: A LITTLE
MOVING TO AND FROM BED TO CHAIR: A LOT
TOILETING: A LOT
DAILY ACTIVITIY SCORE: 14
PERSONAL GROOMING: A LITTLE
SUGGESTED CMS G CODE MODIFIER MOBILITY: CL
WALKING IN HOSPITAL ROOM: TOTAL
CLIMB 3 TO 5 STEPS WITH RAILING: TOTAL
MOVING FROM LYING ON BACK TO SITTING ON SIDE OF FLAT BED: A LOT
HELP NEEDED FOR BATHING: A LOT
EATING MEALS: A LITTLE
DRESSING REGULAR UPPER BODY CLOTHING: A LOT
DRESSING REGULAR LOWER BODY CLOTHING: A LOT

## 2022-06-04 ASSESSMENT — PAIN DESCRIPTION - PAIN TYPE: TYPE: ACUTE PAIN

## 2022-06-04 ASSESSMENT — FIBROSIS 4 INDEX
FIB4 SCORE: 1.71
FIB4 SCORE: 1.2

## 2022-06-04 NOTE — ED PROVIDER NOTES
"ED Provider Note    CHIEF COMPLAINT  Chief Complaint   Patient presents with   • Shortness of Breath     Pt reports SOB since this am. Pt had an operation for a meningioma on May 12th.        HERNAN Palencia is a 81 y.o. female who presents with shortness of breath this morning, via , she states she is having bilateral anterior chest pain worse with deep breath.  Found to be hypoxic on arrival, no history of similar according to the chart.  Patient is complained of swelling in her right leg with pain.  She denies history of DVT or pulmonary embolism.  History difficult secondary to multiple answers of \"I do not know\" per the Romansh .  Patient had embolization of meningioma craniotomy for operation for resection on May 12.  She is apparently visiting from [place].  Speaking with the patient's daughter, patient has been complaining of some lower abdominal pain earlier today now improved.  She has had dysuria since discharge from the hospital weeks ago.  No known history of DVT or pulmonary embolism.  Leg began to swell approximately 7 days ago.  Today the daughter felt her mother was confused as well as complaining of shortness of breath today.  No headache, no trauma.    REVIEW OF SYSTEMS    Constitutional: No fever  Respiratory: Shortness of breath, pleuritic chest pain  Cardiac: No report of cardiac history  Gastrointestinal: Abdominal pain, vomiting  Musculoskeletal: Right leg pain  Neurologic: Status post excision meningioma       All other systems are negative.       PAST MEDICAL HISTORY  History reviewed. No pertinent past medical history.    FAMILY HISTORY  History reviewed. No pertinent family history.    SOCIAL HISTORY  Social History     Socioeconomic History   • Marital status: Single   Tobacco Use   • Smoking status: Never Smoker   • Smokeless tobacco: Never Used   Substance and Sexual Activity   • Alcohol use: Never   • Drug use: Never       SURGICAL HISTORY  Past Surgical " "History:   Procedure Laterality Date   • CRANIOTOMY STEALTH Right 5/19/2022    Procedure: CRANIOTOMY, USING FRAMELESS STEREOTAXY - FRONTAL FOR BRAIN TUMOR RESECTION;  Surgeon: Hakan Dhaliwal M.D.;  Location: SURGERY [facility];  Service: Neurosurgery       CURRENT MEDICATIONS  Home Medications     Reviewed by Virginie Ritchie R.N. (Registered Nurse) on 06/04/22 at 1313  Med List Status: <None>   Medication Last Dose Status   acetaminophen (TYLENOL) 325 MG Tab  Active   amLODIPine (NORVASC) 5 MG Tab  Active   dexamethasone (DECADRON) 2 MG tablet  Active   levETIRAcetam (KEPPRA) 500 MG Tab  Active   lisinopril (PRINIVIL) 40 MG tablet  Active   omeprazole (PRILOSEC) 20 MG delayed-release capsule  Active   oxyCODONE immediate-release (ROXICODONE) 5 MG Tab  Active   polyethylene glycol/lytes (MIRALAX) 17 g Pack  Active   senna-docusate (PERICOLACE OR SENOKOT S) 8.6-50 MG Tab  Active                ALLERGIES  Allergies   Allergen Reactions   • Pork Derived Products Unspecified     Worship BELIEFS       PHYSICAL EXAM  VITAL SIGNS: BP (!) 171/77   Pulse 96   Temp 37.3 °C (99.2 °F) (Temporal)   Resp 19   Ht 1.626 m (5' 4\")   Wt 94.8 kg (209 lb)   SpO2 94%   BMI 35.87 kg/m²   Constitutional:  Non-toxic appearance.   HENT: No facial swelling  Eyes: Anicteric, no conjunctivitis.  Pupils are equal, no nystagmus  Cardiovascular: Normal heart rate, Normal rhythm  Pulmonary:  No wheezing, No rales.  Moderate bilateral air movement  Gastrointestinal: Soft, No tenderness, No palpable mass  Skin: Warm, Dry, No cyanosis.  Swelling right leg compared to left  Neurologic: Speech is clear, follows commands, facial expression is symmetrical.  Strength intact  Psychiatric: Affect normal,  Mood normal.  Patient is calm and cooperative  Musculoskeletal: Right calf tenderness    EKG/Labs  Results for orders placed or performed during the hospital encounter of 06/04/22   Lactic acid (lactate)   Result Value Ref Range    Lactic " Acid 1.5 0.5 - 2.0 mmol/L   CBC WITH DIFFERENTIAL   Result Value Ref Range    WBC 10.1 4.8 - 10.8 K/uL    RBC 4.09 (L) 4.20 - 5.40 M/uL    Hemoglobin 11.5 (L) 12.0 - 16.0 g/dL    Hematocrit 36.1 (L) 37.0 - 47.0 %    MCV 88.3 81.4 - 97.8 fL    MCH 28.1 27.0 - 33.0 pg    MCHC 31.9 (L) 33.6 - 35.0 g/dL    RDW 50.1 (H) 35.9 - 50.0 fL    Platelet Count 167 164 - 446 K/uL    MPV 10.3 9.0 - 12.9 fL    Neutrophils-Polys 75.30 (H) 44.00 - 72.00 %    Lymphocytes 12.80 (L) 22.00 - 41.00 %    Monocytes 8.10 0.00 - 13.40 %    Eosinophils 1.90 0.00 - 6.90 %    Basophils 0.40 0.00 - 1.80 %    Immature Granulocytes 1.50 (H) 0.00 - 0.90 %    Nucleated RBC 0.00 /100 WBC    Neutrophils (Absolute) 7.64 (H) 2.00 - 7.15 K/uL    Lymphs (Absolute) 1.30 1.00 - 4.80 K/uL    Monos (Absolute) 0.82 0.00 - 0.85 K/uL    Eos (Absolute) 0.19 0.00 - 0.51 K/uL    Baso (Absolute) 0.04 0.00 - 0.12 K/uL    Immature Granulocytes (abs) 0.15 (H) 0.00 - 0.11 K/uL    NRBC (Absolute) 0.00 K/uL   COMP METABOLIC PANEL   Result Value Ref Range    Sodium 138 135 - 145 mmol/L    Potassium 3.7 3.6 - 5.5 mmol/L    Chloride 105 96 - 112 mmol/L    Co2 20 20 - 33 mmol/L    Anion Gap 13.0 7.0 - 16.0    Glucose 111 (H) 65 - 99 mg/dL    Bun 16 8 - 22 mg/dL    Creatinine 0.40 (L) 0.50 - 1.40 mg/dL    Calcium 8.8 8.5 - 10.5 mg/dL    AST(SGOT) 17 12 - 45 U/L    ALT(SGPT) 22 2 - 50 U/L    Alkaline Phosphatase 129 (H) 30 - 99 U/L    Total Bilirubin 0.4 0.1 - 1.5 mg/dL    Albumin 3.5 3.2 - 4.9 g/dL    Total Protein 6.4 6.0 - 8.2 g/dL    Globulin 2.9 1.9 - 3.5 g/dL    A-G Ratio 1.2 g/dL   URINALYSIS    Specimen: Urine   Result Value Ref Range    Color Yellow     Character Clear     Specific Gravity 1.021 <1.035    Ph 5.5 5.0 - 8.0    Glucose Negative Negative mg/dL    Ketones Negative Negative mg/dL    Protein Negative Negative mg/dL    Bilirubin Negative Negative    Urobilinogen, Urine 1.0 Negative    Nitrite Positive (A) Negative    Leukocyte Esterase Moderate (A) Negative     Occult Blood Negative Negative    Micro Urine Req Microscopic    CoV-2, FLU A/B, and RSV by PCR (2-4 Hours CEPHEID) : Collect NP swab in VTM    Specimen: Respirate   Result Value Ref Range    Influenza virus A RNA Negative Negative    Influenza virus B, PCR Negative Negative    RSV, PCR Negative Negative    SARS-CoV-2 by PCR NotDetected     SARS-CoV-2 Source NP Swab    URINE MICROSCOPIC (W/UA)   Result Value Ref Range    WBC  (A) /hpf    RBC 2-5 (A) /hpf    Bacteria Many (A) None /hpf    Epithelial Cells Negative /hpf    Hyaline Cast 11-20 (A) /lpf   ESTIMATED GFR   Result Value Ref Range    GFR (CKD-EPI) 100 >60 mL/min/1.73 m 2   EKG   Result Value Ref Range    Report       [facility] Emergency Dept[facility]    Test Date:  2022  Pt Name:    GILA NARANJO              Department: ER  MRN:        [id number]                      Room:       [facility]  Gender:     Female                       Technician: [id number]  :        [id number]                   Requested By:ER TRIAGE PROTOCOL  Order #:    [id number]                    Reading MD: KADY KHAN MD    Measurements  Intervals                                Axis  Rate:       94                           P:          59  NM:         168                          QRS:        44  QRSD:       102                          T:          22  QT:         368  QTc:        461    Interpretive Statements  SINUS RHYTHM  BORDERLINE REPOLARIZATION ABNORMALITY  Compared to ECG 2022 18:32:25  No significant changes  Electronically Signed On 2022 17:44:04 PDT by KADY KHAN MD           RADIOLOGY/PROCEDURES  US-EXTREMITY VENOUS LOWER UNILAT RIGHT   Final Result      DX-CHEST-PORTABLE (1 VIEW)   Final Result         No acute cardiac or pulmonary abnormality is identified.      CT-CTA CHEST PULMONARY ARTERY W/ RECONS    (Results Pending)         COURSE & MEDICAL DECISION MAKING  Pertinent Labs & Imaging studies reviewed. (See chart for  details)  Patient has to strong risk factors for DVT, plane ride from [place] described as a lengthy.  She is also had brain surgery, resection of meningioma on May 12.  Case was discussed with Dr. Oro on-call for Dr. Dhaliwal, he is stated patient is clear for full anticoagulation this far out from her surgery.  Ultrasound of the right leg showed extensive DVT.  With her hypoxia, pleuritic chest pain, shortness of breath pulmonary embolism is likely.  CT scan is obtained to evaluate for possible right heart strain.  Her chest x-ray was negative for pneumonia.  EKG negative for ischemia.  Patient has a symptomatic UTI, started on Rocephin.  She has tested negative for COVID.  Sepsis unlikely, she has normal lactic acid, normal vital signs.  Patient hospitalized for ongoing evaluation and treatment    FINAL IMPRESSION     1. Dyspnea, unspecified type     2. Hypoxia     3. Urinary tract infection without hematuria, site unspecified     4. Acute deep vein thrombosis (DVT) of proximal vein of right lower extremity (HCC)         Critical care time 35 minutes           Electronically signed by: Mustapha Paris M.D., 6/4/2022 3:07 PM

## 2022-06-04 NOTE — ED TRIAGE NOTES
"Chief Complaint   Patient presents with   • Shortness of Breath     Pt reports SOB since this am. Pt had an operation for a meningioma on May 12th.      Pt BIB for above complaint. Pt reports SOB that started this am. EMS reports that the pt has been complaining of RLE pain and decreases ambulation per pt's daughter who called EMS this am. EMS also reports that per the pt's daughter, the pt has been becoming more confused and nonverbal. When asked orientation questions using , pt responds with \"I don't know\". Pt denies RLE pain and denies CP at this time   Pt speaks Persian only.  used   [place] ID number: [id number]     "

## 2022-06-04 NOTE — ED NOTES
Med rec completed per family at bedside and dispense history per [facility].  Allergies reviewed with family. NKDA.  No outpatient antibiotics in the last 30 days.  Preferred pharmacy: [facility] on [address].    Patient was admitted at [facility] 5/12/2022 - 5/31/2022 and was discharged with several new medications including amlodipine, levetiracetam, lisinopril, omeprazole, senna-docusate, dexamethasone, and oxycodone; per daughter she has not given the patient any of these medications aside from the oxycodone and she did not know what the medications were for.    Daughter unsure of strength of Tylenol patient is using at home.

## 2022-06-05 ENCOUNTER — APPOINTMENT (OUTPATIENT)
Dept: RADIOLOGY | Facility: MEDICAL CENTER | Age: 82
DRG: 175 | End: 2022-06-05
Attending: PHYSICIAN ASSISTANT
Payer: MEDICAID

## 2022-06-05 LAB
ANION GAP SERPL CALC-SCNC: 11 MMOL/L (ref 7–16)
BUN SERPL-MCNC: 12 MG/DL (ref 8–22)
CALCIUM SERPL-MCNC: 8.5 MG/DL (ref 8.5–10.5)
CHLORIDE SERPL-SCNC: 103 MMOL/L (ref 96–112)
CHOLEST SERPL-MCNC: 201 MG/DL (ref 100–199)
CO2 SERPL-SCNC: 22 MMOL/L (ref 20–33)
CREAT SERPL-MCNC: 0.38 MG/DL (ref 0.5–1.4)
ERYTHROCYTE [DISTWIDTH] IN BLOOD BY AUTOMATED COUNT: 50.8 FL (ref 35.9–50)
GFR SERPLBLD CREATININE-BSD FMLA CKD-EPI: 101 ML/MIN/1.73 M 2
GLUCOSE SERPL-MCNC: 139 MG/DL (ref 65–99)
HCT VFR BLD AUTO: 33.1 % (ref 37–47)
HDLC SERPL-MCNC: 35 MG/DL
HGB BLD-MCNC: 10.5 G/DL (ref 12–16)
LDLC SERPL CALC-MCNC: 128 MG/DL
MAGNESIUM SERPL-MCNC: 2 MG/DL (ref 1.5–2.5)
MCH RBC QN AUTO: 28.4 PG (ref 27–33)
MCHC RBC AUTO-ENTMCNC: 31.7 G/DL (ref 33.6–35)
MCV RBC AUTO: 89.5 FL (ref 81.4–97.8)
NT-PROBNP SERPL IA-MCNC: 106 PG/ML (ref 0–125)
PHOSPHATE SERPL-MCNC: 3.7 MG/DL (ref 2.5–4.5)
PLATELET # BLD AUTO: 150 K/UL (ref 164–446)
PMV BLD AUTO: 10.9 FL (ref 9–12.9)
POTASSIUM SERPL-SCNC: 3.9 MMOL/L (ref 3.6–5.5)
RBC # BLD AUTO: 3.7 M/UL (ref 4.2–5.4)
SODIUM SERPL-SCNC: 136 MMOL/L (ref 135–145)
TRIGL SERPL-MCNC: 189 MG/DL (ref 0–149)
TROPONIN T SERPL-MCNC: 12 NG/L (ref 6–19)
WBC # BLD AUTO: 7.8 K/UL (ref 4.8–10.8)

## 2022-06-05 PROCEDURE — 84484 ASSAY OF TROPONIN QUANT: CPT

## 2022-06-05 PROCEDURE — 99233 SBSQ HOSP IP/OBS HIGH 50: CPT | Mod: GC | Performed by: INTERNAL MEDICINE

## 2022-06-05 PROCEDURE — A9270 NON-COVERED ITEM OR SERVICE: HCPCS | Performed by: STUDENT IN AN ORGANIZED HEALTH CARE EDUCATION/TRAINING PROGRAM

## 2022-06-05 PROCEDURE — 80061 LIPID PANEL: CPT

## 2022-06-05 PROCEDURE — 80048 BASIC METABOLIC PNL TOTAL CA: CPT

## 2022-06-05 PROCEDURE — 83880 ASSAY OF NATRIURETIC PEPTIDE: CPT

## 2022-06-05 PROCEDURE — 83735 ASSAY OF MAGNESIUM: CPT

## 2022-06-05 PROCEDURE — 36415 COLL VENOUS BLD VENIPUNCTURE: CPT

## 2022-06-05 PROCEDURE — 700111 HCHG RX REV CODE 636 W/ 250 OVERRIDE (IP): Performed by: INTERNAL MEDICINE

## 2022-06-05 PROCEDURE — 85027 COMPLETE CBC AUTOMATED: CPT

## 2022-06-05 PROCEDURE — 70450 CT HEAD/BRAIN W/O DYE: CPT

## 2022-06-05 PROCEDURE — 700102 HCHG RX REV CODE 250 W/ 637 OVERRIDE(OP): Performed by: STUDENT IN AN ORGANIZED HEALTH CARE EDUCATION/TRAINING PROGRAM

## 2022-06-05 PROCEDURE — 84100 ASSAY OF PHOSPHORUS: CPT

## 2022-06-05 PROCEDURE — A9270 NON-COVERED ITEM OR SERVICE: HCPCS | Performed by: INTERNAL MEDICINE

## 2022-06-05 PROCEDURE — 700102 HCHG RX REV CODE 250 W/ 637 OVERRIDE(OP): Performed by: INTERNAL MEDICINE

## 2022-06-05 PROCEDURE — 700111 HCHG RX REV CODE 636 W/ 250 OVERRIDE (IP): Performed by: STUDENT IN AN ORGANIZED HEALTH CARE EDUCATION/TRAINING PROGRAM

## 2022-06-05 PROCEDURE — 770020 HCHG ROOM/CARE - TELE (206)

## 2022-06-05 RX ORDER — ATORVASTATIN CALCIUM 40 MG/1
40 TABLET, FILM COATED ORAL DAILY
Status: DISCONTINUED | OUTPATIENT
Start: 2022-06-05 | End: 2022-06-12 | Stop reason: HOSPADM

## 2022-06-05 RX ORDER — ALBUTEROL SULFATE 90 UG/1
2 AEROSOL, METERED RESPIRATORY (INHALATION)
Status: DISCONTINUED | OUTPATIENT
Start: 2022-06-05 | End: 2022-06-12 | Stop reason: HOSPADM

## 2022-06-05 RX ADMIN — CEFTRIAXONE SODIUM 1 G: 10 INJECTION, POWDER, FOR SOLUTION INTRAVENOUS at 17:07

## 2022-06-05 RX ADMIN — LISINOPRIL 40 MG: 20 TABLET ORAL at 05:18

## 2022-06-05 RX ADMIN — LEVETIRACETAM 500 MG: 500 TABLET, FILM COATED ORAL at 16:18

## 2022-06-05 RX ADMIN — OXYCODONE 5 MG: 5 TABLET ORAL at 16:18

## 2022-06-05 RX ADMIN — ENOXAPARIN SODIUM 80 MG: 80 INJECTION SUBCUTANEOUS at 05:18

## 2022-06-05 RX ADMIN — ATORVASTATIN CALCIUM 40 MG: 40 TABLET, FILM COATED ORAL at 16:18

## 2022-06-05 RX ADMIN — ENOXAPARIN SODIUM 80 MG: 80 INJECTION SUBCUTANEOUS at 17:07

## 2022-06-05 RX ADMIN — OXYCODONE 5 MG: 5 TABLET ORAL at 05:18

## 2022-06-05 RX ADMIN — DEXAMETHASONE 2 MG: 1 TABLET ORAL at 05:18

## 2022-06-05 RX ADMIN — LEVETIRACETAM 500 MG: 500 TABLET, FILM COATED ORAL at 05:18

## 2022-06-05 RX ADMIN — AMLODIPINE BESYLATE 5 MG: 5 TABLET ORAL at 05:18

## 2022-06-05 RX ADMIN — OMEPRAZOLE 20 MG: 20 CAPSULE, DELAYED RELEASE ORAL at 05:18

## 2022-06-05 ASSESSMENT — ENCOUNTER SYMPTOMS
COUGH: 0
BLURRED VISION: 0
POLYDIPSIA: 0
BLOOD IN STOOL: 0
BRUISES/BLEEDS EASILY: 0
CONSTIPATION: 0
VOMITING: 0
CHILLS: 0
HEMOPTYSIS: 0
MYALGIAS: 0
HEARTBURN: 0
DOUBLE VISION: 0
ABDOMINAL PAIN: 0
SHORTNESS OF BREATH: 1
FOCAL WEAKNESS: 0
PALPITATIONS: 0
NAUSEA: 0
FEVER: 0
DIZZINESS: 0
DIARRHEA: 0
SPEECH CHANGE: 0
HEADACHES: 1

## 2022-06-05 ASSESSMENT — PAIN DESCRIPTION - PAIN TYPE
TYPE: ACUTE PAIN
TYPE: ACUTE PAIN

## 2022-06-05 ASSESSMENT — LIFESTYLE VARIABLES: SUBSTANCE_ABUSE: 0

## 2022-06-05 NOTE — H&P
Hospital Medicine History & Physical Note    Date of Service  6/4/2022    Primary Care Physician  Pcp Pt States None    Consultants  None    None    Code Status  Full Code    Chief Complaint  Chief Complaint   Patient presents with   • Shortness of Breath     Pt reports SOB since this am. Pt had an operation for a meningioma on May 12th.        History of Presenting Illness  Jan Palencia is a 81 y.o. female who presented 6/4/2022 with SOB.  Patient has been having shortness of breath since today morning.  Patient also has been having anterior chest pain which has been worsening with a deep breath.  Patient had bilateral swelling of the legs in the past.  But today she is having swelling on her right right leg and pain and it.  The swelling in the leg has been going on for approximately 1 week.  Patient also had been having some lower abdominal pain.  The exact onset of all the other symptoms is hard to pinpoint as through the  patient states she does not know.  Patient also complains of burning sensation in the urine which has been going on since her discharge from the from the hospital a little while ago.  Per daughter the patient also became more confused today.        Review of Systems  Review of Systems   Constitutional: Negative for chills and fever.   HENT: Negative for hearing loss and tinnitus.    Eyes: Negative for blurred vision and double vision.   Respiratory: Positive for shortness of breath. Negative for hemoptysis.    Cardiovascular: Positive for chest pain and leg swelling (Right leg).   Gastrointestinal: Negative for heartburn and nausea.   Genitourinary: Negative for dysuria and urgency.   Musculoskeletal: Negative for myalgias.   Skin: Negative for itching and rash.   Neurological: Negative for dizziness and headaches.   Endo/Heme/Allergies: Negative for polydipsia. Does not bruise/bleed easily.       Past Medical History  1. HTN  2.  Meningioma s/p resection    Surgical History    has a past surgical history that includes craniotomy stealth (Right, 5/19/2022).     Family History    Family history reviewed with patient. There is no family history that is pertinent to the chief complaint.     Social History   reports that she has never smoked. She has never used smokeless tobacco. She reports that she does not drink alcohol and does not use drugs.    Allergies  Allergies   Allergen Reactions   • Gelatin Unspecified     Muslim BELIEFS   • Pork Derived Products Unspecified     Muslim BELIEFS       Medications  Prior to Admission Medications   Prescriptions Last Dose Informant Patient Reported? Taking?   Acetaminophen (TYLENOL PO) 6/4/2022 at 0430 Family Member Yes Yes   Sig: Take 2 Tablets by mouth every 6 hours as needed (Mild or Moderate Pain).   amLODIPine (NORVASC) 5 MG Tab New RX at NOT STARTED Patient's Home Pharmacy No No   Sig: Take 1 Tablet by mouth every day.   dexamethasone (DECADRON) 2 MG tablet New RX at NOT STARTED Patient's Home Pharmacy No No   Sig: Take 1 Tablet by mouth every 24 hours.   levETIRAcetam (KEPPRA) 500 MG Tab New RX at NOT STARTED Patient's Home Pharmacy No No   Sig: Take 1 Tablet by mouth 2 times a day.   lisinopril (PRINIVIL) 40 MG tablet New RX at NOT STARTED Patient's Home Pharmacy No No   Sig: Take 1 Tablet by mouth every day.   omeprazole (PRILOSEC) 20 MG delayed-release capsule New RX at NOT STARTED Patient's Home Pharmacy No No   Sig: Take 1 Capsule by mouth every day.   oxyCODONE immediate-release (ROXICODONE) 5 MG Tab 6/3/2022 at 2200 Patient's Home Pharmacy No No   Sig: Take 1 Tablet by mouth every four hours as needed for Severe Pain (If tylenol not effective) for up to 5 days.   senna-docusate (PERICOLACE OR SENOKOT S) 8.6-50 MG Tab New RX at NOT STARTED Patient's Home Pharmacy No No   Sig: Take 1 Tablet by mouth every evening.      Facility-Administered Medications: None       Physical Exam  Temp:  [37.3 °C (99.2 °F)] 37.3 °C (99.2 °F)  Pulse:   [88-97] 96  Resp:  [16-19] 16  BP: (134-171)/(63-81) 134/70  SpO2:  [87 %-95 %] 95 %  Blood Pressure : 135/63   Temperature: 37.3 °C (99.2 °F)   Pulse: 91   Respiration: 16   Pulse Oximetry: 94 %       Physical Exam  Constitutional:       Appearance: Normal appearance.   HENT:      Head: Normocephalic and atraumatic.      Nose: Nose normal.      Mouth/Throat:      Mouth: Mucous membranes are moist.      Pharynx: Oropharynx is clear.   Eyes:      Conjunctiva/sclera: Conjunctivae normal.      Pupils: Pupils are equal, round, and reactive to light.   Cardiovascular:      Rate and Rhythm: Normal rate and regular rhythm.      Pulses: Normal pulses.      Heart sounds: Normal heart sounds.   Pulmonary:      Effort: Pulmonary effort is normal.      Breath sounds: Normal breath sounds.   Abdominal:      General: Abdomen is flat. Bowel sounds are normal.   Musculoskeletal:      Cervical back: Normal range of motion and neck supple.      Right lower leg: Edema (Much enlarged on left side) present.   Skin:     General: Skin is warm and dry.   Neurological:      General: No focal deficit present.      Mental Status: She is alert and oriented to person, place, and time.         Laboratory:  Recent Labs     06/04/22  1322   WBC 10.1   RBC 4.09*   HEMOGLOBIN 11.5*   HEMATOCRIT 36.1*   MCV 88.3   MCH 28.1   MCHC 31.9*   RDW 50.1*   PLATELETCT 167   MPV 10.3     Recent Labs     06/04/22  1322   SODIUM 138   POTASSIUM 3.7   CHLORIDE 105   CO2 20   GLUCOSE 111*   BUN 16   CREATININE 0.40*   CALCIUM 8.8     Recent Labs     06/04/22  1322   ALTSGPT 22   ASTSGOT 17   ALKPHOSPHAT 129*   TBILIRUBIN 0.4   GLUCOSE 111*         No results for input(s): NTPROBNP in the last 72 hours.      No results for input(s): TROPONINT in the last 72 hours.    Imaging:  US-EXTREMITY VENOUS LOWER UNILAT RIGHT   Final Result      DX-CHEST-PORTABLE (1 VIEW)   Final Result         No acute cardiac or pulmonary abnormality is identified.      CT-CTA CHEST  PULMONARY ARTERY W/ RECONS    (Results Pending)   EC-ECHOCARDIOGRAM COMPLETE W/O CONT    (Results Pending)       EKG:  My impression is: NSR     ED course: Patient was found to be hypoxic in the ED which immediately came up with after putting the patient on oxygen    Assessment/Plan:  Justification for Admission Status  I anticipate this patient will require at least two midnights for appropriate medical management, necessitating inpatient admission because DVT and further evaluation of shortness of breath    * DVT femoral (deep venous thrombosis) with thrombophlebitis, right (HCC)- (present on admission)  Assessment & Plan  Patient has a large DVT likely secondary to being immobile s/p surgery  -We will get a full dose anticoagulation with enoxaparin  -Consider IR guided clot retrival    AMS (altered mental status)  Assessment & Plan  Could be due to the hypoxia initially or due to the dexamethasone which the patient is on  -Patient is back on the oxygen will continue to monitor to see if it improves  -If he does not the other possibility could be to being on dexamethasone which could be weaned and tapered    SOB (shortness of breath)  Assessment & Plan  Most likely secondary to PE  -Is getting a CT PE- has a large rt sided PE per my read  -Patient already getting full dose anticoagulation for DVT  -We will also check an echo to evaluate for heart train    HTN (hypertension)- (present on admission)  Assessment & Plan  Stable  -We will continue lisinopril and amlodipine    Meningioma (HCC)- (present on admission)  Assessment & Plan  S/p resection  -We will continue the dexamethasone  -We will also continue the Keppra for seizure prophylaxis      VTE prophylaxis: therapeutic anticoagulation with Enoxaparin     I discussed the plan of care with bedside RN and ERP.

## 2022-06-05 NOTE — ASSESSMENT & PLAN NOTE
Could be due to the hypoxia initially or due to the dexamethasone which the patient is on  -Patient is back on the oxygen will continue to monitor to see if it improves  -If he does not the other possibility could be to being on dexamethasone which could be weaned and tapered

## 2022-06-05 NOTE — PROGRESS NOTES
Bedside report received from LAURO Waggoner. Fall precautions are in place. Call light is in reach. Patient demonstrated use of call light.

## 2022-06-05 NOTE — PROGRESS NOTES
Patient transferred from ED to T838, patient speaks Haitian Yoruba and it is difficult to find and . Spoke with daughter and she will be returning to the hospital and can assist with translation.

## 2022-06-05 NOTE — ED NOTES
Pt's daughter took the pt's clothing home and pt does not have any other belongings. Pt's daughter informed that the pt will be transferred to T8  Pt transported by receiving RN
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
## 2022-06-05 NOTE — PROGRESS NOTES
Neurosurgery Progress Note    Subjective:  Pleasant 80 yo female, previous patient of Dr. Dhaliwal, 3 weeks post op right anterior parafalcine meningioma resection.  Presented to ED Friday for SOB. Found to have large right PE.  Started weight based Lovenox yesterday  Hx limited due to language barrier    Exam:  Alert, Pleasant and cooperative  Face symmetric, tongue midline  CN2-12 intact  No drift  Strength 5/5 all 4 extremites    BP  Min: 116/67  Max: 171/77  Pulse  Av.5  Min: 86  Max: 97  Resp  Av.3  Min: 16  Max: 22  Temp  Av.7 °C (98 °F)  Min: 36.2 °C (97.2 °F)  Max: 37.3 °C (99.2 °F)  Monitored Temp 2  Av.3 °C (99.14 °F)  Min: 37.3 °C (99.14 °F)  Max: 37.3 °C (99.14 °F)  SpO2  Av.6 %  Min: 87 %  Max: 95 %    No data recorded    Recent Labs     22  1322 22  0025   WBC 10.1 7.8   RBC 4.09* 3.70*   HEMOGLOBIN 11.5* 10.5*   HEMATOCRIT 36.1* 33.1*   MCV 88.3 89.5   MCH 28.1 28.4   MCHC 31.9* 31.7*   RDW 50.1* 50.8*   PLATELETCT 167 150*   MPV 10.3 10.9     Recent Labs     22  1322 22  0025   SODIUM 138 136   POTASSIUM 3.7 3.9   CHLORIDE 105 103   CO2 20 22   GLUCOSE 111* 139*   BUN 16 12   CREATININE 0.40* 0.38*   CALCIUM 8.8 8.5               Intake/Output                       22 - 2259 22 - 22 0659      Total  Total                 Intake    Total Intake -- -- -- -- -- --       Output    Urine  --  -- --  --  -- --    Number of Times Voided -- 2 x 2 x -- -- --    Total Output -- -- -- -- -- --       Net I/O     -- -- -- -- -- --          No intake or output data in the 24 hours ending 22 6694         • cefTRIAXone (ROCEPHIN) IV  1 g Q24HR   • senna-docusate  2 Tablet BID    And   • polyethylene glycol/lytes  1 Packet QDAY PRN    And   • magnesium hydroxide  30 mL QDAY PRN    And   • bisacodyl  10 mg QDAY PRN   • acetaminophen  650 mg Q6HRS PRN   • Pharmacy Consult Request  1 Each  PHARMACY TO DOSE   • oxyCODONE immediate-release  2.5 mg Q3HRS PRN    Or   • oxyCODONE immediate-release  5 mg Q3HRS PRN    Or   • morphine injection  2 mg Q3HRS PRN   • hydrALAZINE  10 mg Q4HRS PRN   • amLODIPine  5 mg DAILY   • dexamethasone  2 mg Q24HRS   • levETIRAcetam  500 mg BID   • lisinopril  40 mg DAILY   • omeprazole  20 mg DAILY   • enoxaparin (LOVENOX) injection  1 mg/kg Q12HRS       Assessment and Plan:  Hospital day # 3  Lovenox started yesterday  Will repeat stat head CT today  q 4 hour neuro checks  No activity restriction    Chemical prophylactic DVT therapy: yes  Start date/time: 6/4

## 2022-06-05 NOTE — ASSESSMENT & PLAN NOTE
Patient has a large DVT likely secondary to being immobile s/p surgery  -We will get a full dose anticoagulation with enoxaparin  -Consider IR guided clot retrival

## 2022-06-05 NOTE — ASSESSMENT & PLAN NOTE
Most likely secondary to PE  -Is getting a CT PE- has a large rt sided PE per my read  -Patient already getting full dose anticoagulation for DVT  -We will also check an echo to evaluate for heart train

## 2022-06-05 NOTE — CARE PLAN
The patient is Watcher - Medium risk of patient condition declining or worsening    Shift Goals  Clinical Goals: pain management, therapeutic lovenox  Patient Goals: pain control, rest  Family Goals: Have patient eat    Progress made toward(s) clinical / shift goals:  patient has not needed any pain medication today, able to eat, therapeutic lovenox, no neurological deficits    Patient is not progressing towards the following goals: patient with positive urine culture, CT head showing progression of stroke       Problem: Pain - Standard  Goal: Alleviation of pain or a reduction in pain to the patient’s comfort goal  Outcome: Progressing     Problem: Fall Risk  Goal: Patient will remain free from falls  Outcome: Progressing

## 2022-06-05 NOTE — ASSESSMENT & PLAN NOTE
S/p resection  -We will continue the dexamethasone  -We will also continue the Keppra for seizure prophylaxis

## 2022-06-05 NOTE — CARE PLAN
The patient is Stable - Low risk of patient condition declining or worsening    Shift Goals  Clinical Goals: pain management, monitor respiratory status, promote sleep  Patient Goals: Pain control  Family Goals: Have patient eat    Progress made toward(s) clinical / shift goals:    Problem: Knowledge Deficit - Standard  Goal: Patient and family/care givers will demonstrate understanding of plan of care, disease process/condition, diagnostic tests and medications  Outcome: Progressing     Problem: Pain - Standard  Goal: Alleviation of pain or a reduction in pain to the patient’s comfort goal  Outcome: Progressing     Problem: Fall Risk  Goal: Patient will remain free from falls  Outcome: Progressing       Patient is not progressing towards the following goals:

## 2022-06-06 ENCOUNTER — APPOINTMENT (OUTPATIENT)
Dept: CARDIOLOGY | Facility: MEDICAL CENTER | Age: 82
DRG: 175 | End: 2022-06-06
Attending: STUDENT IN AN ORGANIZED HEALTH CARE EDUCATION/TRAINING PROGRAM
Payer: MEDICAID

## 2022-06-06 PROBLEM — I26.99 PULMONARY EMBOLISM (HCC): Status: ACTIVE | Noted: 2022-06-06

## 2022-06-06 LAB
ALBUMIN SERPL BCP-MCNC: 3 G/DL (ref 3.2–4.9)
ALBUMIN/GLOB SERPL: 1.1 G/DL
ALP SERPL-CCNC: 118 U/L (ref 30–99)
ALT SERPL-CCNC: 19 U/L (ref 2–50)
ANION GAP SERPL CALC-SCNC: 11 MMOL/L (ref 7–16)
AST SERPL-CCNC: 15 U/L (ref 12–45)
BACTERIA UR CULT: ABNORMAL
BACTERIA UR CULT: ABNORMAL
BASOPHILS # BLD AUTO: 0.2 % (ref 0–1.8)
BASOPHILS # BLD: 0.02 K/UL (ref 0–0.12)
BILIRUB SERPL-MCNC: 0.2 MG/DL (ref 0.1–1.5)
BUN SERPL-MCNC: 21 MG/DL (ref 8–22)
CALCIUM SERPL-MCNC: 8.7 MG/DL (ref 8.5–10.5)
CHLORIDE SERPL-SCNC: 102 MMOL/L (ref 96–112)
CO2 SERPL-SCNC: 21 MMOL/L (ref 20–33)
CREAT SERPL-MCNC: 0.52 MG/DL (ref 0.5–1.4)
EOSINOPHIL # BLD AUTO: 0.07 K/UL (ref 0–0.51)
EOSINOPHIL NFR BLD: 0.9 % (ref 0–6.9)
ERYTHROCYTE [DISTWIDTH] IN BLOOD BY AUTOMATED COUNT: 50.4 FL (ref 35.9–50)
GFR SERPLBLD CREATININE-BSD FMLA CKD-EPI: 94 ML/MIN/1.73 M 2
GLOBULIN SER CALC-MCNC: 2.8 G/DL (ref 1.9–3.5)
GLUCOSE SERPL-MCNC: 163 MG/DL (ref 65–99)
HCT VFR BLD AUTO: 29.7 % (ref 37–47)
HGB BLD-MCNC: 9.3 G/DL (ref 12–16)
IMM GRANULOCYTES # BLD AUTO: 0.15 K/UL (ref 0–0.11)
IMM GRANULOCYTES NFR BLD AUTO: 1.9 % (ref 0–0.9)
LV EJECT FRACT  99904: 65
LV EJECT FRACT MOD 2C 99903: 82.07
LV EJECT FRACT MOD 4C 99902: 72.55
LV EJECT FRACT MOD BP 99901: 77.9
LYMPHOCYTES # BLD AUTO: 0.85 K/UL (ref 1–4.8)
LYMPHOCYTES NFR BLD: 10.6 % (ref 22–41)
MAGNESIUM SERPL-MCNC: 2 MG/DL (ref 1.5–2.5)
MCH RBC QN AUTO: 28 PG (ref 27–33)
MCHC RBC AUTO-ENTMCNC: 31.3 G/DL (ref 33.6–35)
MCV RBC AUTO: 89.5 FL (ref 81.4–97.8)
MONOCYTES # BLD AUTO: 0.78 K/UL (ref 0–0.85)
MONOCYTES NFR BLD AUTO: 9.7 % (ref 0–13.4)
NEUTROPHILS # BLD AUTO: 6.16 K/UL (ref 2–7.15)
NEUTROPHILS NFR BLD: 76.7 % (ref 44–72)
NRBC # BLD AUTO: 0 K/UL
NRBC BLD-RTO: 0 /100 WBC
PHOSPHATE SERPL-MCNC: 3.5 MG/DL (ref 2.5–4.5)
PLATELET # BLD AUTO: 171 K/UL (ref 164–446)
PMV BLD AUTO: 10.3 FL (ref 9–12.9)
POTASSIUM SERPL-SCNC: 4.1 MMOL/L (ref 3.6–5.5)
PROT SERPL-MCNC: 5.8 G/DL (ref 6–8.2)
RBC # BLD AUTO: 3.32 M/UL (ref 4.2–5.4)
SIGNIFICANT IND 70042: ABNORMAL
SITE SITE: ABNORMAL
SODIUM SERPL-SCNC: 134 MMOL/L (ref 135–145)
SOURCE SOURCE: ABNORMAL
WBC # BLD AUTO: 8 K/UL (ref 4.8–10.8)

## 2022-06-06 PROCEDURE — 93306 TTE W/DOPPLER COMPLETE: CPT

## 2022-06-06 PROCEDURE — 93306 TTE W/DOPPLER COMPLETE: CPT | Mod: 26 | Performed by: INTERNAL MEDICINE

## 2022-06-06 PROCEDURE — 36415 COLL VENOUS BLD VENIPUNCTURE: CPT

## 2022-06-06 PROCEDURE — 80053 COMPREHEN METABOLIC PANEL: CPT

## 2022-06-06 PROCEDURE — 99233 SBSQ HOSP IP/OBS HIGH 50: CPT | Mod: GC | Performed by: INTERNAL MEDICINE

## 2022-06-06 PROCEDURE — 700102 HCHG RX REV CODE 250 W/ 637 OVERRIDE(OP): Performed by: INTERNAL MEDICINE

## 2022-06-06 PROCEDURE — A9270 NON-COVERED ITEM OR SERVICE: HCPCS | Performed by: INTERNAL MEDICINE

## 2022-06-06 PROCEDURE — 700102 HCHG RX REV CODE 250 W/ 637 OVERRIDE(OP): Performed by: STUDENT IN AN ORGANIZED HEALTH CARE EDUCATION/TRAINING PROGRAM

## 2022-06-06 PROCEDURE — 85025 COMPLETE CBC W/AUTO DIFF WBC: CPT

## 2022-06-06 PROCEDURE — 84100 ASSAY OF PHOSPHORUS: CPT

## 2022-06-06 PROCEDURE — 83735 ASSAY OF MAGNESIUM: CPT

## 2022-06-06 PROCEDURE — 700111 HCHG RX REV CODE 636 W/ 250 OVERRIDE (IP): Performed by: INTERNAL MEDICINE

## 2022-06-06 PROCEDURE — 770020 HCHG ROOM/CARE - TELE (206)

## 2022-06-06 PROCEDURE — 700111 HCHG RX REV CODE 636 W/ 250 OVERRIDE (IP): Performed by: STUDENT IN AN ORGANIZED HEALTH CARE EDUCATION/TRAINING PROGRAM

## 2022-06-06 PROCEDURE — 700105 HCHG RX REV CODE 258: Performed by: STUDENT IN AN ORGANIZED HEALTH CARE EDUCATION/TRAINING PROGRAM

## 2022-06-06 PROCEDURE — A9270 NON-COVERED ITEM OR SERVICE: HCPCS | Performed by: STUDENT IN AN ORGANIZED HEALTH CARE EDUCATION/TRAINING PROGRAM

## 2022-06-06 RX ORDER — LANOLIN ALCOHOL/MO/W.PET/CERES
400 CREAM (GRAM) TOPICAL 2 TIMES DAILY
Status: DISCONTINUED | OUTPATIENT
Start: 2022-06-06 | End: 2022-06-09

## 2022-06-06 RX ORDER — SODIUM CHLORIDE 9 MG/ML
INJECTION, SOLUTION INTRAVENOUS CONTINUOUS
Status: DISCONTINUED | OUTPATIENT
Start: 2022-06-06 | End: 2022-06-07

## 2022-06-06 RX ADMIN — ENOXAPARIN SODIUM 80 MG: 80 INJECTION SUBCUTANEOUS at 17:28

## 2022-06-06 RX ADMIN — LEVETIRACETAM 500 MG: 500 TABLET, FILM COATED ORAL at 04:22

## 2022-06-06 RX ADMIN — ENOXAPARIN SODIUM 80 MG: 80 INJECTION SUBCUTANEOUS at 04:22

## 2022-06-06 RX ADMIN — SODIUM CHLORIDE: 9 INJECTION, SOLUTION INTRAVENOUS at 20:30

## 2022-06-06 RX ADMIN — LISINOPRIL 40 MG: 20 TABLET ORAL at 04:22

## 2022-06-06 RX ADMIN — ACETAMINOPHEN 650 MG: 325 TABLET ORAL at 06:39

## 2022-06-06 RX ADMIN — AMLODIPINE BESYLATE 5 MG: 5 TABLET ORAL at 04:22

## 2022-06-06 RX ADMIN — ACETAMINOPHEN 650 MG: 325 TABLET ORAL at 20:55

## 2022-06-06 RX ADMIN — CEFTRIAXONE SODIUM 1 G: 10 INJECTION, POWDER, FOR SOLUTION INTRAVENOUS at 17:28

## 2022-06-06 RX ADMIN — SENNOSIDES AND DOCUSATE SODIUM 2 TABLET: 50; 8.6 TABLET ORAL at 17:28

## 2022-06-06 RX ADMIN — LEVETIRACETAM 500 MG: 500 TABLET, FILM COATED ORAL at 17:28

## 2022-06-06 RX ADMIN — SODIUM CHLORIDE: 9 INJECTION, SOLUTION INTRAVENOUS at 09:51

## 2022-06-06 RX ADMIN — ATORVASTATIN CALCIUM 40 MG: 40 TABLET, FILM COATED ORAL at 04:22

## 2022-06-06 RX ADMIN — Medication 400 MG: at 17:27

## 2022-06-06 RX ADMIN — OMEPRAZOLE 20 MG: 20 CAPSULE, DELAYED RELEASE ORAL at 04:22

## 2022-06-06 RX ADMIN — DEXAMETHASONE 2 MG: 1 TABLET ORAL at 04:27

## 2022-06-06 ASSESSMENT — ENCOUNTER SYMPTOMS
ABDOMINAL PAIN: 0
DIARRHEA: 0
HEMOPTYSIS: 0
BLOOD IN STOOL: 0
PALPITATIONS: 0
FEVER: 0
SHORTNESS OF BREATH: 1
FOCAL WEAKNESS: 0
DOUBLE VISION: 0
NAUSEA: 0
BLURRED VISION: 0
POLYDIPSIA: 0
BRUISES/BLEEDS EASILY: 0
HEADACHES: 1
CONSTIPATION: 0
DIZZINESS: 0
HEARTBURN: 0
MYALGIAS: 0
COUGH: 0
SPEECH CHANGE: 0
CHILLS: 0
VOMITING: 0

## 2022-06-06 ASSESSMENT — FIBROSIS 4 INDEX: FIB4 SCORE: 1.59

## 2022-06-06 ASSESSMENT — PAIN DESCRIPTION - PAIN TYPE: TYPE: ACUTE PAIN

## 2022-06-06 ASSESSMENT — LIFESTYLE VARIABLES: SUBSTANCE_ABUSE: 0

## 2022-06-06 NOTE — DISCHARGE PLANNING
Case Management Discharge Planning    Admission Date: 6/4/2022  GMLOS: 4.1  ALOS: 2    6-Clicks ADL Score: 14  6-Clicks Mobility Score: 10  PT and/or OT Eval ordered: Yes  Post-acute Referrals Ordered: No  Post-acute Choice Obtained: No  Has referral(s) been sent to post-acute provider:  No      Anticipated Discharge Dispo: Discharge Disposition: Discharged to home/self care (01)    DME Needed: TBD    Action(s) Taken: pt pending medical clearance, pt has no insurance, on 2 liters O2 currently, case management needs undetermined. Teams message sent to Director of Case Management Latanya Cloud and  of [facility] Kevin Grimm to see of a Related Party Agreement can be authorized for Inpatient Rehab as pt is self pay.    Escalations Completed: None    Medically Clear: No    Next Steps: f/u with medical team and pt/family to discuss dc needs and barriers.    Barriers to Discharge: Medical clearance, No Insurance

## 2022-06-06 NOTE — PROGRESS NOTES
Neurosurgery Progress Note    Subjective:  Pleasant 80 yo female, previous patient of Dr. Dhaliwal, 3 weeks post op right anterior parafalcine meningioma resection.  Presented to ED Friday for SOB. Found to have large right PE.  Started weight based Lovenox yesterday  Hx limited due to language barrier    Exam:  Alert, Pleasant and cooperative  Face symmetric, tongue midline  CN2-12 intact  No drift  Strength 5/5 all 4 extremites    Follow up head CT stable    BP  Min: 102/60  Max: 124/67  Pulse  Av.8  Min: 72  Max: 88  Resp  Av.2  Min: 16  Max: 18  Temp  Av.6 °C (97.8 °F)  Min: 36.1 °C (97 °F)  Max: 37 °C (98.6 °F)  Monitored Temp 2  Av.8 °C (98.2 °F)  Min: 36.4 °C (97.52 °F)  Max: 37 °C (98.6 °F)  SpO2  Av.3 %  Min: 92 %  Max: 93 %    No data recorded    Recent Labs     22  1322 22  0025 22  0254   WBC 10.1 7.8 8.0   RBC 4.09* 3.70* 3.32*   HEMOGLOBIN 11.5* 10.5* 9.3*   HEMATOCRIT 36.1* 33.1* 29.7*   MCV 88.3 89.5 89.5   MCH 28.1 28.4 28.0   MCHC 31.9* 31.7* 31.3*   RDW 50.1* 50.8* 50.4*   PLATELETCT 167 150* 171   MPV 10.3 10.9 10.3     Recent Labs     22  1322 22  0025 22  0254   SODIUM 138 136 134*   POTASSIUM 3.7 3.9 4.1   CHLORIDE 105 103 102   CO2 20 22 21   GLUCOSE 111* 139* 163*   BUN 16 12 21   CREATININE 0.40* 0.38* 0.52   CALCIUM 8.8 8.5 8.7               Intake/Output                       22 07 - 2259 22 - 22 Total [id number] Total                 Intake    P.O.  120  -- 120  --  -- --    P.O. 120 -- 120 -- -- --    Total Intake 120 -- 120 -- -- --       Output    Urine  400  150 550  --  -- --    Number of Times Voided 2 x 1 x 3 x -- -- --    Urine Void (mL) 400 150 550 -- -- --    Total Output 400 150 550 -- -- --       Net I/O     -280 -150 -430 -- -- --            Intake/Output Summary (Last 24 hours) at 2022 1016  Last data filed at 2022  Gross per 24 hour    Intake 120 ml   Output 550 ml   Net -430 ml            • NS   Continuous   • cefTRIAXone (ROCEPHIN) IV  1 g Q24HR   • atorvastatin  40 mg DAILY   • albuterol  2 Puff Q4H PRN (RT)   • senna-docusate  2 Tablet BID    And   • polyethylene glycol/lytes  1 Packet QDAY PRN    And   • magnesium hydroxide  30 mL QDAY PRN    And   • bisacodyl  10 mg QDAY PRN   • acetaminophen  650 mg Q6HRS PRN   • Pharmacy Consult Request  1 Each PHARMACY TO DOSE   • oxyCODONE immediate-release  2.5 mg Q3HRS PRN    Or   • oxyCODONE immediate-release  5 mg Q3HRS PRN    Or   • morphine injection  2 mg Q3HRS PRN   • hydrALAZINE  10 mg Q4HRS PRN   • amLODIPine  5 mg DAILY   • dexamethasone  2 mg Q24HRS   • levETIRAcetam  500 mg BID   • lisinopril  40 mg DAILY   • omeprazole  20 mg DAILY   • enoxaparin (LOVENOX) injection  1 mg/kg Q12HRS       Assessment and Plan:  Hospital day #4  Q4 hour neuro checks and pain assessment   No activity restriction      Chemical prophylactic DVT therapy: yes  Start date/time: 6/4

## 2022-06-06 NOTE — PROGRESS NOTES
Date of Service: 6/6/2022  Primary Team: [facility] Team   Attending: Fidencio Ruiz M.D.   Senior Resident: Bobby Celis M.D.  Intern: Obdulio Green D.O.  Contact:  [phone]    Chief Complaint:   Chief Complaint   Patient presents with   • Shortness of Breath     Pt reports SOB since this am. Pt had an operation for a meningioma on May 12th.        ID:  Patient is a 81 y.o. female [place] German speaking visiting from [place] with PMHx of HTN, recent hospitalization for meningioma with mass effect s/p Craniotomy on 5/19/22 who presented 6/4/2022 with SOB, AMS, and RLE swelling and pain who was admitted for management of right pulmonary embolism and right deep vein thrombosis.    Interval Update:    -No acute events overnight next  -Patient speaks [place] German and phone  was used  -Patient states her headache, dizziness, and lightheadedness has resolved. She denies any photophobia, any changes in muscle strength or sensation. She states her RLE and SOB has slightly improved. She states an improvement in her dysuria. She has not had a BM in 4-6 days, but has been passing gas. Daughter declined giving patient stool softener yesterday    -Pending Echo w/ bubble study today  -Continue with Lovenox 80 mg BID, consider switching to Apixaban  -IS ordered  -PT/OT ordered  -Physiatry consulted for possible inpatient PT  - ml/hr  -Magnesium Oxide 400 mg BID for constipation  -Continue with Ceftriaxone 1g (06/05/22-), switch to Bactrim tomorrow after re-evaluation of kidney function      Consultants/Specialty:  Neurosurgery  Review of Systems:    Review of Systems   Constitutional: Negative for chills, fever and malaise/fatigue.   HENT: Negative for hearing loss and tinnitus.    Eyes: Negative for blurred vision and double vision.   Respiratory: Positive for shortness of breath. Negative for cough and hemoptysis.    Cardiovascular: Positive for chest pain and leg swelling (Right leg). Negative for  palpitations.   Gastrointestinal: Negative for abdominal pain, blood in stool, constipation, diarrhea, heartburn, melena, nausea and vomiting.   Genitourinary: Negative for dysuria and urgency.   Musculoskeletal: Negative for myalgias.   Skin: Negative for itching and rash.   Neurological: Positive for headaches. Negative for dizziness, speech change and focal weakness.   Endo/Heme/Allergies: Negative for polydipsia. Does not bruise/bleed easily.   Psychiatric/Behavioral: Negative for substance abuse.   All other systems reviewed and are negative.      Objective Data:   Physical Exam:   Vitals:   Temp:  [36.1 °C (97 °F)-37 °C (98.6 °F)] 37 °C (98.6 °F)  Pulse:  [72-88] 73  Resp:  [16-20] 16  BP: (102-118)/(60-67) 116/64  SpO2:  [92 %-93 %] 92 %    -VSS, BP: (102-118)/(60-67) 116/64, 92% on 2L NC     Physical Exam  Vitals and nursing note reviewed.   Constitutional:       Appearance: Normal appearance. She is obese. She is ill-appearing.   HENT:      Head: Normocephalic and atraumatic.      Right Ear: External ear normal.      Left Ear: External ear normal.      Nose: Nose normal.      Mouth/Throat:      Mouth: Mucous membranes are moist.      Pharynx: Oropharynx is clear.   Eyes:      Extraocular Movements: Extraocular movements intact.      Conjunctiva/sclera: Conjunctivae normal.      Pupils: Pupils are equal, round, and reactive to light.   Cardiovascular:      Rate and Rhythm: Normal rate and regular rhythm.      Pulses: Normal pulses.      Heart sounds: Normal heart sounds. No murmur heard.    No friction rub. No gallop.   Pulmonary:      Effort: Pulmonary effort is normal. No respiratory distress.      Breath sounds: Normal breath sounds. No stridor. No wheezing, rhonchi or rales.      Comments: Requiring 2L NC  Abdominal:      General: Abdomen is flat. Bowel sounds are normal. There is no distension.      Palpations: Abdomen is soft.      Tenderness: There is no abdominal tenderness. There is no guarding or  rebound.   Musculoskeletal:         General: Swelling (R>L) present. Normal range of motion.      Cervical back: Normal range of motion and neck supple.      Right lower leg: Edema (R>L) present.   Skin:     General: Skin is warm and dry.      Capillary Refill: Capillary refill takes less than 2 seconds.      Coloration: Skin is not jaundiced.      Comments: Difficult to palpate DP and PT on RLE, 2+ pulses on DP and PT on LLE   Neurological:      General: No focal deficit present.      Mental Status: She is alert and oriented to person, place, and time. Mental status is at baseline.      Cranial Nerves: No cranial nerve deficit.   Psychiatric:         Mood and Affect: Mood normal.         Behavior: Behavior normal.         Thought Content: Thought content normal.         Judgment: Judgment normal.           Labs:   Recent Labs     06/04/22 1322 06/05/22  0025 06/06/22  0254   WBC 10.1 7.8 8.0   RBC 4.09* 3.70* 3.32*   HEMOGLOBIN 11.5* 10.5* 9.3*   HEMATOCRIT 36.1* 33.1* 29.7*   MCV 88.3 89.5 89.5   MCH 28.1 28.4 28.0   RDW 50.1* 50.8* 50.4*   PLATELETCT 167 150* 171   MPV 10.3 10.9 10.3   NEUTSPOLYS 75.30*  --  76.70*   LYMPHOCYTES 12.80*  --  10.60*   MONOCYTES 8.10  --  9.70   EOSINOPHILS 1.90  --  0.90   BASOPHILS 0.40  --  0.20     Recent Labs     06/04/22 1322 06/05/22  0025 06/06/22  0254   SODIUM 138 136 134*   POTASSIUM 3.7 3.9 4.1   CHLORIDE 105 103 102   CO2 20 22 21   GLUCOSE 111* 139* 163*   BUN 16 12 21      Recent Labs     06/04/22  1322 06/05/22  0025 06/06/22  0254   ALBUMIN 3.5  --  3.0*   TBILIRUBIN 0.4  --  0.2   ALKPHOSPHAT 129*  --  118*   TOTPROTEIN 6.4  --  5.8*   ALTSGPT 22  --  19   ASTSGOT 17  --  15   CREATININE 0.40* 0.38* 0.52        Imaging:   CT-HEAD W/O   Final Result         1.  Right craniotomy defect again noted.      2.  Resolution of pneumocephalus.      3.  Volume loss and decreased attenuation in the right frontal lobe again noted.      4.  Evolving area of infarction in the  medial posterior left cerebral hemisphere at site where infarction was seen on the prior MRI.               CT-CTA CHEST PULMONARY ARTERY W/ RECONS   Final Result      1.  Large pulmonary embolus in the right pulmonary artery, its lobar, segmental and subsegmental branches. No right heart strain.   2.  Borderline cardiomegaly.      Findings were communicated to KADY KHAN via [employer] system on 6/4/2022 6:31 PM.      US-EXTREMITY VENOUS LOWER UNILAT RIGHT   Final Result      DX-CHEST-PORTABLE (1 VIEW)   Final Result         No acute cardiac or pulmonary abnormality is identified.      EC-ECHOCARDIOGRAM COMPLETE W/O CONT    (Results Pending)   US-EXTREMITY ARTERY LOWER BILAT W/FAYE (COMBO)    (Results Pending)     -CBC (06/06/22): WBC stable from 7.8-8.0 (10.1 on admission), Hgb down trended from 10.5-9.3 (baseline of 11), platelets trending from 150-171 (167 on admission, baseline 295)  -CMP (06/06/22): NA downtrending from 136-134, glucose 163, BUN/creatinine uptrending from 12/0.3-31/0.52  -Lipid panel (06/05/22): Total cholesterol 201, triglycerides 189, HDL 35,   -Troponin (06/05/22): 12, BNP (06/05/22): 106  -CT head w/o (06/05/22): Right craniotomy defect-noted, resolution of pneumocephalus, evolving area of infarction in the medial posterior left cerebral hemisphere at site of infection was seen on the prior MRI    Assessment and Plan:    Patient is a 81 y.o. female Welsh Greek speaking visiting from [place] with PMHx of HTN, recent hospitalization for meningioma with mass effect s/p Craniotomy on 5/19/22 who presented 6/4/2022 with SOB, AMS, and RLE swelling and pain who was admitted for management of right pulmonary embolism and right deep vein thrombosis.      Problem Representation:     #Acute Hypoxic respiratory failure 2/2  #Large pulmonary embolism  #DVT femoral (deep venous thrombosis) with thrombophlebitis, right (HCC)-   Assessment & Plan  *LE swelling x2 days and RLE pain x  1 day,   *CXR 1V (06/04/22): No acute cardiac or pulm abnormality is identified  *US RLE (06/04/22): Acute DVT right lower extremity, distal common femoral vein extending down the leg and through the calf, gastrocnemius and soleal veins are also thrombosed in the calf  *CTA (06/04/22): Large pulm embolism in the right pulmonary artery, its lobar, segmental and segmental branches  *EKG (06/04/22): SR, HR 94, QTc 461  *Likely secondary to provocation secondary to immobility s/p surgery  *Troponin (06/05/22): 12, BNP (06/05/22): 106    -Full dose anticoagulation with enoxaparin 80 mg twice daily for 6 months due to provoked DVT  -Echocardiogram pending to eval for right heart strain, none showed on EKG  -IS ordered  -PT/OT ordered  -Physiatry consulted for possible inpatient PT  -DP and tibial pulses heard on doppler, will order FAYE to rule out arterial insufficiency due to diminished pulses  -No vascular surgery indicated at this time, no compartment syndrome or Phlegmasia cerulea dolens  -Consider IR guided clot retrival if hemodynamic unstable    AMS (altered mental status)  Urinary Tract Infection  Ischemic Stroke  Assessment & Plan  *A&O x 4, but does not seem right per patient's daughter  *CBC (06/04/22): WBC 10.1 (14.6 on 5/29/2022), left shift of neutrophil absolute 7.64 (H) (12.80 on 5/9/2022),  *Urinalysis (06/04/22): Nitrate positive, LE moderate, pyuria , hematuria with RBC 2-5, many bacteria,  hyaline casts 11-20  *Urine culture (06/04/22): E. Coli, susceptibility pending  *Blood cultures X2 (06/04/22): No growth to date  *Likely secondary to UTI and stroke    -Continue ceftriaxone 1 g daily (6/4/2022-)  -Improving    Acute Kidney Injury  *CMP (06/06/22): BUN/creatinine uptrending from 12/0.38-31/0.52    - ml/hr  -Avoid nephrotoxins  -Renally dose drugs  -We will continue to control blood sugar, lipids, and blood pressure    Ischemic Stroke  *MRI head with and without (5/21/2022): Moderately  large gyriform area of acute infarction involving the right posterior medial temporal lobe and adjacent right medial occipital lobe.  Moderate periventricular and juxtacortical white matter changes consistent with chronic microvascular ischemic gliosis  *CT head without (06/05/22): Evolving area of infarction in the medial posterior left cerebral hemisphere at site where infarction was seen on the prior MRI  *Lipid panel (06/05/22): Total cholesterol 201, triglycerides 189, HDL 35,     -Atorvastatin 40 mg ordered  -We will hold off on aspirin at this time  -Neurochecks per neurosurgery    HTN (hypertension)- (present on admission)  Assessment & Plan  *BP (06/04/22): (134-171)/(63-82) 141/82  *Stable    -Continue home amlodipine 5 mg daily, hold for SBP <100  -Continue home lisinopril 40 mg daily, hold for SBP <100    Constipation  *Refuses stool softeners    -Trial magnesium oxide 400 mg BID    Meningioma (HCC)- (present on admission)  Assessment & Plan  *Neurosurgery, Dr. Hakan Dhaliwal (5/19/2022): Right frontal extra axial brain tumor measuring 45X75X 45 mm, bicoronal incision of right frontal craniotomy for tumor resection, intraoperative microscope, Stealth navigation.    -We will continue the dexamethasone 2 mg daily, clarify with neurosurgery when to stop  -We will continue home Keppra 500 mg twice daily for seizure prophylaxis    Gastroesophageal reflux disease    -Continue home omeprazole 20 mg daily    Core Measures:    Code Status: Full Code  Diet: Regular  IVF: None  Rodriguez Catheter: None  IV Lines: pIV  Antibiotics: Ceftriaxone 1g (06/05/22-)  GI Prophylaxis: PEG + Bisacodyl PRN  DVT Prophylaxis: Enoxaparin 60 mg BID  Disposition: Inpatient    Please note that this dictation was created using voice recognition software. I have made every reasonable attempt to correct obvious errors, but there may be errors of grammar and possibly content that I did not discover before finalizing the note. If the  error changes the accuracy of the document, I would appreciate it being brought to my attention.

## 2022-06-06 NOTE — CARE PLAN
The patient is Stable - Low risk of patient condition declining or worsening    Shift Goals  Clinical Goals: pain control, promote sleep  Patient Goals: sleep  Family Goals: clean linen, bed bath    Progress made toward(s) clinical / shift goals:    Problem: Knowledge Deficit - Standard  Goal: Patient and family/care givers will demonstrate understanding of plan of care, disease process/condition, diagnostic tests and medications  Outcome: Progressing     Problem: Pain - Standard  Goal: Alleviation of pain or a reduction in pain to the patient’s comfort goal  Outcome: Progressing     Problem: Fall Risk  Goal: Patient will remain free from falls  Outcome: Progressing       Patient is not progressing towards the following goals:

## 2022-06-06 NOTE — CARE PLAN
The patient is Stable - Low risk of patient condition declining or worsening    Shift Goals  Clinical Goals: pain control, echo, POC  Patient Goals: rest  Family Goals: clean linen, bed bath    Progress made toward(s) clinical / shift goals:      Problem: Pain - Standard  Goal: Alleviation of pain or a reduction in pain to the patient’s comfort goal  Outcome: Progressing   Q4 pain assessments in place. Pt educated on pain scale. RN aware of pt's goal pain. PRN medication orders followed.    Problem: Fall Risk  Goal: Patient will remain free from falls  Outcome: Progressing   [place] estela fall scale utilized. Bed alarm in place. Pt is a two person assist with a front wheel walker. Educated pt and family to call for appropriate assistance prior to ambulating.    Patient is not progressing towards the following goals:

## 2022-06-06 NOTE — PROGRESS NOTES
Bedside report received from night RN, pt care assumed, assessment completed. Pt is alert but unable to determine orientation at this time due to language barrier with translators, will wait for daughter to translate. Nonverbal pain assessment is calm and resting, ST on the monitor. Bed in lowest, locked position, treaded socks on, call light and belongings within reach.

## 2022-06-06 NOTE — PROGRESS NOTES
Report received from  LAURO au. Family is at bedside. Fall precautions in place. Call light is in reach.

## 2022-06-06 NOTE — DISCHARGE PLANNING
[facility] Rehabilitation Transitional Care Coordination    Referral from:  Dr Green  Insurance Provider on Facesheet: Uninsured - not a citizen, ineligible for Medicaid  Potential Rehab Diagnosis: Stroke    Chart review indicates patient may need on going medical management and may have therapy needs to possibly meet inpatient rehab facility criteria with the goal of returning to community.    D/C support: Daughter, son in law     Physiatry consultation pended per protocol.     Physiatry consult pended, awaiting therapy evals. Patient has limited discharge resources. Patient lacks a pay source for post-acute/OP services, daughter and son in law are unemployed as well. Discussed case with [facility] , will need more information to determine if patient is a candidate for RR. TCC will continue to follow patient's progress with therapies.     Thank you for the referral.

## 2022-06-07 ENCOUNTER — APPOINTMENT (OUTPATIENT)
Dept: RADIOLOGY | Facility: MEDICAL CENTER | Age: 82
DRG: 175 | End: 2022-06-07
Attending: STUDENT IN AN ORGANIZED HEALTH CARE EDUCATION/TRAINING PROGRAM
Payer: MEDICAID

## 2022-06-07 LAB
ALBUMIN SERPL BCP-MCNC: 3 G/DL (ref 3.2–4.9)
ALBUMIN/GLOB SERPL: 1.2 G/DL
ALP SERPL-CCNC: 116 U/L (ref 30–99)
ALT SERPL-CCNC: 17 U/L (ref 2–50)
ANION GAP SERPL CALC-SCNC: 9 MMOL/L (ref 7–16)
AST SERPL-CCNC: 15 U/L (ref 12–45)
BILIRUB SERPL-MCNC: <0.2 MG/DL (ref 0.1–1.5)
BUN SERPL-MCNC: 16 MG/DL (ref 8–22)
CALCIUM SERPL-MCNC: 8.4 MG/DL (ref 8.5–10.5)
CHLORIDE SERPL-SCNC: 107 MMOL/L (ref 96–112)
CO2 SERPL-SCNC: 23 MMOL/L (ref 20–33)
CREAT SERPL-MCNC: 0.51 MG/DL (ref 0.5–1.4)
EKG IMPRESSION: NORMAL
GFR SERPLBLD CREATININE-BSD FMLA CKD-EPI: 94 ML/MIN/1.73 M 2
GLOBULIN SER CALC-MCNC: 2.6 G/DL (ref 1.9–3.5)
GLUCOSE SERPL-MCNC: 136 MG/DL (ref 65–99)
MAGNESIUM SERPL-MCNC: 2 MG/DL (ref 1.5–2.5)
PHOSPHATE SERPL-MCNC: 2.5 MG/DL (ref 2.5–4.5)
POTASSIUM SERPL-SCNC: 4.5 MMOL/L (ref 3.6–5.5)
PROT SERPL-MCNC: 5.6 G/DL (ref 6–8.2)
SODIUM SERPL-SCNC: 139 MMOL/L (ref 135–145)
TROPONIN T SERPL-MCNC: <6 NG/L (ref 6–19)

## 2022-06-07 PROCEDURE — 770001 HCHG ROOM/CARE - MED/SURG/GYN PRIV*

## 2022-06-07 PROCEDURE — 71045 X-RAY EXAM CHEST 1 VIEW: CPT

## 2022-06-07 PROCEDURE — 99233 SBSQ HOSP IP/OBS HIGH 50: CPT | Mod: GC | Performed by: INTERNAL MEDICINE

## 2022-06-07 PROCEDURE — 700105 HCHG RX REV CODE 258: Performed by: STUDENT IN AN ORGANIZED HEALTH CARE EDUCATION/TRAINING PROGRAM

## 2022-06-07 PROCEDURE — A9270 NON-COVERED ITEM OR SERVICE: HCPCS | Performed by: INTERNAL MEDICINE

## 2022-06-07 PROCEDURE — 93005 ELECTROCARDIOGRAM TRACING: CPT | Performed by: STUDENT IN AN ORGANIZED HEALTH CARE EDUCATION/TRAINING PROGRAM

## 2022-06-07 PROCEDURE — 84100 ASSAY OF PHOSPHORUS: CPT

## 2022-06-07 PROCEDURE — 97535 SELF CARE MNGMENT TRAINING: CPT

## 2022-06-07 PROCEDURE — 700102 HCHG RX REV CODE 250 W/ 637 OVERRIDE(OP): Performed by: STUDENT IN AN ORGANIZED HEALTH CARE EDUCATION/TRAINING PROGRAM

## 2022-06-07 PROCEDURE — 700102 HCHG RX REV CODE 250 W/ 637 OVERRIDE(OP): Performed by: INTERNAL MEDICINE

## 2022-06-07 PROCEDURE — 36415 COLL VENOUS BLD VENIPUNCTURE: CPT

## 2022-06-07 PROCEDURE — 83735 ASSAY OF MAGNESIUM: CPT

## 2022-06-07 PROCEDURE — A9270 NON-COVERED ITEM OR SERVICE: HCPCS | Performed by: STUDENT IN AN ORGANIZED HEALTH CARE EDUCATION/TRAINING PROGRAM

## 2022-06-07 PROCEDURE — 97163 PT EVAL HIGH COMPLEX 45 MIN: CPT

## 2022-06-07 PROCEDURE — 84484 ASSAY OF TROPONIN QUANT: CPT

## 2022-06-07 PROCEDURE — 80053 COMPREHEN METABOLIC PANEL: CPT

## 2022-06-07 PROCEDURE — 700111 HCHG RX REV CODE 636 W/ 250 OVERRIDE (IP): Performed by: INTERNAL MEDICINE

## 2022-06-07 PROCEDURE — 97166 OT EVAL MOD COMPLEX 45 MIN: CPT

## 2022-06-07 PROCEDURE — 93010 ELECTROCARDIOGRAM REPORT: CPT | Performed by: INTERNAL MEDICINE

## 2022-06-07 RX ORDER — CHOLECALCIFEROL (VITAMIN D3) 125 MCG
5 CAPSULE ORAL NIGHTLY
Status: DISCONTINUED | OUTPATIENT
Start: 2022-06-07 | End: 2022-06-12 | Stop reason: HOSPADM

## 2022-06-07 RX ORDER — SULFAMETHOXAZOLE AND TRIMETHOPRIM 800; 160 MG/1; MG/1
1 TABLET ORAL EVERY 12 HOURS
Status: COMPLETED | OUTPATIENT
Start: 2022-06-07 | End: 2022-06-08

## 2022-06-07 RX ORDER — SULFAMETHOXAZOLE AND TRIMETHOPRIM 800; 160 MG/1; MG/1
1 TABLET ORAL EVERY 12 HOURS
Status: DISCONTINUED | OUTPATIENT
Start: 2022-06-07 | End: 2022-06-07

## 2022-06-07 RX ADMIN — LEVETIRACETAM 500 MG: 500 TABLET, FILM COATED ORAL at 06:17

## 2022-06-07 RX ADMIN — SODIUM CHLORIDE: 9 INJECTION, SOLUTION INTRAVENOUS at 07:30

## 2022-06-07 RX ADMIN — ATORVASTATIN CALCIUM 40 MG: 40 TABLET, FILM COATED ORAL at 06:18

## 2022-06-07 RX ADMIN — OXYCODONE 5 MG: 5 TABLET ORAL at 20:07

## 2022-06-07 RX ADMIN — LISINOPRIL 40 MG: 20 TABLET ORAL at 06:17

## 2022-06-07 RX ADMIN — APIXABAN 10 MG: 5 TABLET, FILM COATED ORAL at 17:03

## 2022-06-07 RX ADMIN — Medication 400 MG: at 06:18

## 2022-06-07 RX ADMIN — SULFAMETHOXAZOLE AND TRIMETHOPRIM 1 TABLET: 800; 160 TABLET ORAL at 17:03

## 2022-06-07 RX ADMIN — ENOXAPARIN SODIUM 80 MG: 80 INJECTION SUBCUTANEOUS at 06:25

## 2022-06-07 RX ADMIN — Medication 5 MG: at 20:48

## 2022-06-07 RX ADMIN — DEXAMETHASONE 2 MG: 1 TABLET ORAL at 06:24

## 2022-06-07 RX ADMIN — OMEPRAZOLE 20 MG: 20 CAPSULE, DELAYED RELEASE ORAL at 06:18

## 2022-06-07 RX ADMIN — AMLODIPINE BESYLATE 5 MG: 5 TABLET ORAL at 06:18

## 2022-06-07 RX ADMIN — LEVETIRACETAM 500 MG: 500 TABLET, FILM COATED ORAL at 17:03

## 2022-06-07 RX ADMIN — Medication 400 MG: at 17:03

## 2022-06-07 RX ADMIN — OXYCODONE 5 MG: 5 TABLET ORAL at 10:15

## 2022-06-07 ASSESSMENT — ENCOUNTER SYMPTOMS
FOCAL WEAKNESS: 0
POLYDIPSIA: 0
HEADACHES: 1
SPEECH CHANGE: 0
FEVER: 0
PALPITATIONS: 0
BRUISES/BLEEDS EASILY: 0
DIZZINESS: 0
MYALGIAS: 0
DOUBLE VISION: 0
BLOOD IN STOOL: 0
COUGH: 0
ABDOMINAL PAIN: 0
DIARRHEA: 0
CONSTIPATION: 0
SHORTNESS OF BREATH: 1
VOMITING: 0
NAUSEA: 0
BLURRED VISION: 0
HEARTBURN: 0
CHILLS: 0
HEMOPTYSIS: 0

## 2022-06-07 ASSESSMENT — PAIN DESCRIPTION - PAIN TYPE
TYPE: ACUTE PAIN

## 2022-06-07 ASSESSMENT — COGNITIVE AND FUNCTIONAL STATUS - GENERAL
SUGGESTED CMS G CODE MODIFIER MOBILITY: CK
MOVING TO AND FROM BED TO CHAIR: A LITTLE
TURNING FROM BACK TO SIDE WHILE IN FLAT BAD: A LITTLE
MOVING FROM LYING ON BACK TO SITTING ON SIDE OF FLAT BED: A LITTLE
PERSONAL GROOMING: A LITTLE
SUGGESTED CMS G CODE MODIFIER DAILY ACTIVITY: CK
DRESSING REGULAR LOWER BODY CLOTHING: A LOT
DAILY ACTIVITIY SCORE: 15
CLIMB 3 TO 5 STEPS WITH RAILING: A LOT
STANDING UP FROM CHAIR USING ARMS: A LITTLE
DRESSING REGULAR UPPER BODY CLOTHING: A LOT
TOILETING: A LOT
HELP NEEDED FOR BATHING: A LOT
MOBILITY SCORE: 17
WALKING IN HOSPITAL ROOM: A LITTLE

## 2022-06-07 ASSESSMENT — GAIT ASSESSMENTS
DISTANCE (FEET): 100
GAIT LEVEL OF ASSIST: CONTACT GUARD ASSIST
ASSISTIVE DEVICE: FRONT WHEEL WALKER
DEVIATION: DECREASED BASE OF SUPPORT

## 2022-06-07 ASSESSMENT — LIFESTYLE VARIABLES: SUBSTANCE_ABUSE: 0

## 2022-06-07 ASSESSMENT — FIBROSIS 4 INDEX: FIB4 SCORE: 1.68

## 2022-06-07 ASSESSMENT — ACTIVITIES OF DAILY LIVING (ADL): TOILETING: INDEPENDENT

## 2022-06-07 NOTE — THERAPY
Occupational Therapy   Initial Evaluation     Patient Name: Jan Palencia  Age:  81 y.o., Sex:  female  Medical Record #: [id number]  Today's Date: 6/7/2022     Precautions  Precautions: Fall Risk  Comments: s/p recent R parafalcine meningioma resection    Assessment  Patient is 81 y.o. female admitted for R PE, RUE DVT, and RLE DVT, respiratory failure, AMS, UTI, CP, JANELLE, encephalopathy, ischemic stroke to R posterior medial temporal lobe, and R medial occipital lobe. PMHx of s/p recent right anterior parafalcine meningioma resection and crani and HTN. Encouraged continued OOB activity and use of toilet instead of BSC. Pt req encouragement to do things for herself Dtr present and supportive and translated as unable to get  on [employer]; reports pt lives with her and her 2 young children. She provides 24/7 assist since pt sx; prior to sx pt was I. Currently limited by decreased functional mobility, activity tolerance, cognition, strength, balance, and pain which are currently affecting pt's ability to complete ADLs/IADLs at baseline. Will continue to follow.     Plan    Recommend Occupational Therapy 3 times per week until therapy goals are met for the following treatments:  Adaptive Equipment, Neuro Re-Education / Balance, Self Care/Activities of Daily Living, Therapeutic Activities, and Therapeutic Exercises.    DC Equipment Recommendations: Unable to determine at this time  Discharge Recommendations: Recommend post-acute placement for additional occupational therapy services prior to discharge home (may progress while in house)      Objective     06/07/22 1342   Prior Living Situation   Prior Services Continuous (24 Hour) Care Giving Family   Housing / Facility 2 Story House   Steps Into Home 2   Steps In Home   (FOS)   Bathroom Set up Bathtub / Shower Combination  (sponge bathing as unable to get to 2nd floor for showering)   Equipment Owned None   Lives with - Patient's Self Care Capacity  Adult Children   Comments Dtr present and supportive; reports pt lives with her and her 2 young children. She provides 24/7 assist since pt sx   Prior Level of ADL Function   Self Feeding Independent   Grooming / Hygiene Independent   Bathing Independent   Dressing Independent   Toileting Independent   Prior Level of IADL Function   Medication Management Independent   Laundry Independent   Kitchen Mobility Independent   Finances Independent   Home Management Independent   Shopping Independent   Comments Prior to sx; after sx dependent for IADLs and assist for ADLs and ambulating   Precautions   Precautions Fall Risk   Comments s/p recent R parafalcine meningioma resection   Vitals   O2 (LPM) 1   O2 Delivery Device Silicone Nasal Cannula   Vitals Comments Req increase of O2 to 3L during mobility d/t desaturation and SOB   Pain 0 - 10 Group   Location Leg;Flank;Arm   Location Orientation Right   Therapist Pain Assessment Post Activity;During Activity;Nurse Notified  (not quantified)   Cognition    Cognition / Consciousness X   Level of Consciousness Alert   Safety Awareness Impaired   Comments pleasant and cooperative; req encouragement to do things for herself   Passive ROM Upper Body   Passive ROM Upper Body WDL   Active ROM Upper Body   Active ROM Upper Body  WDL   Strength Upper Body   Upper Body Strength  X   Gross Strength Generalized Weakness, Equal Bilaterally.    Balance Assessment   Sitting Balance (Static) Fair +   Sitting Balance (Dynamic) Fair   Standing Balance (Static) Fair -   Standing Balance (Dynamic) Fair -   Weight Shift Sitting Fair   Weight Shift Standing Fair   Comments w/FWW in standing; LOB with STS w/o FWW req min A   Bed Mobility    Supine to Sit Minimal Assist   Sit to Supine Minimal Assist   Scooting Supervised   Comments w/HOB flat and use of rail   ADL Assessment   Grooming Contact Guard Assist;Standing  (washing hands; v/cs for location, but req encouragement to do for self rather than  have things handed to pt)   Lower Body Dressing Moderate Assist  (socks)   Toileting Minimal Assist  (for balance during pericare, recommended use of toilet not BSC)   Comments limited by volition   Functional Mobility   Sit to Stand Contact Guard Assist   Bed, Chair, Wheelchair Transfer Contact Guard Assist   Toilet Transfers Contact Guard Assist  (BSC; encouraged use of toilet)   Transfer Method Stand Step   Mobility w/FWW in room and hallway   Edema / Skin Assessment   Edema / Skin  Not Assessed   Activity Tolerance   Comments limited by fatigue, pain, and volition   Patient / Family Goals   Patient / Family Goal #1 to go home   Short Term Goals   Short Term Goal # 1 LB dressing with min A   Short Term Goal # 2 toilet txf with SPV   Short Term Goal # 3 standing g/h with SPV   Short Term Goal # 4 bathing with min A   Education Group   Education Provided Role of Occupational Therapist;Transfers;Activities of Daily Living;Pathology of bedrest   Role of Occupational Therapist Patient Response Patient;Acceptance;Explanation;Family;Verbal Demonstration;Reinforcement Needed   Transfers Patient Response Patient;Family;Acceptance;Explanation;Action Demonstration;Reinforcement Needed   ADL Patient Response Patient;Family;Acceptance;Explanation;Reinforcement Needed;Action Demonstration   Pathology of Bedrest Patient Response Patient;Family;Acceptance;Explanation;Verbal Demonstration;Reinforcement Needed   Problem List   Problem List Decreased Active Daily Living Skills;Decreased Homemaking Skills;Decreased Upper Extremity Strength Right;Decreased Upper Extremity Strength Left;Decreased Functional Mobility;Decreased Activity Tolerance;Safety Awareness Deficits / Cognition;Impaired Postural Control / Balance

## 2022-06-07 NOTE — THERAPY
Physical Therapy   Initial Evaluation     Patient Name: Jan Palencia  Age:  81 y.o., Sex:  female  Medical Record #: [id number]  Today's Date: 6/7/2022     Precautions  Precautions: Fall Risk  Comments: +PE and R LE DVT    Assessment  Patient is 81 y.o. female presented 6/4/2022 with SOB, Pt had an operation for a meningioma on May 12th. .  Patient has been having shortness of breath since today morning.  Patient also has been having anterior chest pain which has been worsening with a deep breath.  Patient had bilateral swelling of the legs in the past.  But today she is having swelling on her right right leg and pain and it.Pt is + for PE and R LE DVT  .  Additional factors influencing patient status / progress :today, daughter is present to translate, which was critical as pt was unable to understand on-line  attempted this am. Daughter has left her number and asks that staff call her to translate when needed. Pt is impulsive, moves quickly when asked to sit EOB, stands abruptly and is unsafe, but much improved when using a FWW. Pt needs cg assist for ambulation with fww, but does c/o chest pain, so distance was cut short, returned to room. Pt comfortable once back in bed. Pt is staying with her daughter who provides full time care as needed. Pt lacks endurance, shows balance deficits and coordination issues. PT to follow.     Plan    Recommend Physical Therapy 3 times per week until therapy goals are met for the following treatments:  Bed Mobility, Gait Training, Neuro Re-Education / Balance, Stair Training and Therapeutic Activities    DC Equipment Recommendations: Front-Wheel Walker  Discharge Recommendations: Recommend post-acute placement for additional physical therapy services prior to discharge home        Objective       06/07/22 1344   Charge Group   PT Evaluation PT Evaluation High   PT Self Care / Home Evaluation  1   Total Time Spent   PT Total Time Yes   PT Evaluation Time Spent (Mins) 38    PT Self Care/Home Evaluation Time Spent (Mins) 10   PT Total Time Spent (Calculated) 48   Initial Contact Note    Initial Contact Note Order Received and Verified, Physical Therapy Evaluation in Progress with Full Report to Follow.   Precautions   Precautions Fall Risk   Comments +PE and R LE DVT   Vitals   O2 (LPM) 1   O2 Delivery Device Silicone Nasal Cannula   Pain 0 - 10 Group   Therapist Pain Assessment During Activity;Nurse Notified  (c/o CP, not rated, walking cut short by c/o CP)   Prior Living Situation   Prior Services Intermittent Physical Support for ADL Per Family   Housing / Facility 2 Story House   Steps Into Home 2   Steps In Home 0  (has flight stairs, but sleeps first floor last 3 weeks)   Rail None;Right Rail  (Steps in Home)   Equipment Owned None   Lives with - Patient's Self Care Capacity Adult Children  (daughter stays home to care for her mother full time)   Comments daughter  moved a bed to first floor.   Prior Level of Functional Mobility   Bed Mobility Required Assist   Transfer Status Required Assist   Ambulation Required Assist   Distance Ambulation (Feet)   (very little in last 3 weeks, leans on daughter's arm)   Assistive Devices Used   (leans on daughter)   Stairs Dependent   Comments not in last 3 weeks   Cognition    Level of Consciousness Alert   Active ROM Lower Body    Active ROM Lower Body  WDL   Strength Lower Body   Lower Body Strength  WDL   Balance Assessment   Sitting Balance (Static) Fair   Sitting Balance (Dynamic) Fair -   Standing Balance (Static) Poor +   Standing Balance (Dynamic) Poor +   Weight Shift Sitting Fair   Weight Shift Standing Fair   Comments with fww   Gait Analysis   Gait Level Of Assist Contact Guard Assist   Assistive Device Front Wheel Walker   Distance (Feet) 100   # of Times Distance was Traveled 1   Deviation Decreased Base Of Support   # of Stairs Climbed 0   Weight Bearing Status no restrictions   Comments unsafe without fWw, impulsive,  unpredictable.   Bed Mobility    Supine to Sit Contact Guard Assist   Sit to Supine Contact Guard Assist   Scooting Supervised   Rolling Contact Guard Assist   Functional Mobility   Sit to Stand Contact Guard Assist  (needs fWW to be safe,)   Bed, Chair, Wheelchair Transfer Contact Guard Assist   Transfer Method Stand Pivot   How much difficulty does the patient currently have...   Turning over in bed (including adjusting bedclothes, sheets and blankets)? 3   Sitting down on and standing up from a chair with arms (e.g., wheelchair, bedside commode, etc.) 3   Moving from lying on back to sitting on the side of the bed? 3   How much help from another person does the patient currently need...   Moving to and from a bed to a chair (including a wheelchair)? 3   Need to walk in a hospital room? 3   Climbing 3-5 steps with a railing? 2   6 clicks Mobility Score 17   Activity Tolerance   Standing 6   Short Term Goals    Short Term Goal # 1 Pt will perform bed mobiltiy with supervision in 6 visits.   Short Term Goal # 2 Pt will transfer with supervision in 6 visits to improve functional indep.   Short Term Goal # 3 Pt will ambulate x 200 feet using FWW with supervision in 6 visits to improve functional indep.   Short Term Goal # 4 Pt will negotiate 1 flight of stairs with cg assist in 6 vistis to access full home.   Education Group   Education Provided Role of Physical Therapist   Role of Physical Therapist Patient Response Patient;Family;Acceptance;Explanation;Action Demonstration;Verbal Demonstration   Problem List    Problems Impaired Bed Mobility;Impaired Transfers;Impaired Ambulation;Impaired Balance;Decreased Activity Tolerance;Safety Awareness Deficits / Cognition   Anticipated Discharge Equipment and Recommendations   DC Equipment Recommendations Front-Wheel Walker   Discharge Recommendations Recommend post-acute placement for additional physical therapy services prior to discharge home   Interdisciplinary Plan of  Care Collaboration   IDT Collaboration with  Nursing   Patient Position at End of Therapy In Bed;Call Light within Reach;Tray Table within Reach;Phone within Reach;Family / Friend in Room   Collaboration Comments nsg updated   Session Information   Date / Session Number  6/7-1 (1/3, 6/13)

## 2022-06-07 NOTE — PROGRESS NOTES
Assumed care of patient. Report received from LAURO Isbell. Patient A&Ox 4. Call light within reach, bed locked and in lowest position. Bed alarm on, instructed patient to call for assistance. Questions answered, no needs at this time. All communication through language-line .

## 2022-06-07 NOTE — PROGRESS NOTES
Bedside report received and patient care assumed. Patient is resting in bed, A&OX4, with no complaints of pain and is 92% on 0.5LNC. Tele box on. All fall precautions are in place, belongings at bedside table. Pt assisted to BSC and back to bed. PT was updated on POC, no questions or concerns. Pt educated on use of call light for assistance.

## 2022-06-07 NOTE — PROGRESS NOTES
Date of Service: 6/7/2022  Primary Team: [facility] Team   Attending: Fidencio Ruiz M.D.   Senior Resident: Bobby Celis M.D.  Intern: Obdulio Green D.O.  Contact:  [phone]    Chief Complaint:   Chief Complaint   Patient presents with   • Shortness of Breath     Pt reports SOB since this am. Pt had an operation for a meningioma on May 12th.        ID:  Patient is a 81 y.o. female Wallisian Swedish speaking visiting from [place] with PMHx of HTN, recent hospitalization for meningioma with mass effect s/p Craniotomy on 5/19/22 who presented 6/4/2022 with SOB, AMS, and RLE swelling and pain who was admitted for management of right pulmonary embolism and right deep vein thrombosis.    Interval Update:    -Patient states improving SOB and RLE pain. Per daughter, patient had intense 10 out of 10 midsternal chest pain at 2 AM that radiated toward her right shoulder.  She states it has mostly resolved since this a.m. rounds.  She was able to have bowel movements yesterday and today after being constipated for 4-6 days.  Appetite improving.     -PT to evaluate today  -Pending physiatry consult for inpatient rehab  -Troponin and EKG ordered  -Switch Lovenox 80 mg twice daily to apixaban 10 mg twice daily  -Discontinued  mL/h  -Switched Ceftriaxone 1g (06/05/22-06/06/22) to Bactrim DS BID x 2 doses      Consultants/Specialty:  Neurosurgery  Review of Systems:    Review of Systems   Constitutional: Negative for chills, fever and malaise/fatigue.   HENT: Negative for hearing loss and tinnitus.    Eyes: Negative for blurred vision and double vision.   Respiratory: Positive for shortness of breath. Negative for cough and hemoptysis.    Cardiovascular: Positive for chest pain and leg swelling (Right leg). Negative for palpitations.   Gastrointestinal: Negative for abdominal pain, blood in stool, constipation, diarrhea, heartburn, melena, nausea and vomiting.   Genitourinary: Negative for dysuria and urgency.    Musculoskeletal: Negative for myalgias.   Skin: Negative for itching and rash.   Neurological: Positive for headaches. Negative for dizziness, speech change and focal weakness.   Endo/Heme/Allergies: Negative for polydipsia. Does not bruise/bleed easily.   Psychiatric/Behavioral: Negative for substance abuse.   All other systems reviewed and are negative.      Objective Data:   Physical Exam:   Vitals:   Temp:  [36.4 °C (97.5 °F)-37.4 °C (99.3 °F)] 37.4 °C (99.3 °F)  Pulse:  [71-93] 76  Resp:  [18] 18  BP: (124-141)/(67-96) 139/96  SpO2:  [91 %-97 %] 94 %    -VSS, BP: (124-141)/(67-96) 139/96     Physical Exam  Vitals and nursing note reviewed.   Constitutional:       Appearance: Normal appearance. She is obese. She is not ill-appearing.   HENT:      Head: Normocephalic and atraumatic.      Right Ear: External ear normal.      Left Ear: External ear normal.      Nose: Nose normal.      Mouth/Throat:      Mouth: Mucous membranes are moist.      Pharynx: Oropharynx is clear.   Eyes:      Extraocular Movements: Extraocular movements intact.      Conjunctiva/sclera: Conjunctivae normal.      Pupils: Pupils are equal, round, and reactive to light.   Cardiovascular:      Rate and Rhythm: Normal rate and regular rhythm.      Pulses: Normal pulses.      Heart sounds: Normal heart sounds. No murmur heard.    No friction rub. No gallop.   Pulmonary:      Effort: Pulmonary effort is normal. No respiratory distress.      Breath sounds: Normal breath sounds. No stridor. No wheezing, rhonchi or rales.      Comments: Requiring 2L NC  Abdominal:      General: Abdomen is flat. Bowel sounds are normal. There is no distension.      Palpations: Abdomen is soft.      Tenderness: There is no abdominal tenderness. There is no guarding or rebound.   Musculoskeletal:         General: Swelling (R>L) present. Normal range of motion.      Cervical back: Normal range of motion and neck supple.      Right lower leg: Edema (R>L) present.    Skin:     General: Skin is warm and dry.      Capillary Refill: Capillary refill takes less than 2 seconds.      Coloration: Skin is not jaundiced.      Comments: Difficult to palpate DP and PT on RLE, 2+ pulses on DP and PT on LLE   Neurological:      General: No focal deficit present.      Mental Status: She is alert and oriented to person, place, and time. Mental status is at baseline.      Cranial Nerves: No cranial nerve deficit.   Psychiatric:         Mood and Affect: Mood normal.         Behavior: Behavior normal.         Thought Content: Thought content normal.         Judgment: Judgment normal.           Labs:   Recent Labs     06/04/22  1322 06/05/22  0025 06/06/22 0254   WBC 10.1 7.8 8.0   RBC 4.09* 3.70* 3.32*   HEMOGLOBIN 11.5* 10.5* 9.3*   HEMATOCRIT 36.1* 33.1* 29.7*   MCV 88.3 89.5 89.5   MCH 28.1 28.4 28.0   RDW 50.1* 50.8* 50.4*   PLATELETCT 167 150* 171   MPV 10.3 10.9 10.3   NEUTSPOLYS 75.30*  --  76.70*   LYMPHOCYTES 12.80*  --  10.60*   MONOCYTES 8.10  --  9.70   EOSINOPHILS 1.90  --  0.90   BASOPHILS 0.40  --  0.20     Recent Labs     06/05/22  0025 06/06/22  0254 06/07/22  0043   SODIUM 136 134* 139   POTASSIUM 3.9 4.1 4.5   CHLORIDE 103 102 107   CO2 22 21 23   GLUCOSE 139* 163* 136*   BUN 12 21 16      Recent Labs     06/04/22  1322 06/05/22  0025 06/06/22 0254 06/07/22  0043   ALBUMIN 3.5  --  3.0* 3.0*   TBILIRUBIN 0.4  --  0.2 <0.2   ALKPHOSPHAT 129*  --  118* 116*   TOTPROTEIN 6.4  --  5.8* 5.6*   ALTSGPT 22  --  19 17   ASTSGOT 17  --  15 15   CREATININE 0.40* 0.38* 0.52 0.51        Imaging:   EC-ECHOCARDIOGRAM COMPLETE W/O CONT   Final Result      CT-HEAD W/O   Final Result         1.  Right craniotomy defect again noted.      2.  Resolution of pneumocephalus.      3.  Volume loss and decreased attenuation in the right frontal lobe again noted.      4.  Evolving area of infarction in the medial posterior left cerebral hemisphere at site where infarction was seen on the prior MRI.                CT-CTA CHEST PULMONARY ARTERY W/ RECONS   Final Result      1.  Large pulmonary embolus in the right pulmonary artery, its lobar, segmental and subsegmental branches. No right heart strain.   2.  Borderline cardiomegaly.      Findings were communicated to KADY KHAN via Voalte messaging system on 6/4/2022 6:31 PM.      US-EXTREMITY VENOUS LOWER UNILAT RIGHT   Final Result      DX-CHEST-PORTABLE (1 VIEW)   Final Result         No acute cardiac or pulmonary abnormality is identified.      US-EXTREMITY ARTERY LOWER BILAT W/FAYE (COMBO)    (Results Pending)     -CMP (06/07/22): BUN/creatinine stable from 21/0.52-16/0.51  -Echo (06/06/22): EF 65%, diastolic function, no significant valvular disease, normal inferior vena cava size inspiratory collapse, normal right ventricle and normal right atrium    Assessment and Plan:    Patient is a 81 y.o. female Malagasy Belarusian speaking visiting from [place] with PMHx of HTN, recent hospitalization for meningioma with mass effect s/p Craniotomy on 5/19/22 who presented 6/4/2022 with SOB, AMS, and RLE swelling and pain who was admitted for management of right pulmonary embolism and right deep vein thrombosis.      Problem Representation:     #Acute Hypoxic respiratory failure 2/2  #Large pulmonary embolism  #DVT femoral (deep venous thrombosis) with thrombophlebitis, right (HCC)-   Assessment & Plan  *LE swelling x2 days and RLE pain x 1 day,   *CXR 1V (06/04/22): No acute cardiac or pulm abnormality is identified  *US RLE (06/04/22): Acute DVT right lower extremity, distal common femoral vein extending down the leg and through the calf, gastrocnemius and soleal veins are also thrombosed in the calf  *CTA (06/04/22): Large pulm embolism in the right pulmonary artery, its lobar, segmental and segmental branches  *EKG (06/04/22): SR, HR 94, QTc 461  *Likely secondary to provocation secondary to immobility s/p surgery  *Troponin (06/05/22): 12, BNP (06/05/22):  106  *Echo (06/06/22): EF 65%, diastolic function, no significant valvular disease, normal inferior vena cava size inspiratory collapse, normal right ventricle and normal right atrium ; no right ventricular heart strain    -Full dose anticoagulation with enoxaparin 80 mg twice daily (06/04/22-06/07/22), switched to apixaban 10 mg twice daily for 3 months due to provoked DVT (09/04/2022)  -IS ordered  -PT/OT ordered  -Physiatry consulted for possible inpatient PT  -DP and tibial pulses heard on doppler, will order FAYE to rule out arterial insufficiency due to diminished pulses  -No vascular surgery indicated at this time, no compartment syndrome or Phlegmasia cerulea dolens  -Consider IR guided clot retrival if hemodynamic unstable    AMS (altered mental status)  Urinary Tract Infection  Ischemic Stroke  Assessment & Plan  *A&O x 4, but does not seem right per patient's daughter  *CBC (06/04/22): WBC 10.1 (14.6 on 5/29/2022), left shift of neutrophil absolute 7.64 (H) (12.80 on 5/9/2022),  *Urinalysis (06/04/22): Nitrate positive, LE moderate, pyuria , hematuria with RBC 2-5, many bacteria,  hyaline casts 11-20  *Urine culture (06/04/22): E. Coli, susceptible to Bactrim and C3  *Blood cultures X2 (06/04/22): No growth to date  *Likely secondary to UTI and stroke    -Switched Ceftriaxone 1g (06/05/22-06/06/22) to Bactrim DS BID x 2 doses    Chest pain    -EKG and troponin ordered for ACS rule out    Acute Kidney Injury  *CMP (06/06/22): BUN/creatinine uptrending from 12/0.38-31/0.52    -Discontinued  ml/hr (06/06/22-06/07/22), improving  -Avoid nephrotoxins  -Renally dose drugs  -We will continue to control blood sugar, lipids, and blood pressure    ?Ischemic Stroke  *MRI head with and without (5/21/2022): Moderately large gyriform area of acute infarction involving the right posterior medial temporal lobe and adjacent right medial occipital lobe.  Moderate periventricular and juxtacortical white matter  changes consistent with chronic microvascular ischemic gliosis  *CT head without (06/05/22): Evolving area of infarction in the medial posterior left cerebral hemisphere at site where infarction was seen on the prior MRI  *Lipid panel (06/05/22): Total cholesterol 201, triglycerides 189, HDL 35,     -Atorvastatin 40 mg ordered  -We will hold off on aspirin at this time    HTN (hypertension)- (present on admission)  Assessment & Plan  *BP (06/04/22): (134-171)/(63-82) 141/82  *Stable    -Continue home amlodipine 5 mg daily, hold for SBP <100  -Continue home lisinopril 40 mg daily, hold for SBP <100    Constipation  *Refuses stool softeners    -Trial magnesium oxide 400 mg BID    Meningioma (HCC)- (present on admission)  Assessment & Plan  *Neurosurgery, Dr. Hakan Dhaliwal (5/19/2022): Right frontal extra axial brain tumor measuring 45X75X 45 mm, bicoronal incision of right frontal craniotomy for tumor resection, intraoperative microscope, Stealth navigation.    -Discontinued dexamethasone 2 mg daily per call with neurosurgeon Dr. Hakan Dhaliwal on 05/19/22 .  -We will continue home Keppra 500 mg twice daily for seizure prophylaxis, will follow up with neurosurgery to stop them    Gastroesophageal reflux disease    -Continue home omeprazole 20 mg daily    Core Measures:    Code Status: Full Code  Diet: Regular  IVF: None  Rodriguez Catheter: None  IV Lines: pIV  Antibiotics: Ceftriaxone 1g (06/05/22-)  GI Prophylaxis: PEG + Bisacodyl PRN  DVT Prophylaxis: Apixaban 10 mg twice daily  Disposition: Inpatient    Please note that this dictation was created using voice recognition software. I have made every reasonable attempt to correct obvious errors, but there may be errors of grammar and possibly content that I did not discover before finalizing the note. If the error changes the accuracy of the document, I would appreciate it being brought to my attention.

## 2022-06-07 NOTE — PROGRESS NOTES
Neurosurgery Progress Note    Subjective:  Pleasant 82 yo female, previous patient of Dr. Dhaliwal, 3 weeks post op right anterior parafalcine meningioma resection.  Presented to ED Friday for SOB. Found to have large right PE.  Started weight based Lovenox yesterday  Hx limited due to language barrier    No issues overnight    Exam:  Alert, Pleasant and cooperative  Face symmetric, tongue midline  CN2-12 intact  No drift  Strength 5/5 all 4 extremites    Follow up head CT stable    BP  Min: 125/71  Max: 141/76  Pulse  Av.5  Min: 71  Max: 93  Resp  Av  Min: 18  Max: 18  Temp  Av.8 °C (98.2 °F)  Min: 36.4 °C (97.5 °F)  Max: 37.4 °C (99.3 °F)  SpO2  Av.9 %  Min: 91 %  Max: 97 %    No data recorded    Recent Labs     22  1322 22  0025 22  0254   WBC 10.1 7.8 8.0   RBC 4.09* 3.70* 3.32*   HEMOGLOBIN 11.5* 10.5* 9.3*   HEMATOCRIT 36.1* 33.1* 29.7*   MCV 88.3 89.5 89.5   MCH 28.1 28.4 28.0   MCHC 31.9* 31.7* 31.3*   RDW 50.1* 50.8* 50.4*   PLATELETCT 167 150* 171   MPV 10.3 10.9 10.3     Recent Labs     22  0025 22  0254 22  0043   SODIUM 136 134* 139   POTASSIUM 3.9 4.1 4.5   CHLORIDE 103 102 107   CO2 22 21 23   GLUCOSE 139* 163* 136*   BUN 12 21 16   CREATININE 0.38* 0.52 0.51   CALCIUM 8.5 8.7 8.4*               Intake/Output                       22 - 22 - 22 Total  Total                 Intake    P.O.  --  240 240  --  -- --    P.O. -- 240 240 -- -- --    Total Intake -- 240 240 -- -- --       Output    Urine  500  800 1300  --  -- --    Number of Times Voided 3 x 3 x 6 x 1 x -- 1 x    Urine Void (mL)  -- -- --    Stool  --  -- --  --  -- --    Number of Times Stooled -- 1 x 1 x 1 x -- 1 x    Total Output  -- -- --       Net I/O     -455 -[phone] -[phone] -- -- --            Intake/Output Summary (Last 24 hours) at 2022 0956  Last data filed at 2022  0600  Gross per 24 hour   Intake 240 ml   Output 1300 ml   Net -1060 ml            • sulfamethoxazole-trimethoprim  1 Tablet Q12HRS   • NS   Continuous   • magnesium oxide  400 mg BID   • atorvastatin  40 mg DAILY   • albuterol  2 Puff Q4H PRN (RT)   • senna-docusate  2 Tablet BID    And   • polyethylene glycol/lytes  1 Packet QDAY PRN    And   • magnesium hydroxide  30 mL QDAY PRN    And   • bisacodyl  10 mg QDAY PRN   • acetaminophen  650 mg Q6HRS PRN   • Pharmacy Consult Request  1 Each PHARMACY TO DOSE   • oxyCODONE immediate-release  2.5 mg Q3HRS PRN    Or   • oxyCODONE immediate-release  5 mg Q3HRS PRN    Or   • morphine injection  2 mg Q3HRS PRN   • hydrALAZINE  10 mg Q4HRS PRN   • amLODIPine  5 mg DAILY   • dexamethasone  2 mg Q24HRS   • levETIRAcetam  500 mg BID   • lisinopril  40 mg DAILY   • omeprazole  20 mg DAILY   • enoxaparin (LOVENOX) injection  1 mg/kg Q12HRS       Assessment and Plan:  Hospital day #5  Repeat head CT stable.   Neuro exam remains stable.   No plan for neurosurgical intervention.   Neurosurgery signing off. Please call with questions or concerns [phone].       Chemical prophylactic DVT therapy: yes  Start date/time: 6/4

## 2022-06-08 LAB
ALBUMIN SERPL BCP-MCNC: 3.1 G/DL (ref 3.2–4.9)
ALBUMIN/GLOB SERPL: 1.1 G/DL
ALP SERPL-CCNC: 126 U/L (ref 30–99)
ALT SERPL-CCNC: 17 U/L (ref 2–50)
ANION GAP SERPL CALC-SCNC: 9 MMOL/L (ref 7–16)
AST SERPL-CCNC: 16 U/L (ref 12–45)
BASOPHILS # BLD AUTO: 0.3 % (ref 0–1.8)
BASOPHILS # BLD: 0.02 K/UL (ref 0–0.12)
BILIRUB SERPL-MCNC: <0.2 MG/DL (ref 0.1–1.5)
BUN SERPL-MCNC: 11 MG/DL (ref 8–22)
CALCIUM SERPL-MCNC: 8.8 MG/DL (ref 8.5–10.5)
CHLORIDE SERPL-SCNC: 103 MMOL/L (ref 96–112)
CO2 SERPL-SCNC: 25 MMOL/L (ref 20–33)
CREAT SERPL-MCNC: 0.38 MG/DL (ref 0.5–1.4)
EOSINOPHIL # BLD AUTO: 0.12 K/UL (ref 0–0.51)
EOSINOPHIL NFR BLD: 1.7 % (ref 0–6.9)
ERYTHROCYTE [DISTWIDTH] IN BLOOD BY AUTOMATED COUNT: 49.6 FL (ref 35.9–50)
FERRITIN SERPL-MCNC: 249 NG/ML (ref 10–291)
GFR SERPLBLD CREATININE-BSD FMLA CKD-EPI: 101 ML/MIN/1.73 M 2
GLOBULIN SER CALC-MCNC: 2.9 G/DL (ref 1.9–3.5)
GLUCOSE SERPL-MCNC: 118 MG/DL (ref 65–99)
HCT VFR BLD AUTO: 29.8 % (ref 37–47)
HGB BLD-MCNC: 9.5 G/DL (ref 12–16)
HGB RETIC QN AUTO: 26 PG/CELL (ref 29–35)
IMM GRANULOCYTES # BLD AUTO: 0.33 K/UL (ref 0–0.11)
IMM GRANULOCYTES NFR BLD AUTO: 4.7 % (ref 0–0.9)
IMM RETICS NFR: 28.8 % (ref 9.3–17.4)
IRON SATN MFR SERPL: 12 % (ref 15–55)
IRON SERPL-MCNC: 28 UG/DL (ref 40–170)
LYMPHOCYTES # BLD AUTO: 1.19 K/UL (ref 1–4.8)
LYMPHOCYTES NFR BLD: 17.1 % (ref 22–41)
MAGNESIUM SERPL-MCNC: 2 MG/DL (ref 1.5–2.5)
MCH RBC QN AUTO: 28.3 PG (ref 27–33)
MCHC RBC AUTO-ENTMCNC: 31.9 G/DL (ref 33.6–35)
MCV RBC AUTO: 88.7 FL (ref 81.4–97.8)
MONOCYTES # BLD AUTO: 0.6 K/UL (ref 0–0.85)
MONOCYTES NFR BLD AUTO: 8.6 % (ref 0–13.4)
NEUTROPHILS # BLD AUTO: 4.69 K/UL (ref 2–7.15)
NEUTROPHILS NFR BLD: 67.6 % (ref 44–72)
NRBC # BLD AUTO: 0 K/UL
NRBC BLD-RTO: 0 /100 WBC
PHOSPHATE SERPL-MCNC: 3 MG/DL (ref 2.5–4.5)
PLATELET # BLD AUTO: 279 K/UL (ref 164–446)
PMV BLD AUTO: 10.3 FL (ref 9–12.9)
POTASSIUM SERPL-SCNC: 4.5 MMOL/L (ref 3.6–5.5)
PROT SERPL-MCNC: 6 G/DL (ref 6–8.2)
RBC # BLD AUTO: 3.36 M/UL (ref 4.2–5.4)
RETICS # AUTO: 0.07 M/UL (ref 0.04–0.06)
RETICS/RBC NFR: 2 % (ref 0.8–2.1)
SODIUM SERPL-SCNC: 137 MMOL/L (ref 135–145)
TIBC SERPL-MCNC: 227 UG/DL (ref 250–450)
UIBC SERPL-MCNC: 199 UG/DL (ref 110–370)
VIT B12 SERPL-MCNC: 229 PG/ML (ref 211–911)
WBC # BLD AUTO: 7 K/UL (ref 4.8–10.8)

## 2022-06-08 PROCEDURE — 85046 RETICYTE/HGB CONCENTRATE: CPT

## 2022-06-08 PROCEDURE — 770001 HCHG ROOM/CARE - MED/SURG/GYN PRIV*

## 2022-06-08 PROCEDURE — 82728 ASSAY OF FERRITIN: CPT

## 2022-06-08 PROCEDURE — 84100 ASSAY OF PHOSPHORUS: CPT

## 2022-06-08 PROCEDURE — 84443 ASSAY THYROID STIM HORMONE: CPT

## 2022-06-08 PROCEDURE — 85025 COMPLETE CBC W/AUTO DIFF WBC: CPT

## 2022-06-08 PROCEDURE — 36415 COLL VENOUS BLD VENIPUNCTURE: CPT

## 2022-06-08 PROCEDURE — 700102 HCHG RX REV CODE 250 W/ 637 OVERRIDE(OP): Performed by: INTERNAL MEDICINE

## 2022-06-08 PROCEDURE — 84439 ASSAY OF FREE THYROXINE: CPT

## 2022-06-08 PROCEDURE — A9270 NON-COVERED ITEM OR SERVICE: HCPCS | Performed by: INTERNAL MEDICINE

## 2022-06-08 PROCEDURE — A9270 NON-COVERED ITEM OR SERVICE: HCPCS | Performed by: STUDENT IN AN ORGANIZED HEALTH CARE EDUCATION/TRAINING PROGRAM

## 2022-06-08 PROCEDURE — 83540 ASSAY OF IRON: CPT

## 2022-06-08 PROCEDURE — 83735 ASSAY OF MAGNESIUM: CPT

## 2022-06-08 PROCEDURE — 700102 HCHG RX REV CODE 250 W/ 637 OVERRIDE(OP): Performed by: STUDENT IN AN ORGANIZED HEALTH CARE EDUCATION/TRAINING PROGRAM

## 2022-06-08 PROCEDURE — 80053 COMPREHEN METABOLIC PANEL: CPT

## 2022-06-08 PROCEDURE — 99233 SBSQ HOSP IP/OBS HIGH 50: CPT | Mod: GC | Performed by: INTERNAL MEDICINE

## 2022-06-08 PROCEDURE — 82607 VITAMIN B-12: CPT

## 2022-06-08 PROCEDURE — 83550 IRON BINDING TEST: CPT

## 2022-06-08 PROCEDURE — 700111 HCHG RX REV CODE 636 W/ 250 OVERRIDE (IP): Performed by: STUDENT IN AN ORGANIZED HEALTH CARE EDUCATION/TRAINING PROGRAM

## 2022-06-08 RX ORDER — CHOLECALCIFEROL (VITAMIN D3) 125 MCG
2000 CAPSULE ORAL DAILY
Status: DISCONTINUED | OUTPATIENT
Start: 2022-06-08 | End: 2022-06-12 | Stop reason: HOSPADM

## 2022-06-08 RX ADMIN — LEVETIRACETAM 500 MG: 500 TABLET, FILM COATED ORAL at 04:22

## 2022-06-08 RX ADMIN — CEFTRIAXONE SODIUM 1 G: 10 INJECTION, POWDER, FOR SOLUTION INTRAVENOUS at 23:14

## 2022-06-08 RX ADMIN — Medication 5 MG: at 20:23

## 2022-06-08 RX ADMIN — ATORVASTATIN CALCIUM 40 MG: 40 TABLET, FILM COATED ORAL at 04:22

## 2022-06-08 RX ADMIN — Medication 400 MG: at 04:22

## 2022-06-08 RX ADMIN — OXYCODONE 5 MG: 5 TABLET ORAL at 16:37

## 2022-06-08 RX ADMIN — APIXABAN 10 MG: 5 TABLET, FILM COATED ORAL at 16:37

## 2022-06-08 RX ADMIN — CYANOCOBALAMIN TAB 500 MCG 2000 MCG: 500 TAB at 13:58

## 2022-06-08 RX ADMIN — SULFAMETHOXAZOLE AND TRIMETHOPRIM 1 TABLET: 800; 160 TABLET ORAL at 04:22

## 2022-06-08 RX ADMIN — OXYCODONE 5 MG: 5 TABLET ORAL at 09:14

## 2022-06-08 RX ADMIN — AMLODIPINE BESYLATE 5 MG: 5 TABLET ORAL at 04:21

## 2022-06-08 RX ADMIN — Medication 400 MG: at 16:36

## 2022-06-08 RX ADMIN — LEVETIRACETAM 500 MG: 500 TABLET, FILM COATED ORAL at 16:36

## 2022-06-08 RX ADMIN — APIXABAN 10 MG: 5 TABLET, FILM COATED ORAL at 04:22

## 2022-06-08 RX ADMIN — LISINOPRIL 40 MG: 20 TABLET ORAL at 04:21

## 2022-06-08 RX ADMIN — OMEPRAZOLE 20 MG: 20 CAPSULE, DELAYED RELEASE ORAL at 04:21

## 2022-06-08 RX ADMIN — OXYCODONE 5 MG: 5 TABLET ORAL at 20:23

## 2022-06-08 RX ADMIN — SENNOSIDES AND DOCUSATE SODIUM 2 TABLET: 50; 8.6 TABLET ORAL at 04:21

## 2022-06-08 ASSESSMENT — FIBROSIS 4 INDEX: FIB4 SCORE: 1.1

## 2022-06-08 ASSESSMENT — ENCOUNTER SYMPTOMS
DOUBLE VISION: 0
CONSTIPATION: 0
CHILLS: 0
PALPITATIONS: 0
BLOOD IN STOOL: 0
NAUSEA: 0
DIARRHEA: 0
ABDOMINAL PAIN: 0
VOMITING: 0
SHORTNESS OF BREATH: 1
SPEECH CHANGE: 0
FOCAL WEAKNESS: 0
FEVER: 0
DIZZINESS: 0
MYALGIAS: 0
HEADACHES: 1
POLYDIPSIA: 0
BRUISES/BLEEDS EASILY: 0
HEARTBURN: 0
HEMOPTYSIS: 0
BLURRED VISION: 0
COUGH: 0

## 2022-06-08 ASSESSMENT — PAIN DESCRIPTION - PAIN TYPE
TYPE: ACUTE PAIN
TYPE: ACUTE PAIN

## 2022-06-08 ASSESSMENT — LIFESTYLE VARIABLES: SUBSTANCE_ABUSE: 0

## 2022-06-08 NOTE — DOCUMENTATION QUERY
"                                                                         [facility]                                                                       Query Response Note      PATIENT:               GILA ARGUETA  ACCT #:                  [id number]  MRN:                     [id number]  :                      1940  ADMIT DATE:       2022 12:51 PM  DISCH DATE:          RESPONDING  PROVIDER #:        [id number]           QUERY TEXT:    Altered mental status is documented in the Medical Record.  Can a more specific diagnosis be provided?    NOTE:  If the appropriate response is not listed below, please respond with a new note.    If you have any questions please contact:  Debbie Moreno RN [facility]  [email or web address]  Debbie Moreno [address] [employer]      The patient's Clinical Indicators include:  81 year old female admitted with PE, UTI, AMS and CVA.     Voong/Shinto \"AMS (altered mental status), Urinary Tract Infection,  Ischemic Stroke\"  \"*Likely secondary to UTI and stroke\"     Rubenong/Graves Voong/Shinto \"AMS (altered mental status), Urinary Tract Infection,  Ischemic Stroke\"  \"*Likely secondary to UTI and stroke\"    Risk factors: recent procedure  Treatment: labs, US,ABX, CT  Options provided:   -- Acute metabolic encephalopathy   -- Acute toxic encephalopathy   -- Unable to determine      Query created by: Debbie Moreno on 2022 7:19 AM    RESPONSE TEXT:    Acute metabolic encephalopathy          Electronically signed by:  AKHIL HARVEY MD 2022 9:11 PM              "

## 2022-06-08 NOTE — PROGRESS NOTES
Date of Service: 6/8/2022  Primary Team: [facility]   Attending: Fidencio Ruiz M.D.   Senior Resident: Bobby Celis M.D.  Intern: Obdulio Green D.O.  Contact:  [phone]    Chief Complaint:   Chief Complaint   Patient presents with   • Shortness of Breath     Pt reports SOB since this am. Pt had an operation for a meningioma on May 12th.        ID:  Patient is a 81 y.o. female Dutch Azeri speaking visiting from [place] with PMHx of HTN, recent hospitalization for meningioma with mass effect s/p Craniotomy on 5/19/22 who presented 6/4/2022 with SOB, AMS, and RLE swelling and pain who was admitted for management of right pulmonary embolism and right deep vein thrombosis.    Interval Update:  -Still reports intermittent chest pain under shoulder pain, reported the pain is worsened with deep inspiration.  Cardiac work-up came back negative including normal troponin and EKG.  -Was evaluated by physical therapy, recommendation for SNF placement  -She was denied by physiatry evaluation for inpatient rehab  -She was switched from Lovenox to apixaban, hemoglobin is stable.    Consultants/Specialty:  Neurosurgery  Review of Systems:    Review of Systems   Constitutional: Negative for chills, fever and malaise/fatigue.   HENT: Negative for hearing loss and tinnitus.    Eyes: Negative for blurred vision and double vision.   Respiratory: Positive for shortness of breath. Negative for cough and hemoptysis.    Cardiovascular: Positive for chest pain and leg swelling (Right leg). Negative for palpitations.   Gastrointestinal: Negative for abdominal pain, blood in stool, constipation, diarrhea, heartburn, melena, nausea and vomiting.   Genitourinary: Negative for dysuria and urgency.   Musculoskeletal: Negative for myalgias.   Skin: Negative for itching and rash.   Neurological: Positive for headaches. Negative for dizziness, speech change and focal weakness.   Endo/Heme/Allergies: Negative for polydipsia. Does not  bruise/bleed easily.   Psychiatric/Behavioral: Negative for substance abuse.   All other systems reviewed and are negative.      Objective Data:   Physical Exam:   Vitals:   Temp:  [36.6 °C (97.9 °F)-37.4 °C (99.3 °F)] 36.6 °C (97.9 °F)  Pulse:  [68-84] 68  Resp:  [18-20] 18  BP: (118-139)/(60-96) 127/60  SpO2:  [94 %-99 %] 99 %    -VSS, BP: (124-141)/(67-96) 139/96     Physical Exam  Vitals and nursing note reviewed.   Constitutional:       Appearance: Normal appearance. She is obese. She is not ill-appearing.   HENT:      Head: Normocephalic and atraumatic.      Right Ear: External ear normal.      Left Ear: External ear normal.      Nose: Nose normal.      Mouth/Throat:      Mouth: Mucous membranes are moist.      Pharynx: Oropharynx is clear.   Eyes:      Extraocular Movements: Extraocular movements intact.      Conjunctiva/sclera: Conjunctivae normal.      Pupils: Pupils are equal, round, and reactive to light.   Cardiovascular:      Rate and Rhythm: Normal rate and regular rhythm.      Pulses: Normal pulses.      Heart sounds: Normal heart sounds. No murmur heard.    No friction rub. No gallop.   Pulmonary:      Effort: Pulmonary effort is normal. No respiratory distress.      Breath sounds: Normal breath sounds. No stridor. No wheezing, rhonchi or rales.      Comments: Requiring 2L NC  Abdominal:      General: Abdomen is flat. Bowel sounds are normal. There is no distension.      Palpations: Abdomen is soft.      Tenderness: There is no abdominal tenderness. There is no guarding or rebound.   Musculoskeletal:         General: Swelling (R>L) present. Normal range of motion.      Cervical back: Normal range of motion and neck supple.      Right lower leg: Edema (R>L) present.   Skin:     General: Skin is warm and dry.      Capillary Refill: Capillary refill takes less than 2 seconds.      Coloration: Skin is not jaundiced.      Comments: Difficult to palpate DP and PT on RLE, 2+ pulses on DP and PT on LLE    Neurological:      General: No focal deficit present.      Mental Status: She is alert and oriented to person, place, and time. Mental status is at baseline.      Cranial Nerves: No cranial nerve deficit.   Psychiatric:         Mood and Affect: Mood normal.         Behavior: Behavior normal.         Thought Content: Thought content normal.         Judgment: Judgment normal.           Labs:   Recent Labs     06/06/22  0254 06/08/22  0225   WBC 8.0 7.0   RBC 3.32* 3.36*   HEMOGLOBIN 9.3* 9.5*   HEMATOCRIT 29.7* 29.8*   MCV 89.5 88.7   MCH 28.0 28.3   RDW 50.4* 49.6   PLATELETCT 171 279   MPV 10.3 10.3   NEUTSPOLYS 76.70* 67.60   LYMPHOCYTES 10.60* 17.10*   MONOCYTES 9.70 8.60   EOSINOPHILS 0.90 1.70   BASOPHILS 0.20 0.30     Recent Labs     06/06/22  0254 06/07/22  0043 06/08/22  0225   SODIUM 134* 139 137   POTASSIUM 4.1 4.5 4.5   CHLORIDE 102 107 103   CO2 21 23 25   GLUCOSE 163* 136* 118*   BUN 21 16 11      Recent Labs     06/06/22  0254 06/07/22  0043 06/08/22  0225   ALBUMIN 3.0* 3.0* 3.1*   TBILIRUBIN 0.2 <0.2 <0.2   ALKPHOSPHAT 118* 116* 126*   TOTPROTEIN 5.8* 5.6* 6.0   ALTSGPT 19 17 17   ASTSGOT 15 15 16   CREATININE 0.52 0.51 0.38*        Imaging:   DX-CHEST-PORTABLE (1 VIEW)   Final Result      1.  Cardiomegaly. No focal consolidation or pleural effusions.   2.  Slight hypoinflation with bibasilar atelectasis.         EC-ECHOCARDIOGRAM COMPLETE W/O CONT   Final Result      CT-HEAD W/O   Final Result         1.  Right craniotomy defect again noted.      2.  Resolution of pneumocephalus.      3.  Volume loss and decreased attenuation in the right frontal lobe again noted.      4.  Evolving area of infarction in the medial posterior left cerebral hemisphere at site where infarction was seen on the prior MRI.               CT-CTA CHEST PULMONARY ARTERY W/ RECONS   Final Result      1.  Large pulmonary embolus in the right pulmonary artery, its lobar, segmental and subsegmental branches. No right heart  strain.   2.  Borderline cardiomegaly.      Findings were communicated to KADY KHAN via [employer] system on 6/4/2022 6:31 PM.      US-EXTREMITY VENOUS LOWER UNILAT RIGHT   Final Result      DX-CHEST-PORTABLE (1 VIEW)   Final Result         No acute cardiac or pulmonary abnormality is identified.      US-EXTREMITY ARTERY LOWER BILAT W/FAYE (COMBO)    (Results Pending)     -CMP (06/07/22): BUN/creatinine stable from 21/0.52-16/0.51  -Echo (06/06/22): EF 65%, diastolic function, no significant valvular disease, normal inferior vena cava size inspiratory collapse, normal right ventricle and normal right atrium    Assessment and Plan:    Patient is a 81 y.o. female Cymro Danish speaking visiting from [place] with PMHx of HTN, recent hospitalization for meningioma with mass effect s/p Craniotomy on 5/19/22 who presented 6/4/2022 with SOB, AMS, and RLE swelling and pain who was admitted for management of right pulmonary embolism and right deep vein thrombosis.      Problem Representation:     #Acute Hypoxic respiratory failure 2/2  #Large pulmonary embolism  #DVT femoral (deep venous thrombosis) with thrombophlebitis, right (HCC)-   Assessment & Plan  *LE swelling x2 days and RLE pain x 1 day,   *CXR 1V (06/04/22): No acute cardiac or pulm abnormality is identified  *US RLE (06/04/22): Acute DVT right lower extremity, distal common femoral vein extending down the leg and through the calf, gastrocnemius and soleal veins are also thrombosed in the calf  *CTA (06/04/22): Large pulm embolism in the right pulmonary artery, its lobar, segmental and segmental branches  *EKG (06/04/22): SR, HR 94, QTc 461  *Likely secondary to provocation secondary to immobility s/p surgery  *Troponin (06/05/22): 12, BNP (06/05/22): 106  *Echo (06/06/22): EF 65%, diastolic function, no significant valvular disease, normal inferior vena cava size inspiratory collapse, normal right ventricle and normal right atrium ; no right  ventricular heart strain    -Full dose anticoagulation with enoxaparin 80 mg twice daily (06/04/22-06/07/22), switched to apixaban 10 mg twice daily for 1 week, followed by apixaban 5 mg twice daily for total duration of 3 months in the setting of provoked DVT (09/04/2022)  -IS ordered  -PT/OT recommended SNF placement  -Physiatry consulted, patient has been declined for inpatient rehab  -DP and tibial pulses heard on doppler, will order FAYE to rule out arterial insufficiency due to diminished pulses  -No vascular surgery indicated at this time, no compartment syndrome or Phlegmasia cerulea dolens    AMS (altered mental status)  Urinary Tract Infection  Ischemic Stroke  Assessment & Plan  *A&O x 4, back to baseline  *CBC (06/04/22): WBC 10.1 (14.6 on 5/29/2022), left shift of neutrophil absolute 7.64 (H) (12.80 on 5/9/2022),  *Urinalysis (06/04/22): Nitrate positive, LE moderate, pyuria , hematuria with RBC 2-5, many bacteria,  hyaline casts 11-20  *Urine culture (06/04/22): E. Coli, susceptible to Bactrim and C3  *Blood cultures X2 (06/04/22): No growth to date  *Likely secondary to UTI and stroke  -Switched Ceftriaxone 1g (06/05/22-06/06/22) to Bactrim DS BID x 2 doses    Chest pain  -EKG and troponin ordered for ACS rule out  -Likely secondary to pulmonary embolism.    Acute Kidney Injury  Resolved  *CMP (06/06/22): BUN/creatinine uptrending from 12/0.38-31/0.52      ?Ischemic Stroke  *MRI head with and without (5/21/2022): Moderately large gyriform area of acute infarction involving the right posterior medial temporal lobe and adjacent right medial occipital lobe.  Moderate periventricular and juxtacortical white matter changes consistent with chronic microvascular ischemic gliosis  *CT head without (06/05/22): Evolving area of infarction in the medial posterior left cerebral hemisphere at site where infarction was seen on the prior MRI  *Lipid panel (06/05/22): Total cholesterol 201, triglycerides 189,  HDL 35,     -Atorvastatin 40 mg ordered  -We will hold off on aspirin at this time    HTN (hypertension)- (present on admission)  Assessment & Plan  *BP (06/04/22): (134-171)/(63-82) 141/82  *Stable    -Continue home amlodipine 5 mg daily, hold for SBP <100  -Continue home lisinopril 40 mg daily, hold for SBP <100    Constipation  *Refuses stool softeners    -Trial magnesium oxide 400 mg BID    Meningioma (HCC)- (present on admission)  Assessment & Plan  *Neurosurgery, Dr. Hakan Dhaliwal (5/19/2022): Right frontal extra axial brain tumor measuring 45X75X 45 mm, bicoronal incision of right frontal craniotomy for tumor resection, intraoperative microscope, Stealth navigation.    -Discontinued dexamethasone 2 mg daily per call with neurosurgeon Dr. Hakan Dhaliwal on 05/19/22 .  -We will continue home Keppra 500 mg twice daily for seizure prophylaxis, will follow up with neurosurgery to stop them    Gastroesophageal reflux disease    -Continue home omeprazole 20 mg daily    Core Measures:    Code Status: Full Code  Diet: Regular  IVF: None  Rodriguez Catheter: None  IV Lines: pIV  Antibiotics: Bactrim for UTI  GI Prophylaxis: PEG + Bisacodyl PRN  DVT Prophylaxis: Apixaban 10 mg twice daily  Disposition: Inpatient    Please note that this dictation was created using voice recognition software. I have made every reasonable attempt to correct obvious errors, but there may be errors of grammar and possibly content that I did not discover before finalizing the note. If the error changes the accuracy of the document, I would appreciate it being brought to my attention.

## 2022-06-08 NOTE — PROGRESS NOTES
2010 Pt reports right sided chest pain that gets worse with breathing and mild SOB. SpO2 97% on 2Lo2. Administered oxycodone. Cardiac workup previously done, trops 6. Paged MD on call as an FYI. New order for chest x ray    0000 Pt asleep. Chest xray results back. MD paged, aware. Pt still having mild CP/epigastric pain. Asked MD if still wants pt on tele or to place d/c tele orders. Per MD, okay to remove from tele.

## 2022-06-08 NOTE — PROGRESS NOTES
Monitor Summary      Rhythm: NSR     Rate:  71-82     Ectopy: occasional PVC     .15/.08/.35

## 2022-06-08 NOTE — CARE PLAN
The patient is Watcher - Medium risk of patient condition declining or worsening    Shift Goals  Clinical Goals: free of CP  Patient Goals: safety,comfort  Family Goals: clean linen, bed bath    Progress made toward(s) clinical / shift goals:    Problem: Knowledge Deficit - Standard  Goal: Patient and family/care givers will demonstrate understanding of plan of care, disease process/condition, diagnostic tests and medications  Outcome: Progressing     Problem: Pain - Standard  Goal: Alleviation of pain or a reduction in pain to the patient’s comfort goal  Outcome: Progressing     Problem: Fall Risk  Goal: Patient will remain free from falls  Outcome: Progressing       Patient is not progressing towards the following goals:

## 2022-06-08 NOTE — DISCHARGE PLANNING
Care Transition Team Assessment  Chart review completed. Discharge plan is to return home with no recommended services. PTA patient was (I) with ambulation and IADL's. Team will follow.       Information Source  Orientation Level: Oriented X4  Information Given By: Other (Comments) (chart review)    Readmission Evaluation  Is this a readmission?: No    Elopement Risk  Legal Hold: No  Ambulatory or Self Mobile in Wheelchair: No-Not an Elopement Risk  Elopement Risk: Not at Risk for Elopement    Interdisciplinary Discharge Planning  Lives with - Patient's Self Care Capacity: Adult Children (daughter stays home to care for her mother full time)  Patient or legal guardian wants to designate a caregiver: No  Housing / Facility: [address] in [place]  Prior Services: Intermittent Physical Support for ADL Per Family    Discharge Preparedness  What is your plan after discharge?: Home with help  What are your discharge supports?: Child  Prior Functional Level: Ambulatory  Difficulity with ADLs: None  Difficulity with IADLs: None    Functional Assesment  Prior Functional Level: Ambulatory    Finances  Financial Barriers to Discharge: No  Prescription Coverage: Yes              Advance Directive  Advance Directive?: None    Domestic Abuse  Have you ever been the victim of abuse or violence?: No  Physical Abuse or Sexual Abuse: No  Verbal Abuse or Emotional Abuse: No  Possible Abuse/Neglect Reported to:: Not Applicable    Psychological Assessment  History of Substance Abuse: None  History of Psychiatric Problems: No  Non-compliant with Treatment: No  Newly Diagnosed Illness: No    Discharge Risks or Barriers  Discharge risks or barriers?: Uninsured / underinsured  Patient risk factors: Uninsured or underinsured    Anticipated Discharge Information  Discharge Disposition: Discharged to home/self care (01)

## 2022-06-08 NOTE — PROGRESS NOTES
Assumed care of patient. Report received from LAURO Cortez. Patient A&Ox 4. Call light within reach, bed locked and in lowest position. Bed alarm on, instructed patient to call for assistance. Patient rating pain 8/10, medicated per MAR. Questions answered, no needs at this time. Daughter at bedside to translate.

## 2022-06-09 ENCOUNTER — APPOINTMENT (OUTPATIENT)
Dept: RADIOLOGY | Facility: MEDICAL CENTER | Age: 82
DRG: 175 | End: 2022-06-09
Attending: STUDENT IN AN ORGANIZED HEALTH CARE EDUCATION/TRAINING PROGRAM
Payer: MEDICAID

## 2022-06-09 LAB
BACTERIA BLD CULT: NORMAL
BACTERIA BLD CULT: NORMAL
BASOPHILS # BLD AUTO: 1 % (ref 0–1.8)
BASOPHILS # BLD: 0.07 K/UL (ref 0–0.12)
EOSINOPHIL # BLD AUTO: 0.34 K/UL (ref 0–0.51)
EOSINOPHIL NFR BLD: 4.7 % (ref 0–6.9)
ERYTHROCYTE [DISTWIDTH] IN BLOOD BY AUTOMATED COUNT: 49.3 FL (ref 35.9–50)
HCT VFR BLD AUTO: 33.3 % (ref 37–47)
HCT VFR BLD AUTO: 33.3 % (ref 37–47)
HGB BLD-MCNC: 10.5 G/DL (ref 12–16)
HGB BLD-MCNC: 10.5 G/DL (ref 12–16)
IMM GRANULOCYTES # BLD AUTO: 0.34 K/UL (ref 0–0.11)
IMM GRANULOCYTES NFR BLD AUTO: 4.7 % (ref 0–0.9)
LYMPHOCYTES # BLD AUTO: 1.65 K/UL (ref 1–4.8)
LYMPHOCYTES NFR BLD: 22.7 % (ref 22–41)
MCH RBC QN AUTO: 27.6 PG (ref 27–33)
MCHC RBC AUTO-ENTMCNC: 31.4 G/DL (ref 33.6–35)
MCV RBC AUTO: 87.9 FL (ref 81.4–97.8)
MONOCYTES # BLD AUTO: 0.54 K/UL (ref 0–0.85)
MONOCYTES NFR BLD AUTO: 7.4 % (ref 0–13.4)
NEUTROPHILS # BLD AUTO: 4.33 K/UL (ref 2–7.15)
NEUTROPHILS NFR BLD: 59.5 % (ref 44–72)
NRBC # BLD AUTO: 0 K/UL
NRBC BLD-RTO: 0 /100 WBC
PLATELET # BLD AUTO: 324 K/UL (ref 164–446)
PMV BLD AUTO: 10.2 FL (ref 9–12.9)
RBC # BLD AUTO: 3.73 M/UL (ref 4.2–5.4)
SIGNIFICANT IND 70042: NORMAL
SIGNIFICANT IND 70042: NORMAL
SITE SITE: NORMAL
SITE SITE: NORMAL
SOURCE SOURCE: NORMAL
SOURCE SOURCE: NORMAL
T4 FREE SERPL-MCNC: 1.2 NG/DL (ref 0.93–1.7)
TSH SERPL DL<=0.005 MIU/L-ACNC: 0.05 UIU/ML (ref 0.38–5.33)
WBC # BLD AUTO: 7.3 K/UL (ref 4.8–10.8)

## 2022-06-09 PROCEDURE — 93925 LOWER EXTREMITY STUDY: CPT | Mod: 26 | Performed by: INTERNAL MEDICINE

## 2022-06-09 PROCEDURE — 85025 COMPLETE CBC W/AUTO DIFF WBC: CPT

## 2022-06-09 PROCEDURE — 82941 ASSAY OF GASTRIN: CPT

## 2022-06-09 PROCEDURE — A9270 NON-COVERED ITEM OR SERVICE: HCPCS | Performed by: STUDENT IN AN ORGANIZED HEALTH CARE EDUCATION/TRAINING PROGRAM

## 2022-06-09 PROCEDURE — 87040 BLOOD CULTURE FOR BACTERIA: CPT | Mod: 91

## 2022-06-09 PROCEDURE — 700111 HCHG RX REV CODE 636 W/ 250 OVERRIDE (IP): Performed by: STUDENT IN AN ORGANIZED HEALTH CARE EDUCATION/TRAINING PROGRAM

## 2022-06-09 PROCEDURE — A9270 NON-COVERED ITEM OR SERVICE: HCPCS | Performed by: INTERNAL MEDICINE

## 2022-06-09 PROCEDURE — 700102 HCHG RX REV CODE 250 W/ 637 OVERRIDE(OP): Performed by: STUDENT IN AN ORGANIZED HEALTH CARE EDUCATION/TRAINING PROGRAM

## 2022-06-09 PROCEDURE — 770020 HCHG ROOM/CARE - TELE (206)

## 2022-06-09 PROCEDURE — 36415 COLL VENOUS BLD VENIPUNCTURE: CPT

## 2022-06-09 PROCEDURE — 700102 HCHG RX REV CODE 250 W/ 637 OVERRIDE(OP): Performed by: INTERNAL MEDICINE

## 2022-06-09 PROCEDURE — 99233 SBSQ HOSP IP/OBS HIGH 50: CPT | Mod: GC | Performed by: INTERNAL MEDICINE

## 2022-06-09 PROCEDURE — 700101 HCHG RX REV CODE 250: Performed by: STUDENT IN AN ORGANIZED HEALTH CARE EDUCATION/TRAINING PROGRAM

## 2022-06-09 PROCEDURE — 93925 LOWER EXTREMITY STUDY: CPT

## 2022-06-09 RX ORDER — ACETAMINOPHEN 500 MG
1000 TABLET ORAL EVERY 8 HOURS
Status: DISCONTINUED | OUTPATIENT
Start: 2022-06-09 | End: 2022-06-12 | Stop reason: HOSPADM

## 2022-06-09 RX ORDER — CELECOXIB 100 MG/1
100 CAPSULE ORAL 2 TIMES DAILY
Status: DISCONTINUED | OUTPATIENT
Start: 2022-06-09 | End: 2022-06-09

## 2022-06-09 RX ADMIN — LEVETIRACETAM 500 MG: 500 TABLET, FILM COATED ORAL at 05:14

## 2022-06-09 RX ADMIN — Medication 400 MG: at 05:15

## 2022-06-09 RX ADMIN — OXYCODONE 5 MG: 5 TABLET ORAL at 05:14

## 2022-06-09 RX ADMIN — APIXABAN 10 MG: 5 TABLET, FILM COATED ORAL at 16:43

## 2022-06-09 RX ADMIN — SENNOSIDES AND DOCUSATE SODIUM 2 TABLET: 50; 8.6 TABLET ORAL at 16:43

## 2022-06-09 RX ADMIN — CEFTRIAXONE SODIUM 1 G: 10 INJECTION, POWDER, FOR SOLUTION INTRAVENOUS at 21:17

## 2022-06-09 RX ADMIN — ACETAMINOPHEN 1000 MG: 500 TABLET, FILM COATED ORAL at 15:30

## 2022-06-09 RX ADMIN — LEVETIRACETAM 500 MG: 500 TABLET, FILM COATED ORAL at 16:43

## 2022-06-09 RX ADMIN — OXYCODONE 5 MG: 5 TABLET ORAL at 21:10

## 2022-06-09 RX ADMIN — APIXABAN 10 MG: 5 TABLET, FILM COATED ORAL at 05:14

## 2022-06-09 RX ADMIN — ACETAMINOPHEN 1000 MG: 500 TABLET, FILM COATED ORAL at 21:10

## 2022-06-09 RX ADMIN — CYANOCOBALAMIN TAB 500 MCG 2000 MCG: 500 TAB at 05:15

## 2022-06-09 RX ADMIN — Medication 5 MG: at 21:10

## 2022-06-09 RX ADMIN — OMEPRAZOLE 20 MG: 20 CAPSULE, DELAYED RELEASE ORAL at 05:15

## 2022-06-09 RX ADMIN — LISINOPRIL 40 MG: 20 TABLET ORAL at 05:15

## 2022-06-09 RX ADMIN — CELECOXIB 100 MG: 100 CAPSULE ORAL at 10:07

## 2022-06-09 RX ADMIN — ATORVASTATIN CALCIUM 40 MG: 40 TABLET, FILM COATED ORAL at 05:15

## 2022-06-09 RX ADMIN — AMLODIPINE BESYLATE 5 MG: 5 TABLET ORAL at 05:14

## 2022-06-09 ASSESSMENT — ENCOUNTER SYMPTOMS
DIARRHEA: 0
VOMITING: 0
POLYDIPSIA: 0
ABDOMINAL PAIN: 0
BLURRED VISION: 0
HEADACHES: 0
DIZZINESS: 0
CONSTIPATION: 0
BRUISES/BLEEDS EASILY: 0
DOUBLE VISION: 0
NAUSEA: 0
FOCAL WEAKNESS: 0
BLOOD IN STOOL: 0
PALPITATIONS: 0
HEARTBURN: 0
MYALGIAS: 0
FEVER: 0
COUGH: 0
SHORTNESS OF BREATH: 1
SPEECH CHANGE: 0
HEMOPTYSIS: 0
CHILLS: 0

## 2022-06-09 ASSESSMENT — LIFESTYLE VARIABLES: SUBSTANCE_ABUSE: 0

## 2022-06-09 ASSESSMENT — PAIN DESCRIPTION - PAIN TYPE: TYPE: ACUTE PAIN

## 2022-06-09 ASSESSMENT — FIBROSIS 4 INDEX: FIB4 SCORE: 0.95

## 2022-06-09 NOTE — CARE PLAN
The patient is Watcher - Medium risk of patient condition declining or worsening    Shift Goals manage pain and safety  Clinical Goals: manage pain  Patient Goals: rest  Family Goals: n/a    Progress made toward(s) clinical / shift goals:    Problem: Pain - Standard  Goal: Alleviation of pain or a reduction in pain to the patient’s comfort goal  Outcome: Progressing   Q4 pain assessments in place. Pt educated on pain scale. RN aware of pt's goal pain. PRN medication orders followed.     Patient is not progressing towards the following goals:

## 2022-06-09 NOTE — PROGRESS NOTES
Date of Service: 6/9/2022  Primary Team: [facility] Team   Attending: Fidencio Ruiz M.D.   Senior Resident: Bobby Celis M.D.  Intern: Obdulio Green D.O.  Contact:  [phone]    Chief Complaint:   Chief Complaint   Patient presents with   • Shortness of Breath     Pt reports SOB since this am. Pt had an operation for a meningioma on May 12th.        ID:  Patient is a 81 y.o. female Tajik Welsh speaking visiting from [place] with PMHx of HTN, recent hospitalization for meningioma with mass effect s/p Craniotomy on 5/19/22 who presented 6/4/2022 with SOB, AMS, and RLE swelling and pain who was admitted for management of right pulmonary embolism and right deep vein thrombosis.    Interval Update:    -Overnight events include spiking fever at 100.8, restarted C3, pancultures  -Translation per daughter, patient's SOB, RUQ pain, and RLE pain has improved. She experienced SOB and pleuritic chest pain when ambulating to urinate. States resolution of dysuria, urinary urgency, and urinary frequency. Denies any fever any fever and chills. Having daily soft BM    -Restarted on Telemetry monitoring  -Will consider if repeat CTA needed  -Scheduled Acetaminophen 1g TID  -Pending repeat urinalysis and urine culture  -Ordered blood cultures  -Continue with Ceftriaxone 1g empirically pending blood cultures  -Gastrin ordered to rule out achlorhydria   -Ordered H pylori stool study  -Discontinued Magnesium Oxide 400 mg BID, having BM daily  -Pending SNF placement      Consultants/Specialty:  Neurosurgery  Review of Systems:    Review of Systems   Constitutional: Negative for chills, fever and malaise/fatigue.   HENT: Negative for hearing loss and tinnitus.    Eyes: Negative for blurred vision and double vision.   Respiratory: Positive for shortness of breath. Negative for cough and hemoptysis.    Cardiovascular: Positive for chest pain and leg swelling (Right leg). Negative for palpitations.   Gastrointestinal: Negative for  abdominal pain, blood in stool, constipation, diarrhea, heartburn, melena, nausea and vomiting.   Genitourinary: Negative for dysuria and urgency.   Musculoskeletal: Negative for myalgias.   Skin: Negative for itching and rash.   Neurological: Negative for dizziness, speech change, focal weakness and headaches.   Endo/Heme/Allergies: Negative for polydipsia. Does not bruise/bleed easily.   Psychiatric/Behavioral: Negative for substance abuse.   All other systems reviewed and are negative.      Objective Data:   Physical Exam:   Vitals:   Temp:  [37 °C (98.6 °F)-38.2 °C (100.8 °F)] 37.2 °C (99 °F)  Pulse:  [] 103  Resp:  [18] 18  BP: (129-156)/(67-79) 156/79  SpO2:  [92 %-95 %] 94 %    -VSS, BP: (124-141)/(67-96) 139/96     Physical Exam  Vitals and nursing note reviewed.   Constitutional:       Appearance: Normal appearance. She is obese. She is not ill-appearing.   HENT:      Head: Normocephalic and atraumatic.      Right Ear: External ear normal.      Left Ear: External ear normal.      Nose: Nose normal.      Mouth/Throat:      Mouth: Mucous membranes are moist.      Pharynx: Oropharynx is clear.   Eyes:      Extraocular Movements: Extraocular movements intact.      Conjunctiva/sclera: Conjunctivae normal.      Pupils: Pupils are equal, round, and reactive to light.   Cardiovascular:      Rate and Rhythm: Normal rate and regular rhythm.      Pulses: Normal pulses.      Heart sounds: Normal heart sounds. No murmur heard.    No friction rub. No gallop.   Pulmonary:      Effort: Pulmonary effort is normal. No respiratory distress.      Breath sounds: Normal breath sounds. No stridor. No wheezing, rhonchi or rales.   Abdominal:      General: Abdomen is flat. Bowel sounds are normal. There is no distension.      Palpations: Abdomen is soft.      Tenderness: There is no abdominal tenderness. There is no guarding or rebound.   Musculoskeletal:         General: Swelling (R>L) present. Normal range of motion.       Cervical back: Normal range of motion and neck supple.      Right lower leg: Edema (R>L) present.   Skin:     General: Skin is warm and dry.      Capillary Refill: Capillary refill takes less than 2 seconds.      Coloration: Skin is not jaundiced.      Comments: Difficult to palpate DP and PT on RLE, 2+ pulses on DP and PT on LLE   Neurological:      General: No focal deficit present.      Mental Status: She is alert and oriented to person, place, and time. Mental status is at baseline.      Cranial Nerves: No cranial nerve deficit.   Psychiatric:         Mood and Affect: Mood normal.         Behavior: Behavior normal.         Thought Content: Thought content normal.         Judgment: Judgment normal.           Labs:   Recent Labs     06/08/22 0225 06/09/22  0340   WBC 7.0  --    RBC 3.36*  --    HEMOGLOBIN 9.5* 10.5*   HEMATOCRIT 29.8* 33.3*   MCV 88.7  --    MCH 28.3  --    RDW 49.6  --    PLATELETCT 279  --    MPV 10.3  --    NEUTSPOLYS 67.60  --    LYMPHOCYTES 17.10*  --    MONOCYTES 8.60  --    EOSINOPHILS 1.70  --    BASOPHILS 0.30  --      Recent Labs     06/07/22  0043 06/08/22  0225   SODIUM 139 137   POTASSIUM 4.5 4.5   CHLORIDE 107 103   CO2 23 25   GLUCOSE 136* 118*   BUN 16 11      Recent Labs     06/07/22 0043 06/08/22 0225   ALBUMIN 3.0* 3.1*   TBILIRUBIN <0.2 <0.2   ALKPHOSPHAT 116* 126*   TOTPROTEIN 5.6* 6.0   ALTSGPT 17 17   ASTSGOT 15 16   CREATININE 0.51 0.38*        Imaging:   DX-CHEST-PORTABLE (1 VIEW)   Final Result      1.  Cardiomegaly. No focal consolidation or pleural effusions.   2.  Slight hypoinflation with bibasilar atelectasis.         EC-ECHOCARDIOGRAM COMPLETE W/O CONT   Final Result      CT-HEAD W/O   Final Result         1.  Right craniotomy defect again noted.      2.  Resolution of pneumocephalus.      3.  Volume loss and decreased attenuation in the right frontal lobe again noted.      4.  Evolving area of infarction in the medial posterior left cerebral hemisphere at site  where infarction was seen on the prior MRI.               CT-CTA CHEST PULMONARY ARTERY W/ RECONS   Final Result      1.  Large pulmonary embolus in the right pulmonary artery, its lobar, segmental and subsegmental branches. No right heart strain.   2.  Borderline cardiomegaly.      Findings were communicated to KADY KHAN via Voal[employer] messaging system on 6/4/2022 6:31 PM.      US-EXTREMITY VENOUS LOWER UNILAT RIGHT   Final Result      DX-CHEST-PORTABLE (1 VIEW)   Final Result         No acute cardiac or pulmonary abnormality is identified.      US-EXTREMITY ARTERY LOWER BILAT W/FAYE (COMBO)    (Results Pending)     -CMP (06/07/22): BUN/creatinine stable from 21/0.52-16/0.51  -Echo (06/06/22): EF 65%, diastolic function, no significant valvular disease, normal inferior vena cava size inspiratory collapse, normal right ventricle and normal right atrium    Assessment and Plan:    Patient is a 81 y.o. female [place] Chinese speaking visiting from [place] with PMHx of HTN, recent hospitalization for meningioma with mass effect s/p Craniotomy on 5/19/22 who presented 6/4/2022 with SOB, AMS, and RLE swelling and pain who was admitted for management of right pulmonary embolism and right deep vein thrombosis.      Problem Representation:     Acute Problems:    Acute Hypoxic respiratory failure 2/2  Large pulmonary embolism  DVT femoral (deep venous thrombosis) with thrombophlebitis, right (HCC)-   Assessment & Plan  *LE swelling x2 days and RLE pain x 1 day, 87% on RA  *CXR 1V (06/04/22): No acute cardiac or pulm abnormality is identified  *US RLE (06/04/22): Acute DVT right lower extremity, distal common femoral vein extending down the leg and through the calf, gastrocnemius and soleal veins are also thrombosed in the calf  *CTA (06/04/22): Large pulm embolism in the right pulmonary artery, its lobar, segmental and segmental branches  *EKG (06/04/22): SR, HR 94, QTc 461  *Likely secondary to provocation secondary to  immobility s/p surgery  *Troponin (06/05/22): 12, BNP (06/05/22): 106  *Echo (06/06/22): EF 65%, diastolic function, no significant valvular disease, normal inferior vena cava size inspiratory collapse, normal right ventricle and normal right atrium ; no right ventricular heart strain  *CXR 1 V (06/08/22): Cardiomegaly, bibasilar atelectasis  *Improving    -Telemetry monitoring  -Supplemental O2 for saturation >92%  -Full dose anticoagulation with enoxaparin 80 mg twice daily (06/04/22-06/07/22), switched to apixaban 10 mg twice daily for 1 week, followed by apixaban 5 mg twice daily for total duration of 3 months in the setting of provoked DVT (09/04/2022)  -IS ordered  -PT/OT recommended SNF placement  -Physiatry consulted, patient has been declined for inpatient rehab  -DP and tibial pulses heard on doppler, will order FAYE to rule out arterial insufficiency due to diminished pulses  -No vascular surgery indicated at this time, no compartment syndrome or Phlegmasia cerulea dolens  -Restarted on Telemetry monitoring  -Will consider if repeat CTA needed  -Pending SNF placement      Fever  *1 episode of T of 100.8 on 06/08/22 @ 2012, no Acetaminophen given, now non-febrile  *Will rule out infectious etiology    -Pending repeat urinalysis and urine culture  -Ordered blood cultures  -Continue with Ceftriaxone 1g empirically pending blood cultures    Chest pain  *EKG and troponin ordered for ACS rule out on 6/7/22, WNL  *Likely secondary to pulmonary embolism    -Scheduled Acetaminophen 1g TID      Normocytic Anemia  *CBC (06/08/22): Hgb 9.5, MCV 88.7  *Reticulocyte index (06/08/22): 0.95, hypoproliferation  *Iron panel (06/08/22): Iron 28, TIBC 227, 12% saturation, ferritin 249  *Vitamin B12 (06/08/22): 229    -Gastrin ordered to rule out achlorhydria   -Ordered H pylori stool study  -Started cyanocobalamin 2000 mcg daily  -We will continue to monitor    AMS (altered mental status)  Urinary Tract Infection  Ischemic  Stroke  Assessment & Plan  *A&O x 4, back to baseline  *CBC (06/04/22): WBC 10.1 (14.6 on 5/29/2022), left shift of neutrophil absolute 7.64 (H) (12.80 on 5/9/2022),  *Urinalysis (06/04/22): Nitrate positive, LE moderate, pyuria , hematuria with RBC 2-5, many bacteria,  hyaline casts 11-20  *Urine culture (06/04/22): E. Coli, susceptible to Bactrim and C3  *Blood cultures X2 (06/04/22): No growth to date  *Likely secondary to UTI and/or stroke    -Switched Ceftriaxone 1g (06/05/22-06/06/22) to Bactrim DS BID x 2 doses  -Resolved (06/07/22)    Acute Kidney Injury  *CMP (06/06/22): BUN/creatinine uptrending from 12/0.38-31/0.52 ; 30% increase    - ml/hr (06/06/22-06/07/22)  -Resolved (06/08/22)    Constipation  *Refuses stool softeners    -Trial magnesium oxide 400 mg BID (06/05/22-06/8/22)  -Resolved (06/09/22)    Chronic Problems:    Ischemic Stroke  *MRI head with and without (5/21/2022): Moderately large gyriform area of acute infarction involving the right posterior medial temporal lobe and adjacent right medial occipital lobe.  Moderate periventricular and juxtacortical white matter changes consistent with chronic microvascular ischemic gliosis  *CT head without (06/05/22): Evolving area of infarction in the medial posterior left cerebral hemisphere at site where infarction was seen on the prior MRI  *Lipid panel (06/05/22): Total cholesterol 201, triglycerides 189, HDL 35,     -Atorvastatin 40 mg ordered  -We will hold off on aspirin at this time per neurosurgery recommendations    HTN (hypertension)- (present on admission)  Assessment & Plan  *BP (06/04/22): (134-171)/(63-82) 141/82  *Stable    -Continue home amlodipine 5 mg daily, hold for SBP <100  -Continue home lisinopril 40 mg daily, hold for SBP <100  -Started Amlodipine 5 mg daily, hold for SBP <100 (06/08/22)      Meningioma (HCC)- (present on admission)  Assessment & Plan  *Neurosurgery, Dr. Hakan Dhaliwal (5/19/2022): Right frontal  extra axial brain tumor measuring 45X75X 45 mm, bicoronal incision of right frontal craniotomy for tumor resection, intraoperative microscope, Stealth navigation.    -Discontinued dexamethasone 2 mg daily per call with neurosurgeon Dr. Hakan Dhaliwal on 05/19/22 .  -We will continue home Keppra 500 mg twice daily for seizure prophylaxis, will follow up with neurosurgery to stop them    Gastroesophageal reflux disease    -Continue home omeprazole 20 mg daily      Core Measures:    Code Status: Full Code  Diet: Regular  IVF: None  Rodriguez Catheter: None  IV Lines: pIV  Antibiotics: Ceftriaxone  GI Prophylaxis: PEG + Bisacodyl PRN  DVT Prophylaxis: Apixaban 10 mg twice daily  Disposition: Inpatient    Please note that this dictation was created using voice recognition software. I have made every reasonable attempt to correct obvious errors, but there may be errors of grammar and possibly content that I did not discover before finalizing the note. If the error changes the accuracy of the document, I would appreciate it being brought to my attention.

## 2022-06-09 NOTE — PROGRESS NOTES
Orders placed to put patient back on tele. Placed pt back on the box. Confirmed with monitor room pt is SR.

## 2022-06-09 NOTE — PROGRESS NOTES
Assumed care of patient at bedside report from day RN. Updated on POC. Patient currently A & O x 4; on 2 L O2 nasal cannula; up x1 with a front wheel walker; without complaints of acute pain. Assessment completed Call light within reach. Whiteboard updated. Fall precautions in place. Bed locked and in lowest position. All questions answered. No other needs indicated at this time.

## 2022-06-09 NOTE — CARE PLAN
The patient is Stable - Low risk of patient condition declining or worsening    Shift Goals  Clinical Goals: pain management, ambulation  Patient Goals: rest  Family Goals: n/a    Progress made toward(s) clinical / shift goals:      Problem: Pain - Standard  Goal: Alleviation of pain or a reduction in pain to the patient’s comfort goal  Outcome: Progressing   Q4 pain assessments in place. Pt educated on pain scale. RN aware of pt's goal pain. PRN medication orders followed.    Problem: Fall Risk  Goal: Patient will remain free from falls  Outcome: Progressing   Gilmore estela fall scale utilized. Bed alarm in place. Pt is a one person assist with a front wheel walker. Educated pt and family to call for appropriate assistance prior to ambulating.      Patient is not progressing towards the following goals:

## 2022-06-09 NOTE — PROGRESS NOTES
Bedside report received from night RN, pt care assumed, assessment completed. Pt is A&O4, pain 0, not on the monitor (medical). Updated on POC, questions answered. Bed in lowest, locked position, treaded socks on, call light and belongings within reach.

## 2022-06-10 ENCOUNTER — APPOINTMENT (OUTPATIENT)
Dept: RADIOLOGY | Facility: MEDICAL CENTER | Age: 82
DRG: 175 | End: 2022-06-10
Attending: STUDENT IN AN ORGANIZED HEALTH CARE EDUCATION/TRAINING PROGRAM
Payer: MEDICAID

## 2022-06-10 PROBLEM — R79.89 ABNORMAL TSH: Status: ACTIVE | Noted: 2022-06-10

## 2022-06-10 LAB
ALBUMIN SERPL BCP-MCNC: 2.9 G/DL (ref 3.2–4.9)
ALBUMIN/GLOB SERPL: 0.9 G/DL
ALP SERPL-CCNC: 133 U/L (ref 30–99)
ALT SERPL-CCNC: 22 U/L (ref 2–50)
ANION GAP SERPL CALC-SCNC: 10 MMOL/L (ref 7–16)
AST SERPL-CCNC: 21 U/L (ref 12–45)
BASOPHILS # BLD AUTO: 0 % (ref 0–1.8)
BASOPHILS # BLD: 0 K/UL (ref 0–0.12)
BILIRUB SERPL-MCNC: 0.2 MG/DL (ref 0.1–1.5)
BUN SERPL-MCNC: 17 MG/DL (ref 8–22)
CALCIUM SERPL-MCNC: 8.8 MG/DL (ref 8.5–10.5)
CHLORIDE SERPL-SCNC: 101 MMOL/L (ref 96–112)
CO2 SERPL-SCNC: 25 MMOL/L (ref 20–33)
CREAT SERPL-MCNC: 0.57 MG/DL (ref 0.5–1.4)
EOSINOPHIL # BLD AUTO: 0.26 K/UL (ref 0–0.51)
EOSINOPHIL NFR BLD: 4.4 % (ref 0–6.9)
ERYTHROCYTE [DISTWIDTH] IN BLOOD BY AUTOMATED COUNT: 48.5 FL (ref 35.9–50)
GFR SERPLBLD CREATININE-BSD FMLA CKD-EPI: 92 ML/MIN/1.73 M 2
GLOBULIN SER CALC-MCNC: 3.1 G/DL (ref 1.9–3.5)
GLUCOSE SERPL-MCNC: 140 MG/DL (ref 65–99)
HCT VFR BLD AUTO: 30.9 % (ref 37–47)
HGB BLD-MCNC: 9.6 G/DL (ref 12–16)
LYMPHOCYTES # BLD AUTO: 1.39 K/UL (ref 1–4.8)
LYMPHOCYTES NFR BLD: 23.9 % (ref 22–41)
MAGNESIUM SERPL-MCNC: 2.1 MG/DL (ref 1.5–2.5)
MANUAL DIFF BLD: NORMAL
MCH RBC QN AUTO: 27.2 PG (ref 27–33)
MCHC RBC AUTO-ENTMCNC: 31.1 G/DL (ref 33.6–35)
MCV RBC AUTO: 87.5 FL (ref 81.4–97.8)
METAMYELOCYTES NFR BLD MANUAL: 0.9 %
MONOCYTES # BLD AUTO: 0.26 K/UL (ref 0–0.85)
MONOCYTES NFR BLD AUTO: 4.4 % (ref 0–13.4)
MORPHOLOGY BLD-IMP: NORMAL
MYELOCYTES NFR BLD MANUAL: 0.9 %
NEUTROPHILS # BLD AUTO: 3.8 K/UL (ref 2–7.15)
NEUTROPHILS NFR BLD: 65.5 % (ref 44–72)
NRBC # BLD AUTO: 0 K/UL
NRBC BLD-RTO: 0 /100 WBC
PLATELET # BLD AUTO: 318 K/UL (ref 164–446)
PLATELET BLD QL SMEAR: NORMAL
PMV BLD AUTO: 10 FL (ref 9–12.9)
POTASSIUM SERPL-SCNC: 4.4 MMOL/L (ref 3.6–5.5)
PROT SERPL-MCNC: 6 G/DL (ref 6–8.2)
RBC # BLD AUTO: 3.53 M/UL (ref 4.2–5.4)
RBC BLD AUTO: NORMAL
SODIUM SERPL-SCNC: 136 MMOL/L (ref 135–145)
T3FREE SERPL-MCNC: 2.14 PG/ML (ref 2–4.4)
WBC # BLD AUTO: 5.8 K/UL (ref 4.8–10.8)

## 2022-06-10 PROCEDURE — 700102 HCHG RX REV CODE 250 W/ 637 OVERRIDE(OP): Performed by: STUDENT IN AN ORGANIZED HEALTH CARE EDUCATION/TRAINING PROGRAM

## 2022-06-10 PROCEDURE — 80053 COMPREHEN METABOLIC PANEL: CPT

## 2022-06-10 PROCEDURE — 83735 ASSAY OF MAGNESIUM: CPT

## 2022-06-10 PROCEDURE — 85025 COMPLETE CBC W/AUTO DIFF WBC: CPT

## 2022-06-10 PROCEDURE — A9270 NON-COVERED ITEM OR SERVICE: HCPCS | Performed by: STUDENT IN AN ORGANIZED HEALTH CARE EDUCATION/TRAINING PROGRAM

## 2022-06-10 PROCEDURE — 94760 N-INVAS EAR/PLS OXIMETRY 1: CPT

## 2022-06-10 PROCEDURE — 700102 HCHG RX REV CODE 250 W/ 637 OVERRIDE(OP): Performed by: INTERNAL MEDICINE

## 2022-06-10 PROCEDURE — 84481 FREE ASSAY (FT-3): CPT

## 2022-06-10 PROCEDURE — 770020 HCHG ROOM/CARE - TELE (206)

## 2022-06-10 PROCEDURE — 85007 BL SMEAR W/DIFF WBC COUNT: CPT

## 2022-06-10 PROCEDURE — 99232 SBSQ HOSP IP/OBS MODERATE 35: CPT | Mod: GC | Performed by: INTERNAL MEDICINE

## 2022-06-10 PROCEDURE — 36415 COLL VENOUS BLD VENIPUNCTURE: CPT

## 2022-06-10 PROCEDURE — A9270 NON-COVERED ITEM OR SERVICE: HCPCS | Performed by: INTERNAL MEDICINE

## 2022-06-10 PROCEDURE — 73620 X-RAY EXAM OF FOOT: CPT | Mod: RT

## 2022-06-10 RX ORDER — GABAPENTIN 300 MG/1
300 CAPSULE ORAL 3 TIMES DAILY
Status: DISCONTINUED | OUTPATIENT
Start: 2022-06-10 | End: 2022-06-10

## 2022-06-10 RX ORDER — VALACYCLOVIR HYDROCHLORIDE 500 MG/1
1000 TABLET, FILM COATED ORAL 3 TIMES DAILY
Status: DISCONTINUED | OUTPATIENT
Start: 2022-06-10 | End: 2022-06-12 | Stop reason: HOSPADM

## 2022-06-10 RX ADMIN — AMLODIPINE BESYLATE 5 MG: 5 TABLET ORAL at 05:59

## 2022-06-10 RX ADMIN — ACETAMINOPHEN 1000 MG: 500 TABLET, FILM COATED ORAL at 13:50

## 2022-06-10 RX ADMIN — APIXABAN 10 MG: 5 TABLET, FILM COATED ORAL at 16:41

## 2022-06-10 RX ADMIN — VALACYCLOVIR HYDROCHLORIDE 1000 MG: 500 TABLET, FILM COATED ORAL at 12:39

## 2022-06-10 RX ADMIN — ACETAMINOPHEN 1000 MG: 500 TABLET, FILM COATED ORAL at 05:58

## 2022-06-10 RX ADMIN — LEVETIRACETAM 500 MG: 500 TABLET, FILM COATED ORAL at 16:41

## 2022-06-10 RX ADMIN — OMEPRAZOLE 20 MG: 20 CAPSULE, DELAYED RELEASE ORAL at 05:58

## 2022-06-10 RX ADMIN — Medication 5 MG: at 21:17

## 2022-06-10 RX ADMIN — LEVETIRACETAM 500 MG: 500 TABLET, FILM COATED ORAL at 05:59

## 2022-06-10 RX ADMIN — ACETAMINOPHEN 1000 MG: 500 TABLET, FILM COATED ORAL at 21:17

## 2022-06-10 RX ADMIN — SENNOSIDES AND DOCUSATE SODIUM 2 TABLET: 50; 8.6 TABLET ORAL at 05:59

## 2022-06-10 RX ADMIN — ATORVASTATIN CALCIUM 40 MG: 40 TABLET, FILM COATED ORAL at 05:59

## 2022-06-10 RX ADMIN — OXYCODONE 5 MG: 5 TABLET ORAL at 13:50

## 2022-06-10 RX ADMIN — OXYCODONE 5 MG: 5 TABLET ORAL at 05:59

## 2022-06-10 RX ADMIN — VALACYCLOVIR HYDROCHLORIDE 1000 MG: 500 TABLET, FILM COATED ORAL at 16:40

## 2022-06-10 RX ADMIN — GABAPENTIN 300 MG: 300 CAPSULE ORAL at 12:39

## 2022-06-10 RX ADMIN — CYANOCOBALAMIN TAB 500 MCG 2000 MCG: 500 TAB at 05:58

## 2022-06-10 RX ADMIN — APIXABAN 10 MG: 5 TABLET, FILM COATED ORAL at 05:58

## 2022-06-10 RX ADMIN — LISINOPRIL 40 MG: 20 TABLET ORAL at 05:59

## 2022-06-10 ASSESSMENT — ENCOUNTER SYMPTOMS
VOMITING: 0
BRUISES/BLEEDS EASILY: 0
SPEECH CHANGE: 0
SHORTNESS OF BREATH: 1
DOUBLE VISION: 0
MYALGIAS: 1
HEARTBURN: 0
CONSTIPATION: 0
PALPITATIONS: 0
HEMOPTYSIS: 0
POLYDIPSIA: 0
DIZZINESS: 0
CHILLS: 0
ABDOMINAL PAIN: 0
BLURRED VISION: 0
BLOOD IN STOOL: 0
NAUSEA: 0
FEVER: 0
COUGH: 0
FOCAL WEAKNESS: 0
DIARRHEA: 0
HEADACHES: 0

## 2022-06-10 ASSESSMENT — LIFESTYLE VARIABLES: SUBSTANCE_ABUSE: 0

## 2022-06-10 ASSESSMENT — PAIN DESCRIPTION - PAIN TYPE
TYPE: ACUTE PAIN
TYPE: ACUTE PAIN

## 2022-06-10 NOTE — THERAPY
"Missed Therapy     Patient Name: Jan Palencia  Age:  81 y.o., Sex:  female  Medical Record #: [id number]  Today's Date: 6/10/2022    Discussed missed therapy with RN. OT attempted to see pt X2 today. Dtr present in AM but left early stating, \"I'm going to get lunch and I'll be back\".  Attempted treatment in early PM and RN informed OT that RN called dtr to ask when she would return. Dtr stated, \"I won't be back until around 4:30PM\".  Dtr not present.. Pt has a particular dialect Nigerian/ Macedonian. Communication is limited due to the language line  does not have the correct dialect and pt does not understand .  RN also informed OT that pt is now refusing to do any OOB activity due to pain in right foot. Pain meds have been given. OT tx held today awaiting Dtr to be present for Interpreting. RN/MD informed and agreed.        06/10/22 1407   Anticipated Discharge Equipment and Recommendations   DC Equipment Recommendations Unable to determine at this time   Discharge Recommendations Recommend post-acute placement for additional occupational therapy services prior to discharge home   Interdisciplinary Plan of Care Collaboration   IDT Collaboration with  Nursing;Physician;Occupational Therapist   Collaboration Comments RN informed of OT attempt. Dtr not present and communication is limited due to language line  does not have the correct dialect. Pt also refusing OOB activity due to pain Right foot.   Session Information   Date / Session Number  6/7, #1 (1/3, 6/13) Attempted 6/10. Pt refused any OOB activity due to pain in Right foot.   Priority 2  (Dtr must be present to translate.)     "

## 2022-06-10 NOTE — PROGRESS NOTES
Date of Service: 6/10/2022  Primary Team: [facility] Team   Attending: Fidencio Ruiz M.D.   Senior Resident: Bobby Celis M.D.  Intern: Obdulio Green D.O.  Contact:  [phone]    Chief Complaint:   Chief Complaint   Patient presents with   • Shortness of Breath     Pt reports SOB since this am. Pt had an operation for a meningioma on May 12th.        ID:  Patient is a 81 y.o. female [place]ian Greek speaking visiting from [place] with PMHx of HTN, recent hospitalization for meningioma with mass effect s/p Craniotomy on 5/19/22 who presented 6/4/2022 with SOB, AMS, and RLE swelling and pain who was admitted for management of right pulmonary embolism and right deep vein thrombosis.    Interval Update:    -No acute vents overnight  -Translated per patient's daughter, patient states that she continues to have right foot pain, specifically from the navicular bone radiating down the first distal phalanges. It is aggravated with walking and weight bearing so it limits her mobility. She complains of a rash located below her lateral right breast spreading to her back in a dermatomal distribution. The daughter states she does not feel comfortable taking her back home until she is more mobile and in less pain.    -Discharge tomorrow on home O2 of 3L    Consultants/Specialty:  Neurosurgery  Review of Systems:    Review of Systems   Constitutional: Negative for chills, fever and malaise/fatigue.   HENT: Negative for hearing loss and tinnitus.    Eyes: Negative for blurred vision and double vision.   Respiratory: Positive for shortness of breath. Negative for cough and hemoptysis.    Cardiovascular: Positive for leg swelling (Right leg). Negative for chest pain and palpitations.   Gastrointestinal: Negative for abdominal pain, blood in stool, constipation, diarrhea, heartburn, melena, nausea and vomiting.   Genitourinary: Negative for dysuria and urgency.   Musculoskeletal: Positive for myalgias (Right foot).   Skin: Positive  for rash (below right breast spreading to back). Negative for itching.   Neurological: Negative for dizziness, speech change, focal weakness and headaches.   Endo/Heme/Allergies: Negative for polydipsia. Does not bruise/bleed easily.   Psychiatric/Behavioral: Negative for substance abuse.   All other systems reviewed and are negative.      Objective Data:   Physical Exam:   Vitals:   Temp:  [36.1 °C (96.9 °F)-37.1 °C (98.8 °F)] 36.2 °C (97.1 °F)  Pulse:  [77-97] 77  Resp:  [17-18] 18  BP: (100-130)/(55-75) 117/75  SpO2:  [93 %-98 %] 94 %    -VSS, BP: (124-141)/(67-96) 139/96     Physical Exam  Vitals and nursing note reviewed.   Constitutional:       Appearance: Normal appearance. She is obese. She is ill-appearing.   HENT:      Head: Normocephalic and atraumatic.      Right Ear: External ear normal.      Left Ear: External ear normal.      Nose: Nose normal.      Mouth/Throat:      Mouth: Mucous membranes are moist.      Pharynx: Oropharynx is clear.   Eyes:      Extraocular Movements: Extraocular movements intact.      Conjunctiva/sclera: Conjunctivae normal.      Pupils: Pupils are equal, round, and reactive to light.   Cardiovascular:      Rate and Rhythm: Normal rate and regular rhythm.      Pulses: Normal pulses.      Heart sounds: Normal heart sounds. No murmur heard.    No friction rub. No gallop.   Pulmonary:      Effort: Pulmonary effort is normal. No respiratory distress.      Breath sounds: Normal breath sounds. No stridor. No wheezing, rhonchi or rales.   Abdominal:      General: Abdomen is flat. Bowel sounds are normal. There is no distension.      Palpations: Abdomen is soft.      Tenderness: There is no abdominal tenderness. There is no guarding or rebound.   Musculoskeletal:         General: Swelling (R>L) present. Normal range of motion.      Cervical back: Normal range of motion and neck supple.      Right lower leg: No edema.   Skin:     General: Skin is warm and dry.      Capillary Refill:  Capillary refill takes less than 2 seconds.      Coloration: Skin is not jaundiced.      Findings: Rash (group vesicles and bullae on an erythematous base of the right breast to back in T5-T7 dermatomal distribution) present.      Comments: Difficult to palpate DP and PT on RLE, 2+ pulses on DP and PT on LLE   Neurological:      General: No focal deficit present.      Mental Status: She is alert and oriented to person, place, and time. Mental status is at baseline.      Cranial Nerves: No cranial nerve deficit.   Psychiatric:         Mood and Affect: Mood normal.         Behavior: Behavior normal.         Thought Content: Thought content normal.         Judgment: Judgment normal.           Labs:   Recent Labs     06/08/22  0225 06/09/22  0340 06/10/22  0246   WBC 7.0 7.3 5.8   RBC 3.36* 3.73* 3.53*   HEMOGLOBIN 9.5* 10.5*  10.5* 9.6*   HEMATOCRIT 29.8* 33.3*  33.3* 30.9*   MCV 88.7 87.9 87.5   MCH 28.3 27.6 27.2   RDW 49.6 49.3 48.5   PLATELETCT 279 324 318   MPV 10.3 10.2 10.0   NEUTSPOLYS 67.60 59.50 65.50   LYMPHOCYTES 17.10* 22.70 23.90   MONOCYTES 8.60 7.40 4.40   EOSINOPHILS 1.70 4.70 4.40   BASOPHILS 0.30 1.00 0.00   RBCMORPHOLO  --   --  Normal     Recent Labs     06/08/22  0225 06/10/22  0246   SODIUM 137 136   POTASSIUM 4.5 4.4   CHLORIDE 103 101   CO2 25 25   GLUCOSE 118* 140*   BUN 11 17      Recent Labs     06/08/22  0225 06/10/22  0246   ALBUMIN 3.1* 2.9*   TBILIRUBIN <0.2 0.2   ALKPHOSPHAT 126* 133*   TOTPROTEIN 6.0 6.0   ALTSGPT 17 22   ASTSGOT 16 21   CREATININE 0.38* 0.57        Imaging:   US-EXTREMITY ARTERY LOWER BILAT   Final Result      DX-CHEST-PORTABLE (1 VIEW)   Final Result      1.  Cardiomegaly. No focal consolidation or pleural effusions.   2.  Slight hypoinflation with bibasilar atelectasis.         EC-ECHOCARDIOGRAM COMPLETE W/O CONT   Final Result      CT-HEAD W/O   Final Result         1.  Right craniotomy defect again noted.      2.  Resolution of pneumocephalus.      3.  Volume  loss and decreased attenuation in the right frontal lobe again noted.      4.  Evolving area of infarction in the medial posterior left cerebral hemisphere at site where infarction was seen on the prior MRI.               CT-CTA CHEST PULMONARY ARTERY W/ RECONS   Final Result      1.  Large pulmonary embolus in the right pulmonary artery, its lobar, segmental and subsegmental branches. No right heart strain.   2.  Borderline cardiomegaly.      Findings were communicated to KADY KHAN via Voalte messaging system on 6/4/2022 6:31 PM.      US-EXTREMITY VENOUS LOWER UNILAT RIGHT   Final Result      DX-CHEST-PORTABLE (1 VIEW)   Final Result         No acute cardiac or pulmonary abnormality is identified.        -CBC (06/10/22): WBC down trended from 7.3-5.8, Hgb down trended from 10.5-9.6  -TSH (6/8/2022): 0.050 (L), free T4 1.20, T3 2.14  -Gastrin (06/09/22): In process  -Blood cultures x 2 (06/09/22): No growth to date  -US extremity arterial lower bilateral (06/09/22): Diffuse low profile heterogeneous plaque and multiphasic flow throughout the common femoral, superficial femoral, and popliteal arteries with no hemodynamically stenosis      Assessment and Plan:    Patient is a 81 y.o. female [place] Kinyarwanda speaking visiting from [place] with PMHx of HTN, recent hospitalization for meningioma with mass effect s/p Craniotomy on 5/19/22 who presented 6/4/2022 with SOB, AMS, and RLE swelling and pain who was admitted for management of right pulmonary embolism and right deep vein thrombosis.      Problem Representation:     Acute Problems:    Acute Hypoxic respiratory failure 2/2  Large pulmonary embolism  DVT femoral (deep venous thrombosis) with thrombophlebitis, right (HCC)-   Assessment & Plan  *LE swelling x2 days and RLE pain x 1 day, 87% on RA  *CXR 1V (06/04/22): No acute cardiac or pulm abnormality is identified  *US RLE (06/04/22): Acute DVT right lower extremity, distal common femoral vein extending down  the leg and through the calf, gastrocnemius and soleal veins are also thrombosed in the calf  *CTA (06/04/22): Large pulm embolism in the right pulmonary artery, its lobar, segmental and segmental branches  *EKG (06/04/22): SR, HR 94, QTc 461  *Likely secondary to provocation secondary to immobility s/p surgery  *Troponin (06/05/22): 12, BNP (06/05/22): 106  *Echo (06/06/22): EF 65%, diastolic function, no significant valvular disease, normal inferior vena cava size inspiratory collapse, normal right ventricle and normal right atrium ; no right ventricular heart strain  *CXR 1 V (06/08/22): Cardiomegaly, bibasilar atelectasis  *Improving    -Telemetry monitoring  -Supplemental O2 for saturation >92%  -Full dose anticoagulation with enoxaparin 80 mg twice daily (06/04/22-06/07/22), switched to apixaban 10 mg twice daily for 1 week, followed by apixaban 5 mg twice daily for total duration of 3 months in the setting of provoked DVT (09/04/2022)  -IS ordered  -PT/OT recommended SNF placement  -Physiatry consulted, patient has been declined for inpatient rehab  -DP and tibial pulses heard on doppler, US extremity arterial lower bilateral ordered (06/09/22): Diffuse low profile heterogeneous plaque and multiphasic flow throughout the common femoral, superficial femoral, and popliteal arteries with no hemodynamically significant stenosis  -No vascular surgery indicated at this time, no compartment syndrome or Phlegmasia cerulea dolens  -Restarted on Telemetry monitoring  -Home O2 evaluation ordered, pending d/c tomorrow      Herpes Zoster  Fever  *1 episode of T of 100.8 on 06/08/22 @ 2012, no Acetaminophen given, now non-febrile  *Fever likely secondary to herpes zoster outbreak    -Discontinued ceftriaxone 1 g (6/9/2022-6/10/2022)  -Discontinued urinalysis and urine cultures  -Blood cultures x 2 (06/09/22): No growth to date  -Valacyclovir 1 g 3 times daily x 7 days (06/10/22-06/11/22)    Chest pain  *EKG and troponin  ordered for ACS rule out on 6/7/22, WNL  *Likely secondary to Herpes Zoster    -Scheduled Acetaminophen 1g TID  -Management for Herpes Zoster as above    Right Foot pain  *Likely referred pain from DVT, x-ray of foot negative for fracture, osteoarthritis, gout. Doppler with no significant stenosis    -Management as above for PE/DVT    Nonthyroidal Illness Syndrome (Euthyroid Sick Syndrome)  *TSH (6/8/2022): 0.050 (L), free T4 1.20, T3 2.14    -Recheck thyroid in 6 weeks after recovery from illness    Normocytic Anemia 2/2  Inflammatory anemia  *CBC (06/08/22): Hgb 9.5, MCV 88.7  *Reticulocyte index (06/08/22): 0.95, hypoproliferation  *Iron panel (06/08/22): Iron 28, TIBC 227, 12% saturation, ferritin 249  *Vitamin B12 (06/08/22): 229   *Consistent with inflammatory anemia    -Gastrin to rule out achlorhydria (06/09/22) : In process  -Pending H pylori stool study  -Started cyanocobalamin 2000 mcg daily  -We will continue to monitor    AMS (altered mental status)  Urinary Tract Infection  Ischemic Stroke  Assessment & Plan  *A&O x 4, back to baseline  *CBC (06/04/22): WBC 10.1 (14.6 on 5/29/2022), left shift of neutrophil absolute 7.64 (H) (12.80 on 5/9/2022),  *Urinalysis (06/04/22): Nitrate positive, LE moderate, pyuria , hematuria with RBC 2-5, many bacteria,  hyaline casts 11-20  *Urine culture (06/04/22): E. Coli, susceptible to Bactrim and C3  *Blood cultures X2 (06/04/22): No growth to date  *Likely secondary to UTI and/or stroke    -Switched Ceftriaxone 1g (06/05/22-06/06/22) to Bactrim DS BID x 2 doses  -Resolved (06/07/22)    Acute Kidney Injury  *CMP (06/06/22): BUN/creatinine uptrending from 12/0.38-31/0.52 ; 30% increase    - ml/hr (06/06/22-06/07/22)  -Resolved (06/08/22)    Constipation  *Refuses stool softeners    -Trial magnesium oxide 400 mg BID (06/05/22-06/8/22)  -Resolved (06/09/22)    Chronic Problems:    Ischemic Stroke  *MRI head with and without (5/21/2022): Moderately large  gyriform area of acute infarction involving the right posterior medial temporal lobe and adjacent right medial occipital lobe.  Moderate periventricular and juxtacortical white matter changes consistent with chronic microvascular ischemic gliosis  *CT head without (06/05/22): Evolving area of infarction in the medial posterior left cerebral hemisphere at site where infarction was seen on the prior MRI  *Lipid panel (06/05/22): Total cholesterol 201, triglycerides 189, HDL 35,     -Atorvastatin 40 mg ordered  -We will hold off on aspirin at this time per neurosurgery recommendations    HTN (hypertension)- (present on admission)  Assessment & Plan  *BP (06/04/22): (134-171)/(63-82) 141/82  *Stable    -Continue home amlodipine 5 mg daily, hold for SBP <100  -Continue home lisinopril 40 mg daily, hold for SBP <100  -Started Amlodipine 5 mg daily, hold for SBP <100 (06/08/22)      Meningioma (HCC)- (present on admission)  Assessment & Plan  *Neurosurgery, Dr. Hakan Dhaliwal (5/19/2022): Right frontal extra axial brain tumor measuring 45X75X 45 mm, bicoronal incision of right frontal craniotomy for tumor resection, intraoperative microscope, Stealth navigation.    -Discontinued dexamethasone 2 mg daily per call with neurosurgeon Dr. Hakan Dhaliwal on 05/19/22 .  -We will continue home Keppra 500 mg twice daily for seizure prophylaxis, will follow up with neurosurgery to stop them    Gastroesophageal reflux disease    -Continue home omeprazole 20 mg daily      Core Measures:    Code Status: Full Code  Diet: Regular  IVF: None  Rodriguez Catheter: None  IV Lines: pIV  Antibiotics: None  GI Prophylaxis: PEG + Bisacodyl PRN  DVT Prophylaxis: Apixaban 10 mg twice daily  Disposition: Inpatient    Please note that this dictation was created using voice recognition software. I have made every reasonable attempt to correct obvious errors, but there may be errors of grammar and possibly content that I did not discover before  finalizing the note. If the error changes the accuracy of the document, I would appreciate it being brought to my attention.

## 2022-06-10 NOTE — DISCHARGE PLANNING
1430 - Choice for O2 obtained from daughter Chantal. Obtained approval for Approved services for O2 from [facility] from Manager Sheryl Bhatt. Approved services completed for $180, faxed to [facility] with demographics and choice form. Pt pending O2 order, and delivery from [facility].

## 2022-06-10 NOTE — PROGRESS NOTES
Assumed care of pt. Bedside report received from RN. Pt was updated on plan of care. AOx4, pt complains of pain in chest area, unable to assess pain scale. Daughter has been translating. Pt denies medications at this time.Call light, phone and personal belongings in reach. Bed alarm on and working properly, bed in lowest position, and locked.

## 2022-06-10 NOTE — DISCHARGE PLANNING
Received Choice form at 1450  Agency/Facility Name: [facility]  Referral sent per Choice form @ 1500     1525-  Agency/Facility Name: [facility]  Outcome: [facility] sending oxygen order for review now.     1545-  Agency/Facility Name: [facility]  Spoke To: Alessandro  Outcome: DME order has already been delivered bedside, no further needs from [facility].

## 2022-06-10 NOTE — CARE PLAN
The patient is Watcher - Medium risk of patient condition declining or worsening    Shift Goals manage pain and monitor temperature  Clinical Goals: manage pain, Monitor temperature  Patient Goals: rest  Family Goals: n/a    Progress made toward(s) clinical / shift goals:    Problem: Pain - Standard  Goal: Alleviation of pain or a reduction in pain to the patient’s comfort goal  Outcome: Progressing   Q4 pain assessments in place. Pt educated on pain scale. RN aware of pt's goal pain. PRN medication orders followed.     Patient is not progressing towards the following goals:

## 2022-06-10 NOTE — PROGRESS NOTES
Assumed care of patient at bedside report from day RN. Updated on POC. Patient currently A & O x 4; on 2 L O2 nasal cannula; up x1 with front wheel walker; without complaints of acute pain. Assessment completed Call light within reach. Whiteboard updated. Fall precautions in place. Bed locked and in lowest position. All questions answered. No other needs indicated at this time.

## 2022-06-11 LAB
ALBUMIN SERPL BCP-MCNC: 2.7 G/DL (ref 3.2–4.9)
ALBUMIN/GLOB SERPL: 0.8 G/DL
ALP SERPL-CCNC: 141 U/L (ref 30–99)
ALT SERPL-CCNC: 19 U/L (ref 2–50)
ANION GAP SERPL CALC-SCNC: 10 MMOL/L (ref 7–16)
AST SERPL-CCNC: 17 U/L (ref 12–45)
BILIRUB SERPL-MCNC: 0.2 MG/DL (ref 0.1–1.5)
BUN SERPL-MCNC: 21 MG/DL (ref 8–22)
CALCIUM SERPL-MCNC: 8.5 MG/DL (ref 8.5–10.5)
CHLORIDE SERPL-SCNC: 99 MMOL/L (ref 96–112)
CK SERPL-CCNC: 15 U/L (ref 0–154)
CO2 SERPL-SCNC: 24 MMOL/L (ref 20–33)
CREAT SERPL-MCNC: 0.76 MG/DL (ref 0.5–1.4)
GFR SERPLBLD CREATININE-BSD FMLA CKD-EPI: 79 ML/MIN/1.73 M 2
GGT SERPL-CCNC: 67 U/L (ref 7–34)
GLOBULIN SER CALC-MCNC: 3.2 G/DL (ref 1.9–3.5)
GLUCOSE SERPL-MCNC: 123 MG/DL (ref 65–99)
POTASSIUM SERPL-SCNC: 4.6 MMOL/L (ref 3.6–5.5)
PROT SERPL-MCNC: 5.9 G/DL (ref 6–8.2)
SODIUM SERPL-SCNC: 133 MMOL/L (ref 135–145)

## 2022-06-11 PROCEDURE — 700102 HCHG RX REV CODE 250 W/ 637 OVERRIDE(OP): Performed by: INTERNAL MEDICINE

## 2022-06-11 PROCEDURE — 700102 HCHG RX REV CODE 250 W/ 637 OVERRIDE(OP): Performed by: STUDENT IN AN ORGANIZED HEALTH CARE EDUCATION/TRAINING PROGRAM

## 2022-06-11 PROCEDURE — 770020 HCHG ROOM/CARE - TELE (206)

## 2022-06-11 PROCEDURE — A9270 NON-COVERED ITEM OR SERVICE: HCPCS | Performed by: STUDENT IN AN ORGANIZED HEALTH CARE EDUCATION/TRAINING PROGRAM

## 2022-06-11 PROCEDURE — 80053 COMPREHEN METABOLIC PANEL: CPT

## 2022-06-11 PROCEDURE — 82550 ASSAY OF CK (CPK): CPT

## 2022-06-11 PROCEDURE — 82977 ASSAY OF GGT: CPT

## 2022-06-11 PROCEDURE — 36415 COLL VENOUS BLD VENIPUNCTURE: CPT

## 2022-06-11 PROCEDURE — A9270 NON-COVERED ITEM OR SERVICE: HCPCS | Performed by: INTERNAL MEDICINE

## 2022-06-11 PROCEDURE — 99231 SBSQ HOSP IP/OBS SF/LOW 25: CPT | Mod: GC | Performed by: INTERNAL MEDICINE

## 2022-06-11 RX ORDER — VALACYCLOVIR HYDROCHLORIDE 1 G/1
1000 TABLET, FILM COATED ORAL 3 TIMES DAILY
Qty: 18 TABLET | Refills: 0 | Status: SHIPPED | OUTPATIENT
Start: 2022-06-11 | End: 2022-06-12 | Stop reason: SDUPTHER

## 2022-06-11 RX ORDER — ATORVASTATIN CALCIUM 40 MG/1
40 TABLET, FILM COATED ORAL DAILY
Qty: 30 TABLET | Refills: 0 | Status: SHIPPED | OUTPATIENT
Start: 2022-06-12 | End: 2022-06-12 | Stop reason: SDUPTHER

## 2022-06-11 RX ORDER — OXYCODONE HYDROCHLORIDE 5 MG/1
5 TABLET ORAL EVERY 6 HOURS PRN
Qty: 15 TABLET | Refills: 0 | Status: SHIPPED | OUTPATIENT
Start: 2022-06-11 | End: 2022-06-11 | Stop reason: SDUPTHER

## 2022-06-11 RX ADMIN — AMLODIPINE BESYLATE 5 MG: 5 TABLET ORAL at 05:16

## 2022-06-11 RX ADMIN — ATORVASTATIN CALCIUM 40 MG: 40 TABLET, FILM COATED ORAL at 05:17

## 2022-06-11 RX ADMIN — ACETAMINOPHEN 1000 MG: 500 TABLET, FILM COATED ORAL at 13:09

## 2022-06-11 RX ADMIN — ACETAMINOPHEN 1000 MG: 500 TABLET, FILM COATED ORAL at 21:20

## 2022-06-11 RX ADMIN — APIXABAN 10 MG: 5 TABLET, FILM COATED ORAL at 05:17

## 2022-06-11 RX ADMIN — Medication 5 MG: at 21:20

## 2022-06-11 RX ADMIN — LISINOPRIL 40 MG: 20 TABLET ORAL at 05:16

## 2022-06-11 RX ADMIN — VALACYCLOVIR HYDROCHLORIDE 1000 MG: 500 TABLET, FILM COATED ORAL at 17:28

## 2022-06-11 RX ADMIN — APIXABAN 10 MG: 5 TABLET, FILM COATED ORAL at 17:28

## 2022-06-11 RX ADMIN — OMEPRAZOLE 20 MG: 20 CAPSULE, DELAYED RELEASE ORAL at 05:16

## 2022-06-11 RX ADMIN — VALACYCLOVIR HYDROCHLORIDE 1000 MG: 500 TABLET, FILM COATED ORAL at 13:09

## 2022-06-11 RX ADMIN — VALACYCLOVIR HYDROCHLORIDE 1000 MG: 500 TABLET, FILM COATED ORAL at 05:17

## 2022-06-11 RX ADMIN — LEVETIRACETAM 500 MG: 500 TABLET, FILM COATED ORAL at 17:28

## 2022-06-11 RX ADMIN — ACETAMINOPHEN 1000 MG: 500 TABLET, FILM COATED ORAL at 05:16

## 2022-06-11 RX ADMIN — CYANOCOBALAMIN TAB 500 MCG 2000 MCG: 500 TAB at 05:16

## 2022-06-11 RX ADMIN — LEVETIRACETAM 500 MG: 500 TABLET, FILM COATED ORAL at 05:16

## 2022-06-11 ASSESSMENT — FIBROSIS 4 INDEX
FIB4 SCORE: 0.97
FIB4 SCORE: 1.11

## 2022-06-11 ASSESSMENT — PAIN DESCRIPTION - PAIN TYPE: TYPE: ACUTE PAIN

## 2022-06-11 NOTE — CARE PLAN
The patient is Watcher - Medium risk of patient condition declining or worsening    Shift Goals  Clinical Goals: manage pain  Patient Goals: rest  Family Goals: home w/ daughter    Progress made toward(s) clinical / shift goals:    Problem: Knowledge Deficit - Standard  Goal: Patient and family/care givers will demonstrate understanding of plan of care, disease process/condition, diagnostic tests and medications  Outcome: Progressing     Problem: Pain - Standard  Goal: Alleviation of pain or a reduction in pain to the patient’s comfort goal  Outcome: Progressing     Problem: Fall Risk  Goal: Patient will remain free from falls  Outcome: Progressing       Patient is not progressing towards the following goals:

## 2022-06-11 NOTE — DISCHARGE INSTRUCTIONS
Instructions:  -Apixaban 10 mg twice daily for 1 week (until 06/14/22), followed by apixaban 5 mg twice daily for total duration of 3 months in the setting of provoked DVT (09/04/2022)  -Valacyclovir 1 g 3 times daily x 7 days (06/10/22-06/11/22)        Discharge Instructions    Discharged to home by car with relative. Discharged via wheelchair, hospital escort: Yes.  Special equipment needed: Oxygen    Be sure to schedule a follow-up appointment with your primary care doctor or any specialists as instructed.     Discharge Plan:        I understand that a diet low in cholesterol, fat, and sodium is recommended for good health. Unless I have been given specific instructions below for another diet, I accept this instruction as my diet prescription.   Other diet: heart healthy      Special Instructions:   Deep Vein Thrombosis Discharge Instructions    A deep vein thrombosis (DVT) is a blood clot (thrombus) that develops in a deep vein. A DVT is a clot in the deep, larger veins of the leg, arm, or pelvis. These are more dangerous than clots that might form in veins on the surface of the body. Deep vein thrombosis can lead to complications if the clot breaks off and travels in the bloodstream to the lungs.     CAUSES  Blood clots form in a vein for different reasons. Usually several things cause blood clots. They include:   The flow of blood slows down.   The inside of the vein is damaged in some way.   The person has a condition that makes blood clot more easily. These conditions may include:  Older age (especially over 75 years old).  Having a history of blood clots.  Having major or lengthy surgery. Hip surgery is particularly high-risk.   Breaking a hip or leg.  Sitting or lying still for a long time.  Cancer or cancer treatment.  Having a long, thin tube (catheter) placed inside a vein during a medical procedure.   Being overweight (obese).  Pregnancy and childbirth.  Medicines with estrogen.  Smoking.  Other  circulation or heart problems.     SYMPTOMS  When a clot forms, it can either partially or totally block the blood flow in that vein. Symptoms of a DVT can include:  Swelling of the leg or arm, especially if one side is much worse.  Warmth and redness of the leg or arm, especially if one side is much worse.   Pain in an arm or leg. If the clot is in the leg, symptoms may be more noticeable or worse when standing or walking.  If the blood clot travels to the lung, it may cause:  Shortness of breath.  Chest pain. The pain may be worsened by deep breaths.   Coughing up thick mucus (phlegm), possibly flecked with blood.   Anyone with these symptoms should get emergency medical treatment right away. Call your local emergency  Services (911 in [place] if you have these symptoms.     DIAGNOSIS  If a DVT is suspected, your caregiver will take a full medical history. He or she will also perform a physical exam. Tests that also may be required include:   Studies of the clotting properties of the blood.   An ultrasound scan.   X-rays to show the flow of blood when special dye is injected into the veins (venography).   Studies of your lungs if you have any chest symptoms.     PREVENTION  Exercise the legs regularly. Take a brisk 30 minute walk every day.   Maintain a weight that is appropriate for your height.  Avoid sitting or lying in bed for long periods of time without moving your legs.   Women, particularly those over the age of 35, should consider the risks and benefits of taking estrogen medicine, including birth control pills.   Do not smoke, especially if you take estrogen medicines.   Long-distance travel can increase your risk. You should exercise your legs by walking or pumping the muscles every hour.   In hospital prevention: Prevention may include medical and non medical measures.     TREATMENT  The most common treatment for DVT is blood thinning (anticoagulant) medicine, which reduces the blood's tendency to clot.  Anticoagulants can stop new blood clots from forming and old ones from growing. They cannot dissolve existing clots. Your body does this by itself over time. Anticoagulants can be given by mouth, by intravenous (IV) access, or by injection. Your caregiver will determine the best program for you.   Less commonly, clot-dissolving drugs (thrombolytics) are used to dissolve a DVT. They carry a high risk of bleeding, so they are used mainly in severe cases.   Very rarely, a blood clot in the leg needs to be removed surgically.   If you are unable to take anticoagulants, your caregiver may arrange for you to have a filter placed in a main vein in your belly (abdomen). This filter prevents clots from traveling to your lungs.     HOME CARE INSTRUCTIONS  Take all medicines prescribed by your caregiver. Follow the directions carefully.   You will most likely continue taking anticoagulants after you leave the hospital. Your caregiver will advise you on the length of treatment (usually 3 to 5 months, sometimes for life).   Taking too much or too little of an anticoagulant is dangerous. While taking this type of medicine, you will need to have regular blood tests to be sure the dose is correct. The dose can change for many reasons. It is critically important that you take this medicine exactly as prescribed, and that you have blood tests exactly as directed.   Many foods can interfere with anticoagulants. These include foods high in vitamin K, such as spinach, kale, broccoli, cabbage, coleen and turnip greens, [place] sprouts, peas, cauliflower, seaweed, parsley, beef and pork liver, green tea, and soybean oil. Your caregiver should discuss limits on these foods with you or you should arrange a visit with a dietician to answer your questions.   Many medicines can interfere with anticoagulants. You must tell your caregiver about any and all medicines you take. This includes all vitamins and supplements. Be especially cautious  with aspirin and anti-inflammatory medicines. Ask your caregiver before taking these.   Anticoagulants can have side effects, mostly excessive bruising or bleeding. You will need to hold pressure over cuts for longer than usual. Avoid alcoholic drinks or consume only very small amounts while taking this medicine.    If you are taking an anticoagulant:  Wear a medical alert bracelet.  Notify your dentist or other caregivers before procedures.  Avoid contact sports.    Ask your caregiver how soon you can go back to normal activities. Not being active can lead to new clots. Ask for a list of what you should and should not do.   Exercise your lower leg muscles. This is important while traveling.   You may need to wear compression stockings. These are tight elastic stockings that apply pressure to the lower legs. This can help keep the blood in the legs from clotting.   If you are a smoker, you should quit.   Learn as much as you can about DVT.     SEEK MEDICAL CARE IF:  You have unusual bruising or any bleeding problems.  The swelling or pain in your affected arm or leg is not gradually improving.   You anticipate surgery or long-distance travel. You should get specific advice on DVT prevention.   You discover other family members with blood clots. This may require further testing for inherited diseases or conditions.     SEEK IMMEDIATE MEDICAL CARE IF:  You develop chest pain.  You develop severe shortness of breath.  You begin to cough up bloody mucus or phlegm (sputum).  You feel dizzy or faint.   You develop swelling or pain in the leg.  You have breathing problems after traveling.    MAKE SURE YOU:  Understand these instructions.  Will watch your condition.  Will get help right away if you are not doing well or get worse.     Is patient discharged on Warfarin / Coumadin?   No       Depression / Suicide Risk    As you are discharged from this [facility] facility, it is important to learn how to keep safe from  harming yourself.    Recognize the warning signs:  Abrupt changes in personality, positive or negative- including increase in energy   Giving away possessions  Change in eating patterns- significant weight changes-  positive or negative  Change in sleeping patterns- unable to sleep or sleeping all the time   Unwillingness or inability to communicate  Depression  Unusual sadness, discouragement and loneliness  Talk of wanting to die  Neglect of personal appearance   Rebelliousness- reckless behavior  Withdrawal from people/activities they love  Confusion- inability to concentrate     If you or a loved one observes any of these behaviors or has concerns about self-harm, here's what you can do:  Talk about it- your feelings and reasons for harming yourself  Remove any means that you might use to hurt yourself (examples: pills, rope, extension cords, firearm)  Get professional help from the community (Mental Health, Substance Abuse, psychological counseling)  Do not be alone:Call your Safe Contact- someone whom you trust who will be there for you.  Call your local CRISIS HOTLINE 690-6073 or 588-641-2119  Call your local Children's Mobile Crisis Response Team [place] (298) 672-4880 or www.Airu  Call the toll free National Suicide Prevention Hotlines   National Suicide Prevention Lifeline 630-122-ASBF (7120)  National Hope Line Network 800-SUICIDE (011-2169)

## 2022-06-11 NOTE — DISCHARGE SUMMARY
Discharge Summary    CHIEF COMPLAINT ON ADMISSION  Chief Complaint   Patient presents with   • Shortness of Breath     Pt reports SOB since this am. Pt had an operation for a meningioma on May 12th.        Reason for Admission  EMS      Admission Date  6/4/2022    CODE STATUS  Full Code      HPI & HOSPITAL COURSE & FOLLOW UP  Patient is a 81 y.o. female [place]ian speaking visiting from [place] with PMHx of HTN, recent hospitalization for meningioma with mass effect s/p Craniotomy on 5/19/22 who presented 6/4/2022 with SOB, AMS, and RLE swelling and pain who was admitted for management of right pulmonary embolism and right deep vein thrombosis.        Acute Problems:     Acute Hypoxic respiratory failure 2/2  Large pulmonary embolism  DVT femoral (deep venous thrombosis) with thrombophlebitis, right (HCC)-   Assessment & Plan  *LE swelling x2 days and RLE pain x 1 day, 87% on RA  *CXR 1V (06/04/22): No acute cardiac or pulm abnormality is identified  *US RLE (06/04/22): Acute DVT right lower extremity, distal common femoral vein extending down the leg and through the calf, gastrocnemius and soleal veins are also thrombosed in the calf  *CTA (06/04/22): Large pulm embolism in the right pulmonary artery, its lobar, segmental and segmental branches  *EKG (06/04/22): SR, HR 94, QTc 461  *Likely secondary to provocation secondary to immobility s/p surgery  *Troponin (06/05/22): 12, BNP (06/05/22): 106  *Echo (06/06/22): EF 65%, diastolic function, no significant valvular disease, normal inferior vena cava size inspiratory collapse, normal right ventricle and normal right atrium ; no right ventricular heart strain  *CXR 1 V (06/08/22): Cardiomegaly, bibasilar atelectasis       -Full dose anticoagulation with enoxaparin 80 mg twice daily (06/04/22-06/07/22), switched to apixaban 10 mg twice daily for 1 week, followed by apixaban 5 mg twice daily for total duration of 3 months in the setting of provoked DVT  (09/04/2022)  -IS ordered  -PT/OT recommended SNF placement. Lack of insurance coverage, will trial at home with patient's daughter and son in law  -Physiatry consulted, patient has been declined for inpatient rehab  -DP and tibial pulses heard on doppler, US extremity arterial lower bilateral ordered (06/09/22): Diffuse low profile heterogeneous plaque and multiphasic flow throughout the common femoral, superficial femoral, and popliteal arteries with no hemodynamically significant stenosis  -S/S significantly improved including SOB, right leg pain, and chest pain  -Ambulated on tele floor with improved symptoms   -Team d/w Pt' daughter  >> answered all questions   -Discussed the risks/benefits of meds with Pt in detail, answered all questions.   -D/W RN and CM       Herpes Zoster  Fever  *1 episode of T of 100.8 on 06/08/22 @ 2012, no Acetaminophen given, now non-febrile  *Fever likely secondary to herpes zoster outbreak     -Discontinued ceftriaxone 1 g (6/9/2022-6/10/2022)  -Discontinued urinalysis and urine cultures  -Blood cultures x 2 (06/09/22): No growth to date  -Valacyclovir 1 g 3 times daily x 7 days (06/10/22-06/11/22)  -No evidence of pain reduction with gabapentin while on valacyclovir or prevention of postherpetic neuralgia     Chest pain  *EKG and troponin ordered for ACS rule out on 6/7/22, WNL  *Likely secondary to Herpes Zoster     -Acetaminophen 1g TID as needed  -Management for Herpes Zoster as above     Right Foot pain  *Likely referred pain from DVT, x-ray of foot negative for fracture, osteoarthritis, gout. Doppler with no significant stenosis     -Management as above for PE/DVT  -Cyclobenzaprine 5 mg nightly ordered for cramps, warned about sedating effects and better to take before bedtime     Nonthyroidal Illness Syndrome (Euthyroid Sick Syndrome)  *TSH (6/8/2022): 0.050 (L), free T4 1.20, T3 2.14     -Recheck thyroid in 6 weeks after recovery from illness     Normocytic Anemia  2/2  Inflammatory anemia  *CBC (06/08/22): Hgb 9.5, MCV 88.7  *Reticulocyte index (06/08/22): 0.95, hypoproliferation  *Iron panel (06/08/22): Iron 28, TIBC 227, 12% saturation, ferritin 249  *Vitamin B12 (06/08/22): 229   *Consistent with inflammatory anemia     -Gastrin to rule out achlorhydria (06/09/22) : In process , follow up outpatient  -Consider H pylori stool study outpatient  -Started cyanocobalamin 2000 mcg daily, follow up in 6 weeks (7/20/22)     AMS (altered mental status)  Urinary Tract Infection  Ischemic Stroke  Assessment & Plan  *A&O x 4, back to baseline  *CBC (06/04/22): WBC 10.1 (14.6 on 5/29/2022), left shift of neutrophil absolute 7.64 (H) (12.80 on 5/9/2022),  *Urinalysis (06/04/22): Nitrate positive, LE moderate, pyuria , hematuria with RBC 2-5, many bacteria,  hyaline casts 11-20  *Urine culture (06/04/22): E. Coli, susceptible to Bactrim and C3  *Blood cultures X2 (06/04/22): No growth to date  *Likely secondary to UTI and/or stroke     -Switched Ceftriaxone 1g (06/05/22-06/06/22) to Bactrim DS BID x 2 doses (06/07/22)  -Resolved (06/07/22)     Acute Kidney Injury  *CMP (06/06/22): BUN/creatinine uptrending from 12/0.38-31/0.52 ; 30% increase     - ml/hr (06/06/22-06/07/22)  -Resolved (06/08/22)     Constipation  *Refuses stool softeners     -Trial magnesium oxide 400 mg BID (06/05/22-06/8/22)  -Resolved (06/09/22)     Chronic Problems:     Ischemic Stroke  *MRI head with and without (5/21/2022): Moderately large gyriform area of acute infarction involving the right posterior medial temporal lobe and adjacent right medial occipital lobe.  Moderate periventricular and juxtacortical white matter changes consistent with chronic microvascular ischemic gliosis  *CT head without (06/05/22): Evolving area of infarction in the medial posterior left cerebral hemisphere at site where infarction was seen on the prior MRI  *Lipid panel (06/05/22): Total cholesterol 201, triglycerides 189,  HDL 35,      -Started Atorvastatin 40 mg, liver function stable, monitor for myalgia symptoms outpatient  -We will hold off on aspirin at this time per neurosurgery recommendations     HTN (hypertension)- (present on admission)  Assessment & Plan  *BP (06/04/22): (134-171)/(63-82) 141/82   BP (06/11/22): ()/(47-62) 107/58  *Stable     -Held home amlodipine 5 mg daily, hold for SBP <100  -Continue home lisinopril 40 mg daily, hold for SBP <100       Meningioma (HCC)- (present on admission)  Assessment & Plan  *Neurosurgery, Dr. Hakan Dhaliwal (5/19/2022): Right frontal extra axial brain tumor measuring 45X75X 45 mm, bicoronal incision of right frontal craniotomy for tumor resection, intraoperative microscope, Stealth navigation.     -Discontinued dexamethasone 2 mg daily per call with neurosurgeon Dr. Hakan Dhaliwal on 05/19/22 .  -We will continue home Keppra 500 mg twice daily for seizure prophylaxis, will follow up with neurosurgery for discontinuation     Gastroesophageal reflux disease     -Continue home omeprazole 20 mg daily    Physical Exam  Vitals and nursing note reviewed.   Constitutional:       Appearance: Normal appearance. She is obese. She is ill-appearing.   HENT:      Head: Normocephalic and atraumatic.      Right Ear: External ear normal.      Left Ear: External ear normal.      Nose: Nose normal.      Mouth/Throat:      Mouth: Mucous membranes are moist.      Pharynx: Oropharynx is clear.   Eyes:      Extraocular Movements: Extraocular movements intact.      Conjunctiva/sclera: Conjunctivae normal.      Pupils: Pupils are equal, round, and reactive to light.   Cardiovascular:      Rate and Rhythm: Normal rate and regular rhythm.      Pulses: Normal pulses.      Heart sounds: Normal heart sounds. No murmur heard.    No friction rub. No gallop.   Pulmonary:      Effort: Pulmonary effort is normal. No respiratory distress.      Breath sounds: Normal breath sounds. No stridor. No wheezing,  rhonchi or rales.   Abdominal:      General: Abdomen is flat. Bowel sounds are normal. There is no distension.      Palpations: Abdomen is soft.      Tenderness: There is no abdominal tenderness. There is no guarding or rebound.   Musculoskeletal:         General: Swelling (R>L) present. Normal range of motion.      Cervical back: Normal range of motion and neck supple.      Right lower leg: No edema.   Skin:     General: Skin is warm and dry.      Capillary Refill: Capillary refill takes less than 2 seconds.      Coloration: Skin is not jaundiced.      Findings: Rash (group vesicles and bullae on an erythematous base of the right breast to back in T5-T7 dermatomal distribution) present.      Comments: Difficult to palpate DP and PT on RLE, 2+ pulses on DP and PT on LLE   Neurological:      General: No focal deficit present.      Mental Status: She is alert and oriented to person, place, and time. Mental status is at baseline.      Cranial Nerves: No cranial nerve deficit.   Psychiatric:         Mood and Affect: Mood normal.         Behavior: Behavior normal.         Thought Content: Thought content normal.         Judgment: Judgment normal.       Therefore, she is discharged in good and stable condition to home with close outpatient follow-up.    The patient met 2-midnight criteria for an inpatient stay at the time of discharge.    Discharge Date  06/11/22        DISCHARGE DIAGNOSES  Principal Problem:    DVT femoral (deep venous thrombosis) with thrombophlebitis, right (HCC) POA: Yes  Active Problems:    Meningioma (HCC) POA: Yes    HTN (hypertension) POA: Yes    SOB (shortness of breath) POA: Unknown      Overview:           AMS (altered mental status) POA: Unknown    Pulmonary embolism (HCC) POA: Unknown    Abnormal TSH POA: Unknown  Resolved Problems:    * No resolved hospital problems. *      FOLLOW UP  No future appointments.  [facility] ([facility] - Primary Care  [address] [place] [address]  [place] [id number]  [phone]  Schedule an appointment as soon as possible for a visit in 1 week(s)        MEDICATIONS ON DISCHARGE     Medication List      START taking these medications      Instructions   * apixaban 5mg Tabs  Commonly known as: ELIQUIS   Take 2 Tablets by mouth 2 times a day for 3 days. Indications: DVT/PE  Dose: 10 mg     * apixaban 5mg Tabs  Start taking on: June 14, 2022  Commonly known as: ELIQUIS   Take 1 Tablet by mouth 2 times a day for 90 days. Indications: DVT/PE  Dose: 5 mg     atorvastatin 40 MG Tabs  Commonly known as: LIPITOR   Take 1 Tablet by mouth every day for 30 days.  Dose: 40 mg     Cyanocobalamin 2000 MCG Tabs   Take 1 Tablet by mouth every day.  Dose: 2,000 mcg     valacyclovir 1 GM Tabs  Commonly known as: VALTREX   Take 1 Tablet by mouth 3 times a day for 6 days.  Dose: 1,000 mg         * This list has 2 medication(s) that are the same as other medications prescribed for you. Read the directions carefully, and ask your doctor or other care provider to review them with you.            CHANGE how you take these medications      Instructions   oxyCODONE immediate-release 5 MG Tabs  What changed: when to take this  Commonly known as: ROXICODONE   Take 1 Tablet by mouth every 6 hours as needed for Severe Pain (If tylenol not effective) for up to 5 days.  Dose: 5 mg        CONTINUE taking these medications      Instructions   levETIRAcetam 500 MG Tabs  Commonly known as: KEPPRA   Take 1 Tablet by mouth 2 times a day.  Dose: 500 mg     lisinopril 40 MG tablet  Commonly known as: PRINIVIL   Take 1 Tablet by mouth every day.  Dose: 40 mg     omeprazole 20 MG delayed-release capsule  Commonly known as: PRILOSEC   Take 1 Capsule by mouth every day.  Dose: 20 mg     Senna Plus 8.6-50 MG Tabs  Generic drug: senna-docusate   Take 1 Tablet by mouth every evening.  Dose: 1 Tablet     TYLENOL PO   Take 2 Tablets by mouth every 6 hours as needed (Mild or Moderate Pain).  Dose: 2 Tablet         STOP taking these medications    amLODIPine 5 MG Tabs  Commonly known as: NORVASC     dexamethasone 2 MG tablet  Commonly known as: DECADRON            Allergies  Allergies   Allergen Reactions   • Gelatin Unspecified     Christian BELIEFS   • Pork Derived Products Unspecified     Christian BELIEFS       DIET  Orders Placed This Encounter   Procedures   • Diet Order Diet: Regular (No Pork); Miscellaneous modifications: (optional): Vegetarian     Standing Status:   Standing     Number of Occurrences:   1     Order Specific Question:   Diet:     Answer:   Regular [1]     Comments:   No Pork     Order Specific Question:   Miscellaneous modifications: (optional)     Answer:   Vegetarian [13]       ACTIVITY  As tolerated.  Weight bearing as tolerated    CONSULTATIONS  Neurosurgery    PROCEDURES  None    LABORATORY  Lab Results   Component Value Date    SODIUM 133 (L) 06/11/2022    POTASSIUM 4.6 06/11/2022    CHLORIDE 99 06/11/2022    CO2 24 06/11/2022    GLUCOSE 123 (H) 06/11/2022    BUN 21 06/11/2022    CREATININE 0.76 06/11/2022        Lab Results   Component Value Date    WBC 5.8 06/10/2022    HEMOGLOBIN 9.6 (L) 06/10/2022    HEMATOCRIT 30.9 (L) 06/10/2022    PLATELETCT 318 06/10/2022        Total time of the discharge process exceeds 35 minutes.

## 2022-06-12 ENCOUNTER — PHARMACY VISIT (OUTPATIENT)
Dept: PHARMACY | Facility: MEDICAL CENTER | Age: 82
End: 2022-06-12
Payer: COMMERCIAL

## 2022-06-12 VITALS
HEART RATE: 85 BPM | RESPIRATION RATE: 17 BRPM | OXYGEN SATURATION: 95 % | WEIGHT: 189.38 LBS | DIASTOLIC BLOOD PRESSURE: 72 MMHG | BODY MASS INDEX: 32.33 KG/M2 | SYSTOLIC BLOOD PRESSURE: 111 MMHG | TEMPERATURE: 96.8 F | HEIGHT: 64 IN

## 2022-06-12 LAB — GASTRIN SERPL-MCNC: 91 PG/ML (ref 0–100)

## 2022-06-12 PROCEDURE — A9270 NON-COVERED ITEM OR SERVICE: HCPCS | Performed by: INTERNAL MEDICINE

## 2022-06-12 PROCEDURE — 700102 HCHG RX REV CODE 250 W/ 637 OVERRIDE(OP): Performed by: INTERNAL MEDICINE

## 2022-06-12 PROCEDURE — A9270 NON-COVERED ITEM OR SERVICE: HCPCS | Performed by: STUDENT IN AN ORGANIZED HEALTH CARE EDUCATION/TRAINING PROGRAM

## 2022-06-12 PROCEDURE — 700102 HCHG RX REV CODE 250 W/ 637 OVERRIDE(OP): Performed by: STUDENT IN AN ORGANIZED HEALTH CARE EDUCATION/TRAINING PROGRAM

## 2022-06-12 PROCEDURE — RXMED WILLOW AMBULATORY MEDICATION CHARGE: Performed by: STUDENT IN AN ORGANIZED HEALTH CARE EDUCATION/TRAINING PROGRAM

## 2022-06-12 PROCEDURE — 99239 HOSP IP/OBS DSCHRG MGMT >30: CPT | Mod: GC | Performed by: INTERNAL MEDICINE

## 2022-06-12 RX ORDER — OXYCODONE HYDROCHLORIDE 5 MG/1
5 TABLET ORAL EVERY 6 HOURS PRN
Qty: 10 TABLET | Refills: 0 | Status: SHIPPED | OUTPATIENT
Start: 2022-06-12 | End: 2022-06-17

## 2022-06-12 RX ORDER — ATORVASTATIN CALCIUM 40 MG/1
40 TABLET, FILM COATED ORAL DAILY
Qty: 30 TABLET | Refills: 0 | Status: SHIPPED | OUTPATIENT
Start: 2022-06-12 | End: 2022-07-12

## 2022-06-12 RX ORDER — CYCLOBENZAPRINE HCL 5 MG
5-10 TABLET ORAL
Qty: 7 TABLET | Refills: 0 | Status: SHIPPED | OUTPATIENT
Start: 2022-06-12 | End: 2022-06-19

## 2022-06-12 RX ORDER — VALACYCLOVIR HYDROCHLORIDE 1 G/1
1000 TABLET, FILM COATED ORAL 3 TIMES DAILY
Qty: 18 TABLET | Refills: 0 | Status: SHIPPED | OUTPATIENT
Start: 2022-06-12 | End: 2022-06-18

## 2022-06-12 RX ADMIN — ACETAMINOPHEN 1000 MG: 500 TABLET, FILM COATED ORAL at 14:17

## 2022-06-12 RX ADMIN — APIXABAN 10 MG: 5 TABLET, FILM COATED ORAL at 05:13

## 2022-06-12 RX ADMIN — LEVETIRACETAM 500 MG: 500 TABLET, FILM COATED ORAL at 05:13

## 2022-06-12 RX ADMIN — CYANOCOBALAMIN TAB 500 MCG 2000 MCG: 500 TAB at 05:13

## 2022-06-12 RX ADMIN — ATORVASTATIN CALCIUM 40 MG: 40 TABLET, FILM COATED ORAL at 05:13

## 2022-06-12 RX ADMIN — OMEPRAZOLE 20 MG: 20 CAPSULE, DELAYED RELEASE ORAL at 05:13

## 2022-06-12 RX ADMIN — LISINOPRIL 40 MG: 20 TABLET ORAL at 05:14

## 2022-06-12 RX ADMIN — ACETAMINOPHEN 1000 MG: 500 TABLET, FILM COATED ORAL at 05:13

## 2022-06-12 RX ADMIN — VALACYCLOVIR HYDROCHLORIDE 1000 MG: 500 TABLET, FILM COATED ORAL at 05:13

## 2022-06-12 ASSESSMENT — ENCOUNTER SYMPTOMS
HEMOPTYSIS: 0
MYALGIAS: 1
BRUISES/BLEEDS EASILY: 0
POLYDIPSIA: 0
BLOOD IN STOOL: 0
PALPITATIONS: 0
COUGH: 0
FOCAL WEAKNESS: 0
ABDOMINAL PAIN: 0
FEVER: 0
CONSTIPATION: 0
CHILLS: 0
VOMITING: 0
DOUBLE VISION: 0
DIZZINESS: 0
SPEECH CHANGE: 0
HEADACHES: 0
SHORTNESS OF BREATH: 1
HEARTBURN: 0
DIARRHEA: 0
BLURRED VISION: 0
NAUSEA: 0

## 2022-06-12 ASSESSMENT — LIFESTYLE VARIABLES: SUBSTANCE_ABUSE: 0

## 2022-06-12 NOTE — DISCHARGE PLANNING
8:56am- Team met to review medication for discharge. RN will communicate with MD to send meds to in-house pharmacy. SW spoke to Shayla in the pharmacy. A image of the Eliquis card was sent via [employer] for co-pay or discount.

## 2022-06-12 NOTE — PROGRESS NOTES
GASTROENTEROLOGY CONSULTATION      Date of Admission:  7/28/2019          ASSESSMENT AND RECOMMENDATIONS:   ASSESSMENT:  Elle Blanton is a 61 year old male with a history of untreated chronic Hep B, HTN, CKD, and thrombocytopenia who is transferred on 07/28 from [facility] and admitted for type B aortic dissection. We are consulted for further management of untreated Hep B and cirrhosis.     Chronic Hepatitis B  Compensated cirrhosis   In 2017 patient noted to have serology positive for Hepatitis B, but he did not undergo further evaluation or treatment. Now with pancytopenia and cirrhosis with splenomegaly noted on CT imaging prompting hepatology consultation. He is currently asymptomatic - no history of encephalopathy, ascites, varices, or jaundice. Evidence of synthetic dysfunction on labs. Thrombocytopenia is most likely related to splenic sequestration and decreased synthetic function (thrombopoetin). MELD-Na 23. T-bili 0.4, ALT 93, AST 36, INR 1.33 Albumin 2.5. Transient elevated transaminases 49/50 (07/03)-->170/107 (07/28)->93/36 (07/31) ALT/AST respectively correlates with current hospitalization and most likely related to dissection. Slightly elevated baseline LFTs to 50-60. Further serologies pending. Recommend treatment for hepatitis B infection in the setting of cirrhosis and establishment of outpatient care with hepatology.     RECOMMENDATIONS:  - Follow-up pending serologies  - Start entecavir   - Establish care in outpatient hepatology clinic with Dr. Hutchins  - Outpatient EGD for variceal surveillance  - U/S Abdomen q6m for HCC surveillance    Gastroenterology team will continue to follow with you. Thank you for involving us in this patient's care. Please do not hesitate to contact the GI service with any questions or concerns.     Patient care plan has been discussed with Dr. Hutchins, GI staff physician.    Billy Garza, MS4  Hepatology Service    I was present with the medical student who  Discharge order in place.  Discharge medications delivered to pt room. Discharge instructions reviewed with pt's daughter and friend.  Demonstrated how to use portable O2 tank with pt's daughter and friend demonstrating back. Pt's daughter and friend verbalized understanding of all medications.  All questions answered to pt's daughter's satisfaction.  Pt left floor with all belongings via wheelchair.   participated in the service and in the documentation of the note. I have verified the history and personally performed the physical exam and medical decision making. I agree with the assessment and plan of care as documented in the note.     Ruthie Rodriguez MD  GI Fellow, PGY-6  [phone]  -------------------------------------------------------------------------------------------------------------------          Chief Complaint:   We were asked by Dr. Cosby to evaluate this patient with chronic untreated Hep B.    History is obtained from the patient and the medical record.           History of Present Illness:   Elle Blanton is a 61 year old male with a history of HTN, CKD, thrombocytopenia and untreated chronic Hep B who is admitted for Type B aortic dissection. Hepatology consulted for HBV infection management.      Patient was initially admitted at [facility] with complaint of acute abdominal pain radiating to the back. He was found to have Type B aortic dissection and transferred to [facility].     Patient moved to [place] in [place]. Per patient, he has never been tested for Hep B since 2017 when serology came back positive during an admission for melena. At that time he did not follow up with GI.     He denies fevers, chills, chest pain, shortness of breath, N/V, fatigue, jaundice, episodes of confusion, low libido, scleral icterus, dark urine, abdominal pain, abdominal distension, and a family history of HCC. However, he endorses easy bruising and constipation (now improved).     10 ROS otherwise negative.     Mr. Blanton states that he does not want a liver biopsy or any liver related surgical procedures, but is willing to participate in medical treatment.            Past Medical History:   Reviewed and edited as appropriate  No past medical history on file.   Hypertension  CKD         Past Surgical History:   Reviewed and edited as appropriate   Past Surgical History:   Procedure Laterality Date      INCISION AND DRAINAGE FINGER, COMBINED Left 10/20/2016    Procedure: COMBINED INCISION AND DRAINAGE FINGER;  Surgeon: Mireya Pizano MD;  Location: [facility] OR            Previous Endoscopy:   No results found for this or any previous visit.         Social History:   Reviewed and edited as appropriate  Social History     Socioeconomic History     Marital status:      Spouse name: Not on file     Number of children: Not on file     Years of education: Not on file     Highest education level: Not on file   Occupational History     Not on file   Social Needs     Financial resource strain: Not on file     Food insecurity:     Worry: Not on file     Inability: Not on file     Transportation needs:     Medical: Not on file     Non-medical: Not on file   Tobacco Use     Smoking status: Never Smoker   Substance and Sexual Activity     Alcohol use: No     Drug use: No     Sexual activity: Not on file   Lifestyle     Physical activity:     Days per week: Not on file     Minutes per session: Not on file     Stress: Not on file   Relationships     Social connections:     Talks on phone: Not on file     Gets together: Not on file     Attends Catholic service: Not on file     Active member of club or organization: Not on file     Attends meetings of clubs or organizations: Not on file     Relationship status: Not on file     Intimate partner violence:     Fear of current or ex partner: Not on file     Emotionally abused: Not on file     Physically abused: Not on file     Forced sexual activity: Not on file   Other Topics Concern     Not on file   Social History Narrative     Not on file   Patient lives in [place] with wife and 2 children.         Family History:   Reviewed and edited as appropriate  No known history of gastrointestinal/liver disease or  gastrointestinal malignancies       Allergies:   Reviewed and edited as appropriate     Allergies   Allergen Reactions     Nka [No Known Allergies]              "Medications:     Current Facility-Administered Medications   Medication     acetaminophen (TYLENOL) tablet 500 mg     amLODIPine (NORVASC) tablet 10 mg     carvedilol (COREG) tablet 50 mg     cloNIDine (CATAPRES) tablet 0.2 mg     esmolol 2000 mg in sodium chloride 0.9% 100 mL infusion     labetalol (NORMODYNE/TRANDATE) syringe 10-20 mg     naloxone (NARCAN) injection 0.1-0.4 mg     niCARdipine 40 mg in 200 mL 0.9% NaCl (CARDENE) infusion     ondansetron (ZOFRAN-ODT) ODT tab 4 mg    Or     ondansetron (ZOFRAN) injection 4 mg     pantoprazole (PROTONIX) EC tablet 40 mg     polyethylene glycol (MIRALAX/GLYCOLAX) Packet 17 g     prochlorperazine (COMPAZINE) injection 10 mg    Or     prochlorperazine (COMPAZINE) tablet 10 mg    Or     prochlorperazine (COMPAZINE) Suppository 25 mg     senna-docusate (SENOKOT-S/PERICOLACE) 8.6-50 MG per tablet 2 tablet     sodium bicarbonate tablet 1,300 mg     sodium chloride 0.9 % infusion             Review of Systems:   A complete review of systems was performed and is negative except as noted in the HPI           Physical Exam:   /55   Pulse 70   Temp 98  F (36.7  C) (Oral)   Resp 18   Ht 1.651 m (5' 5\")   Wt 76.4 kg (168 lb 6.9 oz)   SpO2 97%   BMI 28.03 kg/m    Wt:   Wt Readings from Last 2 Encounters:   07/31/19 76.4 kg (168 lb 6.9 oz)   07/03/17 75.8 kg (167 lb 3.2 oz)      Constitutional: cooperative, pleasant, not dyspneic/diaphoretic, no acute distress  Eyes: Sclera anicteric/injected  Ears/nose/mouth/throat: Normal oropharynx without ulcers or exudate, mucus membranes moist, hearing intact  Neck: supple, thyroid normal size  CV: No edema, nor murmur  Respiratory: Unlabored breathing, CTAB  Lymph: No axillary, submandibular, or supraclavicular lymphadenopathy  Abd: soft, non-distended, +bs, hepatosplenomegaly, nontender, no peritoneal signs  Skin: warm, perfused, no jaundice  Neuro: AAO x 3, No asterixis  Psych: Normal affect  MSK: No gross deformities         " Data:   Labs and imaging below were independently reviewed and interpreted    BMP  Recent Labs   Lab 07/31/19  0339 07/30/19  0411 07/29/19  1017 07/29/19  0414    139 139 140   POTASSIUM 4.7 4.9 5.0 5.4*   CHLORIDE 115* 112* 113* 115*   TANO 7.7* 7.7* 7.3* 7.3*   CO2 16* 18* 17* 18*   BUN 59* 59* 54* 53*   CR 4.25* 4.10* 3.92* 3.83*   GLC 95 102* 134* 102*     CBC  Recent Labs   Lab 07/31/19  0339 07/30/19  0411 07/30/19  0009 07/29/19  1620   WBC 2.9* 3.1* 3.3* 5.2   RBC 2.68* 2.59* 2.48* 2.72*   HGB 8.2* 8.1* 7.8* 8.3*   HCT 26.0* 24.9* 24.0* 26.3*   MCV 97 96 97 97   MCH 30.6 31.3 31.5 30.5   MCHC 31.5 32.5 32.5 31.6   RDW 15.3* 15.5* 15.5* 15.9*   PLT 52* 47* 42* 60*     INR  Recent Labs   Lab 07/31/19 0339 07/30/19  0411 07/29/19  0414 07/28/19  0539   INR 1.33* 1.29* 1.30* 1.17*     LFTs  Recent Labs   Lab 07/31/19  0339 07/28/19  0950 07/28/19  0539   ALKPHOS 88 101 108   AST 36 100* 107*   ALT 93* 167* 170*   BILITOTAL 0.4 0.6 0.6   PROTTOTAL 6.4* 7.2 7.3   ALBUMIN 2.5* 2.9* 3.0*      PANCNo lab results found in last 7 days.    IMAGING:    CT CAP 07/28/2019  1.  Type B thoracoabdominal aortic dissection extending to the aortic bifurcation, and into the proximal left common iliac artery and origin of the SMA.  2.  Morphologic changes of cirrhosis and portal hypertension.  3.  Cholelithiasis.

## 2022-06-12 NOTE — PROGRESS NOTES
Discussed with  at bedside what is patient's preferences, she says she will do whatever we recommend, discussed going home and she is worried about affording the medications as she is not insured, at this time it would be safer for her to have this important treatment for her clot, we will plan for meds to beds in the morning and hopefully early discharge tomorrow.

## 2022-06-12 NOTE — PROGRESS NOTES
Date of Service: 6/12/2022  Primary Team: [facility] Team   Attending: Fidencio Ruiz M.D.   Senior Resident: Bobby Celis M.D.  Intern: Obdulio Green D.O.  Contact:  [phone]    Chief Complaint:   Chief Complaint   Patient presents with   • Shortness of Breath     Pt reports SOB since this am. Pt had an operation for a meningioma on May 12th.        ID:  Patient is a 81 y.o. female [place] Swedish speaking visiting from [place] with PMHx of HTN, recent hospitalization for meningioma with mass effect s/p Craniotomy on 5/19/22 who presented 6/4/2022 with SOB, AMS, and RLE swelling and pain who was admitted for management of right pulmonary embolism and right deep vein thrombosis.    Interval Update:    -Discharge delayed secondary to daughter arriving in the evening to  mother and unable to afford DOAC's outpatient, unsafe discharge and will discuss with pharmacy and  on 6/12/22.  -Discharge on 6/12/22    Consultants/Specialty:  Neurosurgery  Review of Systems:    Review of Systems   Constitutional: Negative for chills, fever and malaise/fatigue.   HENT: Negative for hearing loss and tinnitus.    Eyes: Negative for blurred vision and double vision.   Respiratory: Positive for shortness of breath. Negative for cough and hemoptysis.    Cardiovascular: Positive for leg swelling (Right leg). Negative for chest pain and palpitations.   Gastrointestinal: Negative for abdominal pain, blood in stool, constipation, diarrhea, heartburn, melena, nausea and vomiting.   Genitourinary: Negative for dysuria and urgency.   Musculoskeletal: Positive for myalgias (Right foot).   Skin: Positive for rash (below right breast spreading to back). Negative for itching.   Neurological: Negative for dizziness, speech change, focal weakness and headaches.   Endo/Heme/Allergies: Negative for polydipsia. Does not bruise/bleed easily.   Psychiatric/Behavioral: Negative for substance abuse.   All other systems reviewed and are  negative.      Objective Data:   Physical Exam:   Vitals:   Temp:  [36 °C (96.8 °F)] 36 °C (96.8 °F)  Pulse:  [85-99] 85  Resp:  [16-19] 17  BP: (104-124)/(60-72) 111/72  SpO2:  [95 %-98 %] 95 %    -VSS, BP: (124-141)/(67-96) 139/96     Physical Exam  Vitals and nursing note reviewed.   Constitutional:       Appearance: Normal appearance. She is obese. She is ill-appearing.   HENT:      Head: Normocephalic and atraumatic.      Right Ear: External ear normal.      Left Ear: External ear normal.      Nose: Nose normal.      Mouth/Throat:      Mouth: Mucous membranes are moist.      Pharynx: Oropharynx is clear.   Eyes:      Extraocular Movements: Extraocular movements intact.      Conjunctiva/sclera: Conjunctivae normal.      Pupils: Pupils are equal, round, and reactive to light.   Cardiovascular:      Rate and Rhythm: Normal rate and regular rhythm.      Pulses: Normal pulses.      Heart sounds: Normal heart sounds. No murmur heard.    No friction rub. No gallop.   Pulmonary:      Effort: Pulmonary effort is normal. No respiratory distress.      Breath sounds: Normal breath sounds. No stridor. No wheezing, rhonchi or rales.   Abdominal:      General: Abdomen is flat. Bowel sounds are normal. There is no distension.      Palpations: Abdomen is soft.      Tenderness: There is no abdominal tenderness. There is no guarding or rebound.   Musculoskeletal:         General: Swelling (R>L) present. Normal range of motion.      Cervical back: Normal range of motion and neck supple.      Right lower leg: No edema.   Skin:     General: Skin is warm and dry.      Capillary Refill: Capillary refill takes less than 2 seconds.      Coloration: Skin is not jaundiced.      Findings: Rash (group vesicles and bullae on an erythematous base of the right breast to back in T5-T7 dermatomal distribution) present.      Comments: Difficult to palpate DP and PT on RLE, 2+ pulses on DP and PT on LLE   Neurological:      General: No focal  deficit present.      Mental Status: She is alert and oriented to person, place, and time. Mental status is at baseline.      Cranial Nerves: No cranial nerve deficit.   Psychiatric:         Mood and Affect: Mood normal.         Behavior: Behavior normal.         Thought Content: Thought content normal.         Judgment: Judgment normal.           Labs:   Recent Labs     06/10/22  0246   WBC 5.8   RBC 3.53*   HEMOGLOBIN 9.6*   HEMATOCRIT 30.9*   MCV 87.5   MCH 27.2   RDW 48.5   PLATELETCT 318   MPV 10.0   NEUTSPOLYS 65.50   LYMPHOCYTES 23.90   MONOCYTES 4.40   EOSINOPHILS 4.40   BASOPHILS 0.00   RBCMORPHOLO Normal     Recent Labs     06/10/22  0246 06/11/22  0040   SODIUM 136 133*   POTASSIUM 4.4 4.6   CHLORIDE 101 99   CO2 25 24   GLUCOSE 140* 123*   BUN 17 21   CPKTOTAL  --  15      Recent Labs     06/10/22  0246 06/11/22  0040   ALBUMIN 2.9* 2.7*   TBILIRUBIN 0.2 0.2   ALKPHOSPHAT 133* 141*   TOTPROTEIN 6.0 5.9*   ALTSGPT 22 19   ASTSGOT 21 17   CREATININE 0.57 0.76        Imaging:   DX-FOOT-2- RIGHT   Final Result      1. Distal right lower leg soft tissue swelling.   2. Decreased bone mineralization.   3. The remainder of the radiographic examination of the right foot is within normal limits.      US-EXTREMITY ARTERY LOWER BILAT   Final Result      DX-CHEST-PORTABLE (1 VIEW)   Final Result      1.  Cardiomegaly. No focal consolidation or pleural effusions.   2.  Slight hypoinflation with bibasilar atelectasis.         EC-ECHOCARDIOGRAM COMPLETE W/O CONT   Final Result      CT-HEAD W/O   Final Result         1.  Right craniotomy defect again noted.      2.  Resolution of pneumocephalus.      3.  Volume loss and decreased attenuation in the right frontal lobe again noted.      4.  Evolving area of infarction in the medial posterior left cerebral hemisphere at site where infarction was seen on the prior MRI.               CT-CTA CHEST PULMONARY ARTERY W/ RECONS   Final Result      1.  Large pulmonary embolus in  the right pulmonary artery, its lobar, segmental and subsegmental branches. No right heart strain.   2.  Borderline cardiomegaly.      Findings were communicated to KADY KHAN via Voalte messaging system on 6/4/2022 6:31 PM.      US-EXTREMITY VENOUS LOWER UNILAT RIGHT   Final Result      DX-CHEST-PORTABLE (1 VIEW)   Final Result         No acute cardiac or pulmonary abnormality is identified.        -CBC (06/10/22): WBC down trended from 7.3-5.8, Hgb down trended from 10.5-9.6  -TSH (6/8/2022): 0.050 (L), free T4 1.20, T3 2.14  -Gastrin (06/09/22): In process  -Blood cultures x 2 (06/09/22): No growth to date  -US extremity arterial lower bilateral (06/09/22): Diffuse low profile heterogeneous plaque and multiphasic flow throughout the common femoral, superficial femoral, and popliteal arteries with no hemodynamically stenosis      Assessment and Plan:    Patient is a 81 y.o. female Citizen of [place] Estonian speaking visiting from [place] with PMHx of HTN, recent hospitalization for meningioma with mass effect s/p Craniotomy on 5/19/22 who presented 6/4/2022 with SOB, AMS, and RLE swelling and pain who was admitted for management of right pulmonary embolism and right deep vein thrombosis.      Problem Representation:     Acute Problems:     Acute Hypoxic respiratory failure 2/2  Large pulmonary embolism  DVT femoral (deep venous thrombosis) with thrombophlebitis, right (HCC)-   Assessment & Plan  *LE swelling x2 days and RLE pain x 1 day, 87% on RA  *CXR 1V (06/04/22): No acute cardiac or pulm abnormality is identified  *US RLE (06/04/22): Acute DVT right lower extremity, distal common femoral vein extending down the leg and through the calf, gastrocnemius and soleal veins are also thrombosed in the calf  *CTA (06/04/22): Large pulm embolism in the right pulmonary artery, its lobar, segmental and segmental branches  *EKG (06/04/22): SR, HR 94, QTc 461  *Likely secondary to provocation secondary to immobility s/p  surgery  *Troponin (06/05/22): 12, BNP (06/05/22): 106  *Echo (06/06/22): EF 65%, diastolic function, no significant valvular disease, normal inferior vena cava size inspiratory collapse, normal right ventricle and normal right atrium ; no right ventricular heart strain  *CXR 1 V (06/08/22): Cardiomegaly, bibasilar atelectasis        -Full dose anticoagulation with enoxaparin 80 mg twice daily (06/04/22-06/07/22), switched to apixaban 10 mg twice daily for 1 week, followed by apixaban 5 mg twice daily for total duration of 3 months in the setting of provoked DVT (09/04/2022)  -IS ordered  -PT/OT recommended SNF placement. Lack of insurance coverage, will trial at home with patient's daughter and son in law  -Physiatry consulted, patient has been declined for inpatient rehab  -DP and tibial pulses heard on doppler, US extremity arterial lower bilateral ordered (06/09/22): Diffuse low profile heterogeneous plaque and multiphasic flow throughout the common femoral, superficial femoral, and popliteal arteries with no hemodynamically significant stenosis  -S/S significantly improved including SOB, right leg pain, and chest pain  -Ambulated on tele floor with improved symptoms   -Team d/w Pt' daughter  >> answered all questions   -Discussed the risks/benefits of meds with Pt in detail, answered all questions.   -D/W RN and CM        Herpes Zoster  Fever  *1 episode of T of 100.8 on 06/08/22 @ 2012, no Acetaminophen given, now non-febrile  *Fever likely secondary to herpes zoster outbreak     -Discontinued ceftriaxone 1 g (6/9/2022-6/10/2022)  -Discontinued urinalysis and urine cultures  -Blood cultures x 2 (06/09/22): No growth to date  -Valacyclovir 1 g 3 times daily x 7 days (06/10/22-06/11/22)  -No evidence of pain reduction with gabapentin while on valacyclovir or prevention of postherpetic neuralgia     Chest pain  *EKG and troponin ordered for ACS rule out on 6/7/22, WNL  *Likely secondary to Herpes  Zoster     -Acetaminophen 1g TID as needed  -Management for Herpes Zoster as above     Right Foot pain  *Likely referred pain from DVT, x-ray of foot negative for fracture, osteoarthritis, gout. Doppler with no significant stenosis     -Management as above for PE/DVT  -Cyclobenzaprine 5 mg nightly ordered for cramps, warned about sedating effects and better to take before bedtime     Nonthyroidal Illness Syndrome (Euthyroid Sick Syndrome)  *TSH (6/8/2022): 0.050 (L), free T4 1.20, T3 2.14     -Recheck thyroid in 6 weeks after recovery from illness     Normocytic Anemia 2/2  Inflammatory anemia  *CBC (06/08/22): Hgb 9.5, MCV 88.7  *Reticulocyte index (06/08/22): 0.95, hypoproliferation  *Iron panel (06/08/22): Iron 28, TIBC 227, 12% saturation, ferritin 249  *Vitamin B12 (06/08/22): 229   *Consistent with inflammatory anemia     -Gastrin to rule out achlorhydria (06/09/22) : In process , follow up outpatient  -Consider H pylori stool study outpatient  -Started cyanocobalamin 2000 mcg daily, follow up in 6 weeks (7/20/22)     AMS (altered mental status)  Urinary Tract Infection  Ischemic Stroke  Assessment & Plan  *A&O x 4, back to baseline  *CBC (06/04/22): WBC 10.1 (14.6 on 5/29/2022), left shift of neutrophil absolute 7.64 (H) (12.80 on 5/9/2022),  *Urinalysis (06/04/22): Nitrate positive, LE moderate, pyuria , hematuria with RBC 2-5, many bacteria,  hyaline casts 11-20  *Urine culture (06/04/22): E. Coli, susceptible to Bactrim and C3  *Blood cultures X2 (06/04/22): No growth to date  *Likely secondary to UTI and/or stroke     -Switched Ceftriaxone 1g (06/05/22-06/06/22) to Bactrim DS BID x 2 doses (06/07/22)  -Resolved (06/07/22)     Acute Kidney Injury  *CMP (06/06/22): BUN/creatinine uptrending from 12/0.38-31/0.52 ; 30% increase     - ml/hr (06/06/22-06/07/22)  -Resolved (06/08/22)     Constipation  *Refuses stool softeners     -Trial magnesium oxide 400 mg BID (06/05/22-06/8/22)  -Resolved  (06/09/22)     Chronic Problems:     Ischemic Stroke  *MRI head with and without (5/21/2022): Moderately large gyriform area of acute infarction involving the right posterior medial temporal lobe and adjacent right medial occipital lobe.  Moderate periventricular and juxtacortical white matter changes consistent with chronic microvascular ischemic gliosis  *CT head without (06/05/22): Evolving area of infarction in the medial posterior left cerebral hemisphere at site where infarction was seen on the prior MRI  *Lipid panel (06/05/22): Total cholesterol 201, triglycerides 189, HDL 35,      -Started Atorvastatin 40 mg, liver function stable, monitor for myalgia symptoms outpatient  -We will hold off on aspirin at this time per neurosurgery recommendations     HTN (hypertension)- (present on admission)  Assessment & Plan  *BP (06/04/22): (134-171)/(63-82) 141/82   BP (06/11/22): ()/(47-62) 107/58  *Stable     -Held home amlodipine 5 mg daily, hold for SBP <100  -Continue home lisinopril 40 mg daily, hold for SBP <100        Meningioma (HCC)- (present on admission)  Assessment & Plan  *Neurosurgery, Dr. Hakan Dhaliwal (5/19/2022): Right frontal extra axial brain tumor measuring 45X75X 45 mm, bicoronal incision of right frontal craniotomy for tumor resection, intraoperative microscope, Stealth navigation.     -Discontinued dexamethasone 2 mg daily per call with neurosurgeon Dr. Hakan Dhaliwal on 05/19/22 .  -We will continue home Keppra 500 mg twice daily for seizure prophylaxis, will follow up with neurosurgery for discontinuation     Gastroesophageal reflux disease     -Continue home omeprazole 20 mg daily      Core Measures:    Code Status: Full Code  Diet: Regular  IVF: None  Rodriguez Catheter: None  IV Lines: pIV  Antibiotics: None  GI Prophylaxis: PEG + Bisacodyl PRN  DVT Prophylaxis: Apixaban 10 mg twice daily  Disposition: Inpatient    Please note that this dictation was created using voice recognition  software. I have made every reasonable attempt to correct obvious errors, but there may be errors of grammar and possibly content that I did not discover before finalizing the note. If the error changes the accuracy of the document, I would appreciate it being brought to my attention.

## 2022-06-14 LAB
BACTERIA BLD CULT: NORMAL
BACTERIA BLD CULT: NORMAL
SIGNIFICANT IND 70042: NORMAL
SIGNIFICANT IND 70042: NORMAL
SITE SITE: NORMAL
SITE SITE: NORMAL
SOURCE SOURCE: NORMAL
SOURCE SOURCE: NORMAL

## 2022-06-26 ENCOUNTER — APPOINTMENT (OUTPATIENT)
Dept: RADIOLOGY | Facility: MEDICAL CENTER | Age: 82
End: 2022-06-26
Attending: EMERGENCY MEDICINE
Payer: MEDICAID

## 2022-06-26 ENCOUNTER — HOSPITAL ENCOUNTER (EMERGENCY)
Facility: MEDICAL CENTER | Age: 82
End: 2022-06-26
Attending: EMERGENCY MEDICINE
Payer: MEDICAID

## 2022-06-26 VITALS
TEMPERATURE: 97.4 F | HEART RATE: 78 BPM | HEIGHT: 64 IN | BODY MASS INDEX: 32.37 KG/M2 | WEIGHT: 189.6 LBS | RESPIRATION RATE: 18 BRPM | SYSTOLIC BLOOD PRESSURE: 144 MMHG | DIASTOLIC BLOOD PRESSURE: 82 MMHG | OXYGEN SATURATION: 92 %

## 2022-06-26 DIAGNOSIS — N39.0 ACUTE UTI: ICD-10-CM

## 2022-06-26 DIAGNOSIS — B02.9 HERPES ZOSTER WITHOUT COMPLICATION: ICD-10-CM

## 2022-06-26 DIAGNOSIS — R07.9 CHEST PAIN, UNSPECIFIED TYPE: ICD-10-CM

## 2022-06-26 DIAGNOSIS — R10.84 GENERALIZED ABDOMINAL PAIN: ICD-10-CM

## 2022-06-26 DIAGNOSIS — K42.9 PARAUMBILICAL HERNIA: ICD-10-CM

## 2022-06-26 LAB
ALBUMIN SERPL BCP-MCNC: 3.9 G/DL (ref 3.2–4.9)
ALBUMIN/GLOB SERPL: 1.1 G/DL
ALP SERPL-CCNC: 138 U/L (ref 30–99)
ALT SERPL-CCNC: 37 U/L (ref 2–50)
ANION GAP SERPL CALC-SCNC: 13 MMOL/L (ref 7–16)
APPEARANCE UR: CLEAR
AST SERPL-CCNC: 26 U/L (ref 12–45)
BACTERIA #/AREA URNS HPF: NEGATIVE /HPF
BASOPHILS # BLD AUTO: 1 % (ref 0–1.8)
BASOPHILS # BLD: 0.07 K/UL (ref 0–0.12)
BILIRUB SERPL-MCNC: 0.2 MG/DL (ref 0.1–1.5)
BILIRUB UR QL STRIP.AUTO: NEGATIVE
BUN SERPL-MCNC: 8 MG/DL (ref 8–22)
CALCIUM SERPL-MCNC: 9.6 MG/DL (ref 8.5–10.5)
CHLORIDE SERPL-SCNC: 107 MMOL/L (ref 96–112)
CO2 SERPL-SCNC: 24 MMOL/L (ref 20–33)
COLOR UR: YELLOW
CREAT SERPL-MCNC: 0.57 MG/DL (ref 0.5–1.4)
EKG IMPRESSION: NORMAL
EOSINOPHIL # BLD AUTO: 0.15 K/UL (ref 0–0.51)
EOSINOPHIL NFR BLD: 2.1 % (ref 0–6.9)
EPI CELLS #/AREA URNS HPF: NEGATIVE /HPF
ERYTHROCYTE [DISTWIDTH] IN BLOOD BY AUTOMATED COUNT: 50.9 FL (ref 35.9–50)
GFR SERPLBLD CREATININE-BSD FMLA CKD-EPI: 92 ML/MIN/1.73 M 2
GLOBULIN SER CALC-MCNC: 3.6 G/DL (ref 1.9–3.5)
GLUCOSE SERPL-MCNC: 133 MG/DL (ref 65–99)
GLUCOSE UR STRIP.AUTO-MCNC: NEGATIVE MG/DL
HCT VFR BLD AUTO: 37.9 % (ref 37–47)
HGB BLD-MCNC: 11.6 G/DL (ref 12–16)
HYALINE CASTS #/AREA URNS LPF: ABNORMAL /LPF
IMM GRANULOCYTES # BLD AUTO: 0.1 K/UL (ref 0–0.11)
IMM GRANULOCYTES NFR BLD AUTO: 1.4 % (ref 0–0.9)
KETONES UR STRIP.AUTO-MCNC: NEGATIVE MG/DL
LEUKOCYTE ESTERASE UR QL STRIP.AUTO: ABNORMAL
LIPASE SERPL-CCNC: 45 U/L (ref 11–82)
LYMPHOCYTES # BLD AUTO: 2.52 K/UL (ref 1–4.8)
LYMPHOCYTES NFR BLD: 35 % (ref 22–41)
MCH RBC QN AUTO: 27 PG (ref 27–33)
MCHC RBC AUTO-ENTMCNC: 30.6 G/DL (ref 33.6–35)
MCV RBC AUTO: 88.1 FL (ref 81.4–97.8)
MICRO URNS: ABNORMAL
MONOCYTES # BLD AUTO: 0.63 K/UL (ref 0–0.85)
MONOCYTES NFR BLD AUTO: 8.8 % (ref 0–13.4)
NEUTROPHILS # BLD AUTO: 3.73 K/UL (ref 2–7.15)
NEUTROPHILS NFR BLD: 51.7 % (ref 44–72)
NITRITE UR QL STRIP.AUTO: NEGATIVE
NRBC # BLD AUTO: 0 K/UL
NRBC BLD-RTO: 0 /100 WBC
PH UR STRIP.AUTO: 7 [PH] (ref 5–8)
PLATELET # BLD AUTO: 406 K/UL (ref 164–446)
PMV BLD AUTO: 9.9 FL (ref 9–12.9)
POTASSIUM SERPL-SCNC: 4 MMOL/L (ref 3.6–5.5)
PROT SERPL-MCNC: 7.5 G/DL (ref 6–8.2)
PROT UR QL STRIP: NEGATIVE MG/DL
RBC # BLD AUTO: 4.3 M/UL (ref 4.2–5.4)
RBC # URNS HPF: ABNORMAL /HPF
RBC UR QL AUTO: NEGATIVE
SODIUM SERPL-SCNC: 144 MMOL/L (ref 135–145)
SP GR UR STRIP.AUTO: 1
TROPONIN T SERPL-MCNC: 7 NG/L (ref 6–19)
TROPONIN T SERPL-MCNC: 7 NG/L (ref 6–19)
UROBILINOGEN UR STRIP.AUTO-MCNC: 0.2 MG/DL
WBC # BLD AUTO: 7.2 K/UL (ref 4.8–10.8)
WBC #/AREA URNS HPF: ABNORMAL /HPF

## 2022-06-26 PROCEDURE — 80053 COMPREHEN METABOLIC PANEL: CPT

## 2022-06-26 PROCEDURE — 700111 HCHG RX REV CODE 636 W/ 250 OVERRIDE (IP): Performed by: EMERGENCY MEDICINE

## 2022-06-26 PROCEDURE — 81001 URINALYSIS AUTO W/SCOPE: CPT

## 2022-06-26 PROCEDURE — 93005 ELECTROCARDIOGRAM TRACING: CPT

## 2022-06-26 PROCEDURE — A9270 NON-COVERED ITEM OR SERVICE: HCPCS | Performed by: EMERGENCY MEDICINE

## 2022-06-26 PROCEDURE — 94760 N-INVAS EAR/PLS OXIMETRY 1: CPT

## 2022-06-26 PROCEDURE — 96375 TX/PRO/DX INJ NEW DRUG ADDON: CPT

## 2022-06-26 PROCEDURE — 99285 EMERGENCY DEPT VISIT HI MDM: CPT

## 2022-06-26 PROCEDURE — 74176 CT ABD & PELVIS W/O CONTRAST: CPT

## 2022-06-26 PROCEDURE — 85025 COMPLETE CBC W/AUTO DIFF WBC: CPT

## 2022-06-26 PROCEDURE — 96374 THER/PROPH/DIAG INJ IV PUSH: CPT

## 2022-06-26 PROCEDURE — 93005 ELECTROCARDIOGRAM TRACING: CPT | Performed by: EMERGENCY MEDICINE

## 2022-06-26 PROCEDURE — 700102 HCHG RX REV CODE 250 W/ 637 OVERRIDE(OP): Performed by: EMERGENCY MEDICINE

## 2022-06-26 PROCEDURE — 71045 X-RAY EXAM CHEST 1 VIEW: CPT

## 2022-06-26 PROCEDURE — 83690 ASSAY OF LIPASE: CPT

## 2022-06-26 PROCEDURE — 84484 ASSAY OF TROPONIN QUANT: CPT

## 2022-06-26 PROCEDURE — 700117 HCHG RX CONTRAST REV CODE 255: Performed by: EMERGENCY MEDICINE

## 2022-06-26 PROCEDURE — 36415 COLL VENOUS BLD VENIPUNCTURE: CPT

## 2022-06-26 RX ORDER — MORPHINE SULFATE 4 MG/ML
4 INJECTION INTRAVENOUS ONCE
Status: COMPLETED | OUTPATIENT
Start: 2022-06-26 | End: 2022-06-26

## 2022-06-26 RX ORDER — CEFDINIR 300 MG/1
300 CAPSULE ORAL ONCE
Status: COMPLETED | OUTPATIENT
Start: 2022-06-26 | End: 2022-06-26

## 2022-06-26 RX ORDER — ONDANSETRON 2 MG/ML
4 INJECTION INTRAMUSCULAR; INTRAVENOUS ONCE
Status: COMPLETED | OUTPATIENT
Start: 2022-06-26 | End: 2022-06-26

## 2022-06-26 RX ORDER — CEFDINIR 300 MG/1
300 CAPSULE ORAL 2 TIMES DAILY
Qty: 10 CAPSULE | Refills: 0 | Status: SHIPPED | OUTPATIENT
Start: 2022-06-26 | End: 2022-07-01

## 2022-06-26 RX ADMIN — CEFDINIR 300 MG: 300 CAPSULE ORAL at 19:48

## 2022-06-26 RX ADMIN — MORPHINE SULFATE 4 MG: 4 INJECTION INTRAVENOUS at 18:58

## 2022-06-26 RX ADMIN — IOHEXOL 25 ML: 240 INJECTION, SOLUTION INTRATHECAL; INTRAVASCULAR; INTRAVENOUS; ORAL at 23:00

## 2022-06-26 RX ADMIN — ONDANSETRON 4 MG: 2 INJECTION INTRAMUSCULAR; INTRAVENOUS at 18:57

## 2022-06-26 ASSESSMENT — FIBROSIS 4 INDEX: FIB4 SCORE: 0.97

## 2022-06-26 ASSESSMENT — PAIN DESCRIPTION - DESCRIPTORS: DESCRIPTORS: OTHER (COMMENT)

## 2022-06-27 NOTE — ED PROVIDER NOTES
ED Provider Note    Scribed for Janusz Alonso M.D. by Alissa Saldaña. 6/26/2022, 6:06 PM.    Primary care provider: Pcp Pt States None  Means of arrival: EMS  History obtained from: Patient  History limited by: None    CHIEF COMPLAINT  Chief Complaint   Patient presents with    Abdominal Pain     Generalized,tender with palpitation    Headache     Intermittent since this am       HPI  Jan Palencia is a 81 y.o. female who presents to the Emergency Department for worsening constant right sided chest pain onset two days ago. She reports that her chest pain has been worsening over the past few days and it began radiating to her right axilla. She endorses associated arm and leg numbness, difficulty breathing, fever, nausea, dysuria, and generalized abdominal pain, but denies vomiting, diarrhea, or pain while eating. Patient's daughter additionally notes she had difficulties with movement and a pulmonary emoblism after her surgery, which involved removal of her meningioma. Patient's daughter notes the patient is on supplemental O2 and Eliquis. The patient endorses shingles on her right arm, abdomen, and right chest. The patient states that she is constipated and her last bowel movement was yesterday, which was small and hard, and she is still passing gas. The patient denies any history of myocardial infarctions and any allergies to medications.    REVIEW OF SYSTEMS  Pertinent positives include chest pain, constipation, arm and leg numbness, difficulty breathing, fever, nausea, dysuria, and generalized abdominal pain. Pertinent negatives include vomiting, diarrhea, or pain while eating. All other systems negative.    PAST MEDICAL HISTORY   None noted    SURGICAL HISTORY   has a past surgical history that includes craniotomy stealth (Right, 5/19/2022).    SOCIAL HISTORY  Social History     Tobacco Use    Smoking status: Never Smoker    Smokeless tobacco: Never Used   Substance Use Topics    Alcohol use: Never     "Drug use: Never      Social History     Substance and Sexual Activity   Drug Use Never        FAMILY HISTORY  No family history noted.     CURRENT MEDICATIONS  Current Outpatient Medications   Medication Instructions    Acetaminophen (TYLENOL PO) 2 Tablets, Oral, EVERY 6 HOURS PRN    atorvastatin (LIPITOR) 40 mg, Oral, DAILY    Cyanocobalamin 2,000 mcg, Oral, DAILY    Eliquis 5 mg, Oral, 2 TIMES DAILY    levETIRAcetam (KEPPRA) 500 mg, Oral, 2 TIMES DAILY    lisinopril (PRINIVIL) 40 mg, Oral, DAILY    omeprazole (PRILOSEC) 20 mg, Oral, DAILY    senna-docusate (PERICOLACE OR SENOKOT S) 8.6-50 MG Tab 1 Tablet, Oral, NIGHTLY          ALLERGIES  Allergies   Allergen Reactions    Gelatin Unspecified     Mandaen BELIEFS    Pork Derived Products Unspecified     Mandaen BELIEFS       PHYSICAL EXAM  VITAL SIGNS: BP (!) 156/89   Pulse 99   Temp 37.2 °C (98.9 °F) (Temporal)   Resp 18   Ht 1.626 m (5' 4\")   Wt 86 kg (189 lb 9.5 oz)   SpO2 98%   BMI 32.54 kg/m²     Constitutional: Well developed, Well nourished, mild distress.   HENT: Normocephalic, Atraumatic, mask in place.  Eyes: Conjunctiva normal, No discharge.   Neck: Supple, No stridor   Cardiovascular: Normal heart rate, Normal rhythm, No murmurs, equal pulses.   Pulmonary: Normal breath sounds, No respiratory distress, No wheezing, No rales, No rhonchi.  Chest: No chest wall tenderness or deformity.   Abdomen: Soft, Diffusely tender throughout abdomen, normal bowel sounds, slightly distended, No masses, no rebound, no guarding.   Back: No CVA tenderness.   Musculoskeletal: No major deformities noted, No tenderness.   Skin: Healing shingles rash on right chest wall under breast, Tender to the touch but no surrounding erythema. Warm, Dry.   Neurologic: Alert & oriented x 3, Normal motor function,  No focal deficits noted.   Psychiatric: Affect normal, Judgment normal, Mood normal.     LABS  Results for orders placed or performed during the hospital encounter of " 06/26/22   CBC with Differential   Result Value Ref Range    WBC 7.2 4.8 - 10.8 K/uL    RBC 4.30 4.20 - 5.40 M/uL    Hemoglobin 11.6 (L) 12.0 - 16.0 g/dL    Hematocrit 37.9 37.0 - 47.0 %    MCV 88.1 81.4 - 97.8 fL    MCH 27.0 27.0 - 33.0 pg    MCHC 30.6 (L) 33.6 - 35.0 g/dL    RDW 50.9 (H) 35.9 - 50.0 fL    Platelet Count 406 164 - 446 K/uL    MPV 9.9 9.0 - 12.9 fL    Neutrophils-Polys 51.70 44.00 - 72.00 %    Lymphocytes 35.00 22.00 - 41.00 %    Monocytes 8.80 0.00 - 13.40 %    Eosinophils 2.10 0.00 - 6.90 %    Basophils 1.00 0.00 - 1.80 %    Immature Granulocytes 1.40 (H) 0.00 - 0.90 %    Nucleated RBC 0.00 /100 WBC    Neutrophils (Absolute) 3.73 2.00 - 7.15 K/uL    Lymphs (Absolute) 2.52 1.00 - 4.80 K/uL    Monos (Absolute) 0.63 0.00 - 0.85 K/uL    Eos (Absolute) 0.15 0.00 - 0.51 K/uL    Baso (Absolute) 0.07 0.00 - 0.12 K/uL    Immature Granulocytes (abs) 0.10 0.00 - 0.11 K/uL    NRBC (Absolute) 0.00 K/uL   Complete Metabolic Panel   Result Value Ref Range    Sodium 144 135 - 145 mmol/L    Potassium 4.0 3.6 - 5.5 mmol/L    Chloride 107 96 - 112 mmol/L    Co2 24 20 - 33 mmol/L    Anion Gap 13.0 7.0 - 16.0    Glucose 133 (H) 65 - 99 mg/dL    Bun 8 8 - 22 mg/dL    Creatinine 0.57 0.50 - 1.40 mg/dL    Calcium 9.6 8.5 - 10.5 mg/dL    AST(SGOT) 26 12 - 45 U/L    ALT(SGPT) 37 2 - 50 U/L    Alkaline Phosphatase 138 (H) 30 - 99 U/L    Total Bilirubin 0.2 0.1 - 1.5 mg/dL    Albumin 3.9 3.2 - 4.9 g/dL    Total Protein 7.5 6.0 - 8.2 g/dL    Globulin 3.6 (H) 1.9 - 3.5 g/dL    A-G Ratio 1.1 g/dL   Lipase   Result Value Ref Range    Lipase 45 11 - 82 U/L   Urinalysis    Specimen: Urine, Clean Catch   Result Value Ref Range    Color Yellow     Character Clear     Specific Gravity 1.005 <1.035    Ph 7.0 5.0 - 8.0    Glucose Negative Negative mg/dL    Ketones Negative Negative mg/dL    Protein Negative Negative mg/dL    Bilirubin Negative Negative    Urobilinogen, Urine 0.2 Negative    Nitrite Negative Negative    Leukocyte  Esterase Small (A) Negative    Occult Blood Negative Negative    Micro Urine Req Microscopic    ESTIMATED GFR   Result Value Ref Range    GFR (CKD-EPI) 92 >60 mL/min/1.73 m 2   URINE MICROSCOPIC (W/UA)   Result Value Ref Range    WBC 10-20 (A) /hpf    RBC 0-2 /hpf    Bacteria Negative None /hpf    Epithelial Cells Negative /hpf    Hyaline Cast 0-2 /lpf   TROPONIN   Result Value Ref Range    Troponin T 7 6 - 19 ng/L   TROPONIN   Result Value Ref Range    Troponin T 7 6 - 19 ng/L   EKG   Result Value Ref Range    Report       [facility] Emergency Dept.    Test Date:  2022  Pt Name:    GILA NARANJO              Department: ER  MRN:        [id number]                      Room:        38  Gender:     Female                       Technician: [id number]  :        1940                   Requested By:ER TRIAGE PROTOCOL  Order #:    [id number]                    Reading MD: BLADE JOYCE MD    Measurements  Intervals                                Axis  Rate:       95                           P:          50  NJ:         176                          QRS:        29  QRSD:       88                           T:          55  QT:         348  QTc:        438    Interpretive Statements  SINUS RHYTHM, rate of 95, normal axis, no St elevation.  Compared to ECG 2022 14:54:53  No significant changes  Electronically Signed On 2022 18:21:25 PDT by BLADE JOYCE MD         All labs reviewed by me.    EKG  12 Lead EKG interpreted by me as shown above.      RADIOLOGY  CT-ABDOMEN-PELVIS W/O   Final Result         1.  Left paramedian supraumbilical hernia containing mesenteric fat with fat stranding, consider component of incarceration. Correlate with reducibility.   2.  1.5 mm calcific density along the wall of the distal ureter, could represent nonobstructing ureteral stone or adjacent vascular calcification.   3.  Patchy linear densities in bilateral lung bases suggests atelectasis  with possible infiltrates   4.  Fat-containing left inguinal hernia   5.  Low-density lesion right hepatic lobe, appearance just small cyst or hemangioma, otherwise indeterminate      DX-CHEST-PORTABLE (1 VIEW)   Final Result      1.  Bibasilar atelectasis.      2.  Cardiomegaly.        The radiologist's interpretation of all radiological studies have been reviewed by me.    COURSE & MEDICAL DECISION MAKING  Pertinent Labs & Imaging studies reviewed. (See chart for details)    6:06 PM - Patient seen and examined at bedside. Patient will be treated with Zofran 4 mg via injection and morphine 4 mg via injection. Ordered EKG, UA, Lipase, CMP, CBC w/ Diff, Est GFR, Trop, and CXR to evaluate her symptoms. The differential diagnoses include but are not limited to: myocardial infarction, constipation, shingles, or chest pain from pulmonary embolism    7:18 PM - Patient will be treated with Omnicef 300 mg. Ordered Urine Microscopic to evaluate.     7:59 PM - Ordered CT-Abdomen-Pelvis without to evaluate.     Reexamined the patient after CT scan her periumbilical hernia is easily reducible.    Discussed with the daughter as  and discussed the patient's abdominal pain is likely from UTI and her herpes zoster.      Medical Decision Making: I think the patient's abdominal pain is secondary to UTI and zoster.  As far as the patient's chest pain this may be sequelae from the patient's recent pulmonary embolism she is not currently tachycardic or hypoxic and she is on anticoagulation I therefore do not think a repeat CTA chest is warranted.  EKG is unremarkable and she has negative troponins I do not think she is having a myocardial infarction.     The patient will return for new or worsening symptoms and is stable at the time of discharge.    The patient is referred to a primary physician for blood pressure management, diabetic screening, and for all other preventative health concerns.        DISPOSITION:  Patient will  be discharged home in stable condition.    FOLLOW UP:  Your doctor    Schedule an appointment as soon as possible for a visit in 1 week        OUTPATIENT MEDICATIONS:  Discharge Medication List as of 6/26/2022 11:19 PM        START taking these medications    Details   cefdinir (OMNICEF) 300 MG Cap Take 1 Capsule by mouth 2 times a day for 5 days., Disp-10 Capsule, R-0, Normal                FINAL IMPRESSION  1. Acute UTI    2. Generalized abdominal pain    3. Paraumbilical hernia    4. Herpes zoster without complication    5. Chest pain, unspecified type          I, Alissa Saldaña (Tomeka), am scribing for, and in the presence of, Janusz Alonso M.D.    Electronically signed by: Alissa Saldaña (Tomeka), 6/26/2022    IJanusz M.D. personally performed the services described in this documentation, as scribed by Alissa Saldaña in my presence, and it is both accurate and complete. C.     The note accurately reflects work and decisions made by me.  Janusz Alonso M.D.  6/27/2022  12:33 AM

## 2022-06-27 NOTE — ED NOTES
Report from LAURO Galeano  Pt resting in [facility], call light in reach, on monitor.

## 2022-06-27 NOTE — ED NOTES
"Pt discharged to lobby with daughter, pt assisted into vehicle. AOx4. IV discontinued and gauze placed, pt in possession of belongings. Pt provided discharge education and information pertaining to medications and follow up appointments. Pt received copy of discharge instructions and verbalized understanding. BP (!) 144/82   Pulse 78   Temp 36.3 °C (97.4 °F) (Temporal)   Resp 18   Ht 1.626 m (5' 4\")   Wt 86 kg (189 lb 9.5 oz)   SpO2 92%   BMI 32.54 kg/m²   "

## 2022-06-27 NOTE — DISCHARGE INSTRUCTIONS
Take all the antibiotic until gone. Return if you have the hernia above your belly button push out and you cannot push it back in. Return if you have increasing pain, fever, blood in vomit, blood in stool or new chest pain. You can take miralax for the constipation.

## 2022-06-27 NOTE — ED NOTES
Pt ambulatory to and from restroom with x1 assist.   Pt to and from CT. On monitor, call light in reach

## 2022-07-16 ENCOUNTER — HOSPITAL ENCOUNTER (EMERGENCY)
Facility: MEDICAL CENTER | Age: 82
End: 2022-07-17
Attending: EMERGENCY MEDICINE
Payer: MEDICAID

## 2022-07-16 ENCOUNTER — APPOINTMENT (OUTPATIENT)
Dept: RADIOLOGY | Facility: MEDICAL CENTER | Age: 82
End: 2022-07-16
Attending: EMERGENCY MEDICINE

## 2022-07-16 ENCOUNTER — APPOINTMENT (OUTPATIENT)
Dept: RADIOLOGY | Facility: MEDICAL CENTER | Age: 82
End: 2022-07-16
Attending: EMERGENCY MEDICINE
Payer: MEDICAID

## 2022-07-16 DIAGNOSIS — Z86.011 HX OF MENINGIOMA OF THE BRAIN: ICD-10-CM

## 2022-07-16 DIAGNOSIS — R56.9 SEIZURE-LIKE ACTIVITY (HCC): ICD-10-CM

## 2022-07-16 LAB
ALBUMIN SERPL BCP-MCNC: 3.4 G/DL (ref 3.2–4.9)
ALBUMIN/GLOB SERPL: 1.2 G/DL
ALP SERPL-CCNC: 91 U/L (ref 30–99)
ALT SERPL-CCNC: 15 U/L (ref 2–50)
ANION GAP SERPL CALC-SCNC: 10 MMOL/L (ref 7–16)
APTT PPP: 27.7 SEC (ref 24.7–36)
AST SERPL-CCNC: 15 U/L (ref 12–45)
BASOPHILS # BLD AUTO: 1 % (ref 0–1.8)
BASOPHILS # BLD: 0.06 K/UL (ref 0–0.12)
BILIRUB SERPL-MCNC: <0.2 MG/DL (ref 0.1–1.5)
BUN SERPL-MCNC: 8 MG/DL (ref 8–22)
CALCIUM SERPL-MCNC: 8.6 MG/DL (ref 8.5–10.5)
CHLORIDE SERPL-SCNC: 109 MMOL/L (ref 96–112)
CO2 SERPL-SCNC: 22 MMOL/L (ref 20–33)
CREAT SERPL-MCNC: 0.41 MG/DL (ref 0.5–1.4)
EOSINOPHIL # BLD AUTO: 0.19 K/UL (ref 0–0.51)
EOSINOPHIL NFR BLD: 3 % (ref 0–6.9)
ERYTHROCYTE [DISTWIDTH] IN BLOOD BY AUTOMATED COUNT: 50.6 FL (ref 35.9–50)
GFR SERPLBLD CREATININE-BSD FMLA CKD-EPI: 99 ML/MIN/1.73 M 2
GLOBULIN SER CALC-MCNC: 2.9 G/DL (ref 1.9–3.5)
GLUCOSE SERPL-MCNC: 118 MG/DL (ref 65–99)
HCT VFR BLD AUTO: 31.3 % (ref 37–47)
HGB BLD-MCNC: 9.7 G/DL (ref 12–16)
IMM GRANULOCYTES # BLD AUTO: 0.05 K/UL (ref 0–0.11)
IMM GRANULOCYTES NFR BLD AUTO: 0.8 % (ref 0–0.9)
INR PPP: 1.04 (ref 0.87–1.13)
LACTATE SERPL-SCNC: 1.5 MMOL/L (ref 0.5–2)
LYMPHOCYTES # BLD AUTO: 2.03 K/UL (ref 1–4.8)
LYMPHOCYTES NFR BLD: 32.4 % (ref 22–41)
MCH RBC QN AUTO: 26.9 PG (ref 27–33)
MCHC RBC AUTO-ENTMCNC: 31 G/DL (ref 33.6–35)
MCV RBC AUTO: 86.7 FL (ref 81.4–97.8)
MONOCYTES # BLD AUTO: 0.53 K/UL (ref 0–0.85)
MONOCYTES NFR BLD AUTO: 8.5 % (ref 0–13.4)
NEUTROPHILS # BLD AUTO: 3.4 K/UL (ref 2–7.15)
NEUTROPHILS NFR BLD: 54.3 % (ref 44–72)
NRBC # BLD AUTO: 0 K/UL
NRBC BLD-RTO: 0 /100 WBC
PLATELET # BLD AUTO: 280 K/UL (ref 164–446)
PMV BLD AUTO: 11.1 FL (ref 9–12.9)
POTASSIUM SERPL-SCNC: 4.2 MMOL/L (ref 3.6–5.5)
PROT SERPL-MCNC: 6.3 G/DL (ref 6–8.2)
PROTHROMBIN TIME: 13.5 SEC (ref 12–14.6)
RBC # BLD AUTO: 3.61 M/UL (ref 4.2–5.4)
SODIUM SERPL-SCNC: 141 MMOL/L (ref 135–145)
TROPONIN T SERPL-MCNC: 7 NG/L (ref 6–19)
WBC # BLD AUTO: 6.3 K/UL (ref 4.8–10.8)

## 2022-07-16 PROCEDURE — 80053 COMPREHEN METABOLIC PANEL: CPT

## 2022-07-16 PROCEDURE — 74022 RADEX COMPL AQT ABD SERIES: CPT

## 2022-07-16 PROCEDURE — 96374 THER/PROPH/DIAG INJ IV PUSH: CPT | Mod: XU

## 2022-07-16 PROCEDURE — 700102 HCHG RX REV CODE 250 W/ 637 OVERRIDE(OP): Performed by: EMERGENCY MEDICINE

## 2022-07-16 PROCEDURE — 70498 CT ANGIOGRAPHY NECK: CPT

## 2022-07-16 PROCEDURE — 36415 COLL VENOUS BLD VENIPUNCTURE: CPT

## 2022-07-16 PROCEDURE — 83605 ASSAY OF LACTIC ACID: CPT

## 2022-07-16 PROCEDURE — 700117 HCHG RX CONTRAST REV CODE 255: Performed by: EMERGENCY MEDICINE

## 2022-07-16 PROCEDURE — 700105 HCHG RX REV CODE 258: Performed by: EMERGENCY MEDICINE

## 2022-07-16 PROCEDURE — 99285 EMERGENCY DEPT VISIT HI MDM: CPT

## 2022-07-16 PROCEDURE — 700111 HCHG RX REV CODE 636 W/ 250 OVERRIDE (IP): Performed by: EMERGENCY MEDICINE

## 2022-07-16 PROCEDURE — 85730 THROMBOPLASTIN TIME PARTIAL: CPT

## 2022-07-16 PROCEDURE — A9270 NON-COVERED ITEM OR SERVICE: HCPCS | Performed by: EMERGENCY MEDICINE

## 2022-07-16 PROCEDURE — 85025 COMPLETE CBC W/AUTO DIFF WBC: CPT

## 2022-07-16 PROCEDURE — 85610 PROTHROMBIN TIME: CPT

## 2022-07-16 PROCEDURE — 93971 EXTREMITY STUDY: CPT | Mod: RT

## 2022-07-16 PROCEDURE — 70496 CT ANGIOGRAPHY HEAD: CPT

## 2022-07-16 PROCEDURE — 84484 ASSAY OF TROPONIN QUANT: CPT

## 2022-07-16 RX ORDER — ACETAMINOPHEN 500 MG
1000 TABLET ORAL ONCE
Status: COMPLETED | OUTPATIENT
Start: 2022-07-16 | End: 2022-07-16

## 2022-07-16 RX ORDER — SODIUM CHLORIDE 9 MG/ML
1000 INJECTION, SOLUTION INTRAVENOUS ONCE
Status: COMPLETED | OUTPATIENT
Start: 2022-07-16 | End: 2022-07-17

## 2022-07-16 RX ORDER — DIPHENHYDRAMINE HYDROCHLORIDE 50 MG/ML
25 INJECTION INTRAMUSCULAR; INTRAVENOUS ONCE
Status: COMPLETED | OUTPATIENT
Start: 2022-07-16 | End: 2022-07-16

## 2022-07-16 RX ADMIN — IOHEXOL 75 ML: 350 INJECTION, SOLUTION INTRAVENOUS at 17:00

## 2022-07-16 RX ADMIN — SODIUM CHLORIDE 1000 ML: 9 INJECTION, SOLUTION INTRAVENOUS at 21:45

## 2022-07-16 RX ADMIN — ACETAMINOPHEN 1000 MG: 500 TABLET ORAL at 21:42

## 2022-07-16 RX ADMIN — DIPHENHYDRAMINE HYDROCHLORIDE 25 MG: 50 INJECTION INTRAMUSCULAR; INTRAVENOUS at 21:42

## 2022-07-16 ASSESSMENT — FIBROSIS 4 INDEX: FIB4 SCORE: 0.84

## 2022-07-17 VITALS
SYSTOLIC BLOOD PRESSURE: 108 MMHG | OXYGEN SATURATION: 97 % | BODY MASS INDEX: 32.27 KG/M2 | HEIGHT: 64 IN | RESPIRATION RATE: 15 BRPM | TEMPERATURE: 98.8 F | WEIGHT: 189 LBS | DIASTOLIC BLOOD PRESSURE: 60 MMHG | HEART RATE: 69 BPM

## 2022-07-17 PROCEDURE — 700102 HCHG RX REV CODE 250 W/ 637 OVERRIDE(OP): Performed by: EMERGENCY MEDICINE

## 2022-07-17 PROCEDURE — 93971 EXTREMITY STUDY: CPT | Mod: 26,RT | Performed by: INTERNAL MEDICINE

## 2022-07-17 PROCEDURE — A9270 NON-COVERED ITEM OR SERVICE: HCPCS | Performed by: EMERGENCY MEDICINE

## 2022-07-17 RX ORDER — LEVETIRACETAM 500 MG/1
500 TABLET ORAL 2 TIMES DAILY
Qty: 120 TABLET | Refills: 0 | Status: SHIPPED | OUTPATIENT
Start: 2022-07-17 | End: 2022-08-18

## 2022-07-17 RX ORDER — LEVETIRACETAM 500 MG/1
1500 TABLET ORAL ONCE
Status: COMPLETED | OUTPATIENT
Start: 2022-07-17 | End: 2022-07-17

## 2022-07-17 RX ADMIN — LEVETIRACETAM 1500 MG: 500 TABLET, FILM COATED ORAL at 02:20

## 2022-07-17 RX ADMIN — APIXABAN 5 MG: 5 TABLET, FILM COATED ORAL at 02:21

## 2022-07-17 NOTE — ED PROVIDER NOTES
ED Provider Note    CHIEF COMPLAINT  Chief Complaint   Patient presents with   • Seizure     Pt reportedly had an approximately 2min long witnessed grand mal seizure at home. Daughter called EMS     HPI  Patient's primary language is Slovenian, initial attempts to use  were unsuccessful and daughter was used as an emergent  at the bedside.  She is brought in by daughter via EMS personnel for seizure-like activity that happened approximately an hour prior to arrival.  Patient has been living at home after being recently hospitalized for surgical removal of the meningioma and post op complication including possible stroke and PE.  Patient has been on Eliquis since that time, does not have a significant cardiovascular history and tonight while the patient was at home the daughter brought her down the stairs where she witnessed shaking-like activity that lasted approximately 2 minutes.  She is able to lower the patient into a chair at that time and she did not notice any tongue biting or urination.  Shortly thereafter she was little confused with some slurred speech and then came back to her baseline fairly quickly.  At the time of my evaluation she is speaking in full sentences, moving all of her extremities and the daughter reports that she has not slurring her words or appearing confused.    She has been feeling hot at home, has denied any recent dysuria or hematuria but has been endorsing worsening chronic right-sided chest and abdominal pain that she attributes to recent episode of shingles.  They have not noticed any new skin lesions, since the event she has been having escalating amounts of right-sided neck and head pain.  This is shooting, associated with pressure-like sensations and escalating amounts of pain and discomfort.    PPE Note: I personally donned full PPE for all patient encounters during this visit, including being clean-shaven with an N95 respirator mask, gloves, and  "goggles.     REVIEW OF SYSTEMS  See HPI for further details. All other systems are negative.     PAST MEDICAL HISTORY   meningioma/TIA?/PE    SOCIAL HISTORY  Social History     Tobacco Use   • Smoking status: Never Smoker   • Smokeless tobacco: Never Used   Vaping Use   • Vaping Use: Never used   Substance and Sexual Activity   • Alcohol use: Never   • Drug use: Never   • Sexual activity: Not on file       SURGICAL HISTORY   has a past surgical history that includes craniotomy stealth (Right, 5/19/2022).    CURRENT MEDICATIONS  Home Medications    **Home medications have not yet been reviewed for this encounter**         ALLERGIES  Allergies   Allergen Reactions   • Gelatin Unspecified     Judaism BELIEFS   • Pork Derived Products Unspecified     Judaism BELIEFS       PHYSICAL EXAM  VITAL SIGNS: /60   Pulse 69   Temp 37.1 °C (98.8 °F) (Temporal)   Resp 15   Ht 1.626 m (5' 4\")   Wt 85.7 kg (189 lb)   SpO2 97%   BMI 32.44 kg/m²   Pulse ox interpretation: I interpret this pulse ox as normal.  Genl: F sitting in gurney uncomfortably, speaking her language clearly, appears in mild distress   Head: NC/AT   ENT: Mucous membranes slightly dry, posterior pharynx clear, uvula midline, nares patent bilaterally  Eyes: Normal sclera, pupils equal round reactive to light  Neck: Supple, FROM, no LAD appreciated   Pulmonary: Lungs are clear to auscultation bilaterally  Chest: No TTP  CV:  RRR, no murmur appreciated, pulses 2+ in both upper and lower extremities,  Abdomen: soft, epigastric pain, nonspecific right upper quadrant and left upper quadrant discomfort, mild distention without tympany. no true rebound/guarding, no masses palpated, no HSM   : no CVA or suprapubic tenderness   Musculoskeletal: Pain free ROM of the neck. Moving upper and lower extremities and spontaneous in coordinated fashion  Neuro: A&Ox4 (person, place, time, situation), speech fluent, gait not initially assessed, no focal deficits " appreciated, No cerebellar signs. Sensation is grossly intact in the distal upper and lower extremities.  5/5 strength in  and dorsiflexion/plantar flexion of the ankles  Psych: Patient has an appropriate affect and behavior  Skin: No rash or lesions on right trunk or abdomen.  No pallor or jaundice.  No cyanosis.  Warm and dry.     DIAGNOSTIC STUDIES / PROCEDURES    EKG  Results for orders placed or performed during the hospital encounter of 22   EKG   Result Value Ref Range    Report       [facility] Emergency Dept.    Test Date:  2022  Pt Name:    GILA NARANJO              Department: ER  MRN:        [id number]                      Room:       [facility]  Gender:     Female                       Technician: [id number]  :        1940                   Requested By:ER TRIAGE PROTOCOL  Order #:    [id number]                    Reading MD: BLADE JOYCE MD    Measurements  Intervals                                Axis  Rate:       95                           P:          50  WI:         176                          QRS:        29  QRSD:       88                           T:          55  QT:         348  QTc:        438    Interpretive Statements  SINUS RHYTHM, rate of 95, normal axis, no St elevation.  Compared to ECG 2022 14:54:53  No significant changes  Electronically Signed On 2022 18:21:25 PDT by BLADE JOYCE MD       LABS  Labs Reviewed   CBC WITH DIFFERENTIAL - Abnormal; Notable for the following components:       Result Value    RBC 3.61 (*)     Hemoglobin 9.7 (*)     Hematocrit 31.3 (*)     MCH 26.9 (*)     MCHC 31.0 (*)     RDW 50.6 (*)     All other components within normal limits    Narrative:     Indicate which anticoagulants the patient is on:->ARGATROBAN   COMP METABOLIC PANEL - Abnormal; Notable for the following components:    Glucose 118 (*)     Creatinine 0.41 (*)     All other components within normal limits    Narrative:     Indicate  which anticoagulants the patient is on:->ARGATROBAN   TROPONIN    Narrative:     Indicate which anticoagulants the patient is on:->ARGATROBAN   LACTIC ACID    Narrative:     Indicate which anticoagulants the patient is on:->ARGATROBAN   PROTHROMBIN TIME    Narrative:     Indicate which anticoagulants the patient is on:->ARGATROBAN   APTT    Narrative:     Indicate which anticoagulants the patient is on:->ARGATROBAN   ESTIMATED GFR    Narrative:     Indicate which anticoagulants the patient is on:->ARGATROBAN     RADIOLOGY  CT-CTA HEAD WITH & W/O-POST PROCESS   Final Result         1. No aneurysm or hemodynamically significant narrowing of the major intracranial vessels.      2. Postsurgical change from resection of right frontal meningioma with residual enhancing tissue along the right anterior falx      CT-CTA NECK WITH & W/O-POST PROCESSING   Final Result      1. No evidence of flow-limiting stenosis in the cervical carotid or cervical vertebral arteries.      US-EXTREMITY VENOUS LOWER UNILAT RIGHT   Final Result      DX-ABDOMEN COMPLETE WITH AP OR PA CXR   Final Result         1. No specific finding to suggest small bowel obstruction.   2. Moderate amount of stool throughout the colon.        COURSE & MEDICAL DECISION MAKING  Pertinent Labs & Imaging studies reviewed. (See chart for details)    DDX:  Complications of surgery/on anticoagulation, ICH/aneurysm, nonepileptic seizure, pna/ptx, shingles, neuropathic pain, arrhythmia, electrolyte derangement, medication noncompliance, hypoglycemia, hyponatremia, metabolic abnormality, arrhythmia,     MDM    Initial evaluation at 2058:  Patient presents emergency room for several complaints as stated above.  Concern is for the patient's recent surgery for removal of a meningioma, subsequent PE and and initiation of anticoagulation with no prior history of seizures is now presenting with new onset seizure-like activity.  At the time my arrival while there is a language  barrier patient's family members stating that she is behaving at her baseline, and she does not have a appreciable focal neurological deficit.  She is having head and neck discomfort that is worse since starting earlier today, she has had multiple areas of nonspecific sharp shooting pain throughout her chest and into her abdomen on the lateral aspects and distribution consistent with her prior shingles outbreak.  This has been unrelenting pain and makes the exam difficult to fully discern if she has some nonspecific epigastric tenderness.  She is not rigid, she is not tachycardic or febrile she is denying any hematuria, dysuria or bowel changes.  Lab work obtained for the broad differential as noted above, advanced medical imaging for CTA of the head neck as patient has high risk for bleeds and story is concerning for a possible aneurysm or dissection or pathology.  Additional chest and abdomen plain views are also ordered an ultrasound of the right lower extremity as the patient has been having increasing pain and pressure throughout with nonspecific findings on clinical exam.  She has stopped taking her Eliquis this morning because it was too expensive.  She reports no prior missed doses.    Lab work showed stable but chronic anemia, no leukocytosis or leftward shift.  She has no gross electrolyte abnormalities and lactate is not elevated.  This would point away from a acute seizure event though its been a prolonged period of time and completion of IV fluids from transport and arrival.  Thankfully there is no acute ectopy on the monitor, she remains hemodynamically stable and afebrile.  There is no coagulopathy, troponin is not elevated and doubt this is due to an acute arrhythmia.  CT scan of the head showed changes following prior surgical intervention but no acute aneurysmal disease of the neck or head.  No findings consistent of any deep brainstem strokes or other concerning features.  I have discussed this  "finding with the on-call neurosurgeon who believes it is unlikely that current findings are suggestive of a postoperative cause to her procedure pathology.  While she did not have witnessed seizure activity here \"as described grand mal in nature with possible postictal period though there is prolonged period between this event and arrival and this was not witnessed by myself or nursing staff.  This event of a possible first-time seizure not attributed to her neurosurgical cause this would be an outpatient neurological work-up and referral to neurology initiation of loading medications and antiepileptics started.  She remains hemodynamically stable, no other further evolving features.  This was discussed with the patient and daughter as the daughter is acting as .  Questions are addressed the bedside and was discharged home in stable condition.    I was noted by nursing staff that the patient had further questions and turns out that they are concerned regarding the ability to pay for their blood thinner.  I discussed this with pharmacy and coupons for 3 doses for the upcoming month will be given and I would recommend they discuss further medication changes or alternative therapies with her primary care provider.    HYDRATION: Based on the patient's presentation of Other facilitate HA meds, NPO status the patient was given IV fluids. IV Hydration was used because oral hydration was not adequate alone. Upon recheck following hydration, the patient was improved.    FINAL IMPRESSION  Visit Diagnoses     ICD-10-CM   1. Seizure-like activity (HCC)  R56.9   2. Hx of meningioma of the brain  Z86.011      Electronically signed by: Viktor Chen M.D., 7/16/2022 8:58 PM  "

## 2022-07-17 NOTE — ED TRIAGE NOTES
"Chief Complaint   Patient presents with   • Seizure     Pt reportedly had an approximately 2min long witnessed grand mal seizure at home. Daughter called EMS       Pt BIB EMS from home with the above complaint. Pt's hx obtained from daughter at bedside. Daughter states pt had a recent hx of an intracranial tumor removal and a pulmonary embolism. Pt was dc'd home on eliquis. This is the pt's first seizure, pt did not hit her head or fall to the ground.     BP (!) 146/82   Pulse 89   Temp 37.1 °C (98.7 °F) (Temporal)   Resp 20   Ht 1.626 m (5' 4\")   Wt 85.7 kg (189 lb)   SpO2 98%   BMI 32.44 kg/m²       "

## 2022-07-17 NOTE — ED NOTES
Blood work collected and sent to lab. Pt medicated according to MAR. Pt's daughter remains at bedside.

## 2022-07-27 ENCOUNTER — APPOINTMENT (OUTPATIENT)
Dept: RADIOLOGY | Facility: MEDICAL CENTER | Age: 82
End: 2022-07-27
Attending: EMERGENCY MEDICINE
Payer: MEDICAID

## 2022-07-27 ENCOUNTER — HOSPITAL ENCOUNTER (EMERGENCY)
Facility: MEDICAL CENTER | Age: 82
End: 2022-07-27
Attending: EMERGENCY MEDICINE
Payer: MEDICAID

## 2022-07-27 VITALS
HEIGHT: 66 IN | WEIGHT: 180 LBS | TEMPERATURE: 98.1 F | HEART RATE: 85 BPM | SYSTOLIC BLOOD PRESSURE: 126 MMHG | OXYGEN SATURATION: 96 % | RESPIRATION RATE: 16 BRPM | BODY MASS INDEX: 28.93 KG/M2 | DIASTOLIC BLOOD PRESSURE: 84 MMHG

## 2022-07-27 DIAGNOSIS — S09.90XA CLOSED HEAD INJURY, INITIAL ENCOUNTER: ICD-10-CM

## 2022-07-27 DIAGNOSIS — S00.83XA CONTUSION OF FACE, INITIAL ENCOUNTER: ICD-10-CM

## 2022-07-27 DIAGNOSIS — Z86.018 HISTORY OF MENINGIOMA: ICD-10-CM

## 2022-07-27 LAB
ALBUMIN SERPL BCP-MCNC: 3.7 G/DL (ref 3.2–4.9)
ALBUMIN/GLOB SERPL: 1.3 G/DL
ALP SERPL-CCNC: 96 U/L (ref 30–99)
ALT SERPL-CCNC: 21 U/L (ref 2–50)
ANION GAP SERPL CALC-SCNC: 12 MMOL/L (ref 7–16)
APTT PPP: 23.1 SEC (ref 24.7–36)
AST SERPL-CCNC: 20 U/L (ref 12–45)
BASOPHILS # BLD AUTO: 1.4 % (ref 0–1.8)
BASOPHILS # BLD: 0.09 K/UL (ref 0–0.12)
BILIRUB SERPL-MCNC: 0.2 MG/DL (ref 0.1–1.5)
BUN SERPL-MCNC: 8 MG/DL (ref 8–22)
CALCIUM SERPL-MCNC: 9.2 MG/DL (ref 8.5–10.5)
CHLORIDE SERPL-SCNC: 106 MMOL/L (ref 96–112)
CO2 SERPL-SCNC: 20 MMOL/L (ref 20–33)
CREAT SERPL-MCNC: 0.49 MG/DL (ref 0.5–1.4)
EOSINOPHIL # BLD AUTO: 0.16 K/UL (ref 0–0.51)
EOSINOPHIL NFR BLD: 2.4 % (ref 0–6.9)
ERYTHROCYTE [DISTWIDTH] IN BLOOD BY AUTOMATED COUNT: 48.9 FL (ref 35.9–50)
GFR SERPLBLD CREATININE-BSD FMLA CKD-EPI: 95 ML/MIN/1.73 M 2
GLOBULIN SER CALC-MCNC: 2.8 G/DL (ref 1.9–3.5)
GLUCOSE SERPL-MCNC: 111 MG/DL (ref 65–99)
HCT VFR BLD AUTO: 38.1 % (ref 37–47)
HGB BLD-MCNC: 12 G/DL (ref 12–16)
IMM GRANULOCYTES # BLD AUTO: 0.07 K/UL (ref 0–0.11)
IMM GRANULOCYTES NFR BLD AUTO: 1.1 % (ref 0–0.9)
INR PPP: 1.2 (ref 0.87–1.13)
LYMPHOCYTES # BLD AUTO: 2.66 K/UL (ref 1–4.8)
LYMPHOCYTES NFR BLD: 40.1 % (ref 22–41)
MCH RBC QN AUTO: 26.6 PG (ref 27–33)
MCHC RBC AUTO-ENTMCNC: 31.5 G/DL (ref 33.6–35)
MCV RBC AUTO: 84.5 FL (ref 81.4–97.8)
MONOCYTES # BLD AUTO: 0.56 K/UL (ref 0–0.85)
MONOCYTES NFR BLD AUTO: 8.4 % (ref 0–13.4)
NEUTROPHILS # BLD AUTO: 3.1 K/UL (ref 2–7.15)
NEUTROPHILS NFR BLD: 46.6 % (ref 44–72)
NRBC # BLD AUTO: 0 K/UL
NRBC BLD-RTO: 0 /100 WBC
PLATELET # BLD AUTO: 381 K/UL (ref 164–446)
PMV BLD AUTO: 10.4 FL (ref 9–12.9)
POTASSIUM SERPL-SCNC: 3.9 MMOL/L (ref 3.6–5.5)
PROT SERPL-MCNC: 6.5 G/DL (ref 6–8.2)
PROTHROMBIN TIME: 15 SEC (ref 12–14.6)
RBC # BLD AUTO: 4.51 M/UL (ref 4.2–5.4)
SODIUM SERPL-SCNC: 138 MMOL/L (ref 135–145)
WBC # BLD AUTO: 6.6 K/UL (ref 4.8–10.8)

## 2022-07-27 PROCEDURE — 70450 CT HEAD/BRAIN W/O DYE: CPT

## 2022-07-27 PROCEDURE — 36415 COLL VENOUS BLD VENIPUNCTURE: CPT

## 2022-07-27 PROCEDURE — 80053 COMPREHEN METABOLIC PANEL: CPT

## 2022-07-27 PROCEDURE — 72125 CT NECK SPINE W/O DYE: CPT

## 2022-07-27 PROCEDURE — 85025 COMPLETE CBC W/AUTO DIFF WBC: CPT

## 2022-07-27 PROCEDURE — 85610 PROTHROMBIN TIME: CPT

## 2022-07-27 PROCEDURE — 70486 CT MAXILLOFACIAL W/O DYE: CPT

## 2022-07-27 PROCEDURE — 85730 THROMBOPLASTIN TIME PARTIAL: CPT

## 2022-07-27 PROCEDURE — 99285 EMERGENCY DEPT VISIT HI MDM: CPT

## 2022-07-27 ASSESSMENT — FIBROSIS 4 INDEX: FIB4 SCORE: 1.106566670344976253

## 2022-07-27 NOTE — ED TRIAGE NOTES
"Chief Complaint   Patient presents with   • Head Injury     Pt had MGLF at home this morning, fell hitting head/face on concrete.  Pt has abrasion on nose and lip, and c/o frontal head pain.  Pt on Eliquis for previous DVT and PE.  Pt has hx brain tumor removal.     Pt BIB [facility] from home with above complaint.      BP (!) 166/82   Pulse 90   Resp 16   Ht 1.676 m (5' 6\")   Wt 81.6 kg (180 lb)   SpO2 97%   BMI 29.05 kg/m²     "

## 2022-07-27 NOTE — ED NOTES
VSS, pt cleared for discharge per ERP. PIVs removed. Catheters intact, dressings applied, no bleeding.   DC home with written and verbal instructions regarding f/u, activity and use of walker at home.  Daughter translating for patient per ok with patient. Verbalized understanding of all instructions, no further questions, pt WC out to ED lobby with all belongings and paperwork.

## 2022-07-27 NOTE — ED PROVIDER NOTES
"ED Provider Note    CHIEF COMPLAINT  Fall    HPI  Jan Palencia is a 82 y.o. female who presents for evaluation of fall.  The patient has a history of meningioma that required surgical intervention in May.  Patient subsequently presented for seizure-like activity.  In addition, the patient apparently has had a pulmonary embolism and is currently on Eliquis.  Today, the patient was walking when she fell landing on the right side of her face head.  The patient was brought in by EMS and seen as a \"TBI\" and seen emergently upon arrival.  Patient also complains of neck pain.  Patient is from [place] and speaks Lithuanian only.  The history was obtained through family members who provide information to the paramedics and then were used as translators.  Most recently the patient was diagnosed with a urinary tract infection as well as herpes zoster.    REVIEW OF SYSTEMS  See HPI for further details.  Unable to obtain good review of systems secondary to her underlying medical conditions as well as language barrier;    PAST MEDICAL HISTORY  Discharge summary from her hospitalization in May showed:      Patient is a 81-year-old female with a past medical history significant for obesity, new diagnosis of hypertension presented to the ER with headache, mild confusion hypertension and balance issues.  She was found to have a a right frontal meningioma with associated mass-effect on right frontal lobe with adjacent white matter edema.  Neurosurgery was consulted.  She was started on Keppra and Decadron.  She was initially admitted to the ICU for close monitoring.  IR was unable to perform embolization of feeder vessel due to vessel anatomy. She underwent a craniotomy and tumor resection on 5/19/22. Pathology confirmed that the mass is a meningioma.   Patient did well after surgery and was monitored initially in RICU. Patient was kept on decadron and Keppra  Her recovery was delayed by dizziness debility and she required a prolonged " stay to work with physical and occupational therapy. She also required titrations of medications. After titration of antihypertensives, her dizziness resolved. She is now cleared for discharge home with her daughter with supportive equipment including a wheelchair and bedside commode. She is able to get to and use the commode with minimal assistance at this time. She has mild leukocytosis, however it is improving and is related to the steroid taper that she is on, this will be followed up at the [facility].  Unfortunately, she lives in [place], she was just visiting her daughter here so she does not have [place] insurance and cannot obtain home health services. She will follow up with the [facility] clinic and plan is to return [place] once she is stronger.   She and her daughter agree to close follow up and to return to the ER if needed    FAMILY HISTORY  No family history on file.    SOCIAL HISTORY  Patient lives in [place] who is visiting her daughter here    SURGICAL HISTORY  Past Surgical History:   Procedure Laterality Date   • CRANIOTOMY STEALTH Right 5/19/2022    Procedure: CRANIOTOMY, USING FRAMELESS STEREOTAXY - FRONTAL FOR BRAIN TUMOR RESECTION;  Surgeon: Hakan Dhaliwal M.D.;  Location: SURGERY [facility];  Service: Neurosurgery       CURRENT MEDICATIONS  See nurses notes    ALLERGIES  Allergies   Allergen Reactions   • Gelatin Unspecified     Mu-ism BELIEFS   • Pork Derived Products Unspecified     Mu-ism BELIEFS       PHYSICAL EXAM  VITAL SIGNS: see nurses notes  Constitutional: 82-year-old female, baseline neurological and mental status per the family  HENT: Tenderness at the right forehead area and right zygomatic and facial region, along with abrasions to the right periorbital right cheek area but no suturable laceration; bilateral external ears normal, tympanic membranes clear, Oropharynx moist, No oral exudates, Nose normal.   Eyes: PERRL, EOMI, Conjunctiva normal, No discharge.   Neck: Neck is in a  cervical spine collar; tenderness of the posterior cervical spine region;  Cardiovascular: Normal heart rate, Normal rhythm, No murmurs, No rubs, No gallops.   Thorax & Lungs: Normal Equal breath sounds, No respiratory distress, No wheezing, no stridor, no rales. No chest tenderness.   Abdomen: Soft, nontender, nondistended, no organomegaly, positive bowel sounds normal in quality. No guarding or rebound.  Skin: Good skin turgor, pink, warm, dry. No rashes, petechiae, purpura. Normal capillary refill.   Back: No tenderness, No CVA tenderness.   Extremities: Intact distal pulses, No edema, No tenderness, No cyanosis, No clubbing. Vascular: Pulses are 2+, symmetric in the upper and lower extremities.  Musculoskeletal: diffuse arthritic changes; no tenderness to palpation or major deformities noted.   Neurologic: Awake, patient is at baseline mental status neurological status;      RADIOLOGY/PROCEDURES  CT-HEAD W/O   Final Result      1. No acute intracranial abnormality.   2. Stable postsurgical changes of right frontal craniotomy with right frontal lobe encephalomalacia and chronic right frontal subdural fluid collection with calcifications.   3. Stable left occipital lobe encephalomalacia from prior cortical infarct.   4. Stable diffuse chronic microangiopathic white matter changes versus demyelination or gliosis.         CT-CSPINE WITHOUT PLUS RECONS   Final Result      1. No acute cervical spine fracture or subluxation.   2. Anterolisthesis of C3 with respect to C4 (2 mm), degenerative.      CT-MAXILLOFACIAL W/O PLUS RECONS   Final Result      1. No acute facial bone fracture.   2. Stable chronic incompletely imaged postsurgical changes involving the right frontal bone from prior right frontal craniotomy.   3. Stable chronic right frontal subdural collection with calcification.            COURSE & MEDICAL DECISION MAKING  Pertinent Labs & Imaging studies reviewed. (See chart for details)  1.  Saline  lock    Laboratory studies: CBC shows white count 6.6, 46% neutrophils, 40% lymphocytes, 8% monocytes, hemoglobin 12.0, crit 38.1; CMP shows random glucose 111 otherwise within normal; INR 1.20; PTT 23;    Discussion: At this time, the patient presents after ground-level fall.  Imaging studies are negative for any acute traumatic injuries.  Laboratory studies are reassuring.  The patient's been observed with no deterioration in her status.  Based on these findings, the patient is stable for discharge.  Discussed the findings and treatment plan with family members.  The daughter did have a concern that the patient may have left knee pain.  The patient had full range of motion without discomfort and no joint effusion or ligamentous instability identified.  The patient told her daughter that she was not having significant pain.  Based on the clinical findings I do not feel we need to proceed with imaging studies as the likelihood of fractures minimal.  I discussed the findings and treatment plan with the daughter.  She indicate she is comfortable with this explanation disposition.    FINAL IMPRESSION  1. Closed head injury, initial encounter    2. Contusion of face, initial encounter    3. History of meningioma           PLAN  1.  Appropriate discharge instructions given  2.  Follow-up with her physicians  3.  Return anytime should to be change worsening symptoms or any new or concerning symptoms she is not experiencing at this time.    Electronically signed by: Guy G Gansert, M.D., 7/27/2022 8:19 AM

## 2022-07-27 NOTE — ED NOTES
Pt ambulatory to bathroom with 1 assist. No issues. Pt able to ambulate. assisted back into bed, on monitoring. No other needs at this time.

## 2022-08-17 ENCOUNTER — APPOINTMENT (OUTPATIENT)
Dept: RADIOLOGY | Facility: MEDICAL CENTER | Age: 82
End: 2022-08-17
Attending: EMERGENCY MEDICINE

## 2022-08-17 ENCOUNTER — HOSPITAL ENCOUNTER (OUTPATIENT)
Facility: MEDICAL CENTER | Age: 82
End: 2022-08-19
Attending: EMERGENCY MEDICINE | Admitting: STUDENT IN AN ORGANIZED HEALTH CARE EDUCATION/TRAINING PROGRAM

## 2022-08-17 DIAGNOSIS — M54.2 NECK PAIN: ICD-10-CM

## 2022-08-17 DIAGNOSIS — I10 PRIMARY HYPERTENSION: ICD-10-CM

## 2022-08-17 DIAGNOSIS — R51.9 SUDDEN ONSET OF SEVERE HEADACHE: ICD-10-CM

## 2022-08-17 DIAGNOSIS — R20.2 PARESTHESIAS: ICD-10-CM

## 2022-08-17 DIAGNOSIS — R60.0 LOWER EXTREMITY EDEMA: ICD-10-CM

## 2022-08-17 LAB
ALBUMIN SERPL BCP-MCNC: 4.1 G/DL (ref 3.2–4.9)
ALBUMIN/GLOB SERPL: 1.4 G/DL
ALP SERPL-CCNC: 105 U/L (ref 30–99)
ALT SERPL-CCNC: 18 U/L (ref 2–50)
ANION GAP SERPL CALC-SCNC: 11 MMOL/L (ref 7–16)
APTT PPP: 25.2 SEC (ref 24.7–36)
AST SERPL-CCNC: 19 U/L (ref 12–45)
BASOPHILS # BLD AUTO: 1 % (ref 0–1.8)
BASOPHILS # BLD: 0.07 K/UL (ref 0–0.12)
BILIRUB SERPL-MCNC: <0.2 MG/DL (ref 0.1–1.5)
BUN SERPL-MCNC: 13 MG/DL (ref 8–22)
CALCIUM SERPL-MCNC: 9.5 MG/DL (ref 8.5–10.5)
CHLORIDE SERPL-SCNC: 107 MMOL/L (ref 96–112)
CO2 SERPL-SCNC: 24 MMOL/L (ref 20–33)
CREAT SERPL-MCNC: 0.63 MG/DL (ref 0.5–1.4)
EKG IMPRESSION: NORMAL
EOSINOPHIL # BLD AUTO: 0.1 K/UL (ref 0–0.51)
EOSINOPHIL NFR BLD: 1.4 % (ref 0–6.9)
ERYTHROCYTE [DISTWIDTH] IN BLOOD BY AUTOMATED COUNT: 49.1 FL (ref 35.9–50)
GFR SERPLBLD CREATININE-BSD FMLA CKD-EPI: 90 ML/MIN/1.73 M 2
GLOBULIN SER CALC-MCNC: 2.9 G/DL (ref 1.9–3.5)
GLUCOSE SERPL-MCNC: 124 MG/DL (ref 65–99)
HCT VFR BLD AUTO: 39.6 % (ref 37–47)
HGB BLD-MCNC: 12.1 G/DL (ref 12–16)
IMM GRANULOCYTES # BLD AUTO: 0.03 K/UL (ref 0–0.11)
IMM GRANULOCYTES NFR BLD AUTO: 0.4 % (ref 0–0.9)
INR PPP: 1.03 (ref 0.87–1.13)
LYMPHOCYTES # BLD AUTO: 2.03 K/UL (ref 1–4.8)
LYMPHOCYTES NFR BLD: 29.3 % (ref 22–41)
MCH RBC QN AUTO: 26.2 PG (ref 27–33)
MCHC RBC AUTO-ENTMCNC: 30.6 G/DL (ref 33.6–35)
MCV RBC AUTO: 85.7 FL (ref 81.4–97.8)
MONOCYTES # BLD AUTO: 0.56 K/UL (ref 0–0.85)
MONOCYTES NFR BLD AUTO: 8.1 % (ref 0–13.4)
NEUTROPHILS # BLD AUTO: 4.15 K/UL (ref 2–7.15)
NEUTROPHILS NFR BLD: 59.8 % (ref 44–72)
NRBC # BLD AUTO: 0 K/UL
NRBC BLD-RTO: 0 /100 WBC
PLATELET # BLD AUTO: 391 K/UL (ref 164–446)
PMV BLD AUTO: 11.5 FL (ref 9–12.9)
POTASSIUM SERPL-SCNC: 4.1 MMOL/L (ref 3.6–5.5)
PROT SERPL-MCNC: 7 G/DL (ref 6–8.2)
PROTHROMBIN TIME: 13.4 SEC (ref 12–14.6)
RBC # BLD AUTO: 4.62 M/UL (ref 4.2–5.4)
SODIUM SERPL-SCNC: 142 MMOL/L (ref 135–145)
TROPONIN T SERPL-MCNC: 7 NG/L (ref 6–19)
WBC # BLD AUTO: 6.9 K/UL (ref 4.8–10.8)

## 2022-08-17 PROCEDURE — 71045 X-RAY EXAM CHEST 1 VIEW: CPT

## 2022-08-17 PROCEDURE — 96374 THER/PROPH/DIAG INJ IV PUSH: CPT

## 2022-08-17 PROCEDURE — 700105 HCHG RX REV CODE 258: Performed by: EMERGENCY MEDICINE

## 2022-08-17 PROCEDURE — 96375 TX/PRO/DX INJ NEW DRUG ADDON: CPT

## 2022-08-17 PROCEDURE — 85025 COMPLETE CBC W/AUTO DIFF WBC: CPT

## 2022-08-17 PROCEDURE — 99285 EMERGENCY DEPT VISIT HI MDM: CPT

## 2022-08-17 PROCEDURE — 83880 ASSAY OF NATRIURETIC PEPTIDE: CPT

## 2022-08-17 PROCEDURE — 85610 PROTHROMBIN TIME: CPT

## 2022-08-17 PROCEDURE — 36415 COLL VENOUS BLD VENIPUNCTURE: CPT

## 2022-08-17 PROCEDURE — 84484 ASSAY OF TROPONIN QUANT: CPT

## 2022-08-17 PROCEDURE — 83735 ASSAY OF MAGNESIUM: CPT

## 2022-08-17 PROCEDURE — 85730 THROMBOPLASTIN TIME PARTIAL: CPT

## 2022-08-17 PROCEDURE — 93005 ELECTROCARDIOGRAM TRACING: CPT | Performed by: EMERGENCY MEDICINE

## 2022-08-17 PROCEDURE — 700111 HCHG RX REV CODE 636 W/ 250 OVERRIDE (IP): Performed by: EMERGENCY MEDICINE

## 2022-08-17 PROCEDURE — 80053 COMPREHEN METABOLIC PANEL: CPT

## 2022-08-17 RX ORDER — ONDANSETRON 2 MG/ML
4 INJECTION INTRAMUSCULAR; INTRAVENOUS ONCE
Status: COMPLETED | OUTPATIENT
Start: 2022-08-17 | End: 2022-08-17

## 2022-08-17 RX ORDER — SODIUM CHLORIDE 9 MG/ML
INJECTION, SOLUTION INTRAVENOUS CONTINUOUS
Status: DISCONTINUED | OUTPATIENT
Start: 2022-08-17 | End: 2022-08-19

## 2022-08-17 RX ORDER — MORPHINE SULFATE 4 MG/ML
4 INJECTION INTRAVENOUS ONCE
Status: COMPLETED | OUTPATIENT
Start: 2022-08-17 | End: 2022-08-17

## 2022-08-17 RX ADMIN — MORPHINE SULFATE 4 MG: 4 INJECTION INTRAVENOUS at 23:11

## 2022-08-17 RX ADMIN — SODIUM CHLORIDE: 9 INJECTION, SOLUTION INTRAVENOUS at 23:12

## 2022-08-17 RX ADMIN — ONDANSETRON 4 MG: 2 INJECTION INTRAMUSCULAR; INTRAVENOUS at 23:11

## 2022-08-17 ASSESSMENT — FIBROSIS 4 INDEX: FIB4 SCORE: 0.92

## 2022-08-18 ENCOUNTER — APPOINTMENT (OUTPATIENT)
Dept: RADIOLOGY | Facility: MEDICAL CENTER | Age: 82
End: 2022-08-18
Attending: STUDENT IN AN ORGANIZED HEALTH CARE EDUCATION/TRAINING PROGRAM

## 2022-08-18 ENCOUNTER — APPOINTMENT (OUTPATIENT)
Dept: RADIOLOGY | Facility: MEDICAL CENTER | Age: 82
End: 2022-08-18
Attending: EMERGENCY MEDICINE

## 2022-08-18 ENCOUNTER — APPOINTMENT (OUTPATIENT)
Dept: CARDIOLOGY | Facility: MEDICAL CENTER | Age: 82
End: 2022-08-18
Attending: STUDENT IN AN ORGANIZED HEALTH CARE EDUCATION/TRAINING PROGRAM

## 2022-08-18 PROBLEM — R51.9 HEADACHE: Status: ACTIVE | Noted: 2022-08-18

## 2022-08-18 PROBLEM — Z86.718 HISTORY OF DVT (DEEP VEIN THROMBOSIS): Status: ACTIVE | Noted: 2022-08-18

## 2022-08-18 PROBLEM — R52 PAIN OF RIGHT SIDE OF BODY: Status: ACTIVE | Noted: 2022-08-18

## 2022-08-18 PROBLEM — M79.89 LEG SWELLING: Status: ACTIVE | Noted: 2022-08-18

## 2022-08-18 LAB
D DIMER PPP IA.FEU-MCNC: 0.54 UG/ML (FEU) (ref 0–0.5)
MAGNESIUM SERPL-MCNC: 2.2 MG/DL (ref 1.5–2.5)
NT-PROBNP SERPL IA-MCNC: 40 PG/ML (ref 0–125)

## 2022-08-18 PROCEDURE — 700117 HCHG RX CONTRAST REV CODE 255: Performed by: EMERGENCY MEDICINE

## 2022-08-18 PROCEDURE — 700102 HCHG RX REV CODE 250 W/ 637 OVERRIDE(OP): Performed by: STUDENT IN AN ORGANIZED HEALTH CARE EDUCATION/TRAINING PROGRAM

## 2022-08-18 PROCEDURE — 99220 PR INITIAL OBSERVATION CARE,LEVL III: CPT | Performed by: STUDENT IN AN ORGANIZED HEALTH CARE EDUCATION/TRAINING PROGRAM

## 2022-08-18 PROCEDURE — 70470 CT HEAD/BRAIN W/O & W/DYE: CPT

## 2022-08-18 PROCEDURE — G0378 HOSPITAL OBSERVATION PER HR: HCPCS

## 2022-08-18 PROCEDURE — 93970 EXTREMITY STUDY: CPT

## 2022-08-18 PROCEDURE — 93970 EXTREMITY STUDY: CPT | Mod: 26 | Performed by: INTERNAL MEDICINE

## 2022-08-18 PROCEDURE — 700105 HCHG RX REV CODE 258: Performed by: EMERGENCY MEDICINE

## 2022-08-18 PROCEDURE — 36415 COLL VENOUS BLD VENIPUNCTURE: CPT

## 2022-08-18 PROCEDURE — A9270 NON-COVERED ITEM OR SERVICE: HCPCS | Performed by: STUDENT IN AN ORGANIZED HEALTH CARE EDUCATION/TRAINING PROGRAM

## 2022-08-18 PROCEDURE — 85379 FIBRIN DEGRADATION QUANT: CPT

## 2022-08-18 RX ORDER — LEVETIRACETAM 500 MG/1
500 TABLET ORAL 2 TIMES DAILY
Status: DISCONTINUED | OUTPATIENT
Start: 2022-08-18 | End: 2022-08-18

## 2022-08-18 RX ORDER — ONDANSETRON 4 MG/1
4 TABLET, ORALLY DISINTEGRATING ORAL EVERY 4 HOURS PRN
Status: DISCONTINUED | OUTPATIENT
Start: 2022-08-18 | End: 2022-08-19 | Stop reason: HOSPADM

## 2022-08-18 RX ORDER — BUTALBITAL, ACETAMINOPHEN AND CAFFEINE 50; 325; 40 MG/1; MG/1; MG/1
1 TABLET ORAL
Status: DISCONTINUED | OUTPATIENT
Start: 2022-08-18 | End: 2022-08-19 | Stop reason: HOSPADM

## 2022-08-18 RX ORDER — OXYCODONE HYDROCHLORIDE 5 MG/1
2.5 TABLET ORAL
Status: DISCONTINUED | OUTPATIENT
Start: 2022-08-18 | End: 2022-08-19 | Stop reason: HOSPADM

## 2022-08-18 RX ORDER — POLYETHYLENE GLYCOL 3350 17 G/17G
1 POWDER, FOR SOLUTION ORAL
Status: DISCONTINUED | OUTPATIENT
Start: 2022-08-18 | End: 2022-08-19 | Stop reason: HOSPADM

## 2022-08-18 RX ORDER — AMOXICILLIN 250 MG
2 CAPSULE ORAL 2 TIMES DAILY
Status: DISCONTINUED | OUTPATIENT
Start: 2022-08-18 | End: 2022-08-19 | Stop reason: HOSPADM

## 2022-08-18 RX ORDER — AMITRIPTYLINE HYDROCHLORIDE 10 MG/1
10 TABLET, FILM COATED ORAL EVERY EVENING
Status: DISCONTINUED | OUTPATIENT
Start: 2022-08-18 | End: 2022-08-19 | Stop reason: HOSPADM

## 2022-08-18 RX ORDER — HYDROMORPHONE HYDROCHLORIDE 1 MG/ML
0.25 INJECTION, SOLUTION INTRAMUSCULAR; INTRAVENOUS; SUBCUTANEOUS
Status: DISCONTINUED | OUTPATIENT
Start: 2022-08-18 | End: 2022-08-19 | Stop reason: HOSPADM

## 2022-08-18 RX ORDER — BUTALBITAL, ACETAMINOPHEN AND CAFFEINE 50; 325; 40 MG/1; MG/1; MG/1
1 TABLET ORAL
Status: DISCONTINUED | OUTPATIENT
Start: 2022-08-18 | End: 2022-08-18

## 2022-08-18 RX ORDER — ACETAMINOPHEN 325 MG/1
650 TABLET ORAL EVERY 6 HOURS PRN
Status: DISCONTINUED | OUTPATIENT
Start: 2022-08-18 | End: 2022-08-19 | Stop reason: HOSPADM

## 2022-08-18 RX ORDER — ONDANSETRON 2 MG/ML
4 INJECTION INTRAMUSCULAR; INTRAVENOUS EVERY 4 HOURS PRN
Status: DISCONTINUED | OUTPATIENT
Start: 2022-08-18 | End: 2022-08-19 | Stop reason: HOSPADM

## 2022-08-18 RX ORDER — LISINOPRIL 40 MG/1
40 TABLET ORAL DAILY
Status: DISCONTINUED | OUTPATIENT
Start: 2022-08-18 | End: 2022-08-18

## 2022-08-18 RX ORDER — BISACODYL 10 MG
10 SUPPOSITORY, RECTAL RECTAL
Status: DISCONTINUED | OUTPATIENT
Start: 2022-08-18 | End: 2022-08-19 | Stop reason: HOSPADM

## 2022-08-18 RX ORDER — OXYCODONE HYDROCHLORIDE 5 MG/1
5 TABLET ORAL
Status: DISCONTINUED | OUTPATIENT
Start: 2022-08-18 | End: 2022-08-19 | Stop reason: HOSPADM

## 2022-08-18 RX ORDER — LISINOPRIL 20 MG/1
40 TABLET ORAL
Status: DISCONTINUED | OUTPATIENT
Start: 2022-08-18 | End: 2022-08-19 | Stop reason: HOSPADM

## 2022-08-18 RX ORDER — LABETALOL HYDROCHLORIDE 5 MG/ML
10 INJECTION, SOLUTION INTRAVENOUS EVERY 4 HOURS PRN
Status: DISCONTINUED | OUTPATIENT
Start: 2022-08-18 | End: 2022-08-19 | Stop reason: HOSPADM

## 2022-08-18 RX ADMIN — DOCUSATE SODIUM 50 MG AND SENNOSIDES 8.6 MG 2 TABLET: 8.6; 5 TABLET, FILM COATED ORAL at 19:36

## 2022-08-18 RX ADMIN — SODIUM CHLORIDE: 9 INJECTION, SOLUTION INTRAVENOUS at 05:31

## 2022-08-18 RX ADMIN — APIXABAN 5 MG: 5 TABLET, FILM COATED ORAL at 11:23

## 2022-08-18 RX ADMIN — AMITRIPTYLINE HYDROCHLORIDE 10 MG: 10 TABLET, FILM COATED ORAL at 19:36

## 2022-08-18 RX ADMIN — IOHEXOL 70 ML: 350 INJECTION, SOLUTION INTRAVENOUS at 02:45

## 2022-08-18 RX ADMIN — LISINOPRIL 40 MG: 20 TABLET ORAL at 19:36

## 2022-08-18 ASSESSMENT — LIFESTYLE VARIABLES
ON A TYPICAL DAY WHEN YOU DRINK ALCOHOL HOW MANY DRINKS DO YOU HAVE: 0
HAVE PEOPLE ANNOYED YOU BY CRITICIZING YOUR DRINKING: NO
CONSUMPTION TOTAL: NEGATIVE
DOES PATIENT WANT TO STOP DRINKING: NO
HAVE YOU EVER FELT YOU SHOULD CUT DOWN ON YOUR DRINKING: NO
EVER FELT BAD OR GUILTY ABOUT YOUR DRINKING: NO
HOW MANY TIMES IN THE PAST YEAR HAVE YOU HAD 5 OR MORE DRINKS IN A DAY: 0
TOTAL SCORE: 0
ALCOHOL_USE: NO
EVER HAD A DRINK FIRST THING IN THE MORNING TO STEADY YOUR NERVES TO GET RID OF A HANGOVER: NO
TOTAL SCORE: 0
AVERAGE NUMBER OF DAYS PER WEEK YOU HAVE A DRINK CONTAINING ALCOHOL: 0
TOTAL SCORE: 0

## 2022-08-18 ASSESSMENT — ENCOUNTER SYMPTOMS
DIZZINESS: 0
BACK PAIN: 1
HEADACHES: 1
CHILLS: 0
FEVER: 0
NAUSEA: 0
MYALGIAS: 1
VOMITING: 0
PALPITATIONS: 0

## 2022-08-18 ASSESSMENT — PAIN DESCRIPTION - PAIN TYPE
TYPE: ACUTE PAIN
TYPE: ACUTE PAIN

## 2022-08-18 ASSESSMENT — FIBROSIS 4 INDEX: FIB4 SCORE: 0.92

## 2022-08-18 NOTE — ASSESSMENT & PLAN NOTE
This was likely provoked following neurosurgical removal of the meningioma.  Daughter states patient completed 3 months of apixaban.  Repeat ultrasound showing no lower extremity DVT.    Discontinue apixaban   none

## 2022-08-18 NOTE — ED NOTES
Pharmacy Medication Reconciliation      ~Medication reconciliation updated and complete per patient daughter over the phone (Chantal @ [phone])  ~No oral ABX within the last 30 days  ~Patient home pharmacy: [facility]

## 2022-08-18 NOTE — ED NOTES
Pt laying quietly in gurney. Pt still c/o bilateral leg pain with swelling and right sided numbness but no apparent distress noted at this time. Breathing is non-labored with equal rise and fall of chest wall. VS stable in the monitor. Call light within reach and daughter at the bedside.

## 2022-08-18 NOTE — ED NOTES
Rounded on patient. Patient resting quietly in gurney. No signs of distress noted with non-labored breathing. Equal chest rise and fall. No further needs at this time. Call light within reach.

## 2022-08-18 NOTE — ASSESSMENT & PLAN NOTE
Blood pressures currently uncontrolled.  Systolic goal less than 150 given her age group.  Patient states she ran out of her blood pressure medication a few days ago.  Record shows she was taking lisinopril 40 mg daily.    Resume lisinopril 40 mg daily

## 2022-08-18 NOTE — ED NOTES
Pt resting in gurney. Pt is in no apparent distress. Breathing is non-labored with equal rise and fall of chest wall. VS stable in the monitor. Pt given warm blanket as per request.    Pending for ordered CT head at this time. Pt/ daughter aware and verbalized understanding.

## 2022-08-18 NOTE — ASSESSMENT & PLAN NOTE
Status post resection in May 2022 and CT head as seen above with fluid collection likely from postsurgical changes.  I personally reviewed the head CT.  Per my read no significant abnormalities.  No need for neurosurgical consultation at this time.

## 2022-08-18 NOTE — PROGRESS NOTES
Hospital Medicine Daily Progress Note    Date of Service  8/18/2022    Chief Complaint  Jan Palencia is a 82 y.o. female admitted 8/17/2022 with right-sided body pain and bilateral lower extremity edema.    Hospital Course  No notes on file    Interval Problem Update  Patient was admitted earlier this morning by my colleague Dr. Parry.  Patient is describing pain near the base of her head consistent with a tension headache.  I discussed with the patient as well as the daughter, reported that the patient had sudden onset of right-sided body pain as well as bilateral lower extremity swelling as she did when she had a DVT following her surgery back in May 2022.  Ultrasound of bilateral lower extremity was negative for DVT.  Echocardiogram is pending to assess for cause of her lower extremity edema.  Etiology of her right-sided body pain remains unclear.  Head CT was unremarkable.  I discussed the findings with the patient's daughter.  I personally reviewed the imaging studies with the head CT not showing any pneumocephalus.  Stated to the patient's daughter that the patient is safe to travel on an airplane.     services were used in the patient's primary language of Citizen of [place] Bulgarian.     Name or Number: Karrie [id number]  Mode of interpretation: iPad    Content of Interpretation:  History of present illness.  Physical examination.  Examination and plan of care.        I have discussed this patient's plan of care and discharge plan at IDT rounds today with Case Management, Nursing, Nursing leadership, and other members of the IDT team.    Consultants/Specialty  none    Code Status  Full Code    Disposition  Patient is not medically cleared for discharge.   Anticipate discharge to to home with close outpatient follow-up.  I have placed the appropriate orders for post-discharge needs.    Review of Systems  Review of Systems   Constitutional:  Negative for chills and fever.   Cardiovascular:  Negative  for chest pain and palpitations.   Gastrointestinal:  Negative for nausea and vomiting.   Musculoskeletal:  Positive for back pain, joint pain and myalgias.   Neurological:  Positive for headaches. Negative for dizziness.      Physical Exam  Temp:  [36.4 °C (97.6 °F)] 36.4 °C (97.6 °F)  Pulse:  [62-91] 65  Resp:  [12-20] 16  BP: (136-172)/() 171/72  SpO2:  [91 %-95 %] 93 %    Physical Exam  Vitals and nursing note reviewed.   Constitutional:       Appearance: Normal appearance. She is normal weight. She is not ill-appearing or diaphoretic.   HENT:      Head: Normocephalic and atraumatic.      Mouth/Throat:      Mouth: Mucous membranes are moist.      Pharynx: Oropharynx is clear. No oropharyngeal exudate.   Eyes:      General:         Right eye: No discharge.         Left eye: No discharge.      Conjunctiva/sclera: Conjunctivae normal.      Pupils: Pupils are equal, round, and reactive to light.   Cardiovascular:      Rate and Rhythm: Normal rate and regular rhythm.      Pulses: Normal pulses.      Heart sounds: Normal heart sounds. No murmur heard.  Pulmonary:      Effort: Pulmonary effort is normal. No respiratory distress.      Breath sounds: Normal breath sounds.   Abdominal:      General: Abdomen is flat. Bowel sounds are normal. There is no distension.      Palpations: Abdomen is soft.      Tenderness: There is no abdominal tenderness.   Musculoskeletal:      Cervical back: Neck supple. No tenderness.      Right lower leg: No edema.      Left lower leg: No edema.   Neurological:      Mental Status: She is alert.      Sensory: No sensory deficit.      Motor: No weakness.   Psychiatric:         Behavior: Behavior normal.         Thought Content: Thought content normal.       Fluids  No intake or output data in the 24 hours ending 08/18/22 1535    Laboratory  Recent Labs     08/17/22  6078   WBC 6.9   RBC 4.62   HEMOGLOBIN 12.1   HEMATOCRIT 39.6   MCV 85.7   MCH 26.2*   MCHC 30.6*   RDW 49.1   PLATELETCT  391   MPV 11.5     Recent Labs     08/17/22  2248   SODIUM 142   POTASSIUM 4.1   CHLORIDE 107   CO2 24   GLUCOSE 124*   BUN 13   CREATININE 0.63   CALCIUM 9.5     Recent Labs     08/17/22  2248   APTT 25.2   INR 1.03               Imaging  US-EXTREMITY VENOUS LOWER BILAT   Final Result      CT-HEAD WITH & W/O   Final Result         1.  Mild atrophy with nonspecific white matter changes, commonly associated with small vessel ischemia, otherwise indeterminate.   2.  Subdural fluid collection along the right cranial bone flap, with hazy dependent hyperdensity, appears stable since prior study, appearance most compatible with chronic postop changes.   3.  Area of enhancement along the dura of cranial bone flap, could represent postop changes, possibly dural tumoral extension although this may be unlikely, otherwise of uncertain significance.   4.  Cystic structure anterior to the right frontal lobe with slight peripheral enhancement, could represent postop changes with enhancement of vascular structure, otherwise of uncertain significance.   5.  Atherosclerosis      DX-CHEST-PORTABLE (1 VIEW)   Final Result         1.  Left basilar atelectasis or early infiltrates.   2.  Cardiomegaly      EC-ECHOCARDIOGRAM COMPLETE W/O CONT    (Results Pending)        Assessment/Plan  * Headache- (present on admission)  Assessment & Plan  Consistent with a tension headache.  Etiology unclear but suspect may be related to recent stress. Suspect may be related to stress.  Possibly from upcoming flight back to [place].    Trial of low-dose amitriptyline 10 mg every evening      Pain of right side of body- (present on admission)  Assessment & Plan  Etiology remains unclear.  Suspect may be related to stress.  Possibly from upcoming flight back to [place].    Trial low-dose amitriptyline    Leg swelling- (present on admission)  Assessment & Plan  Ultrasound of bilateral lower extremities negative for DVT.  D-dimer minimally elevated.  Chest  x-ray reveals cardiomegaly and possibly related to early heart failure, no oxygen requirement at this time      Follow-up echocardiogram    History of DVT (deep vein thrombosis)- (present on admission)  Assessment & Plan  This was likely provoked following neurosurgical removal of the meningioma.  Daughter states patient completed 3 months of apixaban.  Repeat ultrasound showing no lower extremity DVT.    Discontinue apixaban    Primary hypertension- (present on admission)  Assessment & Plan  Blood pressures currently uncontrolled.  Systolic goal less than 150 given her age group.  Patient states she ran out of her blood pressure medication a few days ago.  Record shows she was taking lisinopril 40 mg daily.    Resume lisinopril 40 mg daily    Meningioma (HCC)- (present on admission)  Assessment & Plan  Status post resection in May 2022 and CT head as seen above with fluid collection likely from postsurgical changes.  I personally reviewed the head CT.  Per my read no significant abnormalities.  No need for neurosurgical consultation at this time.       VTE prophylaxis: SCDs/TEDs and Xarelto 10 mg daily as prophylaxis    I have performed a physical exam and reviewed and updated ROS and Plan today (8/18/2022). In review of yesterday's note (8/17/2022), there are no changes except as documented above.

## 2022-08-18 NOTE — H&P
Hospital Medicine History & Physical Note    Date of Service  8/18/2022    Primary Care Physician  Pcp Not In Computer    Consultants  None    Code Status  Full Code    Chief Complaint  Chief Complaint   Patient presents with    Leg Swelling     Bilateral. Pitting on the R lower leg.     Numbness     Numbness/weakness to R side. Onset yesterday.        History of Presenting Illness  Jan Palencia is a 82 y.o. female who presented 8/17/2022 with complaints of bilateral lower extremity edema, paresthesias on her right leg.  She has a history of meningioma status post removal and CT head without contrast reveals mild atrophy, subdural fluid collection along the right cranial bone flap appearance compatible with chronic postoperative changes.  Unfortunately  services unable to interpret patient's dialect of Serbian/Azerbaijani and unable to get in touch with daughter at this point in time phone number  (Chantal) [phone].  ROS unable to be obtained at this time.    Vital signs at time presentation were as follows: 97.6, 91, 18, 143/79, 94% room air.    CT head reveals the above findings in HPI.    Chest x-ray performed and reveals left basilar atelectasis or early infiltrates and cardiomegaly.    CMP performed and relatively unremarkable.  Troponin at level of 7, PT/INR APTT within normal limits, magnesium within normal limits.  CBC performed and within normal limits.    Hospitalist consulted for admission.  Patient presumed to be full code and CODE STATUS to be obtained once daughter is able to be reached.  She will be optimized in ED and subsequently transferred to medical squires floor for further optimization medical management.      I discussed the plan of care with patient.    Review of Systems  Review of Systems   Unable to perform ROS: Language     Past Medical History   has no past medical history on file.    Surgical History   has a past surgical history that includes craniotomy stealth (Right,  5/19/2022).     Family History  family history is not on file.  Unable to obtain at time of evaluation.  Family history reviewed with patient. There is no family history that is pertinent to the chief complaint.     Social History   reports that she has never smoked. She has never used smokeless tobacco. She reports that she does not drink alcohol and does not use drugs.    Allergies  Allergies   Allergen Reactions    Gelatin Unspecified     Protestant BELIEFS    Pork Derived Products Unspecified     Protestant BELIEFS       Medications  Prior to Admission Medications   Prescriptions Last Dose Informant Patient Reported? Taking?   Acetaminophen (TYLENOL PO)  Family Member Yes No   Sig: Take 2 Tablets by mouth every 6 hours as needed (Mild or Moderate Pain).   Cyanocobalamin 2000 MCG Tab   No No   Sig: Take 1 Tablet by mouth every day.   apixaban (ELIQUIS) 5mg Tab   No No   Sig: Take 1 Tablet by mouth 2 times a day for 90 days. Indications: DVT/PE   levETIRAcetam (KEPPRA) 500 MG Tab   No No   Sig: Take 1 Tablet by mouth 2 times a day for 60 days.   lisinopril (PRINIVIL) 40 MG tablet  Patient's Home Pharmacy No No   Sig: Take 1 Tablet by mouth every day.   omeprazole (PRILOSEC) 20 MG delayed-release capsule  Patient's Home Pharmacy No No   Sig: Take 1 Capsule by mouth every day.   senna-docusate (PERICOLACE OR SENOKOT S) 8.6-50 MG Tab  Patient's Home Pharmacy No No   Sig: Take 1 Tablet by mouth every evening.      Facility-Administered Medications: None       Physical Exam  Temp:  [36.4 °C (97.6 °F)] 36.4 °C (97.6 °F)  Pulse:  [62-91] 68  Resp:  [12-20] 15  BP: (136-172)/(67-79) 158/76  SpO2:  [91 %-95 %] 93 %  Blood Pressure : (!) 158/76   Temperature: 36.4 °C (97.6 °F)   Pulse: 68   Respiration: 15   Pulse Oximetry: 93 %       Physical Exam  Constitutional:       Appearance: Normal appearance.   HENT:      Head: Normocephalic and atraumatic.      Mouth/Throat:      Mouth: Mucous membranes are moist.      Pharynx:  Oropharynx is clear. No posterior oropharyngeal erythema.   Eyes:      General: No scleral icterus.     Extraocular Movements: Extraocular movements intact.      Conjunctiva/sclera: Conjunctivae normal.      Pupils: Pupils are equal, round, and reactive to light.   Neck:      Vascular: No carotid bruit.   Cardiovascular:      Rate and Rhythm: Normal rate and regular rhythm.      Pulses: Normal pulses.      Heart sounds: Normal heart sounds. No murmur heard.    No friction rub. No gallop.   Pulmonary:      Effort: Pulmonary effort is normal.      Breath sounds: Normal breath sounds. No wheezing, rhonchi or rales.   Abdominal:      General: Abdomen is flat. Bowel sounds are normal. There is no distension.      Palpations: There is no mass.      Tenderness: There is no abdominal tenderness. There is no rebound.   Musculoskeletal:         General: No swelling. Normal range of motion.      Cervical back: Normal range of motion.      Right lower leg: Edema present.      Left lower leg: Edema present.   Lymphadenopathy:      Cervical: No cervical adenopathy.   Skin:     General: Skin is warm and dry.      Capillary Refill: Capillary refill takes less than 2 seconds.      Findings: No erythema or rash.   Neurological:      Mental Status: She is alert.      Cranial Nerves: No cranial nerve deficit.      Sensory: No sensory deficit.      Motor: No weakness.      Comments: Patient does not speak English and cannot understand myself,  unable to understand dialect and full neuro evaluation recommended when daughter is present or able to be spoken to over the phone.   Psychiatric:         Mood and Affect: Mood normal.         Behavior: Behavior normal.       Laboratory:  Recent Labs     08/17/22  2248   WBC 6.9   RBC 4.62   HEMOGLOBIN 12.1   HEMATOCRIT 39.6   MCV 85.7   MCH 26.2*   MCHC 30.6*   RDW 49.1   PLATELETCT 391   MPV 11.5     Recent Labs     08/17/22  2248   SODIUM 142   POTASSIUM 4.1   CHLORIDE 107   CO2 24    GLUCOSE 124*   BUN 13   CREATININE 0.63   CALCIUM 9.5     Recent Labs     08/17/22  2248   ALTSGPT 18   ASTSGOT 19   ALKPHOSPHAT 105*   TBILIRUBIN <0.2   GLUCOSE 124*     Recent Labs     08/17/22  2248   APTT 25.2   INR 1.03     No results for input(s): NTPROBNP in the last 72 hours.      Recent Labs     08/17/22  2248   TROPONINT 7     I have reviewed and interpreted the above twelve-lead EKG and do appreciate sinus rhythm without any ischemic changes and good R wave progression is seen in precordial leads.    Imaging:  CT-HEAD WITH & W/O   Final Result         1.  Mild atrophy with nonspecific white matter changes, commonly associated with small vessel ischemia, otherwise indeterminate.   2.  Subdural fluid collection along the right cranial bone flap, with hazy dependent hyperdensity, appears stable since prior study, appearance most compatible with chronic postop changes.   3.  Area of enhancement along the dura of cranial bone flap, could represent postop changes, possibly dural tumoral extension although this may be unlikely, otherwise of uncertain significance.   4.  Cystic structure anterior to the right frontal lobe with slight peripheral enhancement, could represent postop changes with enhancement of vascular structure, otherwise of uncertain significance.   5.  Atherosclerosis      DX-CHEST-PORTABLE (1 VIEW)   Final Result         1.  Left basilar atelectasis or early infiltrates.   2.  Cardiomegaly            I reviewed and interpreted the above CT head and do appreciate fluid collections likely postsurgical change as seen above.    Assessment/Plan:  Justification for Admission Status  I anticipate this patient will require at least two midnights for appropriate medical management, necessitating inpatient admission because she will need more work-up for her neurologic paresthesias and bilateral lower extremity edema.      * Headache- (present on admission)  Assessment & Plan  Unable to describe  headache to myself due to language barrier and Fioricet added  Analgesics for pain control  Recommend neurology/neurosurgical consultation in a.m. for chart review and any recommendations      Leg swelling- (present on admission)  Assessment & Plan  BMP ordered and pending  Chest x-ray reveals cardiomegaly and possibly related to early heart failure, no oxygen requirement at this time  Echocardiogram ordered and pending    Meningioma (HCC)- (present on admission)  Assessment & Plan  Status post resection and CT head as seen above with fluid collection likely from postsurgical changes  Consider neurosurgical consultation for evaluation of CT scan and can consider MRI if focal deficits present however not appreciated on examination        VTE prophylaxis: therapeutic anticoagulation with Eliquis

## 2022-08-18 NOTE — ED NOTES
In to check on patient. Patient states that she needs to use the restroom. Bedside commode provided. Patient able to stand and pivot to commode without difficulty. Patient winces in pain. Patient placed back in bed. Monitors in place and call light within reach. IVF continue to infuse without difficulty. No further needs noted.

## 2022-08-18 NOTE — ED NOTES
Patient sleeping but easily woken up with verbal stimuli. No signs of distress noted. Equal chest rise and fall with non-labored breathing. VS stable in the monitor. No further needs at this time. Call light within reach.

## 2022-08-18 NOTE — ASSESSMENT & PLAN NOTE
Consistent with a tension headache.  Etiology unclear but suspect may be related to recent stress. Suspect may be related to stress.  Possibly from upcoming flight back to [place].    Trial of low-dose amitriptyline 10 mg every evening

## 2022-08-18 NOTE — ED NOTES
Patient resting on gurney with eyes closed. Respirations even and unlabored. No s/s of distress noted. VSS. Call light within reach.

## 2022-08-18 NOTE — ED TRIAGE NOTES
Jan Palencia  82 y.o. female  Chief Complaint   Patient presents with    Leg Swelling     Bilateral. Pitting on the R lower leg.     Numbness     Numbness/weakness to R side. Onset yesterday.      Pt wheeled in wheelchair to triage for above complaint. Family member with patient for translation. Family reports hx of brain tumor resection and stroke that they were told about after the surgery.  strength equal bilaterally on upper and lower extremities.     Pt is alert, oriented, and follows commands. Pt speaking in full sentences and responds appropriately to questions with family member translating. No acute distress noted in triage and respirations are even and unlabored.     Pt placed in lobby and educated on triage process. Pt encouraged to alert staff for any changes in condition.

## 2022-08-18 NOTE — ASSESSMENT & PLAN NOTE
Etiology remains unclear.  Suspect may be related to stress.  Possibly from upcoming flight back to [place].    Trial low-dose amitriptyline

## 2022-08-18 NOTE — ASSESSMENT & PLAN NOTE
Ultrasound of bilateral lower extremities negative for DVT.  D-dimer minimally elevated.  Chest x-ray reveals cardiomegaly and possibly related to early heart failure, no oxygen requirement at this time      Follow-up echocardiogram

## 2022-08-18 NOTE — ED NOTES
Patient's daughter to bedside. Patient sitting up speaking to daughter. NAD noted. Patient medicated per MAR. No needs noted. Patient's daughter under impression she could take patient home. Explained that patient would be admitted. Patient would like to speak to MD. Will notify MD of daughters request.

## 2022-08-18 NOTE — ED NOTES
Patient alert awake and resting quietly in [facility]. No signs of distress noted and breathing is non-labored. Equal chest rise and fall. VS stable in the monitor. No further needs at this time. Call light within reach.

## 2022-08-18 NOTE — ED NOTES
Pt wheeled back to room in W/C back family. Pt did not want to get in a gown at the moment but will if ERP deems necessary. Pt is sitting upright in bed on the monitor with no needs at this time. Chart up for ERP.      Family at bedside will be .

## 2022-08-18 NOTE — ED NOTES
Pt assisted back to assigned room via wheelchair by this RN. Pt able to transfer from wheelchair to [facility] well with standby assist. Pt now laying quietly in [facility]. No apparent distress noted at this time. Breathing is non-labored with stable VS in the monitor. Blankets given and lights dimmed for comfort.

## 2022-08-18 NOTE — ED PROVIDER NOTES
ED Provider Note     Scribed for Zuleyka Ontiveros D.O. by Gael Yu. 8/17/2022, 10:23 PM.     Primary care provider: Pcp Not In Computer  Means of arrival: Walk-In         History obtained from: Daughter  History limited by: None    CHIEF COMPLAINT  Chief Complaint   Patient presents with    Leg Swelling     Bilateral. Pitting on the R lower leg.     Numbness     Numbness/weakness to R side. Onset yesterday.        HPI  Jan Palencia is a 82 y.o. female who presents to the emergency Department right arm, rib, and leg decreased sensation onset today. The daughter reports associated moderate headache, blurred vision, leg swelling, and moderate neck pain but denies weakness. She had a previous benign brain tumor removed 5/17/22 by Dr. Dhaliwal (Neurosurgery). She is taking Eliquis, but hasn't taken it today as her prescription ran out. She denies any allergies to medications. The patient speaks no english but the daughter translated.     REVIEW OF SYSTEMS  Pertinent positives include right arm, rib, leg swelling, and leg decreased sensation, headache, blurred vision, and neck pain. Pertinent negatives include no weakness.   See HPI for further details. All other systems are negative.    PAST MEDICAL HISTORY  Meningioma  Hypertension    FAMILY HISTORY  None noted    SOCIAL HISTORY  Social History     Tobacco Use    Smoking status: Never    Smokeless tobacco: Never   Vaping Use    Vaping Use: Never used   Substance Use Topics    Alcohol use: Never    Drug use: Never      Social History     Substance and Sexual Activity   Drug Use Never       SURGICAL HISTORY  Past Surgical History:   Procedure Laterality Date    CRANIOTOMY STEALTH Right 5/19/2022    Procedure: CRANIOTOMY, USING FRAMELESS STEREOTAXY - FRONTAL FOR BRAIN TUMOR RESECTION;  Surgeon: Hakan Dhaliwal M.D.;  Location: SURGERY [facility];  Service: Neurosurgery       CURRENT MEDICATIONS    Current Facility-Administered Medications:     NS infusion, , Intravenous,  "Continuous, Zuleyka Ontiveros D.O., Stopped at 08/18/22 0014    Current Outpatient Medications:     levETIRAcetam (KEPPRA) 500 MG Tab, Take 1 Tablet by mouth 2 times a day for 60 days., Disp: 120 Tablet, Rfl: 0    apixaban (ELIQUIS) 5mg Tab, Take 1 Tablet by mouth 2 times a day for 90 days. Indications: DVT/PE, Disp: 180 Tablet, Rfl: 0    Cyanocobalamin 2000 MCG Tab, Take 1 Tablet by mouth every day., Disp: 30 Tablet, Rfl: 3    Acetaminophen (TYLENOL PO), Take 2 Tablets by mouth every 6 hours as needed (Mild or Moderate Pain)., Disp: , Rfl:     lisinopril (PRINIVIL) 40 MG tablet, Take 1 Tablet by mouth every day., Disp: 30 Tablet, Rfl: 0    omeprazole (PRILOSEC) 20 MG delayed-release capsule, Take 1 Capsule by mouth every day., Disp: 30 Capsule, Rfl: 0    senna-docusate (PERICOLACE OR SENOKOT S) 8.6-50 MG Tab, Take 1 Tablet by mouth every evening., Disp: 30 Tablet, Rfl: 0    ALLERGIES  Allergies   Allergen Reactions    Gelatin Unspecified     Denominational BELIEFS    Pork Derived Products Unspecified     Denominational BELIEFS       PHYSICAL EXAM  VITAL SIGNS: BP (!) 147/67   Pulse 83   Temp 36.4 °C (97.6 °F) (Temporal)   Resp 20   Ht 1.676 m (5' 6\")   Wt 81.6 kg (180 lb) Comment: from previous admission, pt in wheelchair  SpO2 93%   BMI 29.05 kg/m²   Constitutional: Patient is well developed, well nourished. Non-toxic appearing.  Moderate distress from her head and neck pain.  HENT: Normocephalic, atraumatic. Nose normal with no mucosal edema or drainage. Oropharynx moist without erythema or exudates.  Eyes: PERRL, EOMI, Conjunctiva without erythema  Neck: Supple Normal range of motion   Lymphatic: No lymphadenopathy noted.   Cardiovascular: Normal heart rate and Regular rhythm. No murmur  Thorax & Lungs: Clear and equal breath sounds with good excursion. No respiratory distress, no rhonchi, wheezing or rales.   Abdomen: Bowel sounds normal in all four quadrants. Soft,nontender, no rebound , guarding, palpable masses. "   Skin: Warm, Dry  Back: No cervical, thoracic, or lumbosacral tenderness. No CVA tenderness.   Extremities: Complains of right arm, rib, and leg decreased sensation. Pain in bilateral lower extremities. No focal motor deficits. Bilateral lower extremity edema, 2+ no pitting. Peripheral pulses 4/4   Musculoskeletal: Normal range of motion in all major joints. No tenderness to palpation or major deformities noted.   Neurologic: Alert & oriented x 3, Normal motor function, Normal sensory function, No lateralizing or focal deficits noted. DTR's 4/4 bilaterally.  Psychiatric: Affect normal, Judgment normal, Mood normal.     DIAGNOSTICS/PROCEDURES    LABS  Results for orders placed or performed during the hospital encounter of 08/17/22   CBC WITH DIFFERENTIAL   Result Value Ref Range    WBC 6.9 4.8 - 10.8 K/uL    RBC 4.62 4.20 - 5.40 M/uL    Hemoglobin 12.1 12.0 - 16.0 g/dL    Hematocrit 39.6 37.0 - 47.0 %    MCV 85.7 81.4 - 97.8 fL    MCH 26.2 (L) 27.0 - 33.0 pg    MCHC 30.6 (L) 33.6 - 35.0 g/dL    RDW 49.1 35.9 - 50.0 fL    Platelet Count 391 164 - 446 K/uL    MPV 11.5 9.0 - 12.9 fL    Neutrophils-Polys 59.80 44.00 - 72.00 %    Lymphocytes 29.30 22.00 - 41.00 %    Monocytes 8.10 0.00 - 13.40 %    Eosinophils 1.40 0.00 - 6.90 %    Basophils 1.00 0.00 - 1.80 %    Immature Granulocytes 0.40 0.00 - 0.90 %    Nucleated RBC 0.00 /100 WBC    Neutrophils (Absolute) 4.15 2.00 - 7.15 K/uL    Lymphs (Absolute) 2.03 1.00 - 4.80 K/uL    Monos (Absolute) 0.56 0.00 - 0.85 K/uL    Eos (Absolute) 0.10 0.00 - 0.51 K/uL    Baso (Absolute) 0.07 0.00 - 0.12 K/uL    Immature Granulocytes (abs) 0.03 0.00 - 0.11 K/uL    NRBC (Absolute) 0.00 K/uL   COMP METABOLIC PANEL   Result Value Ref Range    Sodium 142 135 - 145 mmol/L    Potassium 4.1 3.6 - 5.5 mmol/L    Chloride 107 96 - 112 mmol/L    Co2 24 20 - 33 mmol/L    Anion Gap 11.0 7.0 - 16.0    Glucose 124 (H) 65 - 99 mg/dL    Bun 13 8 - 22 mg/dL    Creatinine 0.63 0.50 - 1.40 mg/dL    Calcium  9.5 8.5 - 10.5 mg/dL    AST(SGOT) 19 12 - 45 U/L    ALT(SGPT) 18 2 - 50 U/L    Alkaline Phosphatase 105 (H) 30 - 99 U/L    Total Bilirubin <0.2 0.1 - 1.5 mg/dL    Albumin 4.1 3.2 - 4.9 g/dL    Total Protein 7.0 6.0 - 8.2 g/dL    Globulin 2.9 1.9 - 3.5 g/dL    A-G Ratio 1.4 g/dL   TROPONIN   Result Value Ref Range    Troponin T 7 6 - 19 ng/L   APTT   Result Value Ref Range    APTT 25.2 24.7 - 36.0 sec   PROTHROMBIN TIME (INR)   Result Value Ref Range    PT 13.4 12.0 - 14.6 sec    INR 1.03 0.87 - 1.13   ESTIMATED GFR   Result Value Ref Range    GFR (CKD-EPI) 90 >60 mL/min/1.73 m 2   EKG (Now)   Result Value Ref Range    Report       [facility] Emergency Dept.    Test Date:  2022  Pt Name:    [facility]              Department: ER  MRN:        [id number]                      Room:       [facility]  Gender:     Female                       Technician: [id number]  :        1940                   Requested By:ELDER LEON  Order #:    [id number]                    Reading MD:    Measurements  Intervals                                Axis  Rate:       72                           P:          68  MT:         183                          QRS:        46  QRSD:       98                           T:          61  QT:         409  QTc:        448    Interpretive Statements  Sinus rhythm  Minimal ST elevation, anterior leads  Compared to ECG 2022 17:39:06  No significant changes        Labs reviewed by me    EKG  Results for orders placed or performed during the hospital encounter of 22   EKG (Now)   Result Value Ref Range    Report       [facility] Emergency Dept.    Test Date:  2022  Pt Name:    [facility]              Department: ER  MRN:        [id number]                      Room:        61  Gender:     Female                       Technician: [id number]  :        1940                   Requested By:ELDER LEON  Order #:    [id number]                     Reading MD:    Measurements  Intervals                                Axis  Rate:       72                           P:          68  CA:         183                          QRS:        46  QRSD:       98                           T:          61  QT:         409  QTc:        448    Interpretive Statements  Sinus rhythm  Minimal ST elevation, anterior leads  Compared to ECG 06/26/2022 17:39:06  No significant changes          RADIOLOGY/PROCEDURES  CT-HEAD WITH & W/O   Final Result         1.  Mild atrophy with nonspecific white matter changes, commonly associated with small vessel ischemia, otherwise indeterminate.   2.  Subdural fluid collection along the right cranial bone flap, with hazy dependent hyperdensity, appears stable since prior study, appearance most compatible with chronic postop changes.   3.  Area of enhancement along the dura of cranial bone flap, could represent postop changes, possibly dural tumoral extension although this may be unlikely, otherwise of uncertain significance.   4.  Cystic structure anterior to the right frontal lobe with slight peripheral enhancement, could represent postop changes with enhancement of vascular structure, otherwise of uncertain significance.   5.  Atherosclerosis      DX-CHEST-PORTABLE (1 VIEW)   Final Result         1.  Left basilar atelectasis or early infiltrates.   2.  Cardiomegaly            Results and radiologist interpretation reviewed by me.     COURSE & MEDICAL DECISION MAKING  Pertinent Labs & Imaging studies reviewed. (See chart for details)    10:23 PM - Patient seen and evaluated at bedside. Ordered for DX-chest, CT-head, APTT, Prothrombin, CBC w/ diff, CMP, and Troponin to evaluate. Patient will be treated with NS continuous infusion, morphine 4mg injection, and Zofran 4mg injection for her symptoms. Differential diagnoses include, but are not limited to, Cerebral edema, intercranial bleed    Patient was given fluids, morphine and Zofran with some  improvement but due to her recent surgery, severe head pain and neck pain along with her neurologic symptoms associated with her recent surgery.      HYDRATION: Based on the patient's presentation of Abnormal Fluid Loss the patient was given IV fluids. IV Hydration was used because oral hydration was not adequate alone. Upon recheck following hydration, the patient was improved.   3:49 am paged hospitalist  Case was discussed and patient will be admitted into the hospital.  She is in guarded condition      FINAL IMPRESSION  1. Sudden onset of severe headache    2. Neck pain    3. Paresthesias    4. Lower extremity edema         Gael HARRIS (Tomeka), am scribing for, and in the presence of, Zuleyka Ontiveros D.O..    Electronically signed by: Gael Yu (Tomeka), 8/17/2022    Zuleyka HARRIS D.O. personally performed the services described in this documentation, as scribed by Gael Yu in my presence, and it is both accurate and complete.    The note accurately reflects work and decisions made by me.  Zuleyka Ontiveros D.O.  8/18/2022  3:50 AM

## 2022-08-19 ENCOUNTER — PHARMACY VISIT (OUTPATIENT)
Dept: PHARMACY | Facility: MEDICAL CENTER | Age: 82
End: 2022-08-19
Payer: COMMERCIAL

## 2022-08-19 ENCOUNTER — APPOINTMENT (OUTPATIENT)
Dept: CARDIOLOGY | Facility: MEDICAL CENTER | Age: 82
End: 2022-08-19
Attending: STUDENT IN AN ORGANIZED HEALTH CARE EDUCATION/TRAINING PROGRAM

## 2022-08-19 VITALS
HEART RATE: 63 BPM | SYSTOLIC BLOOD PRESSURE: 134 MMHG | HEIGHT: 66 IN | OXYGEN SATURATION: 94 % | DIASTOLIC BLOOD PRESSURE: 69 MMHG | TEMPERATURE: 97.6 F | WEIGHT: 179.9 LBS | BODY MASS INDEX: 28.91 KG/M2 | RESPIRATION RATE: 17 BRPM

## 2022-08-19 PROBLEM — M79.89 LEG SWELLING: Status: RESOLVED | Noted: 2022-08-18 | Resolved: 2022-08-19

## 2022-08-19 PROBLEM — R52 PAIN OF RIGHT SIDE OF BODY: Status: RESOLVED | Noted: 2022-08-18 | Resolved: 2022-08-19

## 2022-08-19 LAB
ALBUMIN SERPL BCP-MCNC: 3.4 G/DL (ref 3.2–4.9)
ALBUMIN/GLOB SERPL: 1.3 G/DL
ALP SERPL-CCNC: 94 U/L (ref 30–99)
ALT SERPL-CCNC: 16 U/L (ref 2–50)
ANION GAP SERPL CALC-SCNC: 12 MMOL/L (ref 7–16)
AST SERPL-CCNC: 15 U/L (ref 12–45)
BASOPHILS # BLD AUTO: 0.9 % (ref 0–1.8)
BASOPHILS # BLD: 0.05 K/UL (ref 0–0.12)
BILIRUB SERPL-MCNC: 0.3 MG/DL (ref 0.1–1.5)
BUN SERPL-MCNC: 6 MG/DL (ref 8–22)
CALCIUM SERPL-MCNC: 8.9 MG/DL (ref 8.5–10.5)
CHLORIDE SERPL-SCNC: 107 MMOL/L (ref 96–112)
CO2 SERPL-SCNC: 22 MMOL/L (ref 20–33)
CREAT SERPL-MCNC: 0.47 MG/DL (ref 0.5–1.4)
EOSINOPHIL # BLD AUTO: 0.21 K/UL (ref 0–0.51)
EOSINOPHIL NFR BLD: 3.6 % (ref 0–6.9)
ERYTHROCYTE [DISTWIDTH] IN BLOOD BY AUTOMATED COUNT: 48.3 FL (ref 35.9–50)
GFR SERPLBLD CREATININE-BSD FMLA CKD-EPI: 96 ML/MIN/1.73 M 2
GLOBULIN SER CALC-MCNC: 2.7 G/DL (ref 1.9–3.5)
GLUCOSE SERPL-MCNC: 94 MG/DL (ref 65–99)
HCT VFR BLD AUTO: 38 % (ref 37–47)
HGB BLD-MCNC: 11.5 G/DL (ref 12–16)
IMM GRANULOCYTES # BLD AUTO: 0.02 K/UL (ref 0–0.11)
IMM GRANULOCYTES NFR BLD AUTO: 0.3 % (ref 0–0.9)
LV EJECT FRACT  99904: 75
LV EJECT FRACT MOD 2C 99903: 71.14
LV EJECT FRACT MOD 4C 99902: 75.1
LV EJECT FRACT MOD BP 99901: 72.95
LYMPHOCYTES # BLD AUTO: 1.8 K/UL (ref 1–4.8)
LYMPHOCYTES NFR BLD: 30.8 % (ref 22–41)
MCH RBC QN AUTO: 25.6 PG (ref 27–33)
MCHC RBC AUTO-ENTMCNC: 30.3 G/DL (ref 33.6–35)
MCV RBC AUTO: 84.6 FL (ref 81.4–97.8)
MONOCYTES # BLD AUTO: 0.4 K/UL (ref 0–0.85)
MONOCYTES NFR BLD AUTO: 6.8 % (ref 0–13.4)
NEUTROPHILS # BLD AUTO: 3.37 K/UL (ref 2–7.15)
NEUTROPHILS NFR BLD: 57.6 % (ref 44–72)
NRBC # BLD AUTO: 0 K/UL
NRBC BLD-RTO: 0 /100 WBC
PLATELET # BLD AUTO: 352 K/UL (ref 164–446)
PMV BLD AUTO: 11.2 FL (ref 9–12.9)
POTASSIUM SERPL-SCNC: 3.8 MMOL/L (ref 3.6–5.5)
PROT SERPL-MCNC: 6.1 G/DL (ref 6–8.2)
RBC # BLD AUTO: 4.49 M/UL (ref 4.2–5.4)
SODIUM SERPL-SCNC: 141 MMOL/L (ref 135–145)
WBC # BLD AUTO: 5.9 K/UL (ref 4.8–10.8)

## 2022-08-19 PROCEDURE — 93306 TTE W/DOPPLER COMPLETE: CPT

## 2022-08-19 PROCEDURE — 97162 PT EVAL MOD COMPLEX 30 MIN: CPT

## 2022-08-19 PROCEDURE — 97165 OT EVAL LOW COMPLEX 30 MIN: CPT

## 2022-08-19 PROCEDURE — 85025 COMPLETE CBC W/AUTO DIFF WBC: CPT

## 2022-08-19 PROCEDURE — 700102 HCHG RX REV CODE 250 W/ 637 OVERRIDE(OP): Performed by: STUDENT IN AN ORGANIZED HEALTH CARE EDUCATION/TRAINING PROGRAM

## 2022-08-19 PROCEDURE — 99217 PR OBSERVATION CARE DISCHARGE: CPT | Performed by: NURSE PRACTITIONER

## 2022-08-19 PROCEDURE — G0378 HOSPITAL OBSERVATION PER HR: HCPCS

## 2022-08-19 PROCEDURE — A9270 NON-COVERED ITEM OR SERVICE: HCPCS | Performed by: STUDENT IN AN ORGANIZED HEALTH CARE EDUCATION/TRAINING PROGRAM

## 2022-08-19 PROCEDURE — 96375 TX/PRO/DX INJ NEW DRUG ADDON: CPT

## 2022-08-19 PROCEDURE — RXMED WILLOW AMBULATORY MEDICATION CHARGE: Performed by: NURSE PRACTITIONER

## 2022-08-19 PROCEDURE — 700111 HCHG RX REV CODE 636 W/ 250 OVERRIDE (IP): Performed by: NURSE PRACTITIONER

## 2022-08-19 PROCEDURE — 93306 TTE W/DOPPLER COMPLETE: CPT | Mod: 26 | Performed by: INTERNAL MEDICINE

## 2022-08-19 PROCEDURE — 80053 COMPREHEN METABOLIC PANEL: CPT

## 2022-08-19 RX ORDER — FUROSEMIDE 10 MG/ML
20 INJECTION INTRAMUSCULAR; INTRAVENOUS ONCE
Status: COMPLETED | OUTPATIENT
Start: 2022-08-19 | End: 2022-08-19

## 2022-08-19 RX ORDER — LISINOPRIL 40 MG/1
40 TABLET ORAL DAILY
Qty: 30 TABLET | Refills: 0 | Status: SHIPPED | OUTPATIENT
Start: 2022-08-20 | End: 2022-09-19

## 2022-08-19 RX ORDER — AMITRIPTYLINE HYDROCHLORIDE 10 MG/1
10 TABLET, FILM COATED ORAL NIGHTLY
Qty: 30 TABLET | Refills: 0 | Status: SHIPPED | OUTPATIENT
Start: 2022-08-19 | End: 2022-09-18

## 2022-08-19 RX ADMIN — LISINOPRIL 40 MG: 20 TABLET ORAL at 04:57

## 2022-08-19 RX ADMIN — DOCUSATE SODIUM 50 MG AND SENNOSIDES 8.6 MG 2 TABLET: 8.6; 5 TABLET, FILM COATED ORAL at 04:57

## 2022-08-19 RX ADMIN — FUROSEMIDE 20 MG: 10 INJECTION, SOLUTION INTRAMUSCULAR; INTRAVENOUS at 14:29

## 2022-08-19 ASSESSMENT — GAIT ASSESSMENTS
DISTANCE (FEET): 50
ASSISTIVE DEVICE: NONE;HAND HELD ASSIST
DISTANCE (FEET): 100
DEVIATION: BRADYKINETIC;DECREASED HEEL STRIKE;DECREASED TOE OFF
GAIT LEVEL OF ASSIST: STANDBY ASSIST

## 2022-08-19 ASSESSMENT — COGNITIVE AND FUNCTIONAL STATUS - GENERAL
CLIMB 3 TO 5 STEPS WITH RAILING: A LITTLE
DAILY ACTIVITIY SCORE: 24
WALKING IN HOSPITAL ROOM: A LITTLE
MOBILITY SCORE: 20
MOVING FROM LYING ON BACK TO SITTING ON SIDE OF FLAT BED: A LITTLE
MOVING TO AND FROM BED TO CHAIR: A LITTLE
SUGGESTED CMS G CODE MODIFIER DAILY ACTIVITY: CH
SUGGESTED CMS G CODE MODIFIER MOBILITY: CJ

## 2022-08-19 ASSESSMENT — PAIN DESCRIPTION - PAIN TYPE: TYPE: ACUTE PAIN

## 2022-08-19 NOTE — THERAPY
08/19/22 0910   Interdisciplinary Plan of Care Collaboration   Collaboration Comments OT referral received. Pt is non-english speaking and dialect is not available on Language Line. Family not present. RN will notify OT when family is present to assist with interpretation for OT eval.

## 2022-08-19 NOTE — PROGRESS NOTES
Attempted to obtain language interpretation and per services there are no available interpreters for Turkish at this time. Hospitalist notified of language barrier for assessment at  this time. Neuro assessment performed by RN to best of ability without interpretation. Patient VS stable, patient now resting quietly in bed.

## 2022-08-19 NOTE — PROGRESS NOTES
Patient care assumed. Patient speaks Morocan Latvian and per  services there are no available interpreters at this time. Patient family was called by RN, with no response. Assessment completed as much as possible without interpretation available. Patient denies pain interventions at this time. Patient VS stable, bed locked and in lowest position, bed alarm active, patient belongings within reach of patient. RN will attempt to gain language interpretation.

## 2022-08-19 NOTE — DISCHARGE INSTRUCTIONS
Discharge Instructions    Discharged to home by car with relative. Discharged via wheelchair, hospital escort: Yes.  Special equipment needed: Not Applicable    Be sure to schedule a follow-up appointment with your primary care doctor or any specialists as instructed.     Discharge Plan:        I understand that a diet low in cholesterol, fat, and sodium is recommended for good health. Unless I have been given specific instructions below for another diet, I accept this instruction as my diet prescription.   Other diet: Regular    Special Instructions: None    -Is this patient being discharged with medication to prevent blood clots?  No    Is patient discharged on Warfarin / Coumadin?   No   FOLLOW UP ITEMS POST DISCHARGE  Please call [phone] to schedule PCP appointment for patient.    Required specialty appointments include:       Discharge Instructions per [facility], A.P.RTishaN.    -Follow-up with PCP in [place] for management of care  -Start taking lisinopril for better blood pressure control  -Start taking amitriptyline to help with paresthesia  -Patient cleared to travel; will need to take baby aspirin for long flights    DIET: As tolerated    ACTIVITY: As tolerated    DIAGNOSIS: Bilateral lower extremity edema    Return to ER if symptoms persist, chest pain, palpitations, shortness of breath, numbness, tingling, weakness, and high fevers.

## 2022-08-19 NOTE — DISCHARGE PLANNING
Meds-to-Beds: Discharge prescription orders listed below delivered to patient's bedside LAURO Clark. Patient's daughter counseled via phone. Daughter may come to pharmacy with payment, otherwise she elected to have co-payment billed to patient account.     Current Outpatient Medications   Medication Sig Dispense Refill    amitriptyline (ELAVIL) 10 MG Tab Take 1 Tablet by mouth every evening for 30 days. 30 Tablet 0    [START ON 8/20/2022] lisinopril (PRINIVIL) 40 MG tablet Take 1 Tablet by mouth every day for 30 days. 30 Tablet 0      Azucena Mark, PharmD

## 2022-08-19 NOTE — CARE PLAN
The patient is Watcher - Medium risk of patient condition declining or worsening    Shift Goals  Clinical Goals: Neuro assessment, pain control  Patient Goals: comfort, rest  Family Goals: not present      Problem: Knowledge Deficit - Standard  Goal: Patient and family/care givers will demonstrate understanding of plan of care, disease process/condition, diagnostic tests and medications  Outcome: Progressing     Problem: Fall Risk  Goal: Patient will remain free from falls  Outcome: Progressing         
[de-identified] : The patient comes in today for a followup evaluation, and a reassessment of his pulmonary status.\par \par The patient was initially seen by myself, on 4/26/18 for an evaluation of possible sleep apnea. At that time, the patient was noted to have significant snoring with episodes of cessation of breathing while sleeping. The patient had rare dreams. He does also complain of daytime fatigue. He does frequently nap.\par \par The patient did undergo a home polysomnogram on 6/30/18. The study was remarkable for a respiratory disturbance index of 76.5. He also demonstrated significant desaturations down to 77%. He now comes in to discuss the results of the study. Of note is the fact that he still has significant daytime fatigue and somnolence. According to his wife, the snoring still persists.

## 2022-08-19 NOTE — THERAPY
Occupational Therapy   Initial Evaluation     Patient Name: Jan Palencia  Age:  82 y.o., Sex:  female  Medical Record #: [id number]  Today's Date: 8/19/2022     Precautions: Fall Risk  Comments: language not available on  services    Assessment    Patient is 82 y.o. female with h/o benign brain tumor, admitted with HA, blurred vision, R-sided sensory changes. Pt seen for OT eval. Pt speaks Belizean Telugu which is not available on Language Line. Daughter has been assisting with interpretation. Per RN, daughter was at bedside  but left to retrieve pt's toiletries. Completed OT assessment via pantomime and gesturing. Pt received standing in room, indicating need to use bathroom. Pt able to mobilize to bathroom in hallway without AD, no LOB. Able to complete entire toileting task as well as standing oral care without assist or difficulty. Pt's UE intact for AROM/strength/coordination. Pt appears to be at baseline function and is completing BADL and transfers without assist in this setting. No further acute OT needs at this time.     Plan    Recommend Occupational Therapy for Evaluation only     DC Equipment Recommendations: None  Discharge Recommendations: Anticipate that the patient will have no further occupational therapy needs after discharge from the hospital      Objective       08/19/22 1209   Prior Living Situation   Comments Interpretive services not available for pt's language. Per chart she is visiting from [place] with plan to return home tomorrow.   Precautions   Comments Pt's primary language is not on Language Line   Cognition    Level of Consciousness Alert   Comments Following pantomime and gestural commands; unable to formally assess cognition due to  not available   Active ROM Upper Body   Active ROM Upper Body  WDL   Coordination Upper Body   Coordination WDL   Balance Assessment   Sitting Balance (Static) Normal   Sitting Balance (Dynamic) Good   Standing Balance (Static) Good    Standing Balance (Dynamic) Fair +   Weight Shift Sitting Good   Weight Shift Standing Good   Comments no AD, no LOB   Bed Mobility    Supine to Sit Modified Independent   Sit to Supine Modified Independent   Scooting Modified Independent   Comments flat bed, no rail   ADL Assessment   Grooming Standing;Supervision  (oral care at sink)   Toileting Supervision  (urination on toilet; able to manage clothing & hygiene)   Functional Mobility   Sit to Stand Supervised   Bed, Chair, Wheelchair Transfer Supervised   Toilet Transfers Supervised   Transfer Method Stand Step  (no AD)   Mobility Supine > < EOB, gait to/from tucker bathroom without AD   Activity Tolerance   Sitting in Chair 3 min on toilet   Sitting Edge of Bed 3 min   Standing 8 min total   Comments no overt signs of fatigue, dizziness, SOB, pain

## 2022-08-19 NOTE — PROGRESS NOTES
Dr. Barajas provided language interpretation for patient assessment. Patient states moderate headache pain, but denies interventions besides rest at this time. Patient neuro assessment completed. Patient toileted and assisted back in to bed. Patient denies other needs at this time.

## 2022-08-19 NOTE — DISCHARGE SUMMARY
Discharge Summary    CHIEF COMPLAINT ON ADMISSION  Chief Complaint   Patient presents with    Leg Swelling     Bilateral. Pitting on the R lower leg.     Numbness     Numbness/weakness to R side. Onset yesterday.        Reason for Admission  Leg Pain     Admission Date  8/17/2022    CODE STATUS  Full Code    HPI & HOSPITAL COURSE    Ms. Jan Palencia is a Nepalese English speaking female with a PMHx meningioma status post removal, hypertension, admitted on 8/17/2022 due to complaints of bilateral lower extremity edema, and paresthesia in her right leg.    Patient normally follows providers in [place] where she normally resides.  Patient was on a plane coming from [place] to visit family here, and progressively had gotten worse with her leg swelling and paresthesia in her right side.    In ER, CT of the head without contrast was obtained which revealed mild atrophy, subdural fluid collection along the right cranial bone flap and appearance compatible with chronic postoperative changes.  Ultrasound of the bilateral lower extremity was obtained which noted all veins demonstrate complete color filling and compressibility with normal venous flow dynamics including spontaneous flow and respiratory necessity with no deep venous thrombosis.  Patient admitted into the observation unit for further evaluation and treatment.    During this course of stay, no events noted overnight.  An echocardiogram was obtained which noted LVEF approximately 75%.  On examination, patient noted to have some trace edema of which fluids were discontinued and patient given 1 dose of IV Lasix 20 mg.  Discussed with patient along with patient's daughter, Chantal who was at bedside and utilize family to communicate with patient.    At this time, all tests are negative.  Patient denies any paresthesia at this time and trace edema is improving with use of Lasix.  Discussed with patient and family that patient will be cleared to travel back to [place].   Discussed with patient taking aspirin for the flight as she will be sitting down for long periods of time.  Discussed with patient to keep her self hydrated during flight.  Patient prescribed antihypertensive medication for better control of her blood pressure at home.  Patient will also be trialed on amitriptyline for better control of paresthesia if any arise.  Patient highly encouraged to follow-up with her PCP in [place] for management of care.  All questions and concerns answered prior to being discharged.  Patient discharged home.    Therefore, she is discharged in good and stable condition to home with close outpatient follow-up.    The patient recovered much more quickly than anticipated on admission.    Discharge Date  08/19/22      FOLLOW UP ITEMS POST DISCHARGE  Please call [phone] to schedule PCP appointment for patient.    Required specialty appointments include:       Discharge Instructions per [facility] A.P.R.NTisha    -Follow-up with PCP in [place] for management of care  -Start taking lisinopril for better blood pressure control  -Start taking amitriptyline to help with paresthesia  -Patient cleared to travel; will need to take baby aspirin for long flights    DIET: As tolerated    ACTIVITY: As tolerated    DIAGNOSIS: Bilateral lower extremity edema    Return to ER if symptoms persist, chest pain, palpitations, shortness of breath, numbness, tingling, weakness, and high fevers.    DISCHARGE DIAGNOSES  Principal Problem:    Headache POA: Yes  Active Problems:    Meningioma (HCC) POA: Yes    Primary hypertension POA: Yes    History of DVT (deep vein thrombosis) POA: Yes  Resolved Problems:    Leg swelling POA: Yes    Pain of right side of body POA: Yes      FOLLOW UP  No future appointments.  No follow-up provider specified.    MEDICATIONS ON DISCHARGE     Medication List        START taking these medications        Instructions   amitriptyline 10 MG Tabs  Commonly known as:  ELAVIL   Take 1 Tablet by mouth every evening for 30 days.  Dose: 10 mg     lisinopril 40 MG tablet  Start taking on: August 20, 2022  Commonly known as: PRINIVIL   Take 1 Tablet by mouth every day for 30 days.  Dose: 40 mg            CONTINUE taking these medications        Instructions   Eliquis 5mg Tabs  Generic drug: apixaban   Take 1 Tablet by mouth 2 times a day for 90 days. Indications: DVT/PE  Dose: 5 mg              Allergies  Allergies   Allergen Reactions    Gelatin Unspecified     [employer]ist BELIEFS    Pork Derived Products Unspecified     Ad[employer]ist BELIEFS       DIET  Orders Placed This Encounter   Procedures    Diet Order Diet: Cardiac (vegetarian diet only)     Standing Status:   Standing     Number of Occurrences:   1     Order Specific Question:   Diet:     Answer:   Cardiac [6]     Comments:   vegetarian diet only       ACTIVITY  As tolerated.  Weight bearing as tolerated    CONSULTATIONS  NONE    PROCEDURES  NONE    IMAGING  EC-ECHOCARDIOGRAM COMPLETE W/O CONT   Final Result      US-EXTREMITY VENOUS LOWER BILAT   Final Result      CT-HEAD WITH & W/O   Final Result         1.  Mild atrophy with nonspecific white matter changes, commonly associated with small vessel ischemia, otherwise indeterminate.   2.  Subdural fluid collection along the right cranial bone flap, with hazy dependent hyperdensity, appears stable since prior study, appearance most compatible with chronic postop changes.   3.  Area of enhancement along the dura of cranial bone flap, could represent postop changes, possibly dural tumoral extension although this may be unlikely, otherwise of uncertain significance.   4.  Cystic structure anterior to the right frontal lobe with slight peripheral enhancement, could represent postop changes with enhancement of vascular structure, otherwise of uncertain significance.   5.  Atherosclerosis      DX-CHEST-PORTABLE (1 VIEW)   Final Result         1.  Left basilar atelectasis or early  infiltrates.   2.  Cardiomegaly            LABORATORY  Lab Results   Component Value Date    SODIUM 141 08/19/2022    POTASSIUM 3.8 08/19/2022    CHLORIDE 107 08/19/2022    CO2 22 08/19/2022    GLUCOSE 94 08/19/2022    BUN 6 (L) 08/19/2022    CREATININE 0.47 (L) 08/19/2022        Lab Results   Component Value Date    WBC 5.9 08/19/2022    HEMOGLOBIN 11.5 (L) 08/19/2022    HEMATOCRIT 38.0 08/19/2022    PLATELETCT 352 08/19/2022        Total time of the discharge process took  36 minutes.  ========================================================================================================  Please note that this dictation was created using voice recognition software. I have made every reasonable attempt to correct obvious errors, but there may be errors of grammar and possibly content that I did not discover before finalizing the note.    Electronically signed by:  ALICE Church, MSN, APRN, FNP-C  Hospitalist Services  [facility]  [phone]  [email or web address]  08/19/22                 [id number]

## 2022-08-19 NOTE — HOSPITAL COURSE
Ms. Jan Palencia is a [place]an Tamazight speaking female with a PMHx meningioma status post removal, hypertension, admitted on 8/17/2022 due to complaints of bilateral lower extremity edema, and paresthesia in her right leg.    Patient normally follows providers in [place] where she normally resides.  Patient was on a plane coming from [place] to visit family here, and progressively had gotten worse with her leg swelling and paresthesia in her right side.    In ER, CT of the head without contrast was obtained which revealed mild atrophy, subdural fluid collection along the right cranial bone flap and appearance compatible with chronic postoperative changes.  Ultrasound of the bilateral lower extremity was obtained which noted all veins demonstrate complete color filling and compressibility with normal venous flow dynamics including spontaneous flow and respiratory necessity with no deep venous thrombosis.  Patient admitted into the observation unit for further evaluation and treatment.    During this course of stay, no events noted overnight.  An echocardiogram was obtained which noted LVEF approximately 75%.  On examination, patient noted to have some trace edema of which fluids were discontinued and patient given 1 dose of IV Lasix 20 mg.  Discussed with patient along with patient's daughter, Chantal who was at bedside and utilize family to communicate with patient.    At this time, all tests are negative.  Patient denies any paresthesia at this time and trace edema is improving with use of Lasix.  Discussed with patient and family that patient will be cleared to travel back to [place].  Discussed with patient taking aspirin for the flight as she will be sitting down for long periods of time.  Discussed with patient to keep her self hydrated during flight.  Patient prescribed antihypertensive medication for better control of her blood pressure at home.  Patient will also be trialed on amitriptyline for better  control of paresthesia if any arise.  Patient highly encouraged to follow-up with her PCP in [place] for management of care.  All questions and concerns answered prior to being discharged.  Patient discharged home.

## 2022-08-19 NOTE — THERAPY
Physical Therapy   Initial Evaluation     Patient Name: Jan Palencia  Age:  82 y.o., Sex:  female  Medical Record #: [id number]  Today's Date: 8/19/2022     Precautions  Precautions: Fall Risk  Comments: language not available on  services    Assessment  Patient is an 82 y.o. female who was admitted with R sided body pain and LE edema. Pt diagnosed with a tension headache and ultrasounds of LE were negative for DVT. PMH significant for meningioma removal in May '22, HTN, DVT, PE.     Pt received standing at EOB and gesturing need to use the bathroom. Pt was able to ambulate with both HHA and no assistance with supervision. Pt demonstrated a mild limp with RLE weightbearing that did not significantly affect her balance or ambulation. Attempted to utilize French interpretor and pt stated to her that she did not understand her. Originally were waiting to complete eval with daughter present, but she came in briefly and returned home. Pt appears to be mobilizing at or near baseline level. Anticipate no further acute or post-acute PT needs.    Plan    Recommend Physical Therapy for Evaluation only.    DC Equipment Recommendations: None  Discharge Recommendations: Anticipate that the patient will have no further physical therapy needs after discharge from the hospital     Objective       08/19/22 1215   Initial Contact Note    Initial Contact Note Order Received and Verified, Evaluation Only - Patient Does Not Require Further Acute Physical Therapy at this Time.  However, May Benefit from Post Acute Therapy for Higher Level Functional Deficits.   Precautions   Precautions Fall Risk   Comments language not available on interpretor services   Pain 0 - 10 Group   Therapist Pain Assessment   (pt did not appear to be in pain, limited by language barrier)   Prior Living Situation   Housing / Facility 2 Story Apartment / Condo   Steps In Home   (FOS)   Lives with - Patient's Self Care Capacity Adult Children   Comments  Hx obtained per chart review. Pt visiting from [place] and staying with her daughter's family. Reportedly pt is to return home to [place] tomorrow per RN discussion with daughter.   Prior Level of Functional Mobility   Comments Independent with mobility with no AD   History of Falls   History of Falls   (unknown)   Cognition    Level of Consciousness Alert   Comments Pt pleasant and cooperative with gestural commands. Attempted to use language line in Bulgarian, which the  reports is a different language than New Zealander Bulgarian.   Passive ROM Lower Body   Passive ROM Lower Body WDL   Active ROM Lower Body    Active ROM Lower Body  WDL   Strength Lower Body   Comments Mild antalgia with R stance phase suggestive of either pain or weakness. Pt able to lift BLE against gravity, unable to further test due to language barrier   Lower Body Muscle Tone   Lower Body Muscle Tone  WDL   Coordination Lower Body    Coordination Lower Body  WDL   Balance Assessment   Sitting Balance (Static) Good   Sitting Balance (Dynamic) Good   Standing Balance (Static) Fair   Standing Balance (Dynamic) Fair   Weight Shift Sitting Good   Weight Shift Standing Fair   Comments No AD in standing, able to walk with and without HHA   Gait Analysis   Gait Level Of Assist Standby Assist   Assistive Device None;Hand Held Assist   Distance (Feet) 100   # of Times Distance was Traveled 1   Deviation Bradykinetic;Decreased Heel Strike;Decreased Toe Off  (mild antalgia with R weightbearing)   Bed Mobility    Supine to Sit Independent   Sit to Supine Independent   Scooting Independent   Rolling Independent   Comments Pt able to get in and out of bed independently.   Functional Mobility   Sit to Stand Standby Assist   Bed, Chair, Wheelchair Transfer Standby Assist   Toilet Transfers Standby Assist   Transfer Method Stand Step   How much difficulty does the patient currently have...   Turning over in bed (including adjusting bedclothes, sheets and  blankets)? 4   Sitting down on and standing up from a chair with arms (e.g., wheelchair, bedside commode, etc.) 3   Moving from lying on back to sitting on the side of the bed? 3   How much help from another person does the patient currently need...   Moving to and from a bed to a chair (including a wheelchair)? 4   Need to walk in a hospital room? 3   Climbing 3-5 steps with a railing? 3   6 clicks Mobility Score 20   Education Group   Additional Comments Unable to provide edu 2/2 language barrier   Anticipated Discharge Equipment and Recommendations   DC Equipment Recommendations None   Discharge Recommendations Anticipate that the patient will have no further physical therapy needs after discharge from the hospital   Interdisciplinary Plan of Care Collaboration   IDT Collaboration with  Nursing;Occupational Therapist   Patient Position at End of Therapy Seated;In Bed;Call Light within Reach;Tray Table within Reach   Collaboration Comments RN updated   Session Information   Date / Session Number  8/19-eval only

## 2022-08-20 NOTE — PROGRESS NOTES
Discharge instructions given to patient and daughter. All questions answered. All belongings returned to patient. Patient discharged home via wheelchair, accompanied by daughter.

## 2024-05-31 NOTE — PROGRESS NOTES
"Hospital Medicine Daily Progress Note    Date of Service  5/23/2022    Chief Complaint  Jan Palencia is a 81 y.o. female admitted 5/12/2022 with   Chief Complaint   Patient presents with   • Headache   • Hypertension   • Hernia   • Dental Pain     Left sided        Hospital Course  \"This is 81-year-old female with a past medical history significant for obesity, new diagnosis of hypertension presented to the ER with headache, mild confusion hypertension and balance issues.  She was found to have right frontal meningioma with associated mass-effect on right frontal lobe with adjacent white matter edema.  Neurosurgery was consulted.  She was started on Keppra and Decadron.  She was initially admitted to the ICU for close monitoring.  IR was unable to perform embolization of feeder vessel due to vessel anatomy. Now planned for craniotomy and tumor resection 5/19/22.\" Assessment from prior Hospitalist and critical care APRN.    Patient did well after surgery and was monitored overnight in RICU. As per neurosurgery, patient motor skills intact and downgraded to medical bed.  Patient kept on decadron and Keppra.  MRI brain ordered.    Interval Problem Update  - didn't sleep much overnight, c/o abdominal pain however had suppository and BM this am, feeling better but now very tired  - worked with PT/OT yesterday but was very weak    I have personally seen and examined the patient at bedside. I discussed the plan of care with patient, bedside RN, charge RN,  and neurosurgery.    Consultants/Specialty  critical care and neurosurgery    Code Status  Full Code    Disposition  Patient is not medically cleared for discharge.   Anticipate discharge to to home with close outpatient follow-up.  I have placed the appropriate orders for post-discharge needs.    Review of Systems  Review of Systems   Unable to perform ROS: Language   Gastrointestinal: Positive for abdominal pain and constipation.   Neurological: Positive " for sensory change and headaches. Negative for focal weakness.    Patient appeared to be complaining of a headache and back head pain.     Physical Exam  Temp:  [36.8 °C (98.2 °F)-37 °C (98.6 °F)] 36.8 °C (98.2 °F)  Pulse:  [56-62] 59  Resp:  [18] 18  BP: (129-141)/(52-65) 135/62  SpO2:  [92 %-93 %] 93 %    Physical Exam  Vitals and nursing note reviewed.   Constitutional:       General: She is not in acute distress.     Appearance: She is obese. She is not ill-appearing.   HENT:      Head:      Comments: craniotomy incision c/d/i       Nose: Nose normal.      Mouth/Throat:      Mouth: Mucous membranes are moist.      Pharynx: No oropharyngeal exudate.   Eyes:      General: No scleral icterus.     Conjunctiva/sclera: Conjunctivae normal.   Cardiovascular:      Rate and Rhythm: Normal rate and regular rhythm.      Pulses: Normal pulses.      Heart sounds: Normal heart sounds. No murmur heard.  Pulmonary:      Effort: Pulmonary effort is normal. No respiratory distress.      Breath sounds: Normal breath sounds. No wheezing.   Abdominal:      General: Bowel sounds are normal. There is distension.      Palpations: Abdomen is soft.      Tenderness: There is no abdominal tenderness.      Hernia: A hernia (ventral) is present.   Musculoskeletal:         General: No swelling or tenderness.      Right lower leg: Edema (trace) present.      Left lower leg: Edema (trace) present.   Skin:     General: Skin is warm.      Coloration: Skin is not jaundiced.      Findings: No erythema.   Neurological:      Mental Status: She is alert and oriented to person, place, and time.         Fluids  No intake or output data in the 24 hours ending 05/23/22 0933    Laboratory  Recent Labs     05/21/22  0350 05/22/22  0245 05/23/22  0224   WBC 18.0* 11.9* 11.3*   RBC 3.60* 3.32* 3.28*   HEMOGLOBIN 10.0* 9.4* 9.3*   HEMATOCRIT 32.3* 29.6* 28.8*   MCV 89.7 89.2 87.8   MCH 27.8 28.3 28.4   MCHC 31.0* 31.8* 32.3*   RDW 46.7 45.7 44.8   PLATELETCT  203 187 205   MPV 10.8 11.0 11.2     Recent Labs     05/21/22  0350   SODIUM 139   POTASSIUM 5.2   CHLORIDE 106   CO2 23   GLUCOSE 150*   BUN 25*   CREATININE 0.51   CALCIUM 8.0*                   Imaging  MR-BRAIN-WITH & W/O   Final Result      1.  Recent right frontal craniotomy for resection of large right anterior frontal meningioma. Right frontal subdural drain in place      2.  Bifrontal pneumocephalus.      3.  Postoperative cavity in the right anterior frontal region containing CSF and blood products.      4.  Postoperative changes and edema involving the residual right anterior frontal lobe with effacement of the frontal horns and slight right to left subfalcine herniation.      5.  Moderately large gyriform area of acute infarction involving the right posterior medial temporal lobe and adjacent right medial occipital lobe.      6.  Age-related cerebral atrophy.      7.  Moderate periventricular and juxtacortical white matter changes consistent with chronic microvascular ischemic gliosis.      CT-HEAD W/O   Final Result         1.  Postop changes of right frontal meningioma resection with new pneumocephalus   2.  Hyperdensity along the anterior aspect of the right frontal lobe superiorly, appearance suggests possible Gelfoam or area of postoperative extra-axial hemorrhage.   3.  Atherosclerosis.         DX-CHEST-PORTABLE (1 VIEW)   Final Result      1.  Supportive tubing as described above.   2.  No pneumothorax.   3.  Minimal LEFT lung base atelectasis again seen.      DX-CHEST-PORTABLE (1 VIEW)   Final Result      No radiographic evidence of acute cardiopulmonary disease.      [id number] VIEW   Final Result         1.  Moderate stool in the colon suggests changes of constipation, otherwise nonspecific bowel gas pattern   2.  Hazy bilateral lung base opacities suggests subtle infiltrates or edema.      IR-EMBOLIZE-NEURO-INTRACRANIAL   Final Result   Impression:      Cerebral angiogram for assessment for  vascular supply to the frontal meningioma demonstrates with the predominant supply to the meningioma from the artery of the falx cerebri arising from the left ophthalmic artery with minimal supply also arising from    the right-sided artery of the falx. No definite supply to the lesion from the middle meningeal arteries. Very minimal supply to the right side of the meningioma from distal frontal penetrating branches of the superficial temporal artery also noted.      Since the majority of the supply to the meningioma was from the ophthalmic artery, the lesion could not be safely embolized.      I, Ayo Schmidt was physically present and participated during the entire procedure of the IR-EMBOLIZE-NEURO-INTRACRANIAL.         MR-STEALTH BRAIN WITH & W/O   Final Result         Large right inferior frontal region dural based extra-axial mass that most likely represents a meningioma. There is mild mass effect upon the bilateral inferior frontal lobes with midline shift to the left measuring approximately 10 to 11 mm. There is    also mass effect upon the lateral ventricles.      Minimal edema noted in the right superomedial frontal white matter adjacent to the mass.      Age-related volume loss and chronic microvascular ischemic changes.      CT-HEAD W/O   Final Result      1.  Negative for hemorrhage      2.  Large right frontal meningioma measuring 5.2 x 4.4 x 4.2 cm      3.  Associated mass effect on the right frontal lobe with adjacent white matter edema and mild compression of the frontal horn right lateral ventricle.         CT-MAXILLOFACIAL WITH PLUS RECONS   Final Result      1.  Negative for facial or orbital abscess      2.  CT face within normal limits      3.  Partially calcified right frontal extra-axial mass consistent with a meningioma described in detail on head CT report           Assessment/Plan  * Meningioma (HCC)- (present on admission)  Assessment & Plan  CT head shows Large right frontal  meningioma measuring 5.2 x 4.4 x 4.2 cm and associated mass effect on the right frontal lobe with adjacent white matter edema and mild compression of the frontal horn right lateral ventricle   -- MRI has been reviewed, neurosurgery Dr. Goodson has evaluated, recommended IR guided embolization on 5/14   IR unable to perform embolization of feeder artery due to anatomy    Continue decadron every 6 hours,  omeprazole 20 mg p.o. daily  Keppra 500 mg twice daily    S/p CRANIOTOMY, USING FRAMELESS STEREOTAXY - FRONTAL FOR BRAIN TUMOR RESECTION   - Neurosurgery following patient's case  - MRI brain 5/21  - weaning steroids 5/22  - Pain control    'Neurosurgery cleared for discharge however given weakness and no insurance so no post-acute will keep inpatient to work with PT/OT until safe for discharge, neurosurgery starting dex wean    Leukocytosis- (present on admission)  Assessment & Plan  Improving  Likely steroids and post op reaction.  No infectious source at this time.    HTN (hypertension)- (present on admission)  Assessment & Plan  Continue amlodipine and lisinopril.  Goal for BP <140mmHg  If still above goal after steroids wean may need to add another agent    Abdominal pain  Assessment & Plan  Abdominal pain again, now improved after BM today  bowel regimen  continue PPI since on steroids  CTM    Prediabetes- (present on admission)  Assessment & Plan  No apparent diabetes, no medication at home listed on EMR.   A1c 6.4 on admission.  Patient has hyperglycemia, while on dexamethasone    - sugars controlled and weaning steroids, dc insulin and glucose checks      Dental caries- (present on admission)  Assessment & Plan  Unasyn given in ED  No abscess seen on CT.  unasyn stopped 5/16.     VTE prophylaxis: enoxaparin ppx    I have performed a physical exam and reviewed and updated ROS and Plan today (5/23/2022). In review of yesterday's note (5/22/2022), there are no changes except as documented above.       Assistance OOB with selected safe patient handling equipment/Communicate Risk of Fall with Harm to all staff/Discuss with provider need for PT consult/Monitor gait and stability/Provide patient with walking aids - walker, cane, crutches/Reinforce activity limits and safety measures with patient and family/Tailored Fall Risk Interventions/Visual Cue: Yellow wristband and red socks/Bed in lowest position, wheels locked, appropriate side rails in place/Call bell, personal items and telephone in reach/Instruct patient to call for assistance before getting out of bed or chair/Non-slip footwear when patient is out of bed/[place] to call system/Physically safe environment - no spills, clutter or unnecessary equipment/Purposeful Proactive Rounding/Room/bathroom lighting operational, light cord in reach
